# Patient Record
Sex: MALE | Race: WHITE | NOT HISPANIC OR LATINO | Employment: FULL TIME | URBAN - METROPOLITAN AREA
[De-identification: names, ages, dates, MRNs, and addresses within clinical notes are randomized per-mention and may not be internally consistent; named-entity substitution may affect disease eponyms.]

---

## 2017-06-06 ENCOUNTER — TRANSFERRED RECORDS (OUTPATIENT)
Dept: HEALTH INFORMATION MANAGEMENT | Facility: CLINIC | Age: 42
End: 2017-06-06

## 2017-06-07 ENCOUNTER — TRANSFERRED RECORDS (OUTPATIENT)
Dept: HEALTH INFORMATION MANAGEMENT | Facility: CLINIC | Age: 42
End: 2017-06-07

## 2017-06-08 ENCOUNTER — TRANSFERRED RECORDS (OUTPATIENT)
Dept: HEALTH INFORMATION MANAGEMENT | Facility: CLINIC | Age: 42
End: 2017-06-08

## 2017-06-09 ENCOUNTER — TRANSFERRED RECORDS (OUTPATIENT)
Dept: HEALTH INFORMATION MANAGEMENT | Facility: CLINIC | Age: 42
End: 2017-06-09

## 2017-06-12 ENCOUNTER — TRANSFERRED RECORDS (OUTPATIENT)
Dept: HEALTH INFORMATION MANAGEMENT | Facility: CLINIC | Age: 42
End: 2017-06-12

## 2017-07-12 ENCOUNTER — TRANSFERRED RECORDS (OUTPATIENT)
Dept: HEALTH INFORMATION MANAGEMENT | Facility: CLINIC | Age: 42
End: 2017-07-12

## 2017-07-13 ENCOUNTER — TRANSFERRED RECORDS (OUTPATIENT)
Dept: HEALTH INFORMATION MANAGEMENT | Facility: CLINIC | Age: 42
End: 2017-07-13

## 2017-07-17 ENCOUNTER — TRANSFERRED RECORDS (OUTPATIENT)
Dept: HEALTH INFORMATION MANAGEMENT | Facility: CLINIC | Age: 42
End: 2017-07-17

## 2017-07-18 ENCOUNTER — TRANSFERRED RECORDS (OUTPATIENT)
Dept: HEALTH INFORMATION MANAGEMENT | Facility: CLINIC | Age: 42
End: 2017-07-18

## 2017-07-18 ENCOUNTER — PRE VISIT (OUTPATIENT)
Dept: UROLOGY | Facility: CLINIC | Age: 42
End: 2017-07-18

## 2017-08-16 ENCOUNTER — PRE VISIT (OUTPATIENT)
Dept: UROLOGY | Facility: CLINIC | Age: 42
End: 2017-08-16

## 2017-08-16 DIAGNOSIS — N31.9 NEUROGENIC BLADDER: Primary | ICD-10-CM

## 2017-08-16 NOTE — TELEPHONE ENCOUNTER
Patient with a Neurogenic bladder coming in for a consult. Chart reviewed labs are available. Orders placed and message sent to schedulers to schedule a renal US.

## 2017-08-21 ENCOUNTER — OFFICE VISIT (OUTPATIENT)
Dept: UROLOGY | Facility: CLINIC | Age: 42
End: 2017-08-21

## 2017-08-21 VITALS
HEIGHT: 66 IN | DIASTOLIC BLOOD PRESSURE: 74 MMHG | BODY MASS INDEX: 24.11 KG/M2 | WEIGHT: 150 LBS | HEART RATE: 57 BPM | SYSTOLIC BLOOD PRESSURE: 123 MMHG

## 2017-08-21 DIAGNOSIS — S24.109S INJURY OF THORACIC SPINAL CORD, SEQUELA (H): Primary | ICD-10-CM

## 2017-08-21 DIAGNOSIS — N31.9 NEUROGENIC BLADDER: ICD-10-CM

## 2017-08-21 RX ORDER — TAMSULOSIN HYDROCHLORIDE 0.4 MG/1
0.4 CAPSULE ORAL
COMMUNITY
Start: 2017-07-17 | End: 2018-07-16

## 2017-08-21 RX ORDER — SENNOSIDES A AND B 8.6 MG/1
34.4 TABLET, FILM COATED ORAL
COMMUNITY
Start: 2017-07-17 | End: 2019-11-12

## 2017-08-21 RX ORDER — ACETAMINOPHEN 325 MG/1
TABLET ORAL
COMMUNITY
Start: 2017-07-18 | End: 2018-11-12

## 2017-08-21 RX ORDER — BISACODYL 10 MG
10 SUPPOSITORY, RECTAL RECTAL DAILY
COMMUNITY
Start: 2017-07-17 | End: 2019-11-12

## 2017-08-21 RX ORDER — CARVEDILOL 6.25 MG/1
6.25 TABLET ORAL DAILY
COMMUNITY
Start: 2017-07-17

## 2017-08-21 RX ORDER — TRAMADOL HYDROCHLORIDE 50 MG/1
50-100 TABLET ORAL
Status: ON HOLD | COMMUNITY
Start: 2017-07-24 | End: 2019-11-14

## 2017-08-21 RX ORDER — BACLOFEN 10 MG/1
10 TABLET ORAL
COMMUNITY
Start: 2017-07-17 | End: 2018-11-12

## 2017-08-21 RX ORDER — POLYETHYLENE GLYCOL 3350 17 G/17G
17 POWDER, FOR SOLUTION ORAL
Status: ON HOLD | COMMUNITY
Start: 2017-04-06 | End: 2019-11-15

## 2017-08-21 RX ORDER — IBUPROFEN 200 MG
1200 CAPSULE ORAL
COMMUNITY
Start: 2017-07-17 | End: 2023-04-24

## 2017-08-21 RX ORDER — DOCUSATE SODIUM 100 MG/1
200 CAPSULE, LIQUID FILLED ORAL 2 TIMES DAILY
COMMUNITY
Start: 2017-07-17 | End: 2018-07-16

## 2017-08-21 RX ORDER — ALUMINUM HYDROXIDE, MAGNESIUM HYDROXIDE, SIMETHICONE 400; 400; 40 MG/10ML; MG/10ML; MG/10ML
10-20 SUSPENSION ORAL
COMMUNITY
End: 2017-10-09

## 2017-08-21 ASSESSMENT — PAIN SCALES - GENERAL: PAINLEVEL: NO PAIN (0)

## 2017-08-21 NOTE — MR AVS SNAPSHOT
After Visit Summary   8/21/2017    Kinza Teresa    MRN: [id number]           Patient Information     Date Of Birth          1975        Visit Information        Provider Department      8/21/2017 10:30 AM Sulaiman Jacome MD [facility] Urology and Inst for Prostate and Urologic Cancers        Today's Diagnoses     Injury of thoracic spinal cord, sequela (H)    -  1    Neurogenic bladder          Care Instructions       Page 1 of 1  Your exam is on: _______________________ (date) __________________ (time)at: ____________________________________________________ (location)  For informational purposes only. Not to replace the advice of your health care provider.Copyright   2007 [employer]. All rights reserved. [employer] [id number] - REV 12/16.  Urodynamic Testing  What You Need to Know  What is urodynamic testing?  Urodynamic testing is the best way for your doctor to look at the function of your bladder. It is like an EKG, which is used to look at the function of your heart.  The test takes between 60 and 90 minutes. You will spend about 11/2 to 2 hours in your doctor s office. For most patients, the test is not painful.  How should I get ready for this test?   Arrive with a full bladder, if you are able. (Try to drink six 8-ounce glasses of liquid one hour before your exam.)    You may want to wear loose clothing.   Make sure your skin below the waist is clean and dry (no lotions or powders).   If you have any of the following symptoms before the test, please call the clinic right away:Having to urinate more often or feeling a sudden urge to go   Feeling pain or burning when you urinate   Fever or chills   Smelly or cloudy urine      If we gave you a bladder diary, please complete and bring it with you.    What happens during this test?  1. You will empty your bladder into a special toilet. The toilet measures your rate of urine flow.  2. We will then place a very small tube  (catheter) into your bladder. This drains any urine that is left. We will measure the amount of urine.  3. We will place a small tube in your rectum. We ll also put a tiny patch of electrodes on the skin near the anus.  4. A computer will measure the pressure in your bladder and how well your sphincter is working. (The sphincter is the muscle that controls the bladder.)  5. Your bladder will slowly fill with a mixture of saline and contrast medium. We will take images, similar to an Xray. You will tell us when your bladder feels a bit full, quite full and completely full.  6. We will ask you to bear down several times. As you do, we will check for leaking urine.  7. You will again empty your bladder into the special toilet.    What happens after the test?  Your doctor will share the results on the day of the exam, or soon after.  After your test, you may go about your day as normal. You may notice some blood in your urine. You may feel a more urgent need to use the toilet, or you may need to go more often. These effects should improve in the next few days.          Follow-ups after your visit        Your next 10 appointments already scheduled     Sep 27, 2017  9:30 AM CDT   (Arrive by 9:15 AM)   Urodynamics with Regi Richardson PA-C   [facility] Urology and [facility] for Pro[facility] and Urologic C[facility] ([facility])    [address] [place] [address] [id number] [phone]            Oct 09, 2017 12:00 PM CDT   (Arrive by 11:45 AM)   Return Visit with Sulaiman Jacome MD   [facility] Urology and [facility] Urologic C[facility] ([facility])    [address] [place] [address] [id number] [phone]              Who to contact     Please call your clinic at [phone] to:    Ask questions about your health    Make or cancel appointments    Discuss your medicines    Learn about your test results    Speak to your doctor   If you  "have compliments or concerns about an experience at your clinic, or if you wish to file a complaint, please contact [facility] Patient Relations at [phone] or email us at [email or web address]         Additional Information About Your Visit        [employer] Information     [employer] is an electronic gateway that provides easy, online access to your medical records. With [employer], you can request a clinic appointment, read your test results, renew a prescription or communicate with your care team.     To sign up for [employer] visit the website at [email or web address]   You will be asked to enter the access code listed below, as well as some personal information. Please follow the directions to create your username and password.     Your access code is: [email or web address]  Expires: 2017  6:31 AM     Your access code will  in 90 days. If you need help or a new code, please contact your [facility] Clinic or call [phone] for assistance.        Care EveryWhere ID     This is your Care EveryWhere ID. This could be used by other organizations to access your [facility] medical records  [id number]        Your Vitals Were     Pulse Height BMI (Body Mass Index)             57 1.676 m (5' 6\") 24.21 kg/m2          Blood Pressure from Last 3 Encounters:   17 123/74    Weight from Last 3 Encounters:   17 68 kg (150 lb)              Today, you had the following     No orders found for display       Primary Care Provider Office Phone # Fax #    Ray Puente [phone]       [facility] [address] [address]        Equal Access to Services     SUZANNE CASILLAS : Hadii [facility] Sobrice, waaxda luqadaha, qaybta kaalmada hayley hdez. So [facility] [phone].    ATENCIÓN: Si habla español, tiene a villarreal disposición servicios gratuitos de asistencia lingüística. Llame al " [phone].    We comply with applicable federal civil rights laws and [place] laws. We do not discriminate on the basis of race, color, national origin, age, disability sex, sexual orientation or gender identity.            Thank you!     Thank you for choosing [facility] UROLOGY AND [facility] FOR PROST[facility] AND UROLOGIC CANCERS  for your care. Our goal is always to provide you with excellent care. Hearing back from our patients is one way we can continue to improve our services. Please take a few minutes to complete the written survey that you may receive in the mail after your visit with us. Thank you!             Your Updated Medication List - Protect others around you: Learn how to safely use, store and throw away your medicines at www.disposemymeds.org.          This list is accurate as of: 8/21/17 12:09 PM.  Always use your most recent med list.                   Brand Name Dispense Instructions for use Diagnosis    acetaminophen 325 MG tablet    TYLENOL     Take 2 tabs bid        Aluminum-Magnesium-Simethicone 200-200-20 MG/5ML Susp      Take 10-20 mLs by mouth        baclofen 10 MG tablet    LIORESAL     Take 10 mg by mouth        bisacodyl 10 MG Suppository    DULCOLAX     Place 10 mg rectally        calcium carbonate 1250 (500 CA) MG Tabs tablet    OSCAL 500     Take 500 mg by mouth        carvedilol 6.25 MG tablet    COREG     Take 6.25 mg by mouth        docusate sodium 100 MG capsule    COLACE     Take 200 mg by mouth 2 times daily        FIBER (GUAR GUM) PO      Take 1 packet by mouth        omeprazole 20 MG CR capsule    priLOSEC     Take 20 mg by mouth        polyethylene glycol Packet    MIRALAX/GLYCOLAX     Take 17 g by mouth        senna 8.6 MG tablet    SENOKOT     Take 34.4 mg by mouth        tamsulosin 0.4 MG capsule    FLOMAX     Take 0.4 mg by mouth        traMADol 50 MG tablet    ULTRAM     Take  mg by mouth        Wheel Chair K1 Basic Desk Arm Misc      Wheelchair:  Tilt and space  with  leg rests  Length of need: 6 months
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
## 2017-08-21 NOTE — PATIENT INSTRUCTIONS
Page 1 of 1  Your exam is on: _______________________ (date) __________________ (time)at: ____________________________________________________ (location)  For informational purposes only. Not to replace the advice of your health care provider.Copyright   2007 [employer]. All rights reserved. [employer] [id number] - REV 12/16.  Urodynamic Testing  What You Need to Know  What is urodynamic testing?  Urodynamic testing is the best way for your doctor to look at the function of your bladder. It is like an EKG, which is used to look at the function of your heart.  The test takes between 60 and 90 minutes. You will spend about 11/2 to 2 hours in your doctor s office. For most patients, the test is not painful.  How should I get ready for this test?   Arrive with a full bladder, if you are able. (Try to drink six 8-ounce glasses of liquid one hour before your exam.)    You may want to wear loose clothing.   Make sure your skin below the waist is clean and dry (no lotions or powders).   If you have any of the following symptoms before the test, please call the clinic right away:Having to urinate more often or feeling a sudden urge to go   Feeling pain or burning when you urinate   Fever or chills   Smelly or cloudy urine      If we gave you a bladder diary, please complete and bring it with you.    What happens during this test?  1. You will empty your bladder into a special toilet. The toilet measures your rate of urine flow.  2. We will then place a very small tube (catheter) into your bladder. This drains any urine that is left. We will measure the amount of urine.  3. We will place a small tube in your rectum. We ll also put a tiny patch of electrodes on the skin near the anus.  4. A computer will measure the pressure in your bladder and how well your sphincter is working. (The sphincter is the muscle that controls the bladder.)  5. Your bladder will slowly fill with a mixture of saline and contrast medium.  We will take images, similar to an Xray. You will tell us when your bladder feels a bit full, quite full and completely full.  6. We will ask you to bear down several times. As you do, we will check for leaking urine.  7. You will again empty your bladder into the special toilet.    What happens after the test?  Your doctor will share the results on the day of the exam, or soon after.  After your test, you may go about your day as normal. You may notice some blood in your urine. You may feel a more urgent need to use the toilet, or you may need to go more often. These effects should improve in the next few days.

## 2017-08-21 NOTE — NURSING NOTE
"Chief Complaint   Patient presents with     Consult     Neurogenic bladder due to spinal cord injury.       Blood pressure 123/74, pulse 57, height 1.676 m (5' 6\"), weight 68 kg (150 lb). Body mass index is 24.21 kg/(m^2).    There is no problem list on file for this patient.      No Known Allergies    Current Outpatient Prescriptions   Medication Sig Dispense Refill     acetaminophen (TYLENOL) 325 MG tablet Take 2 tabs bid       baclofen (LIORESAL) 10 MG tablet Take 10 mg by mouth       bisacodyl (DULCOLAX) 10 MG Suppository Place 10 mg rectally       calcium carbonate (OSCAL 500) 1250 (500 CA) MG TABS tablet Take 500 mg by mouth       carvedilol (COREG) 6.25 MG tablet Take 6.25 mg by mouth       docusate sodium (COLACE) 100 MG capsule Take 200 mg by mouth 2 times daily       FIBER, GUAR GUM, PO Take 1 packet by mouth       omeprazole (PRILOSEC) 20 MG CR capsule Take 20 mg by mouth       senna (SENOKOT) 8.6 MG tablet Take 34.4 mg by mouth       tamsulosin (FLOMAX) 0.4 MG capsule Take 0.4 mg by mouth       traMADol (ULTRAM) 50 MG tablet Take  mg by mouth       Misc. Devices (WHEEL CHAIR K1 BASIC DESK ARM) MISC Wheelchair:  Tilt and space  with leg rests  Length of need: 6 months       polyethylene glycol (MIRALAX/GLYCOLAX) Packet Take 17 g by mouth       Alum & Mag Hydroxide-Simeth (ALUMINUM-MAGNESIUM-SIMETHICONE) 200-200-20 MG/5ML SUSP Take 10-20 mLs by mouth         Social History   Substance Use Topics     Smoking status: Never Smoker     Smokeless tobacco: Never Used     Alcohol use Not on file       ABDIRIZAK Gomez  8/21/2017  10:53 AM       "

## 2017-09-20 ENCOUNTER — PRE VISIT (OUTPATIENT)
Dept: UROLOGY | Facility: CLINIC | Age: 42
End: 2017-09-20

## 2017-09-27 ENCOUNTER — OFFICE VISIT (OUTPATIENT)
Dept: UROLOGY | Facility: CLINIC | Age: 42
End: 2017-09-27

## 2017-09-27 VITALS
DIASTOLIC BLOOD PRESSURE: 83 MMHG | WEIGHT: 150 LBS | HEIGHT: 66 IN | HEART RATE: 74 BPM | BODY MASS INDEX: 24.11 KG/M2 | SYSTOLIC BLOOD PRESSURE: 132 MMHG

## 2017-09-27 DIAGNOSIS — S24.109S INJURY OF THORACIC SPINAL CORD, SEQUELA (H): ICD-10-CM

## 2017-09-27 DIAGNOSIS — N31.9 NEUROGENIC BLADDER: Primary | ICD-10-CM

## 2017-09-27 LAB
APPEARANCE UR: CLEAR
BILIRUB UR QL: NORMAL
COLOR UR: YELLOW
GLUCOSE URINE: NORMAL MG/DL
HGB UR QL: NORMAL
KETONES UR QL: NORMAL MG/DL
LEUKOCYTE ESTERASE URINE: NORMAL
NITRITE UR QL STRIP: NORMAL
PH UR STRIP: 5 PH (ref 5–7)
PROTEIN ALBUMIN URINE: NORMAL MG/DL
SOURCE: NORMAL
SP GR UR STRIP: 1.02 (ref 1–1.03)
UROBILINOGEN UR QL STRIP: 0.2 EU/DL (ref 0.2–1)

## 2017-09-27 RX ORDER — GABAPENTIN 300 MG/1
CAPSULE ORAL
COMMUNITY
Start: 2017-09-12 | End: 2018-07-16

## 2017-09-27 RX ORDER — ERGOCALCIFEROL 1.25 MG/1
50000 CAPSULE, LIQUID FILLED ORAL
COMMUNITY
Start: 2017-08-21 | End: 2017-10-10

## 2017-09-27 RX ORDER — LIDOCAINE 50 MG/G
OINTMENT TOPICAL
COMMUNITY
Start: 2017-09-12 | End: 2018-07-16

## 2017-09-27 ASSESSMENT — PAIN SCALES - GENERAL: PAINLEVEL: NO PAIN (0)

## 2017-09-27 NOTE — LETTER
9/27/2017       RE: Kinza Teresa  [address] [place] [address]     Dear Colleague,    Thank you for referring your patient, Kinza Teresa, to the [facility] UROLOGY AND INST FOR PROSTATE AND UROLOGIC CANCERS at [facility]. Please see a copy of my visit note below.    PREPROCEDURE DIAGNOSES:    1. Neurogenic bladder secondary to T11 SCI in 3/2017    POSTPROCEDURE DIAGNOSES:  1. Neurogenic bladder secondary to T11 SCI in 3/2017  2. UDS shows:  -Detrusor activity during filling: multiple episodes of DO starting after 200 mL instilled - these occur without incontinence at lower volumes/pressures but WITH incontinence at higher volumes/pressures (first leak noted at 507 mL and Pdet of 30 cm H2O; larger leak occurs at 560 mL and Pdet of 32 cm H2O).  -Otherwise good bladder compliance between episodes of DO  -Normal bladder capacity   -Sensation: absent  -Incontinence: DO with incontinence as described above; no stress leak  -Detrusor sphincter dyssenergia: could not definitively assess as patient did not void (some increased EMG activity at higher volumes, when DO begins)  -Outlet obstruction: could not definitively assess as patient did not void    PROCEDURE:    1. Sterile urethral catheterization for measurement of postvoid residual urine volume.  2. Complex filling cystometrogram with measurement of bladder and rectal pressures.  3. Electromyography of the pelvic floor during urodynamics.  4. Fluoroscopic imaging of the bladder during urodynamics, at least 3 views.    5. Interpretation of urodynamics and flouroscopic imaging.      INDICATIONS FOR PROCEDURE:  Mr. Kinza Teresa is a pleasant 42 year old male with neurogenic bladder secondary to T11 SCI in 3/2017. Baseline video urodynamic assessment is requested today by Dr. Jacome to better characterize Mr. Kinza Teresa's voiding dysfunction.      VOIDING DIARY:  No formal voiding diary completed. Per history obtained  from patient:  Catheterizes per native urethra with a 14F Coude catheter q4-6 hours.  Episodes of incontinence: only with infection.  Incontinence associated with: infection.  Total Volume Intake: 1 bottle of water per day, 240 mL of milk qam and qhs, occasional soda   Total Volume Output: 200-300 mL per catheterization on average; largest cathed volumes range 600-700 mL    DESCRIPTION OF PROCEDURE:  Risks, benefits, and alternatives to urodynamics were discussed with the patient and he wished to proceed.  Urodynamics are planned to better assess the primary etiology for Mr. Teresa's urologic dysfunction.  The patient does not take anticholinergic medications.  After informed consent was obtained, the patient was taken to the procedure room where uroflowmetry was performed. Findings below.     UROFLOWMETRY:  No pre-study uroflow as patient does not void volitionally.   Postvoid residual by catheter: 150 mL.  Pretest urine dipstick was negative for leukocytes and nitrites.    Next a 7F double-lumen urodynamics catheter was inserted into the bladder under sterile technique.  A 7F abdominal manometry catheter was placed in the rectum.  EMG pads were placed on both sides of the anal verge.  The bladder was filled with 200 mL of Cystografin at 25 mL/minute and serial pressures were recorded.  With coughing there was an appropriate rise in vesical and abdominal pressures with no change in detrusor pressure, confirming good study catheter placement.    DURING THE FILLING PHASE:  No sensations reported.  Maximum Capacity: not reported but max volume we were able to instill before recurrent DO with leak was ~ 569 cc    Uninhibited detrusor contractions: several episodes of DO starting after 200 mL - these occur without incontinence at lower volumes/pressures but WITH incontinence at higher volumes/pressures (first leak noted at 507 mL and Pdet of 30 cm H2O; larger leak occurs at 560 mL and Pdet of 32 cm H2O).  Compliance:  otherwise good compliance between UDC's  Continence: urge incontinence as described above; no stress leak at Pabd 64 cm H2O at a volume of 393 mL.  EMG: concordant during filling. Some increased EMG activity at higher volumes, when DO begins.    DURING THE VOIDING PHASE:  No voiding phase as patient does not void volitionally.     FLUOROSCOPIC IMAGING OF THE BLADDER DURING URODYNAMICS:  Fluoroscopy during today's procedure demonstrated a smooth walled bladder without diverticulae or cellules.  No vesicoureteral reflux was observed.  The bladder neck was closed during filling and open during incontinence episodes.  At the completion of today's study, all catheters were removed and the patient was brought back into the consultation room to further discuss today's study results.      ASSESSMENT/PLAN:  Mr. Kinza Teresa is a pleasant 42 year old male with neurogenic bladder secondary to T11 SCI in 3/2017 who demonstrated the following findings today on urodynamic evaluation:    -Detrusor activity during filling: multiple episodes of DO starting after 200 mL instilled - these occur without incontinence at lower volumes/pressures but WITH incontinence at higher volumes/pressures (first leak noted at 507 mL and Pdet of 30 cm H2O; larger leak occurs at 560 mL and Pdet of 32 cm H2O).  -Otherwise good bladder compliance between episodes of DO  -Normal bladder capacity   -Sensation: absent  -Incontinence: DO with incontinence as described above; no stress leak  -Detrusor sphincter dyssenergia: could not definitively assess as patient did not void (some increased EMG activity at higher volumes, when DO begins)  -Outlet obstruction: could not definitively assess as patient did not void    We discussed his study results in detail today. He does exhibit some detrusor overactivity but does not leak until volumes of ~500 cc when detrusor pressures reach 30 cm H2O (no leak at lower volumes/pressures). No vesicoureteral reflux was  observed.  -Discussed initiating an anticholinergic medication but they prefer to follow up with Dr. Jacome as scheduled in 2 weeks prior to making any medication changes.  -Also discussed that Flomax is likely not needed, but they also prefer to continue with this for now until follow up with Dr. Jacome.  -Encouraged to catheterize q4 hours during the day.     The patient will follow up as scheduled with Dr. Jacome to further discuss today's study results and make plans for how best to proceed.      - A single Cipro antibiotic was provided for UTI prophylaxis following completion of today's study per department protocol.  The risk of UTI with VUDS is low at ~2.5-3%.      Thank you for allowing me to participate in the care of Mr. Kinza Teresa and please don't hesitate to contact me with any questions or concerns.      EL JimenezC  Urology Physician Assistant

## 2017-09-27 NOTE — PROGRESS NOTES
PREPROCEDURE DIAGNOSES:    1. Neurogenic bladder secondary to T11 SCI in 3/2017    POSTPROCEDURE DIAGNOSES:  1. Neurogenic bladder secondary to T11 SCI in 3/2017  2. UDS shows:  -Detrusor activity during filling: multiple episodes of DO starting after 200 mL instilled - these occur without incontinence at lower volumes/pressures but WITH incontinence at higher volumes/pressures (first leak noted at 507 mL and Pdet of 30 cm H2O; larger leak occurs at 560 mL and Pdet of 32 cm H2O).  -Otherwise good bladder compliance between episodes of DO  -Normal bladder capacity   -Sensation: absent  -Incontinence: DO with incontinence as described above; no stress leak  -Detrusor sphincter dyssenergia: could not definitively assess as patient did not void (some increased EMG activity at higher volumes, when DO begins)  -Outlet obstruction: could not definitively assess as patient did not void    PROCEDURE:    1. Sterile urethral catheterization for measurement of postvoid residual urine volume.  2. Complex filling cystometrogram with measurement of bladder and rectal pressures.  3. Electromyography of the pelvic floor during urodynamics.  4. Fluoroscopic imaging of the bladder during urodynamics, at least 3 views.    5. Interpretation of urodynamics and flouroscopic imaging.      INDICATIONS FOR PROCEDURE:  Mr. Kinza Teresa is a pleasant 42 year old male with neurogenic bladder secondary to T11 SCI in 3/2017. Baseline video urodynamic assessment is requested today by Dr. Jacome to better characterize Mr. Kinza Teresa's voiding dysfunction.      VOIDING DIARY:  No formal voiding diary completed. Per history obtained from patient:  Catheterizes per native urethra with a 14F Coude catheter q4-6 hours.  Episodes of incontinence: only with infection.  Incontinence associated with: infection.  Total Volume Intake: 1 bottle of water per day, 240 mL of milk qam and qhs, occasional soda   Total Volume Output: 200-300 mL per  catheterization on average; largest cathed volumes range 600-700 mL    DESCRIPTION OF PROCEDURE:  Risks, benefits, and alternatives to urodynamics were discussed with the patient and he wished to proceed.  Urodynamics are planned to better assess the primary etiology for Mr. Teresa's urologic dysfunction.  The patient does not take anticholinergic medications.  After informed consent was obtained, the patient was taken to the procedure room where uroflowmetry was performed. Findings below.     UROFLOWMETRY:  No pre-study uroflow as patient does not void volitionally.   Postvoid residual by catheter: 150 mL.  Pretest urine dipstick was negative for leukocytes and nitrites.    Next a 7F double-lumen urodynamics catheter was inserted into the bladder under sterile technique.  A 7F abdominal manometry catheter was placed in the rectum.  EMG pads were placed on both sides of the anal verge.  The bladder was filled with 200 mL of Cystografin at 25 mL/minute and serial pressures were recorded.  With coughing there was an appropriate rise in vesical and abdominal pressures with no change in detrusor pressure, confirming good study catheter placement.    DURING THE FILLING PHASE:  No sensations reported.  Maximum Capacity: not reported but max volume we were able to instill before recurrent DO with leak was ~ 569 cc    Uninhibited detrusor contractions: several episodes of DO starting after 200 mL - these occur without incontinence at lower volumes/pressures but WITH incontinence at higher volumes/pressures (first leak noted at 507 mL and Pdet of 30 cm H2O; larger leak occurs at 560 mL and Pdet of 32 cm H2O).  Compliance: otherwise good compliance between UDC's  Continence: urge incontinence as described above; no stress leak at Pabd 64 cm H2O at a volume of 393 mL.  EMG: concordant during filling. Some increased EMG activity at higher volumes, when DO begins.    DURING THE VOIDING PHASE:  No voiding phase as patient does  not void volitionally.     FLUOROSCOPIC IMAGING OF THE BLADDER DURING URODYNAMICS:  Fluoroscopy during today's procedure demonstrated a smooth walled bladder without diverticulae or cellules.  No vesicoureteral reflux was observed.  The bladder neck was closed during filling and open during incontinence episodes.  At the completion of today's study, all catheters were removed and the patient was brought back into the consultation room to further discuss today's study results.      ASSESSMENT/PLAN:  Mr. Kinza Teresa is a pleasant 42 year old male with neurogenic bladder secondary to T11 SCI in 3/2017 who demonstrated the following findings today on urodynamic evaluation:    -Detrusor activity during filling: multiple episodes of DO starting after 200 mL instilled - these occur without incontinence at lower volumes/pressures but WITH incontinence at higher volumes/pressures (first leak noted at 507 mL and Pdet of 30 cm H2O; larger leak occurs at 560 mL and Pdet of 32 cm H2O).  -Otherwise good bladder compliance between episodes of DO  -Normal bladder capacity   -Sensation: absent  -Incontinence: DO with incontinence as described above; no stress leak  -Detrusor sphincter dyssenergia: could not definitively assess as patient did not void (some increased EMG activity at higher volumes, when DO begins)  -Outlet obstruction: could not definitively assess as patient did not void    We discussed his study results in detail today. He does exhibit some detrusor overactivity but does not leak until volumes of ~500 cc when detrusor pressures reach 30 cm H2O (no leak at lower volumes/pressures). No vesicoureteral reflux was observed.  -Discussed initiating an anticholinergic medication but they prefer to follow up with Dr. Jacome as scheduled in 2 weeks prior to making any medication changes.  -Also discussed that Flomax is likely not needed, but they also prefer to continue with this for now until follow up with   [facility].  -Encouraged to catheterize q4 hours during the day.     The patient will follow up as scheduled with Dr. Jacome to further discuss today's study results and make plans for how best to proceed.      - A single Cipro antibiotic was provided for UTI prophylaxis following completion of today's study per department protocol.  The risk of UTI with VUDS is low at ~2.5-3%.      Thank you for allowing me to participate in the care of Mr. Kinza Teresa and please don't hesitate to contact me with any questions or concerns.      EL JimenezC  Urology Physician Assistant

## 2017-09-27 NOTE — MR AVS SNAPSHOT
After Visit Summary   2017    Kinza Teresa    MRN: [id number]           Patient Information     Date Of Birth          1975        Visit Information        Provider Department      2017 9:30 AM Regi Richardson PA-C [facility] Urologic Cancers        Today's Diagnoses     Neurogenic bladder    -  1    Injury of thoracic spinal cord, sequela (H)           Follow-ups after your visit        Your next 10 appointments already scheduled     Oct 09, 2017 12:00 PM CDT   (Arrive by 11:45 AM)   Return Visit with Sulaiman Jacome MD   [facility]rologic Cancers ([facility])    [address] [address] [phone]              Who to contact     Please call your clinic at [phone] to:    Ask questions about your health    Make or cancel appointments    Discuss your medicines    Learn about your test results    Speak to your doctor   If you have compliments or concerns about an experience at your clinic, or if you wish to file a complaint, please contact [facility] Patient Relations at [phone] or email us at [email or web address]         Additional Information About Your Visit        MyChart Information     [employer] is an electronic gateway that provides easy, online access to your medical records. With [email or web address], you can request a clinic appointment, read your test results, renew a prescription or communicate with your care team.     To sign up for [employer] visit the website at [email or web address]   You will be asked to enter the access code listed below, as well as some personal information. Please follow the directions to create your username and password.     Your access code is: [email or web address]  Expires: 2017  6:31 AM     Your access code will  in 90 days. If you need help or a new code, please contact  "your [facility] or call [phone] for assistance.        Care EveryWhere ID     This is your Care EveryWhere ID. This could be used by other organizations to access your [facility] medical records  [id number]        Your Vitals Were     Pulse Height BMI (Body Mass Index)             74 1.676 m (5' 6\") 24.21 kg/m2          Blood Pressure from Last 3 Encounters:   09/27/17 132/83   08/21/17 123/74    Weight from Last 3 Encounters:   09/27/17 68 kg (150 lb)   08/21/17 68 kg (150 lb)              We Performed the Following     CYSTOMETROGRAM COMPLEX     EMG ANAL/URETH SPHINCTER, OTHER THAN NEEDLE     HC CONTRAST ISOVUE 370 MG/ML, PER ML     Urinalysis chemical screen POCT     X-Ray Urethrogram retrograde        Primary Care Provider Office Phone # Fax #    Ray Puente [phone]       [facility] [address] [address]        Equal Access to Services     SUZANNE CASILLAS AH: Hadii aad ku hadasho Soomaali, waaxda luqadaha, qaybta kaalmada adeegyada, waxay idiin hayaan sugar jacoboaramiguel massey . So [facility] [phone].    ATENCIÓN: Si habla español, tiene a villarreal disposición servicios gratuitos de asistencia lingüística. Llame al [phone].    We comply with applicable federal civil rights laws and [place] laws. We do not discriminate on the basis of race, color, national origin, age, disability sex, sexual orientation or gender identity.            Thank you!     Thank you for choosing [facility] UROLOGY AND [facility] FOR PROSTATE AND UROLOGIC CANCERS  for your care. Our goal is always to provide you with excellent care. Hearing back from our patients is one way we can continue to improve our services. Please take a few minutes to complete the written survey that you may receive in the mail after your visit with us. Thank you!             Your Updated Medication List - Protect others around you: Learn how to safely use, store and throw away your medicines at " www.disposemymeds.org.          This list is accurate as of: 9/27/17 11:15 AM.  Always use your most recent med list.                   Brand Name Dispense Instructions for use Diagnosis    acetaminophen 325 MG tablet    TYLENOL     Take 2 tabs bid        Aluminum-Magnesium-Simethicone 200-200-20 MG/5ML Susp      Take 10-20 mLs by mouth        baclofen 10 MG tablet    LIORESAL     Take 10 mg by mouth        bisacodyl 10 MG Suppository    DULCOLAX     Place 10 mg rectally        calcium carbonate 1250 (500 CA) MG Tabs tablet    OSCAL 500     Take 500 mg by mouth        carvedilol 6.25 MG tablet    COREG     Take 6.25 mg by mouth        docusate sodium 100 MG capsule    COLACE     Take 200 mg by mouth 2 times daily        FIBER (GUAR GUM) PO      Take 1 packet by mouth        gabapentin 300 MG capsule    NEURONTIN     300 QHS x3 days, then 300mg BID x3 days and then 300mg TID forever        lidocaine 5 % ointment    XYLOCAINE     Apply to areas of irritation three to four times daily as needed        omeprazole 20 MG CR capsule    priLOSEC     Take 20 mg by mouth        polyethylene glycol Packet    MIRALAX/GLYCOLAX     Take 17 g by mouth        senna 8.6 MG tablet    SENOKOT     Take 34.4 mg by mouth        tamsulosin 0.4 MG capsule    FLOMAX     Take 0.4 mg by mouth        traMADol 50 MG tablet    ULTRAM     Take  mg by mouth        vitamin D 71425 UNIT capsule    ERGOCALCIFEROL     Take 50,000 Units by mouth        Wheel Chair K1 Basic Desk Arm Misc      Wheelchair:  Tilt and space  with leg rests  Length of need: 6 months

## 2017-10-03 ENCOUNTER — PRE VISIT (OUTPATIENT)
Dept: UROLOGY | Facility: CLINIC | Age: 42
End: 2017-10-03

## 2017-10-03 NOTE — TELEPHONE ENCOUNTER
Patient with a neurogenic bladder coming in to review UDS. Chart reviewed and all records availale. No need for a call.

## 2017-10-09 ENCOUNTER — OFFICE VISIT (OUTPATIENT)
Dept: UROLOGY | Facility: CLINIC | Age: 42
End: 2017-10-09

## 2017-10-09 VITALS
WEIGHT: 145 LBS | BODY MASS INDEX: 23.3 KG/M2 | HEART RATE: 53 BPM | DIASTOLIC BLOOD PRESSURE: 72 MMHG | HEIGHT: 66 IN | SYSTOLIC BLOOD PRESSURE: 123 MMHG

## 2017-10-09 DIAGNOSIS — N31.9 NEUROGENIC BLADDER: Primary | ICD-10-CM

## 2017-10-09 RX ORDER — OXYBUTYNIN CHLORIDE 5 MG/1
10 TABLET, EXTENDED RELEASE ORAL DAILY
Qty: 30 TABLET | Refills: 3 | Status: SHIPPED | OUTPATIENT
Start: 2017-10-09 | End: 2017-12-05

## 2017-10-09 ASSESSMENT — PAIN SCALES - GENERAL: PAINLEVEL: NO PAIN (0)

## 2017-10-09 NOTE — MR AVS SNAPSHOT
After Visit Summary   10/9/2017    Kinza Teresa    MRN: [id number]           Patient Information     Date Of Birth          1975        Visit Information        Provider Department      10/9/2017 12:00 PM Sulaiman Jacome MD [facility] and [facility]rolog[facility]        Care Instructions    Take medication as directed.    Follow up with Regi Richardson PA-C in 3 months for follow up and possible edex teaching.          Follow-ups after your visit        Your next 10 appointments already scheduled     2018  1:00 PM CST   (Arrive by 12:45 PM)   Return Visit with Regi Richardson PA-C   [facility] and [facility]rolog[facility] ([facility])    [address] [place] [id number] [phone]              Who to contact     Please call your clinic at [phone] to:    Ask questions about your health    Make or cancel appointments    Discuss your medicines    Learn about your test results    Speak to your doctor   If you have compliments or concerns about an experience at your clinic, or if you wish to file a complaint, please contact [facility] Patient Relations at [phone] or email us at [email or web address]         Additional Information About Your Visit        MyChart Information     [employer] is an electronic gateway that provides easy, online access to your medical records. With [employer], you can request a clinic appointment, read your test results, renew a prescription or communicate with your care team.     To sign up for [employer] visit the website at [email or web address]   You will be asked to enter the access code listed below, as well as some personal information. Please follow the directions to create your username and password.     Your access code is: [email or web address]  Expires: 2017  6:31 AM     Your access code will  in 90 days. If  "you need help or a new code, please contact your [facility] or call [phone] for assistance.        Care EveryWhere ID     This is your Care EveryWhere ID. This could be used by other organizations to access your [place] medical records  [id number]        Your Vitals Were     Pulse Height BMI (Body Mass Index)             53 1.676 m (5' 6\") 23.4 kg/m2          Blood Pressure from Last 3 Encounters:   10/09/17 123/72   09/27/17 132/83   08/21/17 123/74    Weight from Last 3 Encounters:   10/09/17 65.8 kg (145 lb)   09/27/17 68 kg (150 lb)   08/21/17 68 kg (150 lb)              Today, you had the following     No orders found for display       Primary Care Provider Office Phone # Fax #    Ray Puente [phone]       [facility] [address] [address]        Equal Access to Services     SUZANNE CASILLAS : Hadii aad ku hadasho Soomaali, waaxda luqadaha, qaybta kaalmada adeegyada, waxay idiin haykyran sugar massey . So [facility] [phone].    ATENCIÓN: Si habla español, tiene a villarreal disposición servicios gratuitos de asistencia lingüística. Llame al [phone].    We comply with applicable federal civil rights laws and [place] laws. We do not discriminate on the basis of race, color, national origin, age, disability, sex, sexual orientation, or gender identity.            Thank you!     Thank you for choosing [facility] AND [facility] PROSTATE AND UROLOGIC CANCERS  for your care. Our goal is always to provide you with excellent care. Hearing back from our patients is one way we can continue to improve our services. Please take a few minutes to complete the written survey that you may receive in the mail after your visit with us. Thank you!             Your Updated Medication List - Protect others around you: Learn how to safely use, store and throw away your medicines at www.disposemymeds.org.          This list is accurate as of: " 10/9/17  2:00 PM.  Always use your most recent med list.                   Brand Name Dispense Instructions for use Diagnosis    acetaminophen 325 MG tablet    TYLENOL     Take 2 tabs bid        baclofen 10 MG tablet    LIORESAL     Take 10 mg by mouth        bisacodyl 10 MG Suppository    DULCOLAX     Place 10 mg rectally        calcium carbonate 1250 (500 CA) MG Tabs tablet    OSCAL 500     Take 500 mg by mouth        carvedilol 6.25 MG tablet    COREG     Take 6.25 mg by mouth        docusate sodium 100 MG capsule    COLACE     Take 200 mg by mouth 2 times daily        FIBER (GUAR GUM) PO      Take 1 packet by mouth        gabapentin 300 MG capsule    NEURONTIN     300 QHS x3 days, then 300mg BID x3 days and then 300mg TID forever        lidocaine 5 % ointment    XYLOCAINE     Apply to areas of irritation three to four times daily as needed        omeprazole 20 MG CR capsule    priLOSEC     Take 20 mg by mouth        polyethylene glycol Packet    MIRALAX/GLYCOLAX     Take 17 g by mouth        senna 8.6 MG tablet    SENOKOT     Take 34.4 mg by mouth        tamsulosin 0.4 MG capsule    FLOMAX     Take 0.4 mg by mouth        traMADol 50 MG tablet    ULTRAM     Take  mg by mouth        vitamin D 49523 UNIT capsule    ERGOCALCIFEROL     Take 50,000 Units by mouth        Wheel Chair K1 Basic Desk Arm Misc      Wheelchair:  Tilt and space  with leg rests  Length of need: 6 months

## 2017-10-09 NOTE — PATIENT INSTRUCTIONS
Take medication as directed.    Follow up with Regi Richardson PA-C in 3 months for follow up and possible edex teaching.
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
## 2017-10-09 NOTE — NURSING NOTE
"Chief Complaint   Patient presents with     RECHECK     Review UDS       Blood pressure 123/72, pulse 53, height 1.676 m (5' 6\"), weight 65.8 kg (145 lb). Body mass index is 23.4 kg/(m^2).    There is no problem list on file for this patient.      No Known Allergies    Current Outpatient Prescriptions   Medication Sig Dispense Refill     gabapentin (NEURONTIN) 300 MG capsule 300 QHS x3 days, then 300mg BID x3 days and then 300mg TID forever       lidocaine (XYLOCAINE) 5 % ointment Apply to areas of irritation three to four times daily as needed       vitamin D (ERGOCALCIFEROL) 07773 UNIT capsule Take 50,000 Units by mouth       acetaminophen (TYLENOL) 325 MG tablet Take 2 tabs bid       baclofen (LIORESAL) 10 MG tablet Take 10 mg by mouth       bisacodyl (DULCOLAX) 10 MG Suppository Place 10 mg rectally       calcium carbonate (OSCAL 500) 1250 (500 CA) MG TABS tablet Take 500 mg by mouth       carvedilol (COREG) 6.25 MG tablet Take 6.25 mg by mouth       docusate sodium (COLACE) 100 MG capsule Take 200 mg by mouth 2 times daily       FIBER, GUAR GUM, PO Take 1 packet by mouth       omeprazole (PRILOSEC) 20 MG CR capsule Take 20 mg by mouth       senna (SENOKOT) 8.6 MG tablet Take 34.4 mg by mouth       tamsulosin (FLOMAX) 0.4 MG capsule Take 0.4 mg by mouth       traMADol (ULTRAM) 50 MG tablet Take  mg by mouth       [facility] Devices (WHEEL CHAIR K1 BASIC DESK ARM) MISC Wheelchair:  Tilt and space  with leg rests  Length of need: 6 months       polyethylene glycol (MIRALAX/GLYCOLAX) Packet Take 17 g by mouth         Social History   Substance Use Topics     Smoking status: Never Smoker     Smokeless tobacco: Never Used     Alcohol use Not on file       ABDIRIZAK Gomez  10/9/2017  12:30 PM       "
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
## 2017-10-11 ENCOUNTER — TELEPHONE (OUTPATIENT)
Dept: UROLOGY | Facility: CLINIC | Age: 42
End: 2017-10-11

## 2017-10-11 DIAGNOSIS — N52.9 ED (ERECTILE DYSFUNCTION): Primary | ICD-10-CM

## 2017-10-11 RX ORDER — SILDENAFIL 100 MG/1
100 TABLET, FILM COATED ORAL DAILY PRN
Qty: 5 TABLET | Refills: 0 | Status: SHIPPED | OUTPATIENT
Start: 2017-10-11 | End: 2018-07-16

## 2017-10-11 NOTE — TELEPHONE ENCOUNTER
[facility] Prior Authorization Team   Phone: [phone]  Fax: [phone]      PA Initiation    Medication: viagra  Insurance Company: [employer] - Phone [phone] Fax [phone]  Pharmacy Filling the Rx: [id number] - [place], [place] - [address] AT [facility] [address]  Filling Pharmacy Phone: [phone]  Filling Pharmacy Fax: [phone]  Start Date: 10/11/2017

## 2017-10-13 NOTE — TELEPHONE ENCOUNTER
[facility] Prior Authorization Team   Phone: [phone]  Fax: [phone]      PRIOR AUTHORIZATION DENIED    Medication: viagra - denied    Denial Date: 10/13/2017    Denial Rational:  ED medications are not covered by INS     Appeal Information:

## 2017-10-16 ENCOUNTER — TELEPHONE (OUTPATIENT)
Dept: UROLOGY | Facility: CLINIC | Age: 42
End: 2017-10-16

## 2017-10-16 DIAGNOSIS — N39.0 URINARY TRACT INFECTION: Primary | ICD-10-CM

## 2017-10-16 DIAGNOSIS — N39.0 URINARY TRACT INFECTION: ICD-10-CM

## 2017-10-16 DIAGNOSIS — R82.90 NONSPECIFIC FINDING ON EXAMINATION OF URINE: Primary | ICD-10-CM

## 2017-10-16 LAB
ALBUMIN UR-MCNC: NEGATIVE MG/DL
APPEARANCE UR: ABNORMAL
BACTERIA #/AREA URNS HPF: ABNORMAL /HPF
BILIRUB UR QL STRIP: NEGATIVE
COLOR UR AUTO: YELLOW
GLUCOSE UR STRIP-MCNC: NEGATIVE MG/DL
HGB UR QL STRIP: ABNORMAL
KETONES UR STRIP-MCNC: NEGATIVE MG/DL
LEUKOCYTE ESTERASE UR QL STRIP: ABNORMAL
NITRATE UR QL: POSITIVE
PH UR STRIP: 5.5 PH (ref 5–7)
RBC #/AREA URNS AUTO: ABNORMAL /HPF
SOURCE: ABNORMAL
SP GR UR STRIP: >1.03 (ref 1–1.03)
UROBILINOGEN UR STRIP-ACNC: 0.2 EU/DL (ref 0.2–1)
WBC #/AREA URNS AUTO: ABNORMAL /HPF

## 2017-10-16 PROCEDURE — 87086 URINE CULTURE/COLONY COUNT: CPT | Performed by: INTERNAL MEDICINE

## 2017-10-16 PROCEDURE — 87186 SC STD MICRODIL/AGAR DIL: CPT | Performed by: INTERNAL MEDICINE

## 2017-10-16 PROCEDURE — 87109 MYCOPLASMA: CPT | Performed by: INTERNAL MEDICINE

## 2017-10-16 PROCEDURE — 81001 URINALYSIS AUTO W/SCOPE: CPT | Performed by: INTERNAL MEDICINE

## 2017-10-16 PROCEDURE — 87088 URINE BACTERIA CULTURE: CPT | Performed by: INTERNAL MEDICINE

## 2017-10-16 RX ORDER — CIPROFLOXACIN 500 MG/1
500 TABLET, FILM COATED ORAL 2 TIMES DAILY
Qty: 10 TABLET | Refills: 0 | Status: SHIPPED | OUTPATIENT
Start: 2017-10-16 | End: 2018-07-16

## 2017-10-16 NOTE — TELEPHONE ENCOUNTER
Patients wife called and patient is having symptoms of uti and wants UAUC done  Ordered labs and will go to [place] [facility] and patient will stop and get this done today. Dasia Guzman, [facility] Staff Nurse

## 2017-10-18 ENCOUNTER — TELEPHONE (OUTPATIENT)
Dept: UROLOGY | Facility: CLINIC | Age: 42
End: 2017-10-18

## 2017-10-18 LAB
BACTERIA SPEC CULT: ABNORMAL
BACTERIA SPEC CULT: ABNORMAL
SPECIMEN SOURCE: ABNORMAL

## 2017-10-23 DIAGNOSIS — N52.9 ED (ERECTILE DYSFUNCTION): Primary | ICD-10-CM

## 2017-10-23 LAB
BACTERIA SPEC CULT: NORMAL
SPECIMEN SOURCE: NORMAL

## 2017-10-23 RX ORDER — TADALAFIL 20 MG/1
20 TABLET ORAL DAILY PRN
Qty: 6 TABLET | Refills: 3 | Status: SHIPPED | OUTPATIENT
Start: 2017-10-23 | End: 2018-07-16

## 2017-12-05 DIAGNOSIS — N31.9 NEUROGENIC BLADDER: ICD-10-CM

## 2017-12-06 RX ORDER — OXYBUTYNIN CHLORIDE 10 MG/1
TABLET, EXTENDED RELEASE ORAL
Qty: 15 TABLET | Refills: 0 | Status: SHIPPED | OUTPATIENT
Start: 2017-12-06 | End: 2018-07-16

## 2017-12-11 DIAGNOSIS — N39.0 URINARY TRACT INFECTION: Primary | ICD-10-CM

## 2017-12-12 DIAGNOSIS — N39.0 URINARY TRACT INFECTION: ICD-10-CM

## 2017-12-12 LAB
ALBUMIN UR-MCNC: ABNORMAL MG/DL
APPEARANCE UR: ABNORMAL
BACTERIA #/AREA URNS HPF: ABNORMAL /HPF
BILIRUB UR QL STRIP: NEGATIVE
COLOR UR AUTO: YELLOW
GLUCOSE UR STRIP-MCNC: NEGATIVE MG/DL
HGB UR QL STRIP: NEGATIVE
KETONES UR STRIP-MCNC: NEGATIVE MG/DL
LEUKOCYTE ESTERASE UR QL STRIP: ABNORMAL
NITRATE UR QL: POSITIVE
NON-SQ EPI CELLS #/AREA URNS LPF: ABNORMAL /LPF
PH UR STRIP: 6 PH (ref 5–7)
RBC #/AREA URNS AUTO: ABNORMAL /HPF
SOURCE: ABNORMAL
SP GR UR STRIP: 1.02 (ref 1–1.03)
UROBILINOGEN UR STRIP-ACNC: 0.2 EU/DL (ref 0.2–1)
WBC #/AREA URNS AUTO: ABNORMAL /HPF

## 2017-12-12 PROCEDURE — 87086 URINE CULTURE/COLONY COUNT: CPT | Performed by: INTERNAL MEDICINE

## 2017-12-12 PROCEDURE — 81001 URINALYSIS AUTO W/SCOPE: CPT | Performed by: INTERNAL MEDICINE

## 2017-12-12 PROCEDURE — 87186 SC STD MICRODIL/AGAR DIL: CPT | Performed by: INTERNAL MEDICINE

## 2017-12-12 PROCEDURE — 87088 URINE BACTERIA CULTURE: CPT | Performed by: INTERNAL MEDICINE

## 2017-12-16 LAB
BACTERIA SPEC CULT: ABNORMAL
BACTERIA SPEC CULT: ABNORMAL
SPECIMEN SOURCE: ABNORMAL

## 2017-12-18 DIAGNOSIS — N32.81 OVERACTIVE BLADDER: Primary | ICD-10-CM

## 2017-12-18 DIAGNOSIS — N39.0 URINARY TRACT INFECTION: ICD-10-CM

## 2017-12-18 DIAGNOSIS — N31.9 NEUROGENIC BLADDER: ICD-10-CM

## 2017-12-18 RX ORDER — OXYBUTYNIN CHLORIDE 10 MG/1
TABLET, EXTENDED RELEASE ORAL
Qty: 15 TABLET | Refills: 0 | OUTPATIENT
Start: 2017-12-18

## 2017-12-18 RX ORDER — SULFAMETHOXAZOLE/TRIMETHOPRIM 800-160 MG
1 TABLET ORAL 2 TIMES DAILY
Qty: 10 TABLET | Refills: 0 | Status: SHIPPED | OUTPATIENT
Start: 2017-12-18 | End: 2018-07-16

## 2017-12-18 RX ORDER — OXYBUTYNIN CHLORIDE 10 MG/1
10 TABLET, EXTENDED RELEASE ORAL DAILY
Qty: 90 TABLET | Refills: 0 | Status: SHIPPED | OUTPATIENT
Start: 2017-12-18 | End: 2018-01-22

## 2018-01-08 ENCOUNTER — PRE VISIT (OUTPATIENT)
Dept: UROLOGY | Facility: CLINIC | Age: 43
End: 2018-01-08

## 2018-01-08 NOTE — TELEPHONE ENCOUNTER
Patient is coming in to see Regi Richardson PA-C for med check, no need for a call.   DISPLAY PLAN FREE TEXT

## 2018-01-22 ENCOUNTER — OFFICE VISIT (OUTPATIENT)
Dept: UROLOGY | Facility: CLINIC | Age: 43
End: 2018-01-22
Payer: COMMERCIAL

## 2018-01-22 VITALS
SYSTOLIC BLOOD PRESSURE: 127 MMHG | BODY MASS INDEX: 22.82 KG/M2 | WEIGHT: 142 LBS | HEIGHT: 66 IN | HEART RATE: 55 BPM | DIASTOLIC BLOOD PRESSURE: 67 MMHG

## 2018-01-22 DIAGNOSIS — N31.9 NEUROGENIC BLADDER: Primary | ICD-10-CM

## 2018-01-22 DIAGNOSIS — N52.9 ERECTILE DYSFUNCTION, UNSPECIFIED ERECTILE DYSFUNCTION TYPE: ICD-10-CM

## 2018-01-22 ASSESSMENT — PAIN SCALES - GENERAL: PAINLEVEL: NO PAIN (0)

## 2018-01-22 NOTE — PATIENT INSTRUCTIONS
UROLOGY CLINIC VISIT PATIENT INSTRUCTIONS    1) Follow up on a Monday morning with one of our urology nurses for Edex injection teaching.     2) Follow up with me and/or Dr. Jacome in 6 months with a renal ultrasound and blood work beforehand.    Call or return to clinic sooner with any issues.     If you have any issues, questions or concerns in the meantime, do not hesitate to contact us at [phone] or via [employer].     It was a pleasure meeting with you today.  Thank you for allowing me and my team the privilege of caring for you today.  YOU are the reason we are here, and I truly hope we provided you with the excellent service you deserve.  Please let us know if there is anything else we can do for you so that we can be sure you are leaving completely satisfied with your care experience.

## 2018-01-22 NOTE — MR AVS SNAPSHOT
After Visit Summary   1/22/2018    Kinza Teresa    MRN: [id number]           Patient Information     Date Of Birth          1975        Visit Information        Provider Department      1/22/2018 1:00 PM Regi Richardson PA-C [facility] Inst for Prostate and Urologic Cancers        Today's Diagnoses     Neurogenic bladder    -  1    Erectile dysfunction, unspecified erectile dysfunction type          Care Instructions    UROLOGY CLINIC VISIT PATIENT INSTRUCTIONS    1) Follow up on a Monday morning with one of our urology nurses for Edex injection teaching.     2) Follow up with me and/or Dr. Jacome in 6 months with a renal ultrasound and blood work beforehand.    Call or return to clinic sooner with any issues.     If you have any issues, questions or concerns in the meantime, do not hesitate to contact us at [phone] or via [employer].     It was a pleasure meeting with you today.  Thank you for allowing me and my team the privilege of caring for you today.  YOU are the reason we are here, and I truly hope we provided you with the excellent service you deserve.  Please let us know if there is anything else we can do for you so that we can be sure you are leaving completely satisfied with your care experience.                Follow-ups after your visit        Your next 10 appointments already scheduled     Jan 29, 2018  9:00 AM CST   (Arrive by 8:45 AM)   Return Visit with  Prostate Cancer Ctr Nurse   [facility] Inst for Prostate and Urologic Cancers ([facility])    [address] [place] [address]  4th [facility] [address] [phone]            Jul 16, 2018  8:45 AM CDT   LAB with  LAB   [facility] ([facility])    [address] [place] [id number] [phone]           Please do not eat 10-12 hours before your appointment if you are coming in fasting for labs on lipids, cholesterol,  or glucose (sugar). This does not apply to pregnant women. Water, hot tea and black coffee (with nothing added) are okay. Do not drink other fluids, diet soda or chew gum.            Jul 16, 2018  9:00 AM CDT   US RENAL COMPLETE with UCUS3   [facility] US ([facility])    [address] [facility]  1st [facility] [id number] [phone]           Please bring a list of your medicines (including vitamins, minerals and over-the-counter drugs). Also, tell your doctor about any allergies you may have. Wear comfortable clothes and leave your valuables at home.  You do not need to do anything special to prepare for your exam.  Please call the Imaging Department at your exam site with any questions.            Jul 16, 2018 10:00 AM CDT   (Arrive by 9:45 AM)   Return Visit with Sulaiman Jacome MD   [facility] for Prostate and Urologic Cancers ([facility])    [address] [place] [address]  4th [facility] [id number] [phone]              Future tests that were ordered for you today     Open Future Orders        Priority Expected Expires Ordered    Basic metabolic panel Routine  1/22/2019 1/22/2018     Renal Complete Routine  1/22/2019 1/22/2018            Who to contact     Please call your clinic at [phone] to:    Ask questions about your health    Make or cancel appointments    Discuss your medicines    Learn about your test results    Speak to your doctor   If you have compliments or concerns about an experience at your clinic, or if you wish to file a complaint, please contact [facility] Patient Relations at [phone] or email us at [email or web address]         Additional Information About Your Visit        Pairin Information     Pairin is an electronic gateway that provides easy, online access to your medical records. With Pairin, you can request a clinic appointment, read  "your test results, renew a prescription or communicate with your care team.     To sign up for [employer] visit the website at [email or web address]   You will be asked to enter the access code listed below, as well as some personal information. Please follow the directions to create your username and password.     Your access code is: [email or web address]  Expires: 2018  6:30 AM     Your access code will  in 90 days. If you need help or a new code, please contact your [facility] or call [phone] for assistance.        Care EveryWhere ID     This is your Care EveryWhere ID. This could be used by other organizations to access your [place] medical records  [id number]        Your Vitals Were     Pulse Height BMI (Body Mass Index)             55 1.676 m (5' 6\") 22.92 kg/m2          Blood Pressure from Last 3 Encounters:   18 127/67   10/09/17 123/72   17 132/83    Weight from Last 3 Encounters:   18 64.4 kg (142 lb)   10/09/17 65.8 kg (145 lb)   17 68 kg (150 lb)               Primary Care Provider Office Phone # Fax #    Ray GROVER Kwame [phone]       [facility] [address] [address]        Equal Access to Services     SUZANNE CASILLAS AH: Hadii forrest larios hadasho Sonikoleali, waaxda luqadaha, qaybta kaalmada ademaileyada, hayley massey . So [facility] [phone].    ATENCIÓN: Si habla español, tiene a villarreal disposición servicios gratuitos de asistencia lingüística. Calebame al [phone].    We comply with applicable federal civil rights laws and [place] laws. We do not discriminate on the basis of race, color, national origin, age, disability, sex, sexual orientation, or gender identity.            Thank you!     Thank you for choosing [facility] UROLOGY AND [facility] FOR PROSTATE AND UROLOGIC CANCERS  for your care. Our goal is always to provide you with excellent care. Hearing back from our patients is one way " we can continue to improve our services. Please take a few minutes to complete the written survey that you may receive in the mail after your visit with us. Thank you!             Your Updated Medication List - Protect others around you: Learn how to safely use, store and throw away your medicines at www.disposemymeds.org.          This list is accurate as of: 1/22/18  1:38 PM.  Always use your most recent med list.                   Brand Name Dispense Instructions for use Diagnosis    acetaminophen 325 MG tablet    TYLENOL     Take 2 tabs bid        baclofen 10 MG tablet    LIORESAL     Take 10 mg by mouth        bisacodyl 10 MG Suppository    DULCOLAX     Place 10 mg rectally        calcium carbonate 1250 (500 CA) MG Tabs tablet    OSCAL 500     Take 500 mg by mouth        carvedilol 6.25 MG tablet    COREG     Take 6.25 mg by mouth        ciprofloxacin 500 MG tablet    CIPRO    10 tablet    Take 1 tablet (500 mg) by mouth 2 times daily    Urinary tract infection       docusate sodium 100 MG capsule    COLACE     Take 200 mg by mouth 2 times daily        FIBER (GUAR GUM) PO      Take 1 packet by mouth        gabapentin 300 MG capsule    NEURONTIN     300 QHS x3 days, then 300mg BID x3 days and then 300mg TID forever        lidocaine 5 % ointment    XYLOCAINE     Apply to areas of irritation three to four times daily as needed        omeprazole 20 MG CR capsule    priLOSEC     Take 20 mg by mouth        oxybutynin 10 MG 24 hr tablet    DITROPAN-XL    15 tablet    TAKE 1 TABLET BY MOUTH DAILY    Neurogenic bladder       polyethylene glycol Packet    MIRALAX/GLYCOLAX     Take 17 g by mouth        senna 8.6 MG tablet    SENOKOT     Take 34.4 mg by mouth        sildenafil 100 MG tablet    VIAGRA    5 tablet    Take 1 tablet (100 mg) by mouth daily as needed    ED (erectile dysfunction)       sulfamethoxazole-trimethoprim 800-160 MG per tablet    BACTRIM DS/SEPTRA DS    10 tablet    Take 1 tablet by mouth 2 times daily     Overactive bladder, Urinary tract infection       tadalafil 20 MG tablet    CIALIS    6 tablet    Take 1 tablet (20 mg) by mouth daily as needed    ED (erectile dysfunction)       tamsulosin 0.4 MG capsule    FLOMAX     Take 0.4 mg by mouth        traMADol 50 MG tablet    ULTRAM     Take  mg by mouth        Wheel Chair K1 Basic Desk Arm Misc      Wheelchair:  Tilt and space  with leg rests  Length of need: 6 months
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
## 2018-01-29 ENCOUNTER — ALLIED HEALTH/NURSE VISIT (OUTPATIENT)
Dept: UROLOGY | Facility: CLINIC | Age: 43
End: 2018-01-29
Payer: COMMERCIAL

## 2018-01-29 DIAGNOSIS — N52.9 ED (ERECTILE DYSFUNCTION): Primary | ICD-10-CM

## 2018-01-29 RX ORDER — SYRINGE-NEEDLE,INSULIN,0.5 ML 27GX1/2"
SYRINGE, EMPTY DISPOSABLE MISCELLANEOUS
Qty: 100 EACH | Status: SHIPPED | OUTPATIENT
Start: 2018-01-29 | End: 2018-07-16

## 2018-01-29 ASSESSMENT — PAIN SCALES - GENERAL: PAINLEVEL: NO PAIN (0)

## 2018-01-29 NOTE — MR AVS SNAPSHOT
After Visit Summary   1/29/2018    Kinza Teresa    MRN: [id number]           Patient Information     Date Of Birth          1975        Visit Information        Provider Department      1/29/2018 9:00 AM Nurse, [facility] Urology and [facility]        Today's Diagnoses     ED (erectile dysfunction)    -  1       Follow-ups after your visit        Your next 10 appointments already scheduled     Jul 16, 2018  8:45 AM CDT   LAB with  LAB   [facility] Lab ([facility])    [address] [place] [id number] [phone]           Please do not eat 10-12 hours before your appointment if you are coming in fasting for labs on lipids, cholesterol, or glucose (sugar). This does not apply to pregnant women. Water, hot tea and black coffee (with nothing added) are okay. Do not drink other fluids, diet soda or chew gum.            Jul 16, 2018  9:00 AM CDT   US RENAL COMPLETE with [id number]   [facility] Imaging Center US ([facility])    [address] [place] [id number] [phone]           Please bring a list of your medicines (including vitamins, minerals and over-the-counter drugs). Also, tell your doctor about any allergies you may have. Wear comfortable clothes and leave your valuables at home.  You do not need to do anything special to prepare for your exam.  Please call the Imaging Department at your exam site with any questions.            Jul 16, 2018 10:00 AM CDT   (Arrive by 9:45 AM)   Return Visit with Sulaiman Jacome MD   [facility] Urology and [facility]rolog[facility] ([facility])    [address] [place] [id number] [phone]              Who to contact     Please call your clinic at [phone] to:    Ask questions about your health    Make or cancel appointments    Discuss  your medicines    Learn about your test results    Speak to your doctor   If you have compliments or concerns about an experience at your clinic, or if you wish to file a complaint, please contact [facility] Patient Relations at [phone] or email us at [email or web address]         Additional Information About Your Visit        [employer] Information     [employer] is an electronic gateway that provides easy, online access to your medical records. With [facility], you can request a clinic appointment, read your test results, renew a prescription or communicate with your care team.     To sign up for [employer] visit the website at [email or web address]   You will be asked to enter the access code listed below, as well as some personal information. Please follow the directions to create your username and password.     Your access code is: [email or web address]  Expires: 2018  6:30 AM     Your access code will  in 90 days. If you need help or a new code, please contact your [facility] Clinic or call [phone] for assistance.        Care EveryWhere ID     This is your Care EveryWhere ID. This could be used by other organizations to access your [place] medical records  [id number]         Blood Pressure from Last 3 Encounters:   18 127/67   10/09/17 123/72   17 132/83    Weight from Last 3 Encounters:   18 64.4 kg (142 lb)   10/09/17 65.8 kg (145 lb)   17 68 kg (150 lb)              We Performed the Following     INJECTION CORPORA CAVERNOSA W PHRM AGNT          Today's Medication Changes          These changes are accurate as of 18  3:09 PM.  If you have any questions, ask your nurse or doctor.               Start taking these medicines.        Dose/Directions    Alcohol Wipes 70 % Pads   Used for:  ED (erectile dysfunction)        Dose:  1 Application   1 Application as needed   Quantity:  1 each   Refills:  1       insulin  "syringe-needle U-100 30G X 1/2\" 1 ML   Commonly known as:  BD insulin syringe ultrafine   Used for:  ED (erectile dysfunction)        Use one syringe PRN.   Quantity:  100 each   Refills:  prn       NEW MED   Used for:  ED (erectile dysfunction)        Dose:  20 mcg   20 mcg by INTRACAVERNOSAL route as needed Alprostadil 50mcg/mL. Inject 0.2mL as needed. Rotate sites. No more than 3 times per week.   Quantity:  1 mL   Refills:  3            Where to get your medicines      These medications were sent to [id number] - [place], [place] - [address] AT [facility] [address]  [facility] [address], [place] [address]     Phone:  [phone]     Alcohol Wipes 70 % Pads    insulin syringe-needle U-100 30G X 1/2\" 1 ML         Some of these will need a paper prescription and others can be bought over the counter.  Ask your nurse if you have questions.     Bring a paper prescription for each of these medications     NEW MED                Primary Care Provider Office Phone # Fax #    Ray GROVER Kwame [phone]       [facility] [address] [facility] [address]        Equal Access to Services     SUZANNE CASILLAS AH: Hadii forrest ku hadasho Soomaali, waaxda luqadaha, qaybta kaalmada adeegyada, waxay pinkyin haykyran sugar chase. So [facility] [phone].    ATENCIÓN: Si habla español, tiene a villarreal disposición servicios gratuitos de asistencia lingüística. Llame al [phone].    We comply with applicable federal civil rights laws and [place] laws. We do not discriminate on the basis of race, color, national origin, age, disability, sex, sexual orientation, or gender identity.            Thank you!     Thank you for choosing [facility]OLOGY AND [facility] FOR PROSTATE AND UROLOGIC CANCERS  for your care. Our goal is always to provide you with excellent care. Hearing back from our patients is one way we can continue to improve our services. Please take a few minutes to " "complete the written survey that you may receive in the mail after your visit with us. Thank you!             Your Updated Medication List - Protect others around you: Learn how to safely use, store and throw away your medicines at www.disposemymeds.org.          This list is accurate as of 1/29/18  3:09 PM.  Always use your most recent med list.                   Brand Name Dispense Instructions for use Diagnosis    acetaminophen 325 MG tablet    TYLENOL     Take 2 tabs bid        Alcohol Wipes 70 % Pads     1 each    1 Application as needed    ED (erectile dysfunction)       baclofen 10 MG tablet    LIORESAL     Take 10 mg by mouth        bisacodyl 10 MG Suppository    DULCOLAX     Place 10 mg rectally        calcium carbonate 1250 (500 CA) MG Tabs tablet    OSCAL 500     Take 500 mg by mouth        carvedilol 6.25 MG tablet    COREG     Take 6.25 mg by mouth        ciprofloxacin 500 MG tablet    CIPRO    10 tablet    Take 1 tablet (500 mg) by mouth 2 times daily    Urinary tract infection       docusate sodium 100 MG capsule    COLACE     Take 200 mg by mouth 2 times daily        FIBER (GUAR GUM) PO      Take 1 packet by mouth        gabapentin 300 MG capsule    NEURONTIN     300 QHS x3 days, then 300mg BID x3 days and then 300mg TID forever        insulin syringe-needle U-100 30G X 1/2\" 1 ML    BD insulin syringe ultrafine    100 each    Use one syringe PRN.    ED (erectile dysfunction)       lidocaine 5 % ointment    XYLOCAINE     Apply to areas of irritation three to four times daily as needed        NEW MED     1 mL    20 mcg by INTRACAVERNOSAL route as needed Alprostadil 50mcg/mL. Inject 0.2mL as needed. Rotate sites. No more than 3 times per week.    ED (erectile dysfunction)       omeprazole 20 MG CR capsule    priLOSEC     Take 20 mg by mouth        oxybutynin 10 MG 24 hr tablet    DITROPAN-XL    15 tablet    TAKE 1 TABLET BY MOUTH DAILY    Neurogenic bladder       polyethylene glycol Packet    " MIRALAX/GLYCOLAX     Take 17 g by mouth        senna 8.6 MG tablet    SENOKOT     Take 34.4 mg by mouth        sildenafil 100 MG tablet    VIAGRA    5 tablet    Take 1 tablet (100 mg) by mouth daily as needed    ED (erectile dysfunction)       sulfamethoxazole-trimethoprim 800-160 MG per tablet    BACTRIM DS/SEPTRA DS    10 tablet    Take 1 tablet by mouth 2 times daily    Overactive bladder, Urinary tract infection       tadalafil 20 MG tablet    CIALIS    6 tablet    Take 1 tablet (20 mg) by mouth daily as needed    ED (erectile dysfunction)       tamsulosin 0.4 MG capsule    FLOMAX     Take 0.4 mg by mouth        traMADol 50 MG tablet    ULTRAM     Take  mg by mouth        Wheel Chair K1 Basic Desk Arm Misc      Wheelchair:  Tilt and space  with leg rests  Length of need: 6 months

## 2018-01-29 NOTE — PROGRESS NOTES
Kinza Teresa comes into clinic today at the request of Regi Richardson PA-C for Edex teaching.    The following medication was given:     MEDICATION: Alprostadil  Dose: 10 mcg  ROUTE: Intracavernosal  Site: Left Penile Shaft  LOT #: [id number]  :  [employer]  EXPIRATION DATE:  02/2020   Amount of waste(mL): 0  Reason for waste: Single use vial    Patient tolerated: well    Patient was given Edex handouts to review prior to injection. All questions answered prior to demonstration. Dose above given per Regi Richardson PA-C. Patient's wife gave own injection and demonstrated proper technique.     After 20 minutes, patient was reassessed. Patient had some slight engorgement of penis, not firm enough for penetration. Regi Richardson PA-C notified.  Rx sent to patient's pharmacy. Patient is to start at 20mcg and increase if needed.     Patient educated on alternating sites for injection, left and right side of shaft. Also discussed not to use more than 3 times per week, at least 24 hours between each injection. Patient verbalized understanding. No further questions.      This service provided today was under the supervising provider of the day Regi Richardson PA-C, who was available if needed.    Priya Bender, RN-BC, BSN

## 2018-02-19 ENCOUNTER — TELEPHONE (OUTPATIENT)
Dept: UROLOGY | Facility: CLINIC | Age: 43
End: 2018-02-19

## 2018-02-19 DIAGNOSIS — R30.0 DYSURIA: ICD-10-CM

## 2018-02-19 DIAGNOSIS — R30.0 DYSURIA: Primary | ICD-10-CM

## 2018-02-19 LAB
ALBUMIN UR-MCNC: ABNORMAL MG/DL
APPEARANCE UR: ABNORMAL
BACTERIA #/AREA URNS HPF: ABNORMAL /HPF
BILIRUB UR QL STRIP: NEGATIVE
COLOR UR AUTO: YELLOW
GLUCOSE UR STRIP-MCNC: NEGATIVE MG/DL
HGB UR QL STRIP: ABNORMAL
KETONES UR STRIP-MCNC: NEGATIVE MG/DL
LEUKOCYTE ESTERASE UR QL STRIP: ABNORMAL
NITRATE UR QL: POSITIVE
PH UR STRIP: 5.5 PH (ref 5–7)
RBC #/AREA URNS AUTO: ABNORMAL /HPF
SOURCE: ABNORMAL
SP GR UR STRIP: >1.03 (ref 1–1.03)
UROBILINOGEN UR STRIP-ACNC: 0.2 EU/DL (ref 0.2–1)
WBC #/AREA URNS AUTO: ABNORMAL /HPF

## 2018-02-19 PROCEDURE — 87088 URINE BACTERIA CULTURE: CPT | Performed by: INTERNAL MEDICINE

## 2018-02-19 PROCEDURE — 81001 URINALYSIS AUTO W/SCOPE: CPT | Performed by: INTERNAL MEDICINE

## 2018-02-19 PROCEDURE — 87086 URINE CULTURE/COLONY COUNT: CPT | Performed by: INTERNAL MEDICINE

## 2018-02-19 PROCEDURE — 87186 SC STD MICRODIL/AGAR DIL: CPT | Performed by: INTERNAL MEDICINE

## 2018-02-19 NOTE — TELEPHONE ENCOUNTER
Patients wife calling stating patient is having increased accidents of urinary incontinence which is usually indicative of a UTI for this patient.  Placed UA/UC orders.    Yadira Jalloh RN  Care Coordinator- Reconstructive Urology

## 2018-02-20 DIAGNOSIS — R30.0 DYSURIA: Primary | ICD-10-CM

## 2018-02-20 RX ORDER — SULFAMETHOXAZOLE/TRIMETHOPRIM 800-160 MG
1 TABLET ORAL 2 TIMES DAILY
Qty: 14 TABLET | Refills: 0 | Status: SHIPPED | OUTPATIENT
Start: 2018-02-20 | End: 2018-02-27

## 2018-02-21 LAB
BACTERIA SPEC CULT: ABNORMAL
SPECIMEN SOURCE: ABNORMAL

## 2018-03-15 DIAGNOSIS — N32.81 OVERACTIVE BLADDER: ICD-10-CM

## 2018-03-19 RX ORDER — OXYBUTYNIN CHLORIDE 10 MG/1
10 TABLET, EXTENDED RELEASE ORAL DAILY
Qty: 90 TABLET | Refills: 2 | Status: SHIPPED | OUTPATIENT
Start: 2018-03-19 | End: 2018-12-06

## 2018-03-19 NOTE — TELEPHONE ENCOUNTER
Last Clinic Visit: 1/22/2018  [facility] Urology and [facility] for Prostate and Urologic Cancers

## 2018-05-08 ENCOUNTER — TELEPHONE (OUTPATIENT)
Dept: UROLOGY | Facility: CLINIC | Age: 43
End: 2018-05-08

## 2018-05-08 DIAGNOSIS — N39.0 URINARY TRACT INFECTION: Primary | ICD-10-CM

## 2018-05-08 DIAGNOSIS — N39.0 URINARY TRACT INFECTION: ICD-10-CM

## 2018-05-08 LAB
ALBUMIN UR-MCNC: ABNORMAL MG/DL
APPEARANCE UR: ABNORMAL
BACTERIA #/AREA URNS HPF: ABNORMAL /HPF
BILIRUB UR QL STRIP: NEGATIVE
COLOR UR AUTO: YELLOW
GLUCOSE UR STRIP-MCNC: NEGATIVE MG/DL
HGB UR QL STRIP: ABNORMAL
KETONES UR STRIP-MCNC: NEGATIVE MG/DL
LEUKOCYTE ESTERASE UR QL STRIP: ABNORMAL
NITRATE UR QL: POSITIVE
NON-SQ EPI CELLS #/AREA URNS LPF: ABNORMAL /LPF
PH UR STRIP: 6.5 PH (ref 5–7)
RBC #/AREA URNS AUTO: ABNORMAL /HPF
SOURCE: ABNORMAL
SP GR UR STRIP: 1.02 (ref 1–1.03)
UROBILINOGEN UR STRIP-ACNC: 0.2 EU/DL (ref 0.2–1)
WBC #/AREA URNS AUTO: >100 /HPF
WBC CLUMPS #/AREA URNS HPF: PRESENT /HPF

## 2018-05-08 PROCEDURE — 87086 URINE CULTURE/COLONY COUNT: CPT | Performed by: INTERNAL MEDICINE

## 2018-05-08 PROCEDURE — 87186 SC STD MICRODIL/AGAR DIL: CPT | Performed by: INTERNAL MEDICINE

## 2018-05-08 PROCEDURE — 81001 URINALYSIS AUTO W/SCOPE: CPT | Performed by: INTERNAL MEDICINE

## 2018-05-08 PROCEDURE — 87088 URINE BACTERIA CULTURE: CPT | Performed by: INTERNAL MEDICINE

## 2018-05-08 NOTE — TELEPHONE ENCOUNTER
Called wife and spoke to her about his symptoms  Incontinence increased cloudy urine and it also has odor.  Patient will go to  clinic close to work.  Patient self caths at home. Dasia Guzman LPN Staff Nurse

## 2018-05-08 NOTE — TELEPHONE ENCOUNTER
[facility] Call Center    Phone Message    May a detailed message be left on voicemail: no    Reason for Call: Other: Pt wife calling pt may have a UTI, asking for orders for urine test. Pt wife Keshia asking for a call back.      Action Taken: Message routed to:  [facility]  Urology

## 2018-05-09 DIAGNOSIS — N39.0 URINARY TRACT INFECTION: Primary | ICD-10-CM

## 2018-05-09 RX ORDER — SULFAMETHOXAZOLE/TRIMETHOPRIM 800-160 MG
1 TABLET ORAL 2 TIMES DAILY
Qty: 14 TABLET | Refills: 0 | Status: SHIPPED | OUTPATIENT
Start: 2018-05-09 | End: 2018-07-16

## 2018-05-12 LAB
BACTERIA SPEC CULT: ABNORMAL
SPECIMEN SOURCE: ABNORMAL

## 2018-05-14 ENCOUNTER — TELEPHONE (OUTPATIENT)
Dept: UROLOGY | Facility: CLINIC | Age: 43
End: 2018-05-14

## 2018-05-14 NOTE — TELEPHONE ENCOUNTER
----- Message from Yadira Jalloh RN sent at 5/11/2018  3:47 PM CDT -----  Chong Saxena,    UC still pending as of 3:45pm today.  We will check on it on Monday    Yadira Perez  ----- Message -----     From: Dasia Guzman LPN     Sent: 5/10/2018   4:49 PM       To: Yadira Jalloh RN    I was waiting for uc result never came I have pre treated wanted to make sure the right drug. Chris saxena  ----- Message -----     From: Regi Richardson PA-C     Sent: 5/9/2018   7:11 AM       To: YUMI Hutson,    That's fine. We can start him on Bactrim DS BID x 7 days while awaiting final culture results. Could you send Rx and notify patient?    Thank you!  Regi Richardson PA-C  ----- Message -----     From: Dasia Guzman LPN     Sent: 5/8/2018   1:15 PM       To: Regi Richardson PA-C    Having symptoms of uti incontinence the worst  His ua posted nitrite positive  Asking for antibiotics now not in 48 hours. Dasia Guzman LPN Staff Nurse

## 2018-05-14 NOTE — TELEPHONE ENCOUNTER
Patient called and wife told the antibiotic is fighting the infection  Patient is doing better. Dasia Guzman LPN Staff Nurse

## 2018-06-19 ENCOUNTER — TELEPHONE (OUTPATIENT)
Dept: UROLOGY | Facility: CLINIC | Age: 43
End: 2018-06-19

## 2018-06-19 NOTE — TELEPHONE ENCOUNTER
Patients wife called and told them I will ask dr isaacs and message was sent to rn care coordinator. Dasia Guzman LPN Staff Nurse

## 2018-06-19 NOTE — TELEPHONE ENCOUNTER
[facility] Call Center    Phone Message    May a detailed message be left on voicemail: yes    Reason for Call: Other: Pt's wife is calling. They are wondering if they can increase dosage of tadalafil (CIALIS) 20 MG tablet. Please give them a call back.      Action Taken: Message routed to:  [facility] Urology

## 2018-07-13 ENCOUNTER — PRE VISIT (OUTPATIENT)
Dept: UROLOGY | Facility: CLINIC | Age: 43
End: 2018-07-13

## 2018-07-13 NOTE — TELEPHONE ENCOUNTER
Reason for visit: Neurogenic bladder follow up      Relevant information: secondary to spinal cord injury    Records/imaging/labs: Labs available, imaging scheduled    Pt called: No need for a call    Rooming: regular

## 2018-07-16 ENCOUNTER — RADIANT APPOINTMENT (OUTPATIENT)
Dept: ULTRASOUND IMAGING | Facility: CLINIC | Age: 43
End: 2018-07-16
Attending: PHYSICIAN ASSISTANT
Payer: COMMERCIAL

## 2018-07-16 ENCOUNTER — OFFICE VISIT (OUTPATIENT)
Dept: UROLOGY | Facility: CLINIC | Age: 43
End: 2018-07-16
Payer: COMMERCIAL

## 2018-07-16 VITALS
DIASTOLIC BLOOD PRESSURE: 70 MMHG | HEART RATE: 49 BPM | HEIGHT: 66 IN | SYSTOLIC BLOOD PRESSURE: 130 MMHG | BODY MASS INDEX: 23.3 KG/M2 | WEIGHT: 145 LBS

## 2018-07-16 DIAGNOSIS — N31.9 NEUROGENIC BLADDER: ICD-10-CM

## 2018-07-16 DIAGNOSIS — N52.1 ERECTILE DYSFUNCTION DUE TO DISEASES CLASSIFIED ELSEWHERE: Primary | ICD-10-CM

## 2018-07-16 LAB
ANION GAP SERPL CALCULATED.3IONS-SCNC: 6 MMOL/L (ref 3–14)
BUN SERPL-MCNC: 13 MG/DL (ref 7–30)
CALCIUM SERPL-MCNC: 8.9 MG/DL (ref 8.5–10.1)
CHLORIDE SERPL-SCNC: 107 MMOL/L (ref 94–109)
CO2 SERPL-SCNC: 28 MMOL/L (ref 20–32)
CREAT SERPL-MCNC: 0.62 MG/DL (ref 0.66–1.25)
GFR SERPL CREATININE-BSD FRML MDRD: >90 ML/MIN/1.7M2
GLUCOSE SERPL-MCNC: 93 MG/DL (ref 70–99)
POTASSIUM SERPL-SCNC: 4.1 MMOL/L (ref 3.4–5.3)
SODIUM SERPL-SCNC: 140 MMOL/L (ref 133–144)

## 2018-07-16 RX ORDER — PREGABALIN 50 MG/1
50 CAPSULE ORAL DAILY
COMMUNITY
Start: 2018-05-01

## 2018-07-16 RX ORDER — ALPROSTADIL 20 UG/ML
INJECTION, POWDER, LYOPHILIZED, FOR SOLUTION INTRACAVERNOUS
Refills: 3 | COMMUNITY
Start: 2018-07-02 | End: 2018-11-12

## 2018-07-16 ASSESSMENT — PAIN SCALES - GENERAL: PAINLEVEL: SEVERE PAIN (7)
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
## 2018-07-16 NOTE — MR AVS SNAPSHOT
After Visit Summary   7/16/2018    Kinza Teresa    MRN: [id number]           Patient Information     Date Of Birth          1975        Visit Information        Provider Department      7/16/2018 10:00 AM Sulaiman Jacome MD [facility]        Today's Diagnoses     Erectile dysfunction due to diseases classified elsewhere    -  1      Care Instructions    Use medication as directed.    Return in one year to see Regi Richardson PA-C with a renal US and a BMP.    It was a pleasure meeting with you today.  Thank you for allowing me and my team the privilege of caring for you today.  YOU are the reason we are here, and I truly hope we provided you with the excellent service you deserve.  Please let us know if there is anything else we can do for you so that we can be sure you are leaving completely satisfied with your care experience.                  Follow-ups after your visit        Future tests that were ordered for you today     Open Future Orders        Priority Expected Expires Ordered    Basic metabolic panel Routine 7/16/2019 7/16/2019 7/16/2018    Renal US Routine 7/16/2019 7/16/2019 7/16/2018            Who to contact     Please call your clinic at [phone] to:    Ask questions about your health    Make or cancel appointments    Discuss your medicines    Learn about your test results    Speak to your doctor            Additional Information About Your Visit        MyChart Information     [employer] is an electronic gateway that provides easy, online access to your medical records. With [employer], you can request a clinic appointment, read your test results, renew a prescription or communicate with your care team.     To sign up for [employer] visit the website at [email or web address]   You will be asked to enter the access code listed below, as well as some personal information. Please follow the directions to create your username and  "password.     Your access code is: [email or web address]  Expires: 2018  6:31 AM     Your access code will  in 90 days. If you need help or a new code, please contact your [facility] or call [phone] for assistance.        Care EveryWhere ID     This is your Care EveryWhere ID. This could be used by other organizations to access your [facility] medical records  [id number]        Your Vitals Were     Pulse Height BMI (Body Mass Index)             49 1.676 m (5' 6\") 23.4 kg/m2          Blood Pressure from Last 3 Encounters:   18 130/70   18 127/67   10/09/17 123/72    Weight from Last 3 Encounters:   18 65.8 kg (145 lb)   18 64.4 kg (142 lb)   10/09/17 65.8 kg (145 lb)                 Today's Medication Changes          These changes are accurate as of 18 11:17 AM.  If you have any questions, ask your nurse or doctor.               These medicines have changed or have updated prescriptions.        Dose/Directions    * EDEX 20 MCG kit   This may have changed:  Another medication with the same name was added. Make sure you understand how and when to take each.   Generic drug:  alprostadil   Changed by:  Sulaiman Jacome MD        INJECT 20MCG BY INTRACAVERNOSAL ROUTE PRN. NO MORE THAN 3 TIMES PER WEEK   Refills:  3       * alprostadil 40 MCG kit   Commonly known as:  EDEX   This may have changed:  You were already taking a medication with the same name, and this prescription was added. Make sure you understand how and when to take each.   Used for:  Erectile dysfunction due to diseases classified elsewhere   Changed by:  Sulaiman Jacome MD        Dose:  40 mcg   40 mcg by Intracavitary route as needed for erectile dysfunction use no more than 3 times per week   Quantity:  6 kit   Refills:  3       oxybutynin 10 MG 24 hr tablet   Commonly known as:  DITROPAN-XL   This may have changed:  Another medication with the same name was removed. " "Continue taking this medication, and follow the directions you see here.   Used for:  Overactive bladder   Changed by:  Sulaiman Jacome MD        Dose:  10 mg   Take 1 tablet (10 mg) by mouth daily   Quantity:  90 tablet   Refills:  2       * Notice:  This list has 2 medication(s) that are the same as other medications prescribed for you. Read the directions carefully, and ask your doctor or other care provider to review them with you.      Stop taking these medicines if you haven't already. Please contact your care team if you have questions.     ciprofloxacin 500 MG tablet   Commonly known as:  CIPRO   Stopped by:  Sulaiman Jacome MD           docusate sodium 100 MG capsule   Commonly known as:  COLACE   Stopped by:  Sulaiman Jacome MD           gabapentin 300 MG capsule   Commonly known as:  NEURONTIN   Stopped by:  Sulaiman Jacome MD           insulin syringe-needle U-100 30G X 1/2\" 1 ML   Commonly known as:  BD insulin syringe ultrafine   Stopped by:  Sulaiman Jacome MD           lidocaine 5 % ointment   Commonly known as:  XYLOCAINE   Stopped by:  Sulaiman Jacome MD           sildenafil 100 MG tablet   Commonly known as:  VIAGRA   Stopped by:  Sulaiman Jacome MD           sulfamethoxazole-trimethoprim 800-160 MG per tablet   Commonly known as:  BACTRIM DS/SEPTRA DS   Stopped by:  Sulaiman Jacome MD           tadalafil 20 MG tablet   Commonly known as:  CIALIS   Stopped by:  Sulaiman Jacome MD           tamsulosin 0.4 MG capsule   Commonly known as:  FLOMAX   Stopped by:  Sulaiman Jacome MD                Where to get your medicines      Some of these will need a paper prescription and others can be bought over the counter.  Ask your nurse if you have questions.     Bring a paper prescription for each of these medications     alprostadil 40 MCG kit                Primary Care Provider Office Phone # Fax #    Ray Puente [phone] " [phone]       [facility] [place] [address] [facility] [address]        Equal Access to Services     SUZANNE CASILLAS : Hadii aad ku hadgerber Chappell, waroberthda luqadaha, qaybta kaalmada ketty, hayley pinkyin hayaaeliot ricksmaile mc bryson chase. So [facility] [phone].    ATENCIÓN: Si habla español, tiene a villarreal disposición servicios gratuitos de asistencia lingüística. Calebame al [phone].    We comply with applicable federal civil rights laws and [place] laws. We do not discriminate on the basis of race, color, national origin, age, disability, sex, sexual orientation, or gender identity.            Thank you!     Thank you for choosing [facility] UROLOGY AND [facility] FOR PROSTATE AND UROLOGIC CANCERS  for your care. Our goal is always to provide you with excellent care. Hearing back from our patients is one way we can continue to improve our services. Please take a few minutes to complete the written survey that you may receive in the mail after your visit with us. Thank you!             Your Updated Medication List - Protect others around you: Learn how to safely use, store and throw away your medicines at www.disposemymeds.org.          This list is accurate as of 7/16/18 11:17 AM.  Always use your most recent med list.                   Brand Name Dispense Instructions for use Diagnosis    acetaminophen 325 MG tablet    TYLENOL     Take 2 tabs bid        Alcohol Wipes 70 % Pads     1 each    1 Application as needed    ED (erectile dysfunction)       baclofen 10 MG tablet    LIORESAL     Take 10 mg by mouth        bisacodyl 10 MG Suppository    DULCOLAX     Place 10 mg rectally        calcium carbonate 1250 (500 Ca) MG Tabs tablet    OSCAL 500     Take 500 mg by mouth        carvedilol 6.25 MG tablet    COREG     Take 6.25 mg by mouth        * EDEX 20 MCG kit   Generic drug:  alprostadil      INJECT 20MCG BY INTRACAVERNOSAL ROUTE PRN. NO MORE THAN 3 TIMES PER WEEK        * alprostadil 40 MCG kit    EDEX    6 kit    40  mcg by Intracavitary route as needed for erectile dysfunction use no more than 3 times per week    Erectile dysfunction due to diseases classified elsewhere       FIBER (GUAR GUM) PO      Take 1 packet by mouth        NEW MED     1 mL    20 mcg by INTRACAVERNOSAL route as needed Alprostadil 50mcg/mL. Inject 0.2mL as needed. Rotate sites. No more than 3 times per week.    ED (erectile dysfunction)       omeprazole 20 MG CR capsule    priLOSEC     Take 20 mg by mouth        oxybutynin 10 MG 24 hr tablet    DITROPAN-XL    90 tablet    Take 1 tablet (10 mg) by mouth daily    Overactive bladder       polyethylene glycol Packet    MIRALAX/GLYCOLAX     Take 17 g by mouth        pregabalin 50 MG capsule    LYRICA     Take 50 mg by mouth        senna 8.6 MG tablet    SENOKOT     Take 34.4 mg by mouth        traMADol 50 MG tablet    ULTRAM     Take  mg by mouth        Wheel Chair K1 Basic Desk Arm Misc      Wheelchair:  Tilt and space  with leg rests  Length of need: 6 months        * Notice:  This list has 2 medication(s) that are the same as other medications prescribed for you. Read the directions carefully, and ask your doctor or other care provider to review them with you.

## 2018-07-16 NOTE — PATIENT INSTRUCTIONS
Use medication as directed.    Return in one year to see Regi Richardson PA-C with a renal US and a BMP.    It was a pleasure meeting with you today.  Thank you for allowing me and my team the privilege of caring for you today.  YOU are the reason we are here, and I truly hope we provided you with the excellent service you deserve.  Please let us know if there is anything else we can do for you so that we can be sure you are leaving completely satisfied with your care experience.

## 2018-07-31 ENCOUNTER — TELEPHONE (OUTPATIENT)
Dept: UROLOGY | Facility: CLINIC | Age: 43
End: 2018-07-31

## 2018-07-31 DIAGNOSIS — Z01.89 ENCOUNTER FOR URINE TEST: ICD-10-CM

## 2018-07-31 DIAGNOSIS — R82.90 NONSPECIFIC FINDING ON EXAMINATION OF URINE: Primary | ICD-10-CM

## 2018-07-31 DIAGNOSIS — Z01.89 ENCOUNTER FOR URINE TEST: Primary | ICD-10-CM

## 2018-07-31 LAB
ALBUMIN UR-MCNC: NEGATIVE MG/DL
APPEARANCE UR: ABNORMAL
BACTERIA #/AREA URNS HPF: ABNORMAL /HPF
BILIRUB UR QL STRIP: NEGATIVE
COLOR UR AUTO: YELLOW
GLUCOSE UR STRIP-MCNC: NEGATIVE MG/DL
HGB UR QL STRIP: ABNORMAL
KETONES UR STRIP-MCNC: NEGATIVE MG/DL
LEUKOCYTE ESTERASE UR QL STRIP: ABNORMAL
NITRATE UR QL: POSITIVE
PH UR STRIP: 6 PH (ref 5–7)
RBC #/AREA URNS AUTO: ABNORMAL /HPF
SOURCE: ABNORMAL
SP GR UR STRIP: <=1.005 (ref 1–1.03)
UROBILINOGEN UR STRIP-ACNC: 0.2 EU/DL (ref 0.2–1)
WBC #/AREA URNS AUTO: ABNORMAL /HPF

## 2018-07-31 PROCEDURE — 87088 URINE BACTERIA CULTURE: CPT | Performed by: INTERNAL MEDICINE

## 2018-07-31 PROCEDURE — 87086 URINE CULTURE/COLONY COUNT: CPT | Performed by: INTERNAL MEDICINE

## 2018-07-31 PROCEDURE — 87186 SC STD MICRODIL/AGAR DIL: CPT | Performed by: INTERNAL MEDICINE

## 2018-07-31 PROCEDURE — 81001 URINALYSIS AUTO W/SCOPE: CPT | Performed by: INTERNAL MEDICINE
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
## 2018-07-31 NOTE — TELEPHONE ENCOUNTER
Order placed and patient notified.  He will present to local clinic and present catheterized sample.

## 2018-08-02 LAB
BACTERIA SPEC CULT: ABNORMAL
SPECIMEN SOURCE: ABNORMAL

## 2018-08-03 ENCOUNTER — TELEPHONE (OUTPATIENT)
Dept: UROLOGY | Facility: CLINIC | Age: 43
End: 2018-08-03

## 2018-08-03 DIAGNOSIS — N39.0 URINARY TRACT INFECTION: Primary | ICD-10-CM

## 2018-08-03 RX ORDER — SULFAMETHOXAZOLE/TRIMETHOPRIM 800-160 MG
1 TABLET ORAL 2 TIMES DAILY
Qty: 10 TABLET | Refills: 0 | Status: SHIPPED | OUTPATIENT
Start: 2018-08-03 | End: 2018-11-12

## 2018-08-03 NOTE — TELEPHONE ENCOUNTER
[facility] Call Center    Phone Message    May a detailed message be left on voicemail: yes    Reason for Call: Requesting Results   Name/type of test: UA with Microscopic reflex to Culture & Urine Culture Aerobic Bacterial     Date of test: 7.31.2018  Was test done at a location other than [facility] (Please fill in the location if not [facility])?: No      Action Taken: Message routed to:  [facility] URO

## 2018-08-03 NOTE — TELEPHONE ENCOUNTER
Called wife and told her the uauc was positive and ordered bactrim to his pharmacy of choice. Dasia Guzman LPN Staff Nurse  w

## 2018-09-21 DIAGNOSIS — R32 URINARY INCONTINENCE: ICD-10-CM

## 2018-09-21 LAB
ALBUMIN UR-MCNC: 30 MG/DL
APPEARANCE UR: ABNORMAL
BACTERIA #/AREA URNS HPF: ABNORMAL /HPF
BILIRUB UR QL STRIP: NEGATIVE
COLOR UR AUTO: YELLOW
GLUCOSE UR STRIP-MCNC: NEGATIVE MG/DL
HGB UR QL STRIP: ABNORMAL
KETONES UR STRIP-MCNC: NEGATIVE MG/DL
LEUKOCYTE ESTERASE UR QL STRIP: ABNORMAL
NITRATE UR QL: POSITIVE
NON-SQ EPI CELLS #/AREA URNS LPF: ABNORMAL /LPF
PH UR STRIP: 7 PH (ref 5–7)
RBC #/AREA URNS AUTO: ABNORMAL /HPF
SOURCE: ABNORMAL
SP GR UR STRIP: 1.01 (ref 1–1.03)
UROBILINOGEN UR STRIP-ACNC: 0.2 EU/DL (ref 0.2–1)
WBC #/AREA URNS AUTO: >100 /HPF

## 2018-09-21 PROCEDURE — 81001 URINALYSIS AUTO W/SCOPE: CPT | Performed by: INTERNAL MEDICINE

## 2018-09-21 PROCEDURE — 87086 URINE CULTURE/COLONY COUNT: CPT | Performed by: INTERNAL MEDICINE

## 2018-09-21 PROCEDURE — 87186 SC STD MICRODIL/AGAR DIL: CPT | Performed by: INTERNAL MEDICINE

## 2018-09-21 PROCEDURE — 87088 URINE BACTERIA CULTURE: CPT | Performed by: INTERNAL MEDICINE

## 2018-09-24 DIAGNOSIS — N39.0 URINARY TRACT INFECTION: Primary | ICD-10-CM

## 2018-09-24 LAB
BACTERIA SPEC CULT: ABNORMAL
SPECIMEN SOURCE: ABNORMAL

## 2018-09-24 RX ORDER — SULFAMETHOXAZOLE/TRIMETHOPRIM 800-160 MG
1 TABLET ORAL 2 TIMES DAILY
Qty: 14 TABLET | Refills: 0 | Status: SHIPPED | OUTPATIENT
Start: 2018-09-24 | End: 2019-03-19

## 2018-10-08 ENCOUNTER — TELEPHONE (OUTPATIENT)
Dept: UROLOGY | Facility: CLINIC | Age: 43
End: 2018-10-08

## 2018-10-08 DIAGNOSIS — N39.0 URINARY TRACT INFECTION: ICD-10-CM

## 2018-10-08 DIAGNOSIS — N39.0 URINARY TRACT INFECTION: Primary | ICD-10-CM

## 2018-10-08 DIAGNOSIS — N52.1 ERECTILE DYSFUNCTION DUE TO DISEASES CLASSIFIED ELSEWHERE: ICD-10-CM

## 2018-10-08 LAB
ALBUMIN UR-MCNC: 30 MG/DL
APPEARANCE UR: ABNORMAL
BACTERIA #/AREA URNS HPF: ABNORMAL /HPF
BILIRUB UR QL STRIP: NEGATIVE
COLOR UR AUTO: YELLOW
GLUCOSE UR STRIP-MCNC: NEGATIVE MG/DL
HGB UR QL STRIP: ABNORMAL
KETONES UR STRIP-MCNC: NEGATIVE MG/DL
LEUKOCYTE ESTERASE UR QL STRIP: ABNORMAL
NITRATE UR QL: POSITIVE
NON-SQ EPI CELLS #/AREA URNS LPF: ABNORMAL /LPF
PH UR STRIP: 7.5 PH (ref 5–7)
RBC #/AREA URNS AUTO: ABNORMAL /HPF
SOURCE: ABNORMAL
SP GR UR STRIP: 1.02 (ref 1–1.03)
UROBILINOGEN UR STRIP-ACNC: 0.2 EU/DL (ref 0.2–1)
WBC #/AREA URNS AUTO: >100 /HPF

## 2018-10-08 PROCEDURE — 87088 URINE BACTERIA CULTURE: CPT | Performed by: INTERNAL MEDICINE

## 2018-10-08 PROCEDURE — 87186 SC STD MICRODIL/AGAR DIL: CPT | Performed by: INTERNAL MEDICINE

## 2018-10-08 PROCEDURE — 87086 URINE CULTURE/COLONY COUNT: CPT | Performed by: INTERNAL MEDICINE

## 2018-10-08 PROCEDURE — 81001 URINALYSIS AUTO W/SCOPE: CPT | Performed by: INTERNAL MEDICINE

## 2018-10-08 NOTE — TELEPHONE ENCOUNTER
[facility] Call [facility]    Phone Message    May a detailed message be left on voicemail: yes    Reason for Call: Other: Pts wife calling. She states pt just finished a 10 day round of antibiotics and his UTI came back immediately. Please call back to discuss.     Action Taken: Message routed to:  [facility] UC URO AND PROSTATE

## 2018-10-08 NOTE — TELEPHONE ENCOUNTER
Patient called and spoke with wife regarding his upcoming appointments  In dr lux note stated needs to be seen for cysto and urodynamics  Our schedule for urodynamics is out till January appt made for then.  Cysto made for this month with dr meza  Patient will get his UAUC done again and has 3 positive uc since July appointment Dasia Guzman LPN Staff Nurse

## 2018-10-10 LAB
BACTERIA SPEC CULT: ABNORMAL
SPECIMEN SOURCE: ABNORMAL

## 2018-10-12 ENCOUNTER — TELEPHONE (OUTPATIENT)
Dept: UROLOGY | Facility: CLINIC | Age: 43
End: 2018-10-12

## 2018-10-12 DIAGNOSIS — N39.0 URINARY TRACT INFECTION: Primary | ICD-10-CM

## 2018-10-12 RX ORDER — CIPROFLOXACIN 500 MG/1
500 TABLET, FILM COATED ORAL 2 TIMES DAILY
Qty: 10 TABLET | Refills: 0 | Status: SHIPPED | OUTPATIENT
Start: 2018-10-12 | End: 2019-03-19

## 2018-10-12 NOTE — TELEPHONE ENCOUNTER
[facility] Call Center    Phone Message    May a detailed message be left on voicemail: yes    Reason for Call: Other: Pt waiting to hear results from urine analysis that was dropped off, please call     Action Taken: Message routed to:  [facility] Urology Clinic

## 2018-10-12 NOTE — TELEPHONE ENCOUNTER
Just finished abx - day after still having symptoms  Incontinence and generalized tiredness, weakness.  No fever, chills, nausea or vomiting.  C/O right back pain.    UC from 10.8.18 (+). Cipro 500mg BID x 5 days per protocol given.  RX sent to patient's pharmacy.    Patient's spouse, Keshia, informed of above.  Verbalized understanding. No further questions.     Priya Bender, RN, BSN  Urology Patient Care Supervisor

## 2018-10-18 ENCOUNTER — PRE VISIT (OUTPATIENT)
Dept: UROLOGY | Facility: CLINIC | Age: 43
End: 2018-10-18

## 2018-10-22 ENCOUNTER — OFFICE VISIT (OUTPATIENT)
Dept: UROLOGY | Facility: CLINIC | Age: 43
End: 2018-10-22
Payer: COMMERCIAL

## 2018-10-22 ENCOUNTER — RADIANT APPOINTMENT (OUTPATIENT)
Dept: ULTRASOUND IMAGING | Facility: CLINIC | Age: 43
End: 2018-10-22
Attending: UROLOGY
Payer: COMMERCIAL

## 2018-10-22 VITALS
DIASTOLIC BLOOD PRESSURE: 79 MMHG | WEIGHT: 145 LBS | HEART RATE: 76 BPM | SYSTOLIC BLOOD PRESSURE: 137 MMHG | HEIGHT: 66 IN | BODY MASS INDEX: 23.3 KG/M2

## 2018-10-22 DIAGNOSIS — N31.9 NEUROGENIC BLADDER: Primary | ICD-10-CM

## 2018-10-22 DIAGNOSIS — N52.1 ERECTILE DYSFUNCTION DUE TO DISEASES CLASSIFIED ELSEWHERE: ICD-10-CM

## 2018-10-22 ASSESSMENT — PAIN SCALES - GENERAL
PAINLEVEL: NO PAIN (0)
PAINLEVEL: NO PAIN (0)
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
## 2018-10-22 NOTE — NURSING NOTE
"Chief Complaint   Patient presents with     Cystoscopy     NGB, rUTIs       Blood pressure 137/79, pulse 76, height 1.676 m (5' 6\"), weight 65.8 kg (145 lb). Body mass index is 23.4 kg/(m^2).    There is no problem list on file for this patient.      No Known Allergies    Current Outpatient Prescriptions   Medication Sig Dispense Refill     acetaminophen (TYLENOL) 325 MG tablet Take 2 tabs bid       Alcohol Swabs (ALCOHOL WIPES) 70 % PADS 1 Application as needed 1 each 1     alprostadil (EDEX) 40 MCG kit 40 mcg by Intracavitary route as needed for erectile dysfunction use no more than 3 times per week 6 kit 3     baclofen (LIORESAL) 10 MG tablet Take 10 mg by mouth       bisacodyl (DULCOLAX) 10 MG Suppository Place 10 mg rectally       calcium carbonate (OSCAL 500) 1250 (500 CA) MG TABS tablet Take 500 mg by mouth       carvedilol (COREG) 6.25 MG tablet Take 6.25 mg by mouth       EDEX 20 MCG kit INJECT 20MCG BY INTRACAVERNOSAL ROUTE PRN. NO MORE THAN 3 TIMES PER WEEK  3     FIBER, GUAR GUM, PO Take 1 packet by mouth       [facility]. Devices (WHEEL CHAIR K1 BASIC DESK ARM) MISC Wheelchair:  Tilt and space  with leg rests  Length of need: 6 months       NEW MED 20 mcg by INTRACAVERNOSAL route as needed Alprostadil 50mcg/mL. Inject 0.2mL as needed. Rotate sites. No more than 3 times per week. 1 mL 3     omeprazole (PRILOSEC) 20 MG CR capsule Take 20 mg by mouth       oxybutynin (DITROPAN-XL) 10 MG 24 hr tablet Take 1 tablet (10 mg) by mouth daily 90 tablet 2     polyethylene glycol (MIRALAX/GLYCOLAX) Packet Take 17 g by mouth       pregabalin (LYRICA) 50 MG capsule Take 50 mg by mouth       senna (SENOKOT) 8.6 MG tablet Take 34.4 mg by mouth       sulfamethoxazole-trimethoprim (BACTRIM DS/SEPTRA DS) 800-160 MG per tablet Take 1 tablet by mouth 2 times daily 10 tablet 0     traMADol (ULTRAM) 50 MG tablet Take  mg by mouth         Social History   Substance Use Topics     Smoking status: Never Smoker     Smokeless " tobacco: Never Used     Alcohol use Not on file     Invasive Procedure Safety Checklist:    Procedure: cystoscopy    Action: Complete sections and checkboxes as appropriate.    Pre-procedure:  1. Patient ID Verified with 2 identifiers (Nery and  or MRN) : YES    2. Procedure and site verified with patient/designee (when able) : YES    3. Accurate consent documentation in medical record : YES    4. H&P (or appropriate assessment) documented in medical record : NO  H&P must be up to 30 days prior to procedure an updated within 24 hours of                 Procedure as applicable.     5. Relevant diagnostic and radiology test results appropriately labeled and displayed as applicable : NO    6. Blood products, implants, devices, and/or special equipment available for the procedure as applicable : NO    7. Procedure site(s) marked with provider initials [Exclusions: none] : NO    8. Marking not required. Reason : Yes  Procedure does not require site marking    Time Out:     Time-Out performed immediately prior to starting procedure, including verbal and active participation of all team members addressing: YES    1. Correct patient identity.  2. Confirmed that the correct side and site are marked.  3. An accurate procedure to be done.  4. Agreement on the procedure to be done.  5. Correct patient position.  6. Relevant images and results are properly labeled and appropriately displayed.  7. The need to administer antibiotics or fluids for irrigation purposes during the procedure as applicable.  8. Safety precautions based on patient history or medication use.    During Procedure: Verification of correct person, site, and procedure occurs any time the responsibility for care of the patient is transferred to another member of the care team.    Ivette Parikh LPN  10/22/2018  1:30 PM    ** No Urojet was used for this patient as he has no sensation below the waist. **

## 2018-10-22 NOTE — MR AVS SNAPSHOT
"              After Visit Summary   10/22/2018    Kinza Teresa    MRN: [id number]           Patient Information     Date Of Birth          1975        Visit Information        Provider Department      10/22/2018 2:00 PM Elder Trujillo MD [facility]        Today's Diagnoses     Neurogenic bladder    -  1      Care Instructions      AFTER YOUR CYSTOSCOPY        You have just completed a cystoscopy, or \"cysto\", which allowed your physician to learn more about your bladder (or to remove a stent placed after surgery). We suggest that you continue to avoid caffeine, fruit juice, and alcohol for the next 24 hours, however, you are encouraged to return to your normal activities.         A few things that are considered normal after your cystoscopy:     * Small amount of bleeding (or spotting) that clears within the next 24 hours     * Slight burning sensation with urination     * Sensation to of needing to avoid more frequently     * The feeling of \"air\" in your urine     * Mild discomfort that is relieved with Tylenol        Please contact our office promptly if you:     * Develop a fever above 101 degrees     * Are unable to urinate     * Develop bright red blood that does not stop     * Severe pain or swelling         Please contact our office with any concerns or questions @[facility].          Follow-ups after your visit        Follow-up notes from your care team     Return for as scheduled for urodynamics.      Your next 10 appointments already scheduled     Nov 12, 2018 10:00 AM CST   New Sleep Patient with Jagruti Bangura MD   [facility] ([facility])    [address] [place] [address]   [phone]            Jan 09, 2019  1:00 PM CST   (Arrive by 12:45 PM)   Urodynamics with OVIDIO Trevino [facility]rologic " Cancers ([facility])    [address] [place] [address]  4th [facility] [id number] [phone]            Jul 15, 2019  9:00 AM CDT   US RENAL COMPLETE with UCUS3   [facility] US ([facility])    [address] [place] [id number] [phone]           How do I prepare for my exam? (Food and drink instructions) No Food and Drink Restrictions.  How do I prepare for my exam? (Other instructions) You do not need to do anything special to prepare for your exam.  What should I wear: Wear comfortable clothes.  How long does the exam take: Most ultrasounds take 30 to 60 minutes.  What should I bring: Bring a list of your medicines, including vitamins, minerals and over-the-counter drugs. It is safest to leave personal items at home.  Do I need a :  No  is needed.  What do I need to tell my doctor: Tell your doctor about any allergies you may have.  What should I do after the exam: No restrictions, You may resume normal activities.  What is this test: An ultrasound uses sound waves to make pictures of the body. Sound waves do not cause pain. The only discomfort may be the pressure of the wand against your skin or full bladder.  Who should I call with questions: If you have any questions, please call the Imaging Department where you will have your exam. Directions, parking instructions, and other information is available on our website, [email or web address].            Jul 15, 2019 10:00 AM CDT   LAB with  LAB   [facility] Lab ([facility])    [address] [place] [id number] [phone]           Please do not eat 10-12 hours before your appointment if you are coming in fasting for labs on lipids, cholesterol, or glucose (sugar). This does not apply to pregnant women. Water, hot tea and black coffee (with nothing added) are okay. Do not drink other fluids, diet soda or  "chew gum.            Jul 15, 2019 10:30 AM CDT   (Arrive by 10:15 AM)   Return Visit with Regi Richardson PA-C   [facility] Urology and [facility] for Prostate and Urologic Cancers ([facility])    [address] [facility]  4th [facility] [address] [phone]              Who to contact     Please call your clinic at [phone] to:    Ask questions about your health    Make or cancel appointments    Discuss your medicines    Learn about your test results    Speak to your doctor            Additional Information About Your Visit        Care EveryWhere ID     This is your Care EveryWhere ID. This could be used by other organizations to access your [facility] medical records  [id number]        Your Vitals Were     Pulse Height BMI (Body Mass Index)             76 1.676 m (5' 6\") 23.4 kg/m2          Blood Pressure from Last 3 Encounters:   10/22/18 137/79   07/16/18 130/70   01/22/18 127/67    Weight from Last 3 Encounters:   10/22/18 65.8 kg (145 lb)   07/16/18 65.8 kg (145 lb)   01/22/18 64.4 kg (142 lb)              We Performed the Following     CYSTOURETHROSCOPY        Primary Care Provider Office Phone # Fax #    Ray Puente [phone]       [facility] [address] [place] [address]        Equal Access to Services     SUZANNE CASILLAS AH: Hadii aad ku hadasho Soomaali, waaxda luqadaha, qaybta kaalmada ramboegyada, hayley chase. So [facility] [phone].    ATENCIÓN: Si habla español, tiene a villarreal disposición servicios gratzoranos de asistencia lingüística. Quinton al [phone].    We comply with applicable federal civil rights laws and [place] laws. We do not discriminate on the basis of race, color, national origin, age, disability, sex, sexual orientation, or gender identity.            Thank you!     Thank you for choosing [facility] UROLOGY AND [facility] FOR PROSTATE AND UROLOGIC CANCERS  for your care. Our goal is always to " provide you with excellent care. Hearing back from our patients is one way we can continue to improve our services. Please take a few minutes to complete the written survey that you may receive in the mail after your visit with us. Thank you!             Your Updated Medication List - Protect others around you: Learn how to safely use, store and throw away your medicines at www.disposemymeds.org.          This list is accurate as of 10/22/18  2:15 PM.  Always use your most recent med list.                   Brand Name Dispense Instructions for use Diagnosis    acetaminophen 325 MG tablet    TYLENOL     Take 2 tabs bid        Alcohol Wipes 70 % Pads     1 each    1 Application as needed    ED (erectile dysfunction)       baclofen 10 MG tablet    LIORESAL     Take 10 mg by mouth        bisacodyl 10 MG Suppository    DULCOLAX     Place 10 mg rectally        calcium carbonate 500 mg (elemental) 1250 (500 Ca) MG Tabs tablet    OSCAL 500     Take 500 mg by mouth        carvedilol 6.25 MG tablet    COREG     Take 6.25 mg by mouth        * EDEX 20 MCG kit   Generic drug:  alprostadil      INJECT 20MCG BY INTRACAVERNOSAL ROUTE PRN. NO MORE THAN 3 TIMES PER WEEK        * alprostadil 40 MCG kit    EDEX    6 kit    40 mcg by Intracavitary route as needed for erectile dysfunction use no more than 3 times per week    Erectile dysfunction due to diseases classified elsewhere       FIBER (GUAR GUM) PO      Take 1 packet by mouth        NEW MED     1 mL    20 mcg by INTRACAVERNOSAL route as needed Alprostadil 50mcg/mL. Inject 0.2mL as needed. Rotate sites. No more than 3 times per week.    ED (erectile dysfunction)       omeprazole 20 MG CR capsule    priLOSEC     Take 20 mg by mouth        oxybutynin 10 MG 24 hr tablet    DITROPAN-XL    90 tablet    Take 1 tablet (10 mg) by mouth daily    Overactive bladder       polyethylene glycol Packet    MIRALAX/GLYCOLAX     Take 17 g by mouth        pregabalin 50 MG capsule    LYRICA     Take  50 mg by mouth        senna 8.6 MG tablet    SENOKOT     Take 34.4 mg by mouth        sulfamethoxazole-trimethoprim 800-160 MG per tablet    BACTRIM DS/SEPTRA DS    10 tablet    Take 1 tablet by mouth 2 times daily    Urinary tract infection       traMADol 50 MG tablet    ULTRAM     Take  mg by mouth        Wheel Chair K1 Basic Desk Arm Misc      Wheelchair:  Tilt and space  with leg rests  Length of need: 6 months        * Notice:  This list has 2 medication(s) that are the same as other medications prescribed for you. Read the directions carefully, and ask your doctor or other care provider to review them with you.

## 2018-10-25 ENCOUNTER — HOSPITAL ENCOUNTER (EMERGENCY)
Facility: CLINIC | Age: 43
Discharge: HOME | End: 2018-10-25
Attending: EMERGENCY MEDICINE | Admitting: EMERGENCY MEDICINE
Payer: COMMERCIAL

## 2018-10-25 ENCOUNTER — APPOINTMENT (OUTPATIENT)
Dept: CT IMAGING | Facility: CLINIC | Age: 43
End: 2018-10-25
Attending: EMERGENCY MEDICINE
Payer: COMMERCIAL

## 2018-10-25 ENCOUNTER — APPOINTMENT (OUTPATIENT)
Dept: ULTRASOUND IMAGING | Facility: CLINIC | Age: 43
End: 2018-10-25
Attending: EMERGENCY MEDICINE
Payer: COMMERCIAL

## 2018-10-25 ENCOUNTER — APPOINTMENT (OUTPATIENT)
Dept: GENERAL RADIOLOGY | Facility: CLINIC | Age: 43
End: 2018-10-25
Attending: EMERGENCY MEDICINE
Payer: COMMERCIAL

## 2018-10-25 VITALS
BODY MASS INDEX: 23.4 KG/M2 | WEIGHT: 145 LBS | RESPIRATION RATE: 11 BRPM | TEMPERATURE: 98.3 F | OXYGEN SATURATION: 98 % | DIASTOLIC BLOOD PRESSURE: 59 MMHG | SYSTOLIC BLOOD PRESSURE: 110 MMHG | HEART RATE: 63 BPM

## 2018-10-25 DIAGNOSIS — R10.84 ABDOMINAL PAIN, GENERALIZED: ICD-10-CM

## 2018-10-25 DIAGNOSIS — G89.4 CHRONIC PAIN SYNDROME: ICD-10-CM

## 2018-10-25 DIAGNOSIS — K59.00 CONSTIPATION, UNSPECIFIED CONSTIPATION TYPE: ICD-10-CM

## 2018-10-25 DIAGNOSIS — R10.9 FLANK PAIN: ICD-10-CM
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
## 2018-10-25 PROBLEM — I60.9 SAH (SUBARACHNOID HEMORRHAGE) (H): Status: ACTIVE | Noted: 2017-03-06

## 2018-10-25 PROBLEM — S06.9X9S: Status: ACTIVE | Noted: 2017-03-21

## 2018-10-25 PROBLEM — S06.9XAA TRAUMATIC BRAIN INJURY (H): Status: ACTIVE | Noted: 2017-06-06

## 2018-10-25 PROBLEM — S22.089A: Status: ACTIVE | Noted: 2017-06-06

## 2018-10-25 PROBLEM — S42.302D CLOSED FRACTURE OF SHAFT OF LEFT HUMERUS WITH ROUTINE HEALING: Status: ACTIVE | Noted: 2017-03-27

## 2018-10-25 PROBLEM — Z98.1 S/P SPINAL FUSION: Status: ACTIVE | Noted: 2017-04-25

## 2018-10-25 PROBLEM — S24.109A: Status: ACTIVE | Noted: 2017-06-06

## 2018-10-25 PROBLEM — G82.20 PARAPLEGIA (H): Status: ACTIVE | Noted: 2017-05-08

## 2018-10-25 PROBLEM — S22.089D: Status: ACTIVE | Noted: 2017-03-06

## 2018-10-25 PROBLEM — Z87.828: Status: ACTIVE | Noted: 2017-06-06

## 2018-10-25 PROBLEM — S02.609B: Status: ACTIVE | Noted: 2017-03-06

## 2018-10-25 PROBLEM — M79.2 NEUROPATHIC PAIN: Status: ACTIVE | Noted: 2017-09-12

## 2018-10-25 LAB
ALBUMIN SERPL-MCNC: 4.2 G/DL (ref 3.4–5)
ALBUMIN UR-MCNC: NEGATIVE MG/DL
ALP SERPL-CCNC: 93 U/L (ref 40–150)
ALT SERPL W P-5'-P-CCNC: 19 U/L (ref 0–70)
ANION GAP SERPL CALCULATED.3IONS-SCNC: 7 MMOL/L (ref 3–14)
APPEARANCE UR: CLEAR
APTT PPP: 32 SEC (ref 22–37)
AST SERPL W P-5'-P-CCNC: 15 U/L (ref 0–45)
BASOPHILS # BLD AUTO: 0 10E9/L (ref 0–0.2)
BASOPHILS NFR BLD AUTO: 0.1 %
BILIRUB SERPL-MCNC: 0.3 MG/DL (ref 0.2–1.3)
BILIRUB UR QL STRIP: NEGATIVE
BUN SERPL-MCNC: 19 MG/DL (ref 7–30)
CALCIUM SERPL-MCNC: 9.2 MG/DL (ref 8.5–10.1)
CHLORIDE SERPL-SCNC: 104 MMOL/L (ref 94–109)
CK SERPL-CCNC: 178 U/L (ref 30–300)
CO2 SERPL-SCNC: 29 MMOL/L (ref 20–32)
COLOR UR AUTO: NORMAL
CREAT SERPL-MCNC: 0.79 MG/DL (ref 0.66–1.25)
DIFFERENTIAL METHOD BLD: NORMAL
EOSINOPHIL # BLD AUTO: 0 10E9/L (ref 0–0.7)
EOSINOPHIL NFR BLD AUTO: 0.3 %
ERYTHROCYTE [DISTWIDTH] IN BLOOD BY AUTOMATED COUNT: 12.6 % (ref 10–15)
GFR SERPL CREATININE-BSD FRML MDRD: >90 ML/MIN/1.7M2
GLUCOSE SERPL-MCNC: 100 MG/DL (ref 70–99)
GLUCOSE UR STRIP-MCNC: NEGATIVE MG/DL
HCT VFR BLD AUTO: 44.8 % (ref 40–53)
HGB BLD-MCNC: 15.1 G/DL (ref 13.3–17.7)
HGB UR QL STRIP: NEGATIVE
IMM GRANULOCYTES # BLD: 0 10E9/L (ref 0–0.4)
IMM GRANULOCYTES NFR BLD: 0.3 %
INR PPP: 0.93 (ref 0.86–1.14)
KETONES UR STRIP-MCNC: NEGATIVE MG/DL
LACTATE BLD-SCNC: 1 MMOL/L (ref 0.7–2)
LEUKOCYTE ESTERASE UR QL STRIP: NEGATIVE
LIPASE SERPL-CCNC: 89 U/L (ref 73–393)
LYMPHOCYTES # BLD AUTO: 2.7 10E9/L (ref 0.8–5.3)
LYMPHOCYTES NFR BLD AUTO: 35.6 %
MAGNESIUM SERPL-MCNC: 2.1 MG/DL (ref 1.6–2.3)
MCH RBC QN AUTO: 31.2 PG (ref 26.5–33)
MCHC RBC AUTO-ENTMCNC: 33.7 G/DL (ref 31.5–36.5)
MCV RBC AUTO: 93 FL (ref 78–100)
MONOCYTES # BLD AUTO: 0.5 10E9/L (ref 0–1.3)
MONOCYTES NFR BLD AUTO: 6.6 %
NEUTROPHILS # BLD AUTO: 4.3 10E9/L (ref 1.6–8.3)
NEUTROPHILS NFR BLD AUTO: 57.1 %
NITRATE UR QL: NEGATIVE
NRBC # BLD AUTO: 0 10*3/UL
NRBC BLD AUTO-RTO: 0 /100
PH UR STRIP: 6 PH (ref 5–7)
PLATELET # BLD AUTO: 185 10E9/L (ref 150–450)
POTASSIUM SERPL-SCNC: 3.8 MMOL/L (ref 3.4–5.3)
PROT SERPL-MCNC: 8 G/DL (ref 6.8–8.8)
RBC # BLD AUTO: 4.84 10E12/L (ref 4.4–5.9)
SODIUM SERPL-SCNC: 140 MMOL/L (ref 133–144)
SOURCE: NORMAL
SP GR UR STRIP: 1.01 (ref 1–1.03)
UROBILINOGEN UR STRIP-MCNC: NORMAL MG/DL (ref 0–2)
WBC # BLD AUTO: 7.5 10E9/L (ref 4–11)

## 2018-10-25 PROCEDURE — 25000125 ZZHC RX 250: Performed by: EMERGENCY MEDICINE

## 2018-10-25 PROCEDURE — 83735 ASSAY OF MAGNESIUM: CPT | Performed by: EMERGENCY MEDICINE

## 2018-10-25 PROCEDURE — 76705 ECHO EXAM OF ABDOMEN: CPT

## 2018-10-25 PROCEDURE — 96374 THER/PROPH/DIAG INJ IV PUSH: CPT | Performed by: EMERGENCY MEDICINE

## 2018-10-25 PROCEDURE — 99284 EMERGENCY DEPT VISIT MOD MDM: CPT | Mod: Z6 | Performed by: EMERGENCY MEDICINE

## 2018-10-25 PROCEDURE — 99285 EMERGENCY DEPT VISIT HI MDM: CPT | Mod: 25 | Performed by: EMERGENCY MEDICINE

## 2018-10-25 PROCEDURE — 85610 PROTHROMBIN TIME: CPT | Performed by: EMERGENCY MEDICINE

## 2018-10-25 PROCEDURE — 85730 THROMBOPLASTIN TIME PARTIAL: CPT | Performed by: EMERGENCY MEDICINE

## 2018-10-25 PROCEDURE — 87040 BLOOD CULTURE FOR BACTERIA: CPT | Performed by: EMERGENCY MEDICINE

## 2018-10-25 PROCEDURE — 96376 TX/PRO/DX INJ SAME DRUG ADON: CPT | Performed by: EMERGENCY MEDICINE

## 2018-10-25 PROCEDURE — 80053 COMPREHEN METABOLIC PANEL: CPT | Performed by: EMERGENCY MEDICINE

## 2018-10-25 PROCEDURE — 25000128 H RX IP 250 OP 636: Performed by: EMERGENCY MEDICINE

## 2018-10-25 PROCEDURE — 96375 TX/PRO/DX INJ NEW DRUG ADDON: CPT | Performed by: EMERGENCY MEDICINE

## 2018-10-25 PROCEDURE — 71045 X-RAY EXAM CHEST 1 VIEW: CPT

## 2018-10-25 PROCEDURE — 74177 CT ABD & PELVIS W/CONTRAST: CPT

## 2018-10-25 PROCEDURE — 85025 COMPLETE CBC W/AUTO DIFF WBC: CPT | Performed by: EMERGENCY MEDICINE

## 2018-10-25 PROCEDURE — 25000132 ZZH RX MED GY IP 250 OP 250 PS 637: Performed by: EMERGENCY MEDICINE

## 2018-10-25 PROCEDURE — 81003 URINALYSIS AUTO W/O SCOPE: CPT | Performed by: EMERGENCY MEDICINE

## 2018-10-25 PROCEDURE — 83690 ASSAY OF LIPASE: CPT | Performed by: EMERGENCY MEDICINE

## 2018-10-25 PROCEDURE — 83605 ASSAY OF LACTIC ACID: CPT | Performed by: EMERGENCY MEDICINE

## 2018-10-25 PROCEDURE — 82550 ASSAY OF CK (CPK): CPT | Performed by: EMERGENCY MEDICINE

## 2018-10-25 PROCEDURE — 96361 HYDRATE IV INFUSION ADD-ON: CPT | Performed by: EMERGENCY MEDICINE

## 2018-10-25 RX ORDER — MAGNESIUM CARB/ALUMINUM HYDROX 105-160MG
30 TABLET,CHEWABLE ORAL ONCE
Status: COMPLETED | OUTPATIENT
Start: 2018-10-25 | End: 2018-10-25

## 2018-10-25 RX ORDER — DIAZEPAM 5 MG
5 TABLET ORAL EVERY 6 HOURS PRN
Qty: 10 TABLET | Refills: 0 | Status: SHIPPED | OUTPATIENT
Start: 2018-10-25 | End: 2018-11-12

## 2018-10-25 RX ORDER — ONDANSETRON 2 MG/ML
4 INJECTION INTRAMUSCULAR; INTRAVENOUS EVERY 30 MIN PRN
Status: DISCONTINUED | OUTPATIENT
Start: 2018-10-25 | End: 2018-10-25 | Stop reason: HOSPADM

## 2018-10-25 RX ORDER — IOPAMIDOL 755 MG/ML
89 INJECTION, SOLUTION INTRAVASCULAR ONCE
Status: COMPLETED | OUTPATIENT
Start: 2018-10-25 | End: 2018-10-25

## 2018-10-25 RX ORDER — KETOROLAC TROMETHAMINE 15 MG/ML
10 INJECTION, SOLUTION INTRAMUSCULAR; INTRAVENOUS ONCE
Status: COMPLETED | OUTPATIENT
Start: 2018-10-25 | End: 2018-10-25

## 2018-10-25 RX ORDER — SODIUM CHLORIDE 9 MG/ML
1000 INJECTION, SOLUTION INTRAVENOUS CONTINUOUS
Status: DISCONTINUED | OUTPATIENT
Start: 2018-10-25 | End: 2018-10-25 | Stop reason: HOSPADM

## 2018-10-25 RX ORDER — HYDROMORPHONE HYDROCHLORIDE 1 MG/ML
1 INJECTION, SOLUTION INTRAMUSCULAR; INTRAVENOUS; SUBCUTANEOUS ONCE
Status: COMPLETED | OUTPATIENT
Start: 2018-10-25 | End: 2018-10-25

## 2018-10-25 RX ADMIN — MAGNESIUM CITRATE 30 ML: 1.75 LIQUID ORAL at 06:22

## 2018-10-25 RX ADMIN — SODIUM CHLORIDE 1000 ML: 9 INJECTION, SOLUTION INTRAVENOUS at 03:32

## 2018-10-25 RX ADMIN — Medication 1 MG: at 06:13

## 2018-10-25 RX ADMIN — KETOROLAC TROMETHAMINE 10 MG: 15 INJECTION, SOLUTION INTRAMUSCULAR; INTRAVENOUS at 06:17

## 2018-10-25 RX ADMIN — Medication 1 MG: at 03:32

## 2018-10-25 RX ADMIN — IOPAMIDOL 89 ML: 755 INJECTION, SOLUTION INTRAVENOUS at 05:03

## 2018-10-25 RX ADMIN — SODIUM CHLORIDE, PRESERVATIVE FREE 76 ML: 5 INJECTION INTRAVENOUS at 05:03

## 2018-10-25 ASSESSMENT — ENCOUNTER SYMPTOMS
ABDOMINAL PAIN: 1
NAUSEA: 1
FEVER: 0
SHORTNESS OF BREATH: 0

## 2018-10-25 NOTE — DISCHARGE INSTRUCTIONS
Abdominal Pain    Abdominal pain is pain in the stomach or belly area. Everyone has this pain from time to time. In many cases it goes away on its own. But abdominal pain can sometimes be due to a serious problem, such as appendicitis. So it s important to know when to seek help.  Causes of abdominal pain  There are many possible causes of abdominal pain. Common causes in adults include:    Constipation, diarrhea, or gas    Stomach acid flowing back up into the esophagus (acid reflux or heartburn)    Severe acid reflux, called GERD (gastroesophageal reflux disease)    A sore in the lining of the stomach or small intestine (peptic ulcer)    Inflammation of the gallbladder, liver, or pancreas    Gallstones or kidney stones    Appendicitis     Intestinal blockage     An internal organ pushing through a muscle or other tissue (hernia)    Urinary tract infections    In women, menstrual cramps, fibroids, or endometriosis    Inflammation or infection of the intestines  Diagnosing the cause of abdominal pain  Your healthcare provider will do a physical exam help find the cause of your pain. If needed, tests will be ordered. Belly pain has many possible causes. So it can be hard to find the reason for your pain. Giving details about your pain can help. Tell your provider where and when you feel the pain, and what makes it better or worse. Also let your provider know if you have other symptoms such as:    Fever    Tiredness    Upset stomach (nausea)    Vomiting    Changes in bathroom habits  Treating abdominal pain  Some causes of pain need emergency medical treatment right away. These include appendicitis or a bowel blockage. Other problems can be treated with rest, fluids, or medicines. Your healthcare provider can give you specific instructions for treatment or self-care based on what is causing your pain.  If you have vomiting or diarrhea, sip water or other clear fluids. When you are ready to eat solid foods again,  start with small amounts of easy-to-digest, low-fat foods. These include apple sauce, toast, or crackers.   When to seek medical care  Call 911 or go to the hospital right away if you:    Can t pass stool and are vomiting    Are vomiting blood or have bloody diarrhea or black, tarry diarrhea    Have chest, neck, or shoulder pain    Feel like you might pass out    Have pain in your shoulder blades with nausea    Have sudden, severe belly pain    Have new, severe pain unlike any you have felt before    Have a belly that is rigid, hard, and tender to touch  Call your healthcare provider if you have:    Pain for more than 5 days    Bloating for more than 2 days    Diarrhea for more than 5 days    A fever of 100.4 F (38 C) or higher, or as directed by your healthcare provider    Pain that gets worse    Weight loss for no reason    Continued lack of appetite    Blood in your stool  How to prevent abdominal pain  Here are some tips to help prevent abdominal pain:    Eat smaller amounts of food at one time.    Avoid greasy, fried, or other high-fat foods.    Avoid foods that give you gas.    Exercise regularly.    Drink plenty of fluids.  To help prevent GERD symptoms:    Quit smoking.    Reduce alcohol and certain foods that increase stomach acid.    Avoid aspirin and over-the-counter pain and fever medicines (NSAIDS or nonsteroidal anti-inflammatory drugs), if possible    Lose extra weight.    Finish eating at least 2 hours before you go to bed or lie down.    Raise the head of your bed.  Date Last Reviewed: 7/1/2016 2000-2017 [employer]. [address] [place] [address], [place] [address]. All rights reserved. This information is not intended as a substitute for professional medical care. Always follow your healthcare professional's instructions.          Chronic Pain  Pain serves an important role. It lets you know something is wrong that needs your attention. When the body heals, pain normally goes away.  When  pain lasts longer than 6 months, it is called  chronic  pain. This is pain that is present even after the body has healed. Chronic pain can cause mood problems and get in the way of your relationships and your daily life.  A number of conditions can cause chronic pain. Some of the more common include:    Previous surgery    An old injury    Infection    Diseases such as diabetes    Nerve damage    Back injury    Arthritis    Migraine or other headaches    Fibromyalgia    Cancer  Depression and stress can make chronic pain symptoms worse. In some cases, a cause for the pain can't be found.   Treatment  Treatment can greatly reduce pain. In many cases, pain can become less severe, occur less often, and interfere less with your daily life. Chronic pain is often treated with a combination of medicines, therapies, and lifestyle changes. You will work closely with your healthcare provider to find a treatment plan that works best for you.    Ask your healthcare provider for a referral to a pain management specialty center. These can provide the most recent and proven pain management strategies, along with emotional support and comprehensive services.    Several different types of medicines may be prescribed for chronic pain. Work with your healthcare provider to develop a medicine plan that helps manage your pain.    Physical therapy can help reduce certain types of chronic pain.    Occupational therapy teaches you how to do routine tasks of daily living in ways that lessen your discomfort.    Counseling can help you cope better with stress and pain.    Other therapies such as meditation, yoga, biofeedback, massage, and acupuncture can also help manage chronic pain.    Changing certain habits can help reduce chronic pain. They include:  ? Eating healthy  ? Developing an exercise routine  ? Getting enough sleep   ? Stopping smoking and limiting alcohol use  ? Losing excess weight  Follow-up care  Follow up with  your healthcare provider, or as advised. Let your healthcare provider know if your current treatment plan is working or if changes are needed.  Resources  For more information, contact:    American Headache and Migraine Association, juice.memberAkustica.[employer]umific or 179-354-8512    American Chronic Pain Association, theacpa.org or 063-802-4949  Date Last Reviewed: 8/1/2017 2000-2017 [employer]. [address] [place] [address]. All rights reserved. This information is not intended as a substitute for professional medical care. Always follow your healthcare professional's instructions.          Treating Constipation    Constipation is a common and often uncomfortable problem. Constipation means you have bowel movements fewer than 3 times per week, or strain to pass hard, dry stool. It can last a short time. Or it can be a problem that never seems to go away. The good news is that it can often be treated and controlled.  Eat more fiber  One of the best ways to help treat constipation is to increase your fiber intake. You can do this either through diet or by using fiber supplements. Fiber (in whole grains, fruits, and vegetables) adds bulk and absorbs water to soften the stool. This helps the stool pass through the colon more easily. When you increase your fiber intake, do it slowly to avoid side effects such as bloating. Also increase the amount of water that you drink. Eating more of the following foods can add fiber to your diet.    High-fiber cereals    Whole grains, bran, and brown rice    Vegetables such as carrots, broccoli, and greens    Fresh fruits (especially apples, pears, and dried fruits like raisins and apricots)    Nuts and legumes (especially beans such as lentils, kidney beans, and lima beans)  Get physically active  Exercise helps improve the working of your colon which helps ease constipation. Try to get some physical activity every day. If you haven t been active for a while,  talk to your healthcare provider before starting again.  Laxatives  Your healthcare provider may suggest an over-the-counter product to help ease your constipation. He or she may suggest the use of bulk-forming agents or laxatives. The use of laxatives, if used as directed, is common and safe. Follow directions carefully when using them. See your healthcare provider for new-onset constipation, or long-term constipation, to rule out other causes such as medicines or thyroid disease.  Date Last Reviewed: 7/1/2016 2000-2017 [employer]. [address] [place] [address], [place] [address]. All rights reserved. This information is not intended as a substitute for professional medical care. Always follow your healthcare professional's instructions.      Is very important that you follow-up with your primary care doctor in 2 days to make sure that you are getting better and to see if any other test of treatments will be needed.  If fevers if worse pain if nausea vomiting or if any other concerns develop please return to the emergency department as soon as possible.  Please realize abdominal pain is very difficult to diagnose to make sure you return if you get worse for reevaluation.
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
## 2018-10-25 NOTE — ED AVS SNAPSHOT
[facility], [place], Emergency Department    [address] [place] [address]    Phone:  [phone]                                       Kinza Teresa   MRN: [id number]    Department:  [facility], Emergency Department   Date of Visit:  10/25/2018           After Visit Summary Signature Page     I have received my discharge instructions, and my questions have been answered. I have discussed any challenges I see with this plan with the nurse or doctor.    ..........................................................................................................................................  Patient/Patient Representative Signature      ..........................................................................................................................................  Patient Representative Print Name and Relationship to Patient    ..................................................               ................................................  Date                                   Time    ..........................................................................................................................................  Reviewed by Signature/Title    ...................................................              ..............................................  Date                                               Time          22EPIC Rev 08/18
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
## 2018-10-25 NOTE — ED AVS SNAPSHOT
[facility], Emergency Department    [address] [address]    Phone:  [phone]                                       Kinza Teresa   MRN: [id number]    Department:  [facility], Emergency Department   Date of Visit:  10/25/2018           Patient Information     Date Of Birth          1975        Your diagnoses for this visit were:     Flank pain     Abdominal pain, generalized     Chronic pain syndrome     Constipation, unspecified constipation type        You were seen by Shankar Hernandez MD.      Follow-up Information     Follow up with Ray Puente MD In 2 days.    Specialty:  Family Practice    Why:  To see if any other tests or treatments will be needed    Contact information:    [facility]  [address]  [place] [address]  [phone]          Discharge Instructions         Abdominal Pain    Abdominal pain is pain in the stomach or belly area. Everyone has this pain from time to time. In many cases it goes away on its own. But abdominal pain can sometimes be due to a serious problem, such as appendicitis. So it s important to know when to seek help.  Causes of abdominal pain  There are many possible causes of abdominal pain. Common causes in adults include:    Constipation, diarrhea, or gas    Stomach acid flowing back up into the esophagus (acid reflux or heartburn)    Severe acid reflux, called GERD (gastroesophageal reflux disease)    A sore in the lining of the stomach or small intestine (peptic ulcer)    Inflammation of the gallbladder, liver, or pancreas    Gallstones or kidney stones    Appendicitis     Intestinal blockage     An internal organ pushing through a muscle or other tissue (hernia)    Urinary tract infections    In women, menstrual cramps, fibroids, or endometriosis    Inflammation or infection of the intestines  Diagnosing the cause of abdominal pain  Your healthcare provider will do a physical exam help find the cause of your  pain. If needed, tests will be ordered. Belly pain has many possible causes. So it can be hard to find the reason for your pain. Giving details about your pain can help. Tell your provider where and when you feel the pain, and what makes it better or worse. Also let your provider know if you have other symptoms such as:    Fever    Tiredness    Upset stomach (nausea)    Vomiting    Changes in bathroom habits  Treating abdominal pain  Some causes of pain need emergency medical treatment right away. These include appendicitis or a bowel blockage. Other problems can be treated with rest, fluids, or medicines. Your healthcare provider can give you specific instructions for treatment or self-care based on what is causing your pain.  If you have vomiting or diarrhea, sip water or other clear fluids. When you are ready to eat solid foods again, start with small amounts of easy-to-digest, low-fat foods. These include apple sauce, toast, or crackers.   When to seek medical care  Call 911 or go to the hospital right away if you:    Can t pass stool and are vomiting    Are vomiting blood or have bloody diarrhea or black, tarry diarrhea    Have chest, neck, or shoulder pain    Feel like you might pass out    Have pain in your shoulder blades with nausea    Have sudden, severe belly pain    Have new, severe pain unlike any you have felt before    Have a belly that is rigid, hard, and tender to touch  Call your healthcare provider if you have:    Pain for more than 5 days    Bloating for more than 2 days    Diarrhea for more than 5 days    A fever of 100.4 F (38 C) or higher, or as directed by your healthcare provider    Pain that gets worse    Weight loss for no reason    Continued lack of appetite    Blood in your stool  How to prevent abdominal pain  Here are some tips to help prevent abdominal pain:    Eat smaller amounts of food at one time.    Avoid greasy, fried, or other high-fat foods.    Avoid foods that give you  gas.    Exercise regularly.    Drink plenty of fluids.  To help prevent GERD symptoms:    Quit smoking.    Reduce alcohol and certain foods that increase stomach acid.    Avoid aspirin and over-the-counter pain and fever medicines (NSAIDS or nonsteroidal anti-inflammatory drugs), if possible    Lose extra weight.    Finish eating at least 2 hours before you go to bed or lie down.    Raise the head of your bed.  Date Last Reviewed: 7/1/2016 2000-2017 [employer]. [address] [place] [address], [place] [id number]. All rights reserved. This information is not intended as a substitute for professional medical care. Always follow your healthcare professional's instructions.          Chronic Pain  Pain serves an important role. It lets you know something is wrong that needs your attention. When the body heals, pain normally goes away.  When pain lasts longer than 6 months, it is called  chronic  pain. This is pain that is present even after the body has healed. Chronic pain can cause mood problems and get in the way of your relationships and your daily life.  A number of conditions can cause chronic pain. Some of the more common include:    Previous surgery    An old injury    Infection    Diseases such as diabetes    Nerve damage    Back injury    Arthritis    Migraine or other headaches    Fibromyalgia    Cancer  Depression and stress can make chronic pain symptoms worse. In some cases, a cause for the pain can't be found.   Treatment  Treatment can greatly reduce pain. In many cases, pain can become less severe, occur less often, and interfere less with your daily life. Chronic pain is often treated with a combination of medicines, therapies, and lifestyle changes. You will work closely with your healthcare provider to find a treatment plan that works best for you.    Ask your healthcare provider for a referral to a pain management specialty center. These can provide the most recent and proven pain management  strategies, along with emotional support and comprehensive services.    Several different types of medicines may be prescribed for chronic pain. Work with your healthcare provider to develop a medicine plan that helps manage your pain.    Physical therapy can help reduce certain types of chronic pain.    Occupational therapy teaches you how to do routine tasks of daily living in ways that lessen your discomfort.    Counseling can help you cope better with stress and pain.    Other therapies such as meditation, yoga, biofeedback, massage, and acupuncture can also help manage chronic pain.    Changing certain habits can help reduce chronic pain. They include:  ? Eating healthy  ? Developing an exercise routine  ? Getting enough sleep   ? Stopping smoking and limiting alcohol use  ? Losing excess weight  Follow-up care  Follow up with your healthcare provider, or as advised. Let your healthcare provider know if your current treatment plan is working or if changes are needed.  Resources  For more information, contact:    American Headache and Migraine Associationjuice.memberClick [email or web address].[employer] or 508-308-2255    American Chronic Pain Association, theacpa.org or 103-291-8772  Date Last Reviewed: 8/1/2017 2000-2017 [employer]. [address] [place] [address]. All rights reserved. This information is not intended as a substitute for professional medical care. Always follow your healthcare professional's instructions.          Treating Constipation    Constipation is a common and often uncomfortable problem. Constipation means you have bowel movements fewer than 3 times per week, or strain to pass hard, dry stool. It can last a short time. Or it can be a problem that never seems to go away. The good news is that it can often be treated and controlled.  Eat more fiber  One of the best ways to help treat constipation is to increase your fiber intake. You can do this either through diet or by using fiber  supplements. Fiber (in whole grains, fruits, and vegetables) adds bulk and absorbs water to soften the stool. This helps the stool pass through the colon more easily. When you increase your fiber intake, do it slowly to avoid side effects such as bloating. Also increase the amount of water that you drink. Eating more of the following foods can add fiber to your diet.    High-fiber cereals    Whole grains, bran, and brown rice    Vegetables such as carrots, broccoli, and greens    Fresh fruits (especially apples, pears, and dried fruits like raisins and apricots)    Nuts and legumes (especially beans such as lentils, kidney beans, and lima beans)  Get physically active  Exercise helps improve the working of your colon which helps ease constipation. Try to get some physical activity every day. If you haven t been active for a while, talk to your healthcare provider before starting again.  Laxatives  Your healthcare provider may suggest an over-the-counter product to help ease your constipation. He or she may suggest the use of bulk-forming agents or laxatives. The use of laxatives, if used as directed, is common and safe. Follow directions carefully when using them. See your healthcare provider for new-onset constipation, or long-term constipation, to rule out other causes such as medicines or thyroid disease.  Date Last Reviewed: 7/1/2016 2000-2017 [employer]. [address] [place] [address], [place] [address]. All rights reserved. This information is not intended as a substitute for professional medical care. Always follow your healthcare professional's instructions.      Is very important that you follow-up with your primary care doctor in 2 days to make sure that you are getting better and to see if any other test of treatments will be needed.  If fevers if worse pain if nausea vomiting or if any other concerns develop please return to the emergency department as soon as possible.  Please realize  abdominal pain is very difficult to diagnose to make sure you return if you get worse for reevaluation.    Your next 10 appointments already scheduled     Nov 12, 2018 10:00 AM CST   New Sleep Patient with Jagruti Bangura MD   [facility] ([facility])    [address] [place] [address]   [phone]            Jan 09, 2019  1:00 PM CST   (Arrive by 12:45 PM)   Urodynamics with Regi Richardson PA-C   [facility] ([facility] [place])    [address]  4th Floor  [facility] [address]   [phone]            Jul 15, 2019  9:00 AM CDT   US RENAL COMPLETE with [email or web address]   [facility] ([facility])    [address]  1st Floor  [facility] [address]   [phone]           How do I prepare for my exam? (Food and drink instructions) No Food and Drink Restrictions.  How do I prepare for my exam? (Other instructions) You do not need to do anything special to prepare for your exam.  What should I wear: Wear comfortable clothes.  How long does the exam take: Most ultrasounds take 30 to 60 minutes.  What should I bring: Bring a list of your medicines, including vitamins, minerals and over-the-counter drugs. It is safest to leave personal items at home.  Do I need a :  No  is needed.  What do I need to tell my doctor: Tell your doctor about any allergies you may have.  What should I do after the exam: No restrictions, You may resume normal activities.  What is this test: An ultrasound uses sound waves to make pictures of the body. Sound waves do not cause pain. The only discomfort may be the pressure of the wand against your skin or full bladder.  Who should I call with questions: If you have any questions, please call the Imaging Department where you will have your exam. Directions, parking  instructions, and other information is available on our website, [email or web address].            Jul 15, 2019 10:00 AM CDT   LAB with  LAB   [facility] ([facility])    [address] [place] [address]  [address] [facility] [address] [phone]           Please do not eat 10-12 hours before your appointment if you are coming in fasting for labs on lipids, cholesterol, or glucose (sugar). This does not apply to pregnant women. Water, hot tea and black coffee (with nothing added) are okay. Do not drink other fluids, diet soda or chew gum.            Jul 15, 2019 10:30 AM CDT   (Arrive by 10:15 AM)   Return Visit with Regi Richardson PA-C   [facility] ([facility])    [address] [place] [address]  [address] [facility] [id number] [phone]              24 Hour Appointment Hotline       To make an appointment at any [facility] clinic, call [phone] ([phone]). If you don't have a family doctor or clinic, we will help you find one. [facility] clinics are conveniently located to serve the needs of you and your family.             Review of your medicines      START taking        Dose / Directions Last dose taken    diazepam 5 MG tablet   Commonly known as:  VALIUM   Dose:  5 mg   Quantity:  10 tablet        Take 1 tablet (5 mg) by mouth every 6 hours as needed for anxiety or sleep   Refills:  0          Our records show that you are taking the medicines listed below. If these are incorrect, please call your family doctor or clinic.        Dose / Directions Last dose taken    acetaminophen 325 MG tablet   Commonly known as:  TYLENOL        Take 2 tabs bid   Refills:  0        Alcohol Wipes 70 % Pads   Dose:  1 Application   Quantity:  1 each        1 Application as needed   Refills:  1        baclofen 10 MG tablet   Commonly known as:  LIORESAL   Dose:  10 mg        Take 10 mg by mouth   Refills:  0         bisacodyl 10 MG Suppository   Commonly known as:  DULCOLAX   Dose:  10 mg        Place 10 mg rectally   Refills:  0        calcium carbonate 500 mg (elemental) 1250 (500 Ca) MG Tabs tablet   Commonly known as:  OSCAL 500   Dose:  500 mg        Take 500 mg by mouth   Refills:  0        carvedilol 6.25 MG tablet   Commonly known as:  COREG   Dose:  6.25 mg        Take 6.25 mg by mouth   Refills:  0        * EDEX 20 MCG kit   Generic drug:  alprostadil        INJECT 20MCG BY INTRACAVERNOSAL ROUTE PRN. NO MORE THAN 3 TIMES PER WEEK   Refills:  3        * alprostadil 40 MCG kit   Commonly known as:  EDEX   Dose:  40 mcg   Quantity:  6 kit        40 mcg by Intracavitary route as needed for erectile dysfunction use no more than 3 times per week   Refills:  3        FIBER (GUAR GUM) PO   Dose:  1 packet        Take 1 packet by mouth   Refills:  0        NEW MED   Dose:  20 mcg   Quantity:  1 mL        20 mcg by INTRACAVERNOSAL route as needed Alprostadil 50mcg/mL. Inject 0.2mL as needed. Rotate sites. No more than 3 times per week.   Refills:  3        omeprazole 20 MG CR capsule   Commonly known as:  priLOSEC   Dose:  20 mg        Take 20 mg by mouth   Refills:  0        oxybutynin 10 MG 24 hr tablet   Commonly known as:  DITROPAN-XL   Dose:  10 mg   Quantity:  90 tablet        Take 1 tablet (10 mg) by mouth daily   Refills:  2        polyethylene glycol Packet   Commonly known as:  MIRALAX/GLYCOLAX   Dose:  17 g        Take 17 g by mouth   Refills:  0        pregabalin 50 MG capsule   Commonly known as:  LYRICA   Dose:  50 mg        Take 50 mg by mouth   Refills:  0        senna 8.6 MG tablet   Commonly known as:  SENOKOT   Dose:  34.4 mg        Take 34.4 mg by mouth   Refills:  0        sulfamethoxazole-trimethoprim 800-160 MG per tablet   Commonly known as:  BACTRIM DS/SEPTRA DS   Dose:  1 tablet   Quantity:  10 tablet        Take 1 tablet by mouth 2 times daily   Refills:  0        traMADol 50 MG tablet   Commonly  known as:  ULTRAM   Dose:   mg        Take  mg by mouth   Refills:  0        Wheel Chair K1 Basic Desk Arm Misc        Wheelchair:  Tilt and space  with leg rests  Length of need: 6 months   Refills:  0        * Notice:  This list has 2 medication(s) that are the same as other medications prescribed for you. Read the directions carefully, and ask your doctor or other care provider to review them with you.            Prescriptions were sent or printed at these locations (1 Prescription)                   Other Prescriptions                Printed at Department/Unit printer (1 of 1)         diazepam (VALIUM) 5 MG tablet                Procedures and tests performed during your visit     Abdomen US, limited (RUQ only)    Blood culture    CBC with platelets differential    CK total    CT Abdomen Pelvis w Contrast    Comprehensive metabolic panel    INR    Lactic acid whole blood    Lipase    Magnesium    Partial thromboplastin time    UA reflex to Microscopic and Culture    XR Chest Port 1 View      Orders Needing Specimen Collection     None      Pending Results     Date and Time Order Name Status Description    10/25/2018 0256 CT Abdomen Pelvis w Contrast Preliminary     10/25/2018 0256 Blood culture Preliminary             Pending Culture Results     Date and Time Order Name Status Description    10/25/2018 0256 Blood culture Preliminary             Pending Results Instructions     If you had any lab results that were not finalized at the time of your Discharge, you can call the ED Lab Result RN at [phone]. You will be contacted by this team for any positive Lab results or changes in treatment. The nurses are available 7 days a week from 10A to 6:30P.  You can leave a message 24 hours per day and they will return your call.        Thank you for choosing [facility]       Thank you for choosing [facility] for your care. Our goal is always to provide you with excellent care. Hearing back from our patients  is one way we can continue to improve our services. Please take a few minutes to complete the written survey that you may receive in the mail after you visit with us. Thank you!        Care EveryWhere ID     This is your Care EveryWhere ID. This could be used by other organizations to access your [facility] medical records  [id number]        Equal Access to Services     SUZANNE CASILLAS : Carissa Chappell, bernardino billingsley, hayley lopez. So [facility] [phone].    ATENCIÓN: Si habla español, tiene a villarreal disposición servicios gratuitos de asistencia lingüística. Llame al [phone].    We comply with applicable federal civil rights laws and [place] laws. We do not discriminate on the basis of race, color, national origin, age, disability, sex, sexual orientation, or gender identity.            After Visit Summary       This is your record. Keep this with you and show to your community pharmacist(s) and doctor(s) at your next visit.

## 2018-10-31 LAB
BACTERIA SPEC CULT: NO GROWTH
Lab: NORMAL
SPECIMEN SOURCE: NORMAL

## 2018-11-09 ENCOUNTER — PRE VISIT (OUTPATIENT)
Dept: SLEEP MEDICINE | Facility: CLINIC | Age: 43
End: 2018-11-09

## 2018-11-09 NOTE — TELEPHONE ENCOUNTER
"  1.  Reason for the visit:  Consult possible sleep apnea  2.  Referring provider and clinic name:  Dr. Vinny Godinez  3.  Previous Sleep Doctor or Pulmonlogist (clinic name)?  None noted  4.  Records, Procedures, Imaging, and Labs (see below)  No records to obtain        All NOTES from previous office visits that pertain to why they are being seen in the Sleep Center    Previous Sleep Studies, Chest CT, Echos and reports that pertain to why they are seeing Sleep Center    All Sleep records that have been done in the last 2 years that pertain to why they are seeing Sleep Center            Are they being seen for continuation of care for Cpap/Bipap/Avap/Trilogy/Dental Device?     If yes to above Who and Where was Device issued/currently getting supplies from?     Are you currently on \"Supplemental Oxygen\" during the day or night?                                                                                                                                                         Please remind pt to bring Cpap machine and ask to arrive 15 minutes early to appointment due traffic and congestion                                                 5. Pt Sleep Center Packet received Message left asking pt to arrive 30 minutes early to appointment if no packet received.        Yes: \"please make sure that you bring this to your appointment completed, either the doctor will not see you until this completed or you may be asked to reschedule your appointment.\"     No: mail or email to the pt and explain, \"please make sure that you bring this to your appointment completed, either the doctor will not see you until this completed or you may be asked to reschedule your appointment.\"     ~If pt coming early to fill packet out, ask that they come 30 minutes prior to their appointment~     6. Has the pt's medication list been updated and preferred pharmacy added?     7. Has the allergy list been reviewed?    \"Thank you for choosing " "[facility] and we look forward to seeing you at your upcoming appointment\"     "

## 2018-11-12 ENCOUNTER — OFFICE VISIT (OUTPATIENT)
Dept: SLEEP MEDICINE | Facility: CLINIC | Age: 43
End: 2018-11-12
Payer: COMMERCIAL

## 2018-11-12 VITALS
SYSTOLIC BLOOD PRESSURE: 118 MMHG | HEART RATE: 52 BPM | HEIGHT: 66 IN | DIASTOLIC BLOOD PRESSURE: 75 MMHG | BODY MASS INDEX: 24.11 KG/M2 | RESPIRATION RATE: 16 BRPM | WEIGHT: 150 LBS | OXYGEN SATURATION: 100 %

## 2018-11-12 DIAGNOSIS — R06.83 SNORING: Primary | ICD-10-CM

## 2018-11-12 PROCEDURE — 99205 OFFICE O/P NEW HI 60 MIN: CPT | Performed by: INTERNAL MEDICINE

## 2018-11-12 NOTE — PATIENT INSTRUCTIONS
Your BMI is Body mass index is 24.21 kg/(m^2).  Weight management is a personal decision.  If you are interested in exploring weight loss strategies, the following discussion covers the approaches that may be successful. Body mass index (BMI) is one way to tell whether you are at a healthy weight, overweight, or obese. It measures your weight in relation to your height.  A BMI of 18.5 to 24.9 is in the healthy range. A person with a BMI of 25 to 29.9 is considered overweight, and someone with a BMI of 30 or greater is considered obese. More than two-thirds of American adults are considered overweight or obese.  Being overweight or obese increases the risk for further weight gain. Excess weight may lead to heart disease and diabetes.  Creating and following plans for healthy eating and physical activity may help you improve your health.  Weight control is part of healthy lifestyle and includes exercise, emotional health, and healthy eating habits. Careful eating habits lifelong are the mainstay of weight control. Though there are significant health benefits from weight loss, long-term weight loss with diet alone may be very difficult to achieve- studies show long-term success with dietary management in less than 10% of people. Attaining a healthy weight may be especially difficult to achieve in those with severe obesity. In some cases, medications, devices and surgical management might be considered.  What can you do?  If you are overweight or obese and are interested in methods for weight loss, you should discuss this with your provider.     Consider reducing daily calorie intake by 500 calories.     Keep a food journal.     Avoiding skipping meals, consider cutting portions instead.    Diet combined with exercise helps maintain muscle while optimizing fat loss. Strength training is particularly important for building and maintaining muscle mass. Exercise helps reduce stress, increase energy, and improves fitness.  "Increasing exercise without diet control, however, may not burn enough calories to loose weight.       Start walking three days a week 10-20 minutes at a time    Work towards walking thirty minutes five days a week     Eventually, increase the speed of your walking for 1-2 minutes at time    In addition, we recommend that you review healthy lifestyles and methods for weight loss available through the National Institutes of Health patient information sites:  http://win.niddk.nih.gov/publications/index.htm    And look into health and wellness programs that may be available through your health insurance provider, employer, local community center, or shilo club.    MY TREATMENT INFORMATION FOR SLEEP APNEA-  Kinza Teresa    [facility] :      MY CONTACT NUMBER:     Am I having a sleep study at a sleep center?  Make sure you have an appointment for the study before you leave!    Am I having a home sleep study?  Watch this video:  https://www.Schoolnet.com/watch?v=CteI_GhyP9g&list=[email or web address]  Please verify your insurance coverage with your insurance carrier    Frequently asked questions:  1. What is Obstructive Sleep Apnea (JENNIFER)? JENNIFER is the most common type of sleep apnea. Apnea means, \"without breath.\"  Apnea is most often caused by narrowing or collapse of the upper airway as muscles relax during sleep.   Almost everyone has occasional apneas. Most people with sleep apnea have had brief interruptions at night frequently for many years.  The severity of sleep apnea is related to how frequent and severe the events are.   2. What are the consequences of JENNIFER? Symptoms include: feeling sleepy during the day, snoring loudly, gasping or stopping of breathing, trouble sleeping, and occasionally morning headaches or heartburn at night.  Sleepiness can be serious and even increase the risk of falling asleep while driving. Other health consequences may include " development of high blood pressure and other cardiovascular disease in persons who are susceptible. Untreated JENNIFER  can contribute to heart disease, stroke and diabetes.   3. What are the treatment options? In most situations, sleep apnea is a lifelong disease that must be managed with daily therapy. Medications are not effective for sleep apnea and surgery is generally not considered until other therapies have been tried. Your treatment is your choice . Continuous Positive Airway (CPAP) works right away and is the therapy that is effective in nearly everyone. An oral device to hold your jaw forward is usually the next most reliable option. Other options include postioning devices (to keep you off your back), weight loss, and surgery including a tongue pacing device. There is more detail about some of these options below.    Important tips for using CPAP and similar devices   Know your equipment:  CPAP is continuous positive airway pressure that prevents obstructive sleep apnea by keeping the throat from collapsing while you are sleeping. In most cases, the device is  smart  and can slowly self-adjusts if your throat collapses and keeps a record every day of how well you are treated-this information is available to you and your care team.  BPAP is bilevel positive airway pressure that keeps your throat open and also assists each breath with a pressure boost to maintain adequate breathing.  Special kinds of BPAP are used in patients who have inadequate breathing from lung or heart disease. In most cases, the device is  smart  and can slowly self-adjusts to assist breathing. Like CPAP, the device keeps a record of how well you are treated.  Your mask is your connection to the device. You get to choose what feels most comfortable and the staff will help to make sure if fits. Here: are some examples of the different masks that are available:       Key points to remember on your journey with sleep apnea:  1. Sleep study.   PAP devices often need to be adjusted during a sleep study to show that they are effective and adjusted right.  2. Good tips to remember: Try wearing just the mask during a quiet time during the day so your body adapts to wearing it. A humidifier is recommended for comfort in most cases to prevent drying of your nose and throat. Allergy medication from your provider may help you if you are having nasal congestion.  3. Getting settled-in. It takes more than one night for most of us to get used to wearing a mask. Try wearing just the mask during a quiet time during the day so your body adapts to wearing it. A humidifier is recommended for comfort in most cases. Our team will work with you carefully on the first day and will be in contact within 4 days and again at 2 and 4 weeks for advice and remote device adjustments. Your therapy is evaluated by the device each day.   4. Use it every night. The more you are able to sleep naturally for 7-8 hours, the more likely you will have good sleep and to prevent health risks or symptoms from sleep apnea. Even if you use it 4 hours it helps. Occasionally all of us are unable to use a medical therapy, in sleep apnea, it is not dangerous to miss one night.   5. Communicate. Call our skilled team on the number provided on the first day if your visit for problems that make it difficult to wear the device. Over 2 out of 3 patients can learn to wear the device long-term with help from our team. Remember to call our team or your sleep providers if you are unable to wear the device as we may have other solutions for those who cannot adapt to mask CPAP therapy. It is recommended that you sleep your sleep provider within the first 3 months and yearly after that if you are not having problems.   Take care of your equipment. Make sure you clean your mask and tubing using directions every day and that your filter and mask are replaced as recommended or if they are not working.     BESIDES  CPAP, WHAT OTHER THERAPIES ARE THERE?    Positioning Device  Positioning devices are generally used when sleep apnea is mild and only occurs on your back.This example shows a pillow that straps around the waist. It may be appropriate for those whose sleep study shows milder sleep apnea that occurs primarily when lying flat on one's back. Preliminary studies have shown benefit but effectiveness at home may need to be verified by a home sleep test. These devices are generally not covered by medical insurance.  Examples of devices that maintain sleeping on the back to prevent snoring and mild sleep apnea.    Belt type body positioner  Http://[employer]/    Electronic reminder  Http://nightshifttherapy.com/  Http://[email or web address]/    Oral Appliance  What is oral appliance therapy?  An oral appliance device fits on your teeth at night like a retainer used after having braces. The device is made by a specialized dentist and requires several visits over 1-2 months before a manufactured device is made to fit your teeth and is adjusted to prevent your sleep apnea. Once an oral device is working properly, snoring should be improved. A home sleep test may be recommended at that time if to determine whether the sleep apnea is adequately treated.       Some things to remember:  -Oral devices are often, but not always, covered by your medical insurance. Be sure to check with your insurance provider.   -If you are referred for oral therapy, you will be given a list of specialized dentists to consider or you may choose to visit the Web site of the American Academy of Dental Sleep Medicine  -Oral devices are less likely to work if you have severe sleep apnea or are extremely overweight.     More detailed information  An oral appliance is a small acrylic device that fits over the upper and lower teeth  (similar to a retainer or a mouth guard). This device slightly moves jaw forward, which moves the base of the tongue forward,  opens the airway, improves breathing for effective treat snoring and obstructive sleep apnea in perhaps 7 out of 10 people .  The best working devices are custom-made by a dental device  after a mold is made of the teeth 1, 2, 3.  When is an oral appliance indicated?  Oral appliance therapy is recommended as a first-line treatment for patients with primary snoring, mild sleep apnea, and for patients with moderate sleep apnea who prefer appliance therapy to use of CPAP4, 5. Severity of sleep apnea is determined by sleep testing and is based on the number of respiratory events per hour of sleep.   How successful is oral appliance therapy?  The success rate of oral appliance therapy in patients with mild sleep apnea is 75-80% while in patients with moderate sleep apnea it is 50-70%. The chance of success in patients with severe sleep apnea is 40-50%. The research also shows that oral appliances have a beneficial effect on the cardiovascular health of JENNIFER patients at the same magnitude as CPAP therapy7.  Oral appliances should be a second-line treatment in cases of severe sleep apnea, but if not completely successful then a combination therapy utilizing CPAP plus oral appliance therapy may be effective. Oral appliances tend to be effective in a broad range of patients although studies show that the patients who have the highest success are females, younger patients, those with milder disease, and less severe obesity. 3, 6.   Finding a dentist that practices dental sleep medicine  Specific training is available through the American Academy of Dental Sleep Medicine for dentists interested in working in the field of sleep. To find a dentist who is educated in the field of sleep and the use of oral appliances, near you, visit the Web site of the American Academy of Dental Sleep Medicine.    References  1. Kathy et al. Objectively measured vs self-reported compliance during oral appliance therapy for  sleep-disordered breathing. Chest 2013; 144(5): 5535-2548.  2. Manisha, et al. Objective measurement of compliance during oral appliance therapy for sleep-disordered breathing. Thorax 2013; 68(1): 91-96.  3. Mario et al. Mandibular advancement devices in 620 men and women with JENNIFER and snoring: tolerability and predictors of treatment success. Chest 2004; 125: 5581-4216.  4. Ronn et al. Oral appliances for snoring and JENNIFER: a review. Sleep 2006; 29: 244-262.  5. Yesi et al. Oral appliance treatment for JENNIFER: an update. J Clin Sleep Med 2014; 10(2): 215-227.  6. Raghav et al. Predictors of OSAH treatment outcome. J Dent Res 2007; 86: 5339-2233.      Weight Loss:    Weight loss is a long-term strategy that may improve sleep apnea in some patients.    Weight management is a personal decision and the decision should be based on your interest and the potential benefits.  If you are interested in exploring weight loss strategies, the following discussion covers the impact on weight loss on sleep apnea and the approaches that may be successful.    Being overweight does not necessarily mean you will have health consequences.  Those who have BMI over 35 or over 27 with existing medical conditions carries greater risk.   Weight loss decreases severity of sleep apnea in most people with obesity. For those with mild obesity who have developed snoring with weight gain, even 15-30 pound weight loss can improve and occasionally eliminate sleep apnea.  Structured and life-long dietary and health habits are necessary to lose weight and keep healthier weight levels.     Though there may be significant health benefits from weight loss, long-term weight loss is very difficult to achieve- studies show success with dietary management in less than 10% of people. In addition, substantial weight loss may require years of dietary control and may be difficult if patients have severe obesity. In these cases, surgical  management may be considered.  Finally, older individuals who have tolerated obesity without health complications may be less likely to benefit from weight loss strategies.        Your BMI is Body mass index is 24.21 kg/(m^2).  Weight management is a personal decision.  If you are interested in exploring weight loss strategies, the following discussion covers the approaches that may be successful. Body mass index (BMI) is one way to tell whether you are at a healthy weight, overweight, or obese. It measures your weight in relation to your height.  A BMI of 18.5 to 24.9 is in the healthy range. A person with a BMI of 25 to 29.9 is considered overweight, and someone with a BMI of 30 or greater is considered obese. More than two-thirds of American adults are considered overweight or obese.  Being overweight or obese increases the risk for further weight gain. Excess weight may lead to heart disease and diabetes.  Creating and following plans for healthy eating and physical activity may help you improve your health.  Weight control is part of healthy lifestyle and includes exercise, emotional health, and healthy eating habits. Careful eating habits lifelong are the mainstay of weight control. Though there are significant health benefits from weight loss, long-term weight loss with diet alone may be very difficult to achieve- studies show long-term success with dietary management in less than 10% of people. Attaining a healthy weight may be especially difficult to achieve in those with severe obesity. In some cases, medications, devices and surgical management might be considered.  What can you do?  If you are overweight or obese and are interested in methods for weight loss, you should discuss this with your provider.     Consider reducing daily calorie intake by 500 calories.     Keep a food journal.     Avoiding skipping meals, consider cutting portions instead.    Diet combined with exercise helps maintain muscle  while optimizing fat loss. Strength training is particularly important for building and maintaining muscle mass. Exercise helps reduce stress, increase energy, and improves fitness. Increasing exercise without diet control, however, may not burn enough calories to loose weight.       Start walking three days a week 10-20 minutes at a time    Work towards walking thirty minutes five days a week     Eventually, increase the speed of your walking for 1-2 minutes at time    In addition, we recommend that you review healthy lifestyles and methods for weight loss available through the National Institutes of Health patient information sites:  http://win.niddk.nih.gov/publications/index.htm    And look into health and wellness programs that may be available through your health insurance provider, employer, local community center, or shilo club.      Surgery:    Surgery for obstructive sleep apnea is considered generally only when other therapies fail to work. Surgery may be discussed with you if you are having a difficult time tolerating CPAP and or when there is an abnormal structure that requires surgical correction.  Nose and throat surgeries often enlarge the airway to prevent collapse.  Most of these surgeries create pain for 1-2 weeks and up to half of the most common surgeries are not effective throughout life.  You should carefully discuss the benefits and drawbacks to surgery with your sleep provider and surgeon to determine if it is the best solution for you.   More information  Surgery for JENNIFER is directed at areas that are responsible for narrowing or complete obstruction of the airway during sleep.  There are a wide range of procedures available to enlarge and/or stabilize the airway to prevent blockage of breathing in the three major areas where it can occur: the palate, tongue, and nasal regions.  Successful surgical treatment depends on the accurate identification of the factors responsible for obstructive  sleep apnea in each person.  A personalized approach is required because there is no single treatment that works well for everyone.  Because of anatomic variation, consultation with an examination by a sleep surgeon is a critical first step in determining what surgical options are best for each patient.  In some cases, examination during sedation may be recommended in order to guide the selection of procedures.  Patients will be counseled about risks and benefits as well as the typical recovery course after surgery. Surgery is typically not a cure for a person s JENNIFER.  However, surgery will often significantly improve one s JENNIFER severity (termed  success rate ).  Even in the absence of a cure, surgery will decrease the cardiovascular risk associated with OSA7; improve overall quality of life8 (sleepiness, functionality, sleep quality, etc).      Palate Procedures:  Patients with JENNIFER often have narrowing of their airway in the region of their tonsils and uvula.  The goals of palate procedures are to widen the airway in this region as well as to help the tissues resist collapse.  Modern palate procedure techniques focus on tissue conservation and soft tissue rearrangement, rather than tissue removal.  Often the uvula is preserved in this procedure. Residual sleep apnea is common in patient after pharyngoplasty with an average reduction in sleep apnea events of 33%2.      Tongue Procedures:  ExamWhile patients are awake, the muscles that surround the throat are active and keep this region open for breathing. These muscles relax during sleep, allowing the tongue and other structures to collapse and block breathing.  There are several different tongue procedures available.  Selection of a tongue base procedure depends on characteristics seen on physical exam.  Generally, procedures are aimed at removing bulky tissues in this area or preventing the back of the tongue from falling back during sleep.  Success rates for tongue  surgery range from 50-62%3.    Hypoglossal Nerve Stimulation:  Hypoglossal nerve stimulation has recently received approval from the [place] Food and Drug Administration for the treatment of obstructive sleep apnea.  This is based on research showing that the system was safe and effective in treating sleep apnea6.  Results showed that the median AHI score decreased 68%, from 29.3 to 9.0. This therapy uses an implant system that senses breathing patterns and delivers mild stimulation to airway muscles, which keeps the airway open during sleep.  The system consists of three fully implanted components: a small generator (similar in size to a pacemaker), a breathing sensor, and a stimulation lead.  Using a small handheld remote, a patient turns the therapy on before bed and off upon awakening.    Candidates for this device must be greater than 22 years of age, have moderate to severe JENNIFER (AHI between 20-65), BMI less than 32, have tried CPAP/oral appliance without success, and have appropriate upper airway anatomy (determined by a sleep endoscopy performed by Dr. Shepard).    Hypoglossal Nerve Stimulation Pathway:    The sleep surgeon s office will work with the patient through the insurance prior-authorization process (including communications and appeals).    Nasal Procedures:  Nasal obstruction can interfere with nasal breathing during the day and night.  Studies have shown that relief of nasal obstruction can improve the ability of some patients to tolerate positive airway pressure therapy for obstructive sleep apnea1.  Treatment options include medications such as nasal saline, topical corticosteroid and antihistamine sprays, and oral medications such as antihistamines or decongestants. Non-surgical treatments can include external nasal dilators for selected patients. If these are not successful by themselves, surgery can improve the nasal airway either alone or in combination with these other  options.      Combination Procedures:  Combination of surgical procedures and other treatments may be recommended, particularly if patients have more than one area of narrowing or persistent positional disease.  The success rate of combination surgery ranges from 66-80%2,3.    References  1. Calos SALAZAR. The Role of the Nose in Snoring and Obstructive Sleep Apnoea: An Update.  Eur Arch Otorhinolaryngol. 2011; 268: 1365-73.  2.  Bart SM; Brody JA; Azul JR; Pallanch JF; Eitan MB; Audrey SG; Ashley SHAH. Surgical modifications of the upper airway for obstructive sleep apnea in adults: a systematic review and meta-analysis. SLEEP 2010;33(10):4111-5324. Jere MEJIA. Hypopharyngeal surgery in obstructive sleep apnea: an evidence-based medicine review.  Arch Otolaryngol Head Neck Surg. 2006 Feb;132(2):206-13.  3. Lyle YH1, Kathryn Y, [place]esto GLORIA. The efficacy of anatomically based multilevel surgery for obstructive sleep apnea. Otolaryngol Head Neck Surg. 2003 Oct;129(4):327-35.  4. Jere MEJIA, Goldberg A. Hypopharyngeal Surgery in Obstructive Sleep Apnea: An Evidence-Based Medicine Review. Arch Otolaryngol Head Neck Surg. 2006 Feb;132(2):206-13.  5. Venu TREJO et al. Upper-Airway Stimulation for Obstructive Sleep Apnea.  N Engl J Med. 2014 Jan 9;370(2):139-49.  6. Radha Y et al. Increased Incidence of Cardiovascular Disease in Middle-aged Men with Obstructive Sleep Apnea. Am J Respir Crit Care Med; 2002 166: 159-165  7. Leyla EM et al. Studying Life Effects and Effectiveness of Palatopharyngoplasty (SLEEP) study: Subjective Outcomes of Isolated Uvulopalatopharyngoplasty. Otolaryngol Head Neck Surg. 2011; 144: 623-631.    Please do not drive if drowsy or sleepy;  pull over if drowsy.  Please make sure a copy of the pain specialist's report is faxed to our sleep clinic [phone]

## 2018-11-12 NOTE — MR AVS SNAPSHOT
After Visit Summary   11/12/2018    Kinza Teresa    MRN: [id number]           Patient Information     Date Of Birth          1975        Visit Information        Provider Department      11/12/2018 10:00 AM Jagruti Bangura MD [facility]        Today's Diagnoses     Snoring    -  1      Care Instructions      Your BMI is Body mass index is 24.21 kg/(m^2).  Weight management is a personal decision.  If you are interested in exploring weight loss strategies, the following discussion covers the approaches that may be successful. Body mass index (BMI) is one way to tell whether you are at a healthy weight, overweight, or obese. It measures your weight in relation to your height.  A BMI of 18.5 to 24.9 is in the healthy range. A person with a BMI of 25 to 29.9 is considered overweight, and someone with a BMI of 30 or greater is considered obese. More than two-thirds of American adults are considered overweight or obese.  Being overweight or obese increases the risk for further weight gain. Excess weight may lead to heart disease and diabetes.  Creating and following plans for healthy eating and physical activity may help you improve your health.  Weight control is part of healthy lifestyle and includes exercise, emotional health, and healthy eating habits. Careful eating habits lifelong are the mainstay of weight control. Though there are significant health benefits from weight loss, long-term weight loss with diet alone may be very difficult to achieve- studies show long-term success with dietary management in less than 10% of people. Attaining a healthy weight may be especially difficult to achieve in those with severe obesity. In some cases, medications, devices and surgical management might be considered.  What can you do?  If you are overweight or obese and are interested in methods for weight loss, you should discuss this with your provider.  "    Consider reducing daily calorie intake by 500 calories.     Keep a food journal.     Avoiding skipping meals, consider cutting portions instead.    Diet combined with exercise helps maintain muscle while optimizing fat loss. Strength training is particularly important for building and maintaining muscle mass. Exercise helps reduce stress, increase energy, and improves fitness. Increasing exercise without diet control, however, may not burn enough calories to loose weight.       Start walking three days a week 10-20 minutes at a time    Work towards walking thirty minutes five days a week     Eventually, increase the speed of your walking for 1-2 minutes at time    In addition, we recommend that you review healthy lifestyles and methods for weight loss available through the National Institutes of Health patient information sites:  http://win.niddk.nih.gov/publications/index.htm    And look into health and wellness programs that may be available through your health insurance provider, employer, local community center, or shilo club.    MY TREATMENT INFORMATION FOR SLEEP APNEA-  Kinza Teresa    [facility]  SLEEP CENTER :      MY CONTACT NUMBER:     Am I having a sleep study at a sleep center?  Make sure you have an appointment for the study before you leave!    Am I having a home sleep study?  Watch this video:  https://www.Bit9.com/watch?v=CteI_GhyP9g&list=[email or web address]  Please verify your insurance coverage with your insurance carrier    Frequently asked questions:  1. What is Obstructive Sleep Apnea (JENNIFER)? JENNIFER is the most common type of sleep apnea. Apnea means, \"without breath.\"  Apnea is most often caused by narrowing or collapse of the upper airway as muscles relax during sleep.   Almost everyone has occasional apneas. Most people with sleep apnea have had brief interruptions at night frequently for many years.  The severity of sleep apnea is " related to how frequent and severe the events are.   2. What are the consequences of JENNIFER? Symptoms include: feeling sleepy during the day, snoring loudly, gasping or stopping of breathing, trouble sleeping, and occasionally morning headaches or heartburn at night.  Sleepiness can be serious and even increase the risk of falling asleep while driving. Other health consequences may include development of high blood pressure and other cardiovascular disease in persons who are susceptible. Untreated JENNIFER  can contribute to heart disease, stroke and diabetes.   3. What are the treatment options? In most situations, sleep apnea is a lifelong disease that must be managed with daily therapy. Medications are not effective for sleep apnea and surgery is generally not considered until other therapies have been tried. Your treatment is your choice . Continuous Positive Airway (CPAP) works right away and is the therapy that is effective in nearly everyone. An oral device to hold your jaw forward is usually the next most reliable option. Other options include postioning devices (to keep you off your back), weight loss, and surgery including a tongue pacing device. There is more detail about some of these options below.    Important tips for using CPAP and similar devices   Know your equipment:  CPAP is continuous positive airway pressure that prevents obstructive sleep apnea by keeping the throat from collapsing while you are sleeping. In most cases, the device is  smart  and can slowly self-adjusts if your throat collapses and keeps a record every day of how well you are treated-this information is available to you and your care team.  BPAP is bilevel positive airway pressure that keeps your throat open and also assists each breath with a pressure boost to maintain adequate breathing.  Special kinds of BPAP are used in patients who have inadequate breathing from lung or heart disease. In most cases, the device is  smart  and can  slowly self-adjusts to assist breathing. Like CPAP, the device keeps a record of how well you are treated.  Your mask is your connection to the device. You get to choose what feels most comfortable and the staff will help to make sure if fits. Here: are some examples of the different masks that are available:       Key points to remember on your journey with sleep apnea:  1. Sleep study.  PAP devices often need to be adjusted during a sleep study to show that they are effective and adjusted right.  2. Good tips to remember: Try wearing just the mask during a quiet time during the day so your body adapts to wearing it. A humidifier is recommended for comfort in most cases to prevent drying of your nose and throat. Allergy medication from your provider may help you if you are having nasal congestion.  3. Getting settled-in. It takes more than one night for most of us to get used to wearing a mask. Try wearing just the mask during a quiet time during the day so your body adapts to wearing it. A humidifier is recommended for comfort in most cases. Our team will work with you carefully on the first day and will be in contact within 4 days and again at 2 and 4 weeks for advice and remote device adjustments. Your therapy is evaluated by the device each day.   4. Use it every night. The more you are able to sleep naturally for 7-8 hours, the more likely you will have good sleep and to prevent health risks or symptoms from sleep apnea. Even if you use it 4 hours it helps. Occasionally all of us are unable to use a medical therapy, in sleep apnea, it is not dangerous to miss one night.   5. Communicate. Call our skilled team on the number provided on the first day if your visit for problems that make it difficult to wear the device. Over 2 out of 3 patients can learn to wear the device long-term with help from our team. Remember to call our team or your sleep providers if you are unable to wear the device as we may have other  solutions for those who cannot adapt to mask CPAP therapy. It is recommended that you sleep your sleep provider within the first 3 months and yearly after that if you are not having problems.   Take care of your equipment. Make sure you clean your mask and tubing using directions every day and that your filter and mask are replaced as recommended or if they are not working.     BESIDES CPAP, WHAT OTHER THERAPIES ARE THERE?    Positioning Device  Positioning devices are generally used when sleep apnea is mild and only occurs on your back.This example shows a pillow that straps around the waist. It may be appropriate for those whose sleep study shows milder sleep apnea that occurs primarily when lying flat on one's back. Preliminary studies have shown benefit but effectiveness at home may need to be verified by a home sleep test. These devices are generally not covered by medical insurance.  Examples of devices that maintain sleeping on the back to prevent snoring and mild sleep apnea.    Belt type body positioner  Http://YesPlz!/    Electronic reminder  Http://nightshifttherapy.com/  Http://www."[employer].GoChongo.au/    Oral Appliance  What is oral appliance therapy?  An oral appliance device fits on your teeth at night like a retainer used after having braces. The device is made by a specialized dentist and requires several visits over 1-2 months before a manufactured device is made to fit your teeth and is adjusted to prevent your sleep apnea. Once an oral device is working properly, snoring should be improved. A home sleep test may be recommended at that time if to determine whether the sleep apnea is adequately treated.       Some things to remember:  -Oral devices are often, but not always, covered by your medical insurance. Be sure to check with your insurance provider.   -If you are referred for oral therapy, you will be given a list of specialized dentists to consider or you may choose to visit the Web site of  the American Academy of Dental Sleep Medicine  -Oral devices are less likely to work if you have severe sleep apnea or are extremely overweight.     More detailed information  An oral appliance is a small acrylic device that fits over the upper and lower teeth  (similar to a retainer or a mouth guard). This device slightly moves jaw forward, which moves the base of the tongue forward, opens the airway, improves breathing for effective treat snoring and obstructive sleep apnea in perhaps 7 out of 10 people .  The best working devices are custom-made by a dental device  after a mold is made of the teeth 1, 2, 3.  When is an oral appliance indicated?  Oral appliance therapy is recommended as a first-line treatment for patients with primary snoring, mild sleep apnea, and for patients with moderate sleep apnea who prefer appliance therapy to use of CPAP4, 5. Severity of sleep apnea is determined by sleep testing and is based on the number of respiratory events per hour of sleep.   How successful is oral appliance therapy?  The success rate of oral appliance therapy in patients with mild sleep apnea is 75-80% while in patients with moderate sleep apnea it is 50-70%. The chance of success in patients with severe sleep apnea is 40-50%. The research also shows that oral appliances have a beneficial effect on the cardiovascular health of JENNIFER patients at the same magnitude as CPAP therapy7.  Oral appliances should be a second-line treatment in cases of severe sleep apnea, but if not completely successful then a combination therapy utilizing CPAP plus oral appliance therapy may be effective. Oral appliances tend to be effective in a broad range of patients although studies show that the patients who have the highest success are females, younger patients, those with milder disease, and less severe obesity. 3, 6.   Finding a dentist that practices dental sleep medicine  Specific training is available through the  American Academy of Dental Sleep Medicine for dentists interested in working in the field of sleep. To find a dentist who is educated in the field of sleep and the use of oral appliances, near you, visit the Web site of the American Academy of Dental Sleep Medicine.    References  1. Kathy et al. Objectively measured vs self-reported compliance during oral appliance therapy for sleep-disordered breathing. Chest 2013; 144(5): 6300-3631.  2. Manisha et al. Objective measurement of compliance during oral appliance therapy for sleep-disordered breathing. Thorax 2013; 68(1): 91-96.  3. Mario et al. Mandibular advancement devices in 620 men and women with JENNIFER and snoring: tolerability and predictors of treatment success. Chest 2004; 125: 5567-9823.  4. Ronn et al. Oral appliances for snoring and JENNIFER: a review. Sleep 2006; 29: 244-262.  5. Yesi et al. Oral appliance treatment for JENNIFER: an update. J Clin Sleep Med 2014; 10(2): 215-227.  6. Raghav et al. Predictors of OSAH treatment outcome. J Dent Res 2007; 86: 6179-8572.      Weight Loss:    Weight loss is a long-term strategy that may improve sleep apnea in some patients.    Weight management is a personal decision and the decision should be based on your interest and the potential benefits.  If you are interested in exploring weight loss strategies, the following discussion covers the impact on weight loss on sleep apnea and the approaches that may be successful.    Being overweight does not necessarily mean you will have health consequences.  Those who have BMI over 35 or over 27 with existing medical conditions carries greater risk.   Weight loss decreases severity of sleep apnea in most people with obesity. For those with mild obesity who have developed snoring with weight gain, even 15-30 pound weight loss can improve and occasionally eliminate sleep apnea.  Structured and life-long dietary and health habits are necessary to lose weight  and keep healthier weight levels.     Though there may be significant health benefits from weight loss, long-term weight loss is very difficult to achieve- studies show success with dietary management in less than 10% of people. In addition, substantial weight loss may require years of dietary control and may be difficult if patients have severe obesity. In these cases, surgical management may be considered.  Finally, older individuals who have tolerated obesity without health complications may be less likely to benefit from weight loss strategies.        Your BMI is Body mass index is 24.21 kg/(m^2).  Weight management is a personal decision.  If you are interested in exploring weight loss strategies, the following discussion covers the approaches that may be successful. Body mass index (BMI) is one way to tell whether you are at a healthy weight, overweight, or obese. It measures your weight in relation to your height.  A BMI of 18.5 to 24.9 is in the healthy range. A person with a BMI of 25 to 29.9 is considered overweight, and someone with a BMI of 30 or greater is considered obese. More than two-thirds of American adults are considered overweight or obese.  Being overweight or obese increases the risk for further weight gain. Excess weight may lead to heart disease and diabetes.  Creating and following plans for healthy eating and physical activity may help you improve your health.  Weight control is part of healthy lifestyle and includes exercise, emotional health, and healthy eating habits. Careful eating habits lifelong are the mainstay of weight control. Though there are significant health benefits from weight loss, long-term weight loss with diet alone may be very difficult to achieve- studies show long-term success with dietary management in less than 10% of people. Attaining a healthy weight may be especially difficult to achieve in those with severe obesity. In some cases, medications, devices and  surgical management might be considered.  What can you do?  If you are overweight or obese and are interested in methods for weight loss, you should discuss this with your provider.     Consider reducing daily calorie intake by 500 calories.     Keep a food journal.     Avoiding skipping meals, consider cutting portions instead.    Diet combined with exercise helps maintain muscle while optimizing fat loss. Strength training is particularly important for building and maintaining muscle mass. Exercise helps reduce stress, increase energy, and improves fitness. Increasing exercise without diet control, however, may not burn enough calories to loose weight.       Start walking three days a week 10-20 minutes at a time    Work towards walking thirty minutes five days a week     Eventually, increase the speed of your walking for 1-2 minutes at time    In addition, we recommend that you review healthy lifestyles and methods for weight loss available through the National Institutes of Health patient information sites:  http://win.niddk.nih.gov/publications/index.htm    And look into health and wellness programs that may be available through your health insurance provider, employer, local community center, or shilo club.      Surgery:    Surgery for obstructive sleep apnea is considered generally only when other therapies fail to work. Surgery may be discussed with you if you are having a difficult time tolerating CPAP and or when there is an abnormal structure that requires surgical correction.  Nose and throat surgeries often enlarge the airway to prevent collapse.  Most of these surgeries create pain for 1-2 weeks and up to half of the most common surgeries are not effective throughout life.  You should carefully discuss the benefits and drawbacks to surgery with your sleep provider and surgeon to determine if it is the best solution for you.   More information  Surgery for JENNIFER is directed at areas that are responsible  for narrowing or complete obstruction of the airway during sleep.  There are a wide range of procedures available to enlarge and/or stabilize the airway to prevent blockage of breathing in the three major areas where it can occur: the palate, tongue, and nasal regions.  Successful surgical treatment depends on the accurate identification of the factors responsible for obstructive sleep apnea in each person.  A personalized approach is required because there is no single treatment that works well for everyone.  Because of anatomic variation, consultation with an examination by a sleep surgeon is a critical first step in determining what surgical options are best for each patient.  In some cases, examination during sedation may be recommended in order to guide the selection of procedures.  Patients will be counseled about risks and benefits as well as the typical recovery course after surgery. Surgery is typically not a cure for a person s JENNIFER.  However, surgery will often significantly improve one s JENNIFER severity (termed  success rate ).  Even in the absence of a cure, surgery will decrease the cardiovascular risk associated with OSA7; improve overall quality of life8 (sleepiness, functionality, sleep quality, etc).      Palate Procedures:  Patients with JENNIFER often have narrowing of their airway in the region of their tonsils and uvula.  The goals of palate procedures are to widen the airway in this region as well as to help the tissues resist collapse.  Modern palate procedure techniques focus on tissue conservation and soft tissue rearrangement, rather than tissue removal.  Often the uvula is preserved in this procedure. Residual sleep apnea is common in patient after pharyngoplasty with an average reduction in sleep apnea events of 33%2.      Tongue Procedures:  ExamWhile patients are awake, the muscles that surround the throat are active and keep this region open for breathing. These muscles relax during sleep,  allowing the tongue and other structures to collapse and block breathing.  There are several different tongue procedures available.  Selection of a tongue base procedure depends on characteristics seen on physical exam.  Generally, procedures are aimed at removing bulky tissues in this area or preventing the back of the tongue from falling back during sleep.  Success rates for tongue surgery range from 50-62%3.    Hypoglossal Nerve Stimulation:  Hypoglossal nerve stimulation has recently received approval from the [place] Food and Drug Administration for the treatment of obstructive sleep apnea.  This is based on research showing that the system was safe and effective in treating sleep apnea6.  Results showed that the median AHI score decreased 68%, from 29.3 to 9.0. This therapy uses an implant system that senses breathing patterns and delivers mild stimulation to airway muscles, which keeps the airway open during sleep.  The system consists of three fully implanted components: a small generator (similar in size to a pacemaker), a breathing sensor, and a stimulation lead.  Using a small handheld remote, a patient turns the therapy on before bed and off upon awakening.    Candidates for this device must be greater than 22 years of age, have moderate to severe JENNIFER (AHI between 20-65), BMI less than 32, have tried CPAP/oral appliance without success, and have appropriate upper airway anatomy (determined by a sleep endoscopy performed by Dr. Shepard).    Hypoglossal Nerve Stimulation Pathway:    The sleep surgeon s office will work with the patient through the insurance prior-authorization process (including communications and appeals).    Nasal Procedures:  Nasal obstruction can interfere with nasal breathing during the day and night.  Studies have shown that relief of nasal obstruction can improve the ability of some patients to tolerate positive airway pressure therapy for obstructive sleep apnea1.  Treatment  options include medications such as nasal saline, topical corticosteroid and antihistamine sprays, and oral medications such as antihistamines or decongestants. Non-surgical treatments can include external nasal dilators for selected patients. If these are not successful by themselves, surgery can improve the nasal airway either alone or in combination with these other options.      Combination Procedures:  Combination of surgical procedures and other treatments may be recommended, particularly if patients have more than one area of narrowing or persistent positional disease.  The success rate of combination surgery ranges from 66-80%2,3.    References  1. aClos SALAZAR. The Role of the Nose in Snoring and Obstructive Sleep Apnoea: An Update.  Eur Arch Otorhinolaryngol. 2011; 268: 1365-73.  2.  Bart SM; Brody JA; Azul JR; Pallanch JF; Eitan MB; Audrey SG; Ashley SHAH. Surgical modifications of the upper airway for obstructive sleep apnea in adults: a systematic review and meta-analysis. SLEEP 2010;33(10):2303-6208. Jere MEJIA. Hypopharyngeal surgery in obstructive sleep apnea: an evidence-based medicine review.  Arch Otolaryngol Head Neck Surg. 2006 Feb;132(2):206-13.  3. Lyle YH1, Kathryn Y, Modesto GLORIA. The efficacy of anatomically based multilevel surgery for obstructive sleep apnea. Otolaryngol Head Neck Surg. 2003 Oct;129(4):327-35.  4. Jere MEJIA, Goldberg A. Hypopharyngeal Surgery in Obstructive Sleep Apnea: An Evidence-Based Medicine Review. Arch Otolaryngol Head Neck Surg. 2006 Feb;132(2):206-13.  5. Venu TREJO et al. Upper-Airway Stimulation for Obstructive Sleep Apnea.  N Engl J Med. 2014 Jan 9;370(2):139-49.  6. Radha Y et al. Increased Incidence of Cardiovascular Disease in Middle-aged Men with Obstructive Sleep Apnea. Am J Respir Crit Care Med; 2002 166: 159-165  7. Leyla ROPER et al. Studying Life Effects and Effectiveness of Palatopharyngoplasty (SLEEP) study: Subjective Outcomes of Isolated  Uvulopalatopharyngoplasty. Otolaryngol Head Neck Surg. 2011; 144: 623-631.    Please do not drive if drowsy or sleepy;  pull over if drowsy.  Please make sure a copy of the pain specialist's report is faxed to our sleep clinic [phone]                        Follow-ups after your visit        Follow-up notes from your care team     Return in 4 weeks (on 12/10/2018).      Your next 10 appointments already scheduled     Dec 12, 2018 11:00 AM CST   HST  with SLEEP STUDY  7   [facility] ([facility])    [address] [place] [address]   [phone]            Dec 13, 2018  8:30 AM CST   HST Drop Off with SLEEP STUDY  7   [facility] ([facility])    [address] [place] [id number]   [phone]            Jan 09, 2019  1:00 PM CST   (Arrive by 12:45 PM)   Urodynamics with Regi Richardson PA-C   [facility] ([facility] [place])    [address]  4th Floor  [facility] [address] [phone]            Jul 16, 2019  9:00 AM CDT   US RENAL COMPLETE with UCUS3   [facility] ([facility])    [address] [place] [address]  [address] [facility] [address] [phone]           How do I prepare for my exam? (Food and drink instructions) No Food and Drink Restrictions.  How do I prepare for my exam? (Other instructions) You do not need to do anything special to prepare for your exam.  What should I wear: Wear comfortable clothes.  How long does the exam take: Most ultrasounds take 30 to 60 minutes.  What should I bring: Bring a list of your medicines, including vitamins, minerals and over-the-counter drugs. It is safest to leave personal items at home.  Do I need a :  No  is needed.  What do I need to tell my doctor: Tell your  doctor about any allergies you may have.  What should I do after the exam: No restrictions, You may resume normal activities.  What is this test: An ultrasound uses sound waves to make pictures of the body. Sound waves do not cause pain. The only discomfort may be the pressure of the wand against your skin or full bladder.  Who should I call with questions: If you have any questions, please call the Imaging Department where you will have your exam. Directions, parking instructions, and other information is available on our website, [email or web address].            Jul 16, 2019 10:00 AM CDT   LAB with  LAB   [facility] ([facility])    [address] [facility]  [address]st [facility] [address] [phone]           Please do not eat 10-12 hours before your appointment if you are coming in fasting for labs on lipids, cholesterol, or glucose (sugar). This does not apply to pregnant women. Water, hot tea and black coffee (with nothing added) are okay. Do not drink other fluids, diet soda or chew gum.            Jul 16, 2019 10:30 AM CDT   (Arrive by 10:15 AM)   Return Visit with Regi Richardson PA-C   [facility] ([facility])    [address] [place] [address] [phone]              Future tests that were ordered for you today     Open Future Orders        Priority Expected Expires Ordered    HST-Home Sleep Apnea Test Routine  5/14/2019 11/12/2018            Who to contact     If you have questions or need follow up information about today's clinic visit or your schedule please contact [facility] directly at [phone].  Normal or non-critical lab and imaging results will be communicated to you by MyChart, letter or phone within 4 business days after the clinic has received the results. If you do not hear from us within 7 days, please contact the clinic  "through [employer] or phone. If you have a critical or abnormal lab result, we will notify you by phone as soon as possible.  Submit refill requests through [employer] or call your pharmacy and they will forward the refill request to us. Please allow 3 business days for your refill to be completed.          Additional Information About Your Visit        Care EveryWhere ID     This is your Care EveryWhere ID. This could be used by other organizations to access your [place] medical records  [id number]        Your Vitals Were     Pulse Respirations Height Pulse Oximetry BMI (Body Mass Index)       52 16 1.676 m (5' 6\") 100% 24.21 kg/m2        Blood Pressure from Last 3 Encounters:   11/12/18 118/75   10/25/18 110/59   10/22/18 137/79    Weight from Last 3 Encounters:   11/12/18 68 kg (150 lb)   10/25/18 65.8 kg (145 lb)   10/22/18 65.8 kg (145 lb)               Primary Care Provider Office Phone # Fax #    Vinny Godinez [phone]       [facility] [address]  [facility] [address]        Equal Access to Services     SUZANNE CASILLAS AH: Hadii aad ku hadasho Soomaali, waaxda luqadaha, qaybta kaalmada adeegyada, waxay pinkyin hayaan sugar massey ah. So [facility] [phone].    ATENCIÓN: Si habla español, tiene a villarreal disposición servicios gratuitos de asistencia lingüística. Quinton al [phone].    We comply with applicable federal civil rights laws and [place] laws. We do not discriminate on the basis of race, color, national origin, age, disability, sex, sexual orientation, or gender identity.            Thank you!     Thank you for choosing [facility]  for your care. Our goal is always to provide you with excellent care. Hearing back from our patients is one way we can continue to improve our services. Please take a few minutes to complete the written survey that you may receive in the mail after your visit with us. Thank you!             Your Updated " Medication List - Protect others around you: Learn how to safely use, store and throw away your medicines at www.disposemymeds.org.          This list is accurate as of 11/12/18 10:44 AM.  Always use your most recent med list.                   Brand Name Dispense Instructions for use Diagnosis    alprostadil 40 MCG kit    EDEX    6 kit    40 mcg by Intracavitary route as needed for erectile dysfunction use no more than 3 times per week    Erectile dysfunction due to diseases classified elsewhere       bisacodyl 10 MG Suppository    DULCOLAX     Place 10 mg rectally        calcium carbonate 500 mg (elemental) 1250 (500 Ca) MG Tabs tablet    OSCAL 500     Take 600 mg by mouth With vitamin D        carvedilol 6.25 MG tablet    COREG     Take 6.25 mg by mouth 2 times daily (with meals)        METAMUCIL PO      Takes one capsule  every other day        oxybutynin 10 MG 24 hr tablet    DITROPAN-XL    90 tablet    Take 1 tablet (10 mg) by mouth daily    Overactive bladder       polyethylene glycol Packet    MIRALAX/GLYCOLAX     Take 17 g by mouth        pregabalin 50 MG capsule    LYRICA     Take 50 mg by mouth Per patient the dosing is 100 mg in AM, 100 MG at noon and 50 at HS        senna 8.6 MG tablet    SENOKOT     Take 34.4 mg by mouth        traMADol 50 MG tablet    ULTRAM     Take  mg by mouth        Wheel Chair K1 Basic Desk Arm Misc      Wheelchair:  Tilt and space  with leg rests  Length of need: 6 months

## 2018-11-12 NOTE — NURSING NOTE
"    Chief Complaint   Patient presents with     Consult     Poss JENNIFER       Initial /75  Pulse 52  Resp 16  Ht 1.676 m (5' 6\")  Wt 68 kg (150 lb)  SpO2 100%  BMI 24.21 kg/m2 Estimated body mass index is 24.21 kg/(m^2) as calculated from the following:    Height as of this encounter: 1.676 m (5' 6\").    Weight as of this encounter: 68 kg (150 lb).    Medication Reconciliation: complete    Neck circumference: 14.5 inches / 37 centimeters.    DME: [facility]       "
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
## 2018-11-14 NOTE — PROGRESS NOTES
DICTATED THE SLEEP CLINIC VISIT NOTE     Jagruti Bangura MD   of Medicine,  Division of Pulmonary/Sleep Medicine  [facility].

## 2018-12-06 DIAGNOSIS — N32.81 OVERACTIVE BLADDER: ICD-10-CM

## 2018-12-10 RX ORDER — OXYBUTYNIN CHLORIDE 10 MG/1
10 TABLET, EXTENDED RELEASE ORAL DAILY
Qty: 90 TABLET | Refills: 2 | Status: SHIPPED | OUTPATIENT
Start: 2018-12-10 | End: 2019-01-09

## 2018-12-12 ENCOUNTER — DOCUMENTATION ONLY (OUTPATIENT)
Dept: SLEEP MEDICINE | Facility: CLINIC | Age: 43
End: 2018-12-12

## 2018-12-12 ENCOUNTER — OFFICE VISIT (OUTPATIENT)
Dept: SLEEP MEDICINE | Facility: CLINIC | Age: 43
End: 2018-12-12
Attending: INTERNAL MEDICINE
Payer: COMMERCIAL

## 2018-12-12 DIAGNOSIS — R06.83 SNORING: ICD-10-CM

## 2018-12-12 PROCEDURE — G0399 HOME SLEEP TEST/TYPE 3 PORTA: HCPCS | Performed by: INTERNAL MEDICINE

## 2018-12-12 NOTE — PROGRESS NOTES
Pt is completing a home sleep test. Pt wife arrived and was instructed on how to put on the Noxturnal T3 device and associated equipment before going to bed and given the opportunity to practice putting it on before leaving the sleep center. Pt wife was reminded to bring the home sleep test kit back to the center tomorrow, at agreed upon time for download and reporting. Pt was unable to come in for instructions due to patient mobility issues.    Hunter Melgar  Sleep Clinic Specialist - [place]

## 2018-12-13 ENCOUNTER — DOCUMENTATION ONLY (OUTPATIENT)
Dept: SLEEP MEDICINE | Facility: CLINIC | Age: 43
End: 2018-12-13
Payer: COMMERCIAL

## 2018-12-13 ENCOUNTER — TELEPHONE (OUTPATIENT)
Dept: UROLOGY | Facility: CLINIC | Age: 43
End: 2018-12-13

## 2018-12-13 DIAGNOSIS — N39.0 URINARY TRACT INFECTION: Primary | ICD-10-CM

## 2018-12-13 NOTE — NURSING NOTE
Pt returned HST device. It was downloaded and forwarded data to the clinical specialist for scoring.    Hunter Melgar  Sleep Clinic Specialist - [place]

## 2018-12-13 NOTE — TELEPHONE ENCOUNTER
[facility] Call Center    Phone Message    May a detailed message be left on voicemail: yes    Reason for Call: Other: Pts wife calling again asking for UA orders to be placed for pt.     Action Taken: Message routed to:  [facility] ([facility]): MANDIE URO AND PROSTATE

## 2018-12-13 NOTE — TELEPHONE ENCOUNTER
Spoke to patient wife, patient is having fever/chills, malodorous urine and urinary incontinence.  Placed UA/UC orders.  Patient will leave urine specimen today or tomorrow.    Yadira Jalloh RN, BSN  Care Coordinator- Reconstructive Urology

## 2018-12-13 NOTE — TELEPHONE ENCOUNTER
[facility] Call Center    Phone Message    May a detailed message be left on voicemail: yes    Reason for Call: Other: Patient's wife calling in, again, requesting UA orders. I told her call back policy is 24 hours so she may not hear back today. Thank you.     Action Taken: Message routed to:  [facility] ([facility]): Urology

## 2018-12-13 NOTE — Clinical Note
HST downloaded ready for scoring.[facility] - [place]

## 2018-12-13 NOTE — TELEPHONE ENCOUNTER
[facility] Call Center    Phone Message    May a detailed message be left on voicemail: yes    Reason for Call: Symptoms or Concerns     If patient has red-flag symptoms, warm transfer to triage line    Current symptom or concern: UTI    Symptoms have been present for:  A couple day(s)    Has patient previously been seen for this? No      Action Taken: Message routed to:  [facility] urology

## 2018-12-13 NOTE — NURSING NOTE
This HST performed using a Noxturnal T3 device which recorded snore sound, movement activity, body position, nasal pressure, oronasal thermal airflow, pulse, oximetry, and both chest and abdominal respiratory effort.  HST data was confined to the time patient states he was in bed.    Patient was scored using the 4% hypopnea rule.  Patient respiratory events showed an AHI of 6.0 with rare mild snoring.  Patient Sp02 below 89% was 6.0  minutes.   Pt will follow up with his sleep provider to determine appropriate therapy.

## 2018-12-14 DIAGNOSIS — N39.0 URINARY TRACT INFECTION: ICD-10-CM

## 2018-12-14 LAB
ALBUMIN UR-MCNC: ABNORMAL MG/DL
APPEARANCE UR: ABNORMAL
BACTERIA #/AREA URNS HPF: ABNORMAL /HPF
BILIRUB UR QL STRIP: NEGATIVE
COLOR UR AUTO: YELLOW
GLUCOSE UR STRIP-MCNC: NEGATIVE MG/DL
HGB UR QL STRIP: ABNORMAL
KETONES UR STRIP-MCNC: NEGATIVE MG/DL
LEUKOCYTE ESTERASE UR QL STRIP: ABNORMAL
NITRATE UR QL: POSITIVE
PH UR STRIP: 6 PH (ref 5–7)
RBC #/AREA URNS AUTO: ABNORMAL /HPF
SOURCE: ABNORMAL
SP GR UR STRIP: 1.01 (ref 1–1.03)
UROBILINOGEN UR STRIP-ACNC: 0.2 EU/DL (ref 0.2–1)
WBC #/AREA URNS AUTO: ABNORMAL /HPF

## 2018-12-14 PROCEDURE — 87086 URINE CULTURE/COLONY COUNT: CPT | Performed by: INTERNAL MEDICINE

## 2018-12-14 PROCEDURE — 87186 SC STD MICRODIL/AGAR DIL: CPT | Performed by: INTERNAL MEDICINE

## 2018-12-14 PROCEDURE — 87088 URINE BACTERIA CULTURE: CPT | Performed by: INTERNAL MEDICINE

## 2018-12-14 PROCEDURE — 81001 URINALYSIS AUTO W/SCOPE: CPT | Performed by: INTERNAL MEDICINE

## 2018-12-16 LAB
BACTERIA SPEC CULT: ABNORMAL
SPECIMEN SOURCE: ABNORMAL

## 2018-12-17 DIAGNOSIS — N39.0 URINARY TRACT INFECTION: Primary | ICD-10-CM

## 2018-12-17 RX ORDER — CIPROFLOXACIN 500 MG/1
500 TABLET, FILM COATED ORAL 2 TIMES DAILY
Qty: 10 TABLET | Refills: 0 | Status: SHIPPED | OUTPATIENT
Start: 2018-12-17 | End: 2019-03-19

## 2018-12-18 NOTE — PROCEDURES
"HOME SLEEP STUDY INTERPRETATION    Patient: Kinza Teresa  MRN: [id number]  YOB: 1975  Study Date:2018  Referring Provider: Vinny Godinez  Ordering Provider:Jagruti Bangura MD     Indications for Home Study: Kinza Teresa is a 43 year old male with reports of snoring and observed apneas during sleep.  Home sleep study was obtained to evaluate for possible sleep apnea.    Estimated body mass index is 24.21 kg/m  as calculated from the following:    Height as of 18: 1.676 m (5' 6\").    Weight as of 18: 68 kg (150 lb).  [place] Sleepiness Scale:   STOP-BAN/8    Data: A full night home sleep study was performed recording the standard physiologic parameters including body position, movement, sound, nasal pressure, thermal oral airflow, chest and abdominal movements with respiratory inductance plethysmography, and oxygen saturation by pulse oximetry. Pulse rate was estimated by oximetry recording. This study was considered adequate based on > 4 hours of quality oximetry and respiratory recording. As specified by the AASM Manual for the Scoring of Sleep and Associated events, version 2.3, Rule VIII.D 1B, 4% oxygen desaturation scoring for hypopneas is used as a standard of care on all home sleep apnea testing.    Analysis Time: 467.5 minutes    Respiration:   Sleep Associated Hypoxemia: sustained hypoxemia was present. Baseline oxygen saturation was 95.4 %.  Time with saturation less than or equal to 88% was 6.0 minutes. The lowest oxygen saturation was 72 %.   Snoring: Snoring was present(mild).  Respiratory events: The home study revealed a presence of 16 obstructive apneas and 4 mixed and central apneas. There were 27 hypopneas resulting in a combined apnea/hypopnea index [AHI] of 6.0 events per hour.  AHI was 6.0 per hour supine, - per hour prone, - per hour on left side, and - per hour on right side.   Pattern: Excluding events noted above, " respiratory rate and pattern was Normal.    Position: Percent of time spent: supine -100 %, prone - 0%, on left - 0%, on right - 0%.    Heart Rate: By pulse oximetry normal rate was noted.     Assessment:     Mild obstructive sleep apnea(patient slept in supine position during the entire study).Home sleep apnea testing may lack sensitivity to identify mild   disease.    Sleep associated hypoxemia was present.    Recommendations:    Suggest obtaining supervised polysomnography to reevaluate the overall severity of the sleep related breathing disorder.    If the patient is not interested in pursuing the polysomnography, suggest the option of empiric treatment using auto titrating CPAP device with pressure settings 5-15 cm water with  clinical follow-up through sleep therapy management program.  Suggest obtaining an overnight oximetry with the auto CPAP to check for resolution of the hypoxemia.    Suggest optimizing sleep hygiene and avoiding sleep deprivation.      Diagnosis Code(s): Obstructive Sleep Apnea G47.33, Hypoxemia G47.36, Snoring R06.83    Jagruti Bangura MD, December 18, 2018   Diplomate, American Board of Internal Medicine, Sleep Medicine

## 2019-01-02 ENCOUNTER — PRE VISIT (OUTPATIENT)
Dept: UROLOGY | Facility: CLINIC | Age: 44
End: 2019-01-02

## 2019-01-02 DIAGNOSIS — N39.0 URINARY TRACT INFECTION: Primary | ICD-10-CM

## 2019-01-02 NOTE — PROGRESS NOTES
Spoke with patients wife, Anjali.  Would like to repeat UA/UC prior to Urodynamics Study next week.  Records available in EPIC.

## 2019-01-04 DIAGNOSIS — N39.0 URINARY TRACT INFECTION: ICD-10-CM

## 2019-01-04 LAB
ALBUMIN UR-MCNC: NEGATIVE MG/DL
APPEARANCE UR: CLEAR
BACTERIA #/AREA URNS HPF: ABNORMAL /HPF
BILIRUB UR QL STRIP: NEGATIVE
COLOR UR AUTO: YELLOW
GLUCOSE UR STRIP-MCNC: NEGATIVE MG/DL
HGB UR QL STRIP: NEGATIVE
KETONES UR STRIP-MCNC: NEGATIVE MG/DL
LEUKOCYTE ESTERASE UR QL STRIP: ABNORMAL
MUCOUS THREADS #/AREA URNS LPF: PRESENT /LPF
NITRATE UR QL: NEGATIVE
NON-SQ EPI CELLS #/AREA URNS LPF: ABNORMAL /LPF
PH UR STRIP: 6.5 PH (ref 5–7)
RBC #/AREA URNS AUTO: ABNORMAL /HPF
SOURCE: ABNORMAL
SP GR UR STRIP: 1.02 (ref 1–1.03)
UROBILINOGEN UR STRIP-ACNC: 0.2 EU/DL (ref 0.2–1)
WBC #/AREA URNS AUTO: ABNORMAL /HPF

## 2019-01-04 PROCEDURE — 87086 URINE CULTURE/COLONY COUNT: CPT | Performed by: INTERNAL MEDICINE

## 2019-01-04 PROCEDURE — 81001 URINALYSIS AUTO W/SCOPE: CPT | Performed by: INTERNAL MEDICINE

## 2019-01-05 LAB
BACTERIA SPEC CULT: NO GROWTH
SPECIMEN SOURCE: NORMAL

## 2019-01-09 ENCOUNTER — OFFICE VISIT (OUTPATIENT)
Dept: UROLOGY | Facility: CLINIC | Age: 44
End: 2019-01-09
Payer: COMMERCIAL

## 2019-01-09 ENCOUNTER — ANCILLARY PROCEDURE (OUTPATIENT)
Dept: RADIOLOGY | Facility: AMBULATORY SURGERY CENTER | Age: 44
End: 2019-01-09
Payer: COMMERCIAL

## 2019-01-09 VITALS
DIASTOLIC BLOOD PRESSURE: 66 MMHG | BODY MASS INDEX: 24.11 KG/M2 | HEIGHT: 66 IN | HEART RATE: 63 BPM | SYSTOLIC BLOOD PRESSURE: 111 MMHG | WEIGHT: 150 LBS

## 2019-01-09 DIAGNOSIS — N32.81 OVERACTIVE BLADDER: ICD-10-CM

## 2019-01-09 DIAGNOSIS — N31.9 NEUROGENIC BLADDER: Primary | ICD-10-CM

## 2019-01-09 LAB
APPEARANCE UR: CLEAR
BILIRUB UR QL: NORMAL
COLOR UR: YELLOW
GLUCOSE URINE: NORMAL MG/DL
HGB UR QL: NORMAL
KETONES UR QL: NORMAL MG/DL
LEUKOCYTE ESTERASE URINE: NORMAL
NITRITE UR QL STRIP: NORMAL
PH UR STRIP: 5.5 PH (ref 5–7)
PROTEIN ALBUMIN URINE: NORMAL MG/DL
SOURCE: NORMAL
SP GR UR STRIP: 1.02 (ref 1–1.03)
UROBILINOGEN UR QL STRIP: 0.2 EU/DL (ref 0.2–1)

## 2019-01-09 RX ORDER — OXYBUTYNIN CHLORIDE 10 MG/1
20 TABLET, EXTENDED RELEASE ORAL DAILY
Qty: 180 TABLET | Refills: 3 | Status: SHIPPED | OUTPATIENT
Start: 2019-01-09 | End: 2019-03-19

## 2019-01-09 ASSESSMENT — MIFFLIN-ST. JEOR: SCORE: 1508.15

## 2019-01-09 ASSESSMENT — PAIN SCALES - GENERAL: PAINLEVEL: SEVERE PAIN (7)

## 2019-01-09 NOTE — LETTER
1/9/2019       RE: Kinza Teresa  [address] [id number]     Dear Colleague,    Thank you for referring your patient, Kinza Teresa, to the [facility] UROLOGY AND INST FOR PROSTATE AND UROLOGIC CANCERS at [facility]. Please see a copy of my visit note below.    Chief Complaint   Patient presents with     Urodynamics Study     ABDIRIZAK Whitehead    PREPROCEDURE DIAGNOSES:    1. Neurogenic bladder secondary to T11 spinal cord injury in March 2017  2. Recurrent UTI's  3. Urinary retention, currently managed with CIC     POSTPROCEDURE DIAGNOSES:  -Normal bladder capacity (650 mL) with absent filling sensations.  -Several episodes of DO with small volume incontinence starting at bladder volumes > 400 mL (improved from last study when he started having spasms at 200 mL). Max Pdet reaches 30 cm H2O at the peak of these contractions with pressures quickly returning to baseline after he leaks. Cycle repeats x 3  -Otherwise normal bladder compliance between uninhibited detrusor contractions.  -No stress urinary incontinence.  -No voiding phase as patient does not void volitionally.  -No evidence to suggest presence of DSD.  -Fluoroscopy reveals a smooth-walled bladder without diverticuli or VUR. Bladder neck is closed during filling.     PROCEDURE:    1. Sterile urethral catheterization for measurement of residual urine volume.  2. Complex filling cystometrogram with measurement of bladder and rectal pressures.  3. Electromyography of the pelvic floor during urodynamics.  4. Fluoroscopic imaging of the bladder during urodynamics, at least 3 views.    5. Interpretation of urodynamics and flouroscopic imaging.      INDICATIONS FOR PROCEDURE:  Mr. Kinza Teresa is a pleasant 43 year old male with neurogenic bladder secondary to T11 SCI in 3/2017 with resulting urinary retention, currently managed with CIC. He is experiencing recurrent UTI's. Prior UDS in 9/2017 showed DO  so he was started on oxybutynin XL 10 mg daily at this time. Given his ongoing UTI's, repeat video urodynamic assessment is requested today by Dr. Trujillo to better characterize Mr. Kinza Teresa's voiding dysfunction.      VOIDING DIARY:  Patient did not complete. Per chart review:  Catheterization History:  The patient catheterizes per native urethra with a 14F Coude catheter q 6-8 hours. Voiding approximately 250-300 mL with each cath. Catheterization is performed by  self. The patient uses a new catheter each time. He does not irrigate the bladder. Reports good hygiene.     Incontinence History:  He does not leak between voids/caths unless he has a UTI. He does not experience urgency of urination. He does not experience stress urinary incontinence with activites.      If patient has an AUS or Sling then:  Leak management: Diapers (how many/day? 1- but remains dry).    DESCRIPTION OF PROCEDURE:  Risks, benefits, and alternatives to urodynamics were discussed with the patient and he wished to proceed.  Urodynamics are planned to better assess the primary etiology for Mr. Teresa's urologic dysfunction.  The patient last took anticholinergic medication 17 hours ago.  After informed consent was obtained, the patient was taken to the procedure room where uroflowmetry was performed. Findings below.     PRE-STUDY UROFLOWMETRY:  Not performed as patient does not void volitionally.   Residual by catheter: 80 mL.  Pretest urine dipstick was negative for leukocytes and nitrites.    Next a 7F double-lumen urodynamics catheter was inserted into the bladder under sterile technique via urethra.  A 7F abdominal manometry catheter was placed in the rectum.  EMG pads were placed on both sides of the anal verge.  The bladder was filled with 200 mL of Cystografin at 40 mL/minute and serial pressures were recorded.  With coughing there was an appropriate rise in vesical and abdominal pressures with no change in detrusor pressure,  confirming good study catheter placement.    DURING THE FILLING PHASE:  No filling sensations reported.  Maximum Capacity: filling terminated at 650 mL per clinician discretion.    Uninhibited detrusor contractions: several episodes of DO with small volume incontinence starting at bladder volumes > 400 mL. Max Pdet reaches 30 cm H2O at the peak of these contractions with pressures quickly returning to baseline after he leaks. Cycle repeats x 3  Compliance: otherwise normal bladder compliance between UDC's.  Continence: DOI as described above. No ARTURO.  EMG: mostly concordant during filling.    DURING THE VOIDING PHASE:  No voiding phase as patient does not void volitionally.     FLUOROSCOPIC IMAGING OF THE BLADDER DURING URODYNAMICS:   imaging demonstrated spinal and pelvic hardware. Fluoroscopy during today's procedure demonstrated a smooth walled bladder without diverticulae or cellules.  No vesicoureteral reflux was observed.  The bladder neck was closed during filling.  At the completion of today's study, all catheters were removed, the patient was straight catheterized for residual contrast volume, and was brought back into the consultation room to further discuss today's study results.      ASSESSMENT/PLAN:  Mr. Kinza Teresa is a pleasant 43 year old male with neurogenic bladder secondary to T11 SCI who demonstrated the following findings today on urodynamic evaluation:    -Normal bladder capacity (650 mL) with absent filling sensations.  -Several episodes of DO with small volume incontinence starting at bladder volumes > 400 mL (improved from last study when he started having spasms at 200 mL). Max Pdet reaches 30 cm H2O at the peak of these contractions with pressures quickly returning to baseline after he leaks. Cycle repeats x 3  -Otherwise normal bladder compliance between uninhibited detrusor contractions.  -No stress urinary incontinence.  -No voiding phase as patient does not void  volitionally.  -No evidence to suggest presence of DSD.  -Fluoroscopy reveals a smooth-walled bladder without diverticuli or VUR. Bladder neck is closed during filling.     We discussed his study results in detail today. Continues to have episodes of DO with incontinence despite oxybutynin XL 10 mg once daily. We discussed increasing the dose to lessen risk for incontinence and Fritz. Patient and wife in agreement. We also discussed implementing daily bladder irrigations to further reduce risk for UTI's. They also agree to this.  -Increase oxybutynin XL to 20 mg once daily. Rx updated.  -Start daily bladder irrigations. Will order supplies today.     The patient will also follow up as scheduled with Dr. Jacome to further discuss today's study results and make plans for how best to proceed and to discuss IPP.    - A single Cipro antibiotic was provided for UTI prophylaxis following completion of today's study per department protocol.  The risk of UTI with VUDS is low at ~2.5-3%.      Thank you for allowing me to participate in the care of Mr. Kinza Teresa and please don't hesitate to contact me with any questions or concerns.      Regi Richardson PA-C  Urology Physician Assistant    Invasive Procedure Safety Checklist:    Procedure: Urodynamics Study    Action: Complete sections and checkboxes as appropriate.    Pre-procedure:  1. Patient ID Verified with 2 identifiers (Nery and  or MRN) : YES    2. Procedure and site verified with patient/designee (when able) : YES    3. Accurate consent documentation in medical record : YES    4. H&P (or appropriate assessment) documented in medical record : NO  H&P must be up to 30 days prior to procedure an updated within 24 hours of                 Procedure as applicable.     5. Relevant diagnostic and radiology test results appropriately labeled and displayed as applicable : NO    6. Blood products, implants, devices, and/or special equipment available for the procedure  as applicable : NO    7. Procedure site(s) marked with provider initials [Exclusions: ] : NO    8. Marking not required. Reason : Yes  Procedure does not require site marking    Time Out:     Time-Out performed immediately prior to starting procedure, including verbal and active participation of all team members addressing: YES    1. Correct patient identity.  2. Confirmed that the correct side and site are marked.  3. An accurate procedure to be done.  4. Agreement on the procedure to be done.  5. Correct patient position.  6. Relevant images and results are properly labeled and appropriately displayed.  7. The need to administer antibiotics or fluids for irrigation purposes during the procedure as applicable.  8. Safety precautions based on patient history or medication use.    During Procedure: Verification of correct person, site, and procedure occurs any time the responsibility for care of the patient is transferred to another member of the care team.      The following medication was given:     MEDICATION: Ciprofloxacin  ROUTE: PO  SITE: Medication was given orally   DOSE: 500 mg  LOT #: [id number]  :  [employer]  EXPIRATION DATE:  08/2020  NDC#: [id number]    ABDIRIZAK Whitehead       Again, thank you for allowing me to participate in the care of your patient.      Sincerely,    Regi Richardson PA-C

## 2019-01-09 NOTE — PATIENT INSTRUCTIONS
UROLOGY CLINIC VISIT PATIENT INSTRUCTIONS    1) Increase your dose of oxybutynin XL to 20 mg once daily. Double up on the supply you have until you run out, and then a new prescription was sent to your pharmacy.    2) Start irrigating (flushing) your bladder once daily to eliminate mucous/sediment which can cause UTI's.   Directions:  Instill 2 syringes (120 mL) of normal saline or sterile water, then withdraw one (60 mL), then instill one (60 mL), and withdraw one (60 mL)... and continue this process until there is no longer mucous being irrigated out.      We will set up an order for irrigation supplies for you.    3) Follow up with Dr. Jacome as scheduled.     If you have any issues, questions or concerns in the meantime, do not hesitate to contact us at [phone] or via [employer].     It was a pleasure meeting with you today.  Thank you for allowing me and my team the privilege of caring for you today.  YOU are the reason we are here, and I truly hope we provided you with the excellent service you deserve.  Please let us know if there is anything else we can do for you so that we can be sure you are leaving completely satisfied with your care experience.

## 2019-01-09 NOTE — PROGRESS NOTES
PREPROCEDURE DIAGNOSES:    1. Neurogenic bladder secondary to T11 spinal cord injury in March 2017  2. Recurrent UTI's  3. Urinary retention, currently managed with CIC     POSTPROCEDURE DIAGNOSES:  -Normal bladder capacity (650 mL) with absent filling sensations.  -Several episodes of DO with small volume incontinence starting at bladder volumes > 400 mL (improved from last study when he started having spasms at 200 mL). Max Pdet reaches 30 cm H2O at the peak of these contractions with pressures quickly returning to baseline after he leaks. Cycle repeats x 3  -Otherwise normal bladder compliance between uninhibited detrusor contractions.  -No stress urinary incontinence.  -No voiding phase as patient does not void volitionally.  -No evidence to suggest presence of DSD.  -Fluoroscopy reveals a smooth-walled bladder without diverticuli or VUR. Bladder neck is closed during filling.     PROCEDURE:    1. Sterile urethral catheterization for measurement of residual urine volume.  2. Complex filling cystometrogram with measurement of bladder and rectal pressures.  3. Electromyography of the pelvic floor during urodynamics.  4. Fluoroscopic imaging of the bladder during urodynamics, at least 3 views.    5. Interpretation of urodynamics and flouroscopic imaging.      INDICATIONS FOR PROCEDURE:  Mr. Kinza Teresa is a pleasant 43 year old male with neurogenic bladder secondary to T11 SCI in 3/2017 with resulting urinary retention, currently managed with CIC. He is experiencing recurrent UTI's. Prior UDS in 9/2017 showed DO so he was started on oxybutynin XL 10 mg daily at this time. Given his ongoing UTI's, repeat video urodynamic assessment is requested today by Dr. Trujillo to better characterize Mr. Kinza Teresa's voiding dysfunction.      VOIDING DIARY:  Patient did not complete. Per chart review:  Catheterization History:  The patient catheterizes per native urethra with a 14F Coude catheter q 6-8 hours. Voiding  approximately 250-300 mL with each cath. Catheterization is performed by  self. The patient uses a new catheter each time. He does not irrigate the bladder. Reports good hygiene.     Incontinence History:  He does not leak between voids/caths unless he has a UTI. He does not experience urgency of urination. He does not experience stress urinary incontinence with activites.      If patient has an AUS or Sling then:  Leak management: Diapers (how many/day? 1- but remains dry).    DESCRIPTION OF PROCEDURE:  Risks, benefits, and alternatives to urodynamics were discussed with the patient and he wished to proceed.  Urodynamics are planned to better assess the primary etiology for Mr. Teresa's urologic dysfunction.  The patient last took anticholinergic medication 17 hours ago.  After informed consent was obtained, the patient was taken to the procedure room where uroflowmetry was performed. Findings below.     PRE-STUDY UROFLOWMETRY:  Not performed as patient does not void volitionally.   Residual by catheter: 80 mL.  Pretest urine dipstick was negative for leukocytes and nitrites.    Next a 7F double-lumen urodynamics catheter was inserted into the bladder under sterile technique via urethra.  A 7F abdominal manometry catheter was placed in the rectum.  EMG pads were placed on both sides of the anal verge.  The bladder was filled with 200 mL of Cystografin at 40 mL/minute and serial pressures were recorded.  With coughing there was an appropriate rise in vesical and abdominal pressures with no change in detrusor pressure, confirming good study catheter placement.    DURING THE FILLING PHASE:  No filling sensations reported.  Maximum Capacity: filling terminated at 650 mL per clinician discretion.    Uninhibited detrusor contractions: several episodes of DO with small volume incontinence starting at bladder volumes > 400 mL. Max Pdet reaches 30 cm H2O at the peak of these contractions with pressures quickly returning  to baseline after he leaks. Cycle repeats x 3  Compliance: otherwise normal bladder compliance between UDC's.  Continence: DOI as described above. No ARTURO.  EMG: mostly concordant during filling.    DURING THE VOIDING PHASE:  No voiding phase as patient does not void volitionally.     FLUOROSCOPIC IMAGING OF THE BLADDER DURING URODYNAMICS:   imaging demonstrated spinal and pelvic hardware. Fluoroscopy during today's procedure demonstrated a smooth walled bladder without diverticulae or cellules.  No vesicoureteral reflux was observed.  The bladder neck was closed during filling.  At the completion of today's study, all catheters were removed, the patient was straight catheterized for residual contrast volume, and was brought back into the consultation room to further discuss today's study results.      ASSESSMENT/PLAN:  Mr. Kinza Teresa is a pleasant 43 year old male with neurogenic bladder secondary to T11 SCI who demonstrated the following findings today on urodynamic evaluation:    -Normal bladder capacity (650 mL) with absent filling sensations.  -Several episodes of DO with small volume incontinence starting at bladder volumes > 400 mL (improved from last study when he started having spasms at 200 mL). Max Pdet reaches 30 cm H2O at the peak of these contractions with pressures quickly returning to baseline after he leaks. Cycle repeats x 3  -Otherwise normal bladder compliance between uninhibited detrusor contractions.  -No stress urinary incontinence.  -No voiding phase as patient does not void volitionally.  -No evidence to suggest presence of DSD.  -Fluoroscopy reveals a smooth-walled bladder without diverticuli or VUR. Bladder neck is closed during filling.     We discussed his study results in detail today. Continues to have episodes of DO with incontinence despite oxybutynin XL 10 mg once daily. We discussed increasing the dose to lessen risk for incontinence and Fritz. Patient and wife in agreement.  We also discussed implementing daily bladder irrigations to further reduce risk for UTI's. They also agree to this.  -Increase oxybutynin XL to 20 mg once daily. Rx updated.  -Start daily bladder irrigations. Will order supplies today.     The patient will also follow up as scheduled with Dr. Jacome to further discuss today's study results and make plans for how best to proceed and to discuss IPP.    - A single Cipro antibiotic was provided for UTI prophylaxis following completion of today's study per department protocol.  The risk of UTI with VUDS is low at ~2.5-3%.      Thank you for allowing me to participate in the care of Mr. Kinza Teresa and please don't hesitate to contact me with any questions or concerns.      EL JimenezC  Urology Physician Assistant

## 2019-01-09 NOTE — PROGRESS NOTES
Invasive Procedure Safety Checklist:    Procedure: Urodynamics Study    Action: Complete sections and checkboxes as appropriate.    Pre-procedure:  1. Patient ID Verified with 2 identifiers (Nery and  or MRN) : YES    2. Procedure and site verified with patient/designee (when able) : YES    3. Accurate consent documentation in medical record : YES    4. H&P (or appropriate assessment) documented in medical record : NO  H&P must be up to 30 days prior to procedure an updated within 24 hours of                 Procedure as applicable.     5. Relevant diagnostic and radiology test results appropriately labeled and displayed as applicable : NO    6. Blood products, implants, devices, and/or special equipment available for the procedure as applicable : NO    7. Procedure site(s) marked with provider initials [Exclusions: ] : NO    8. Marking not required. Reason : Yes  Procedure does not require site marking    Time Out:     Time-Out performed immediately prior to starting procedure, including verbal and active participation of all team members addressing: YES    1. Correct patient identity.  2. Confirmed that the correct side and site are marked.  3. An accurate procedure to be done.  4. Agreement on the procedure to be done.  5. Correct patient position.  6. Relevant images and results are properly labeled and appropriately displayed.  7. The need to administer antibiotics or fluids for irrigation purposes during the procedure as applicable.  8. Safety precautions based on patient history or medication use.    During Procedure: Verification of correct person, site, and procedure occurs any time the responsibility for care of the patient is transferred to another member of the care team.      The following medication was given:     MEDICATION: Ciprofloxacin  ROUTE: PO  SITE: Medication was given orally   DOSE: 500 mg  LOT #: [id number]  :  [employer]  EXPIRATION DATE:  2020  NDC#:  [id number]    Ladan Zarco ROCKY

## 2019-01-16 ENCOUNTER — TELEPHONE (OUTPATIENT)
Dept: UROLOGY | Facility: CLINIC | Age: 44
End: 2019-01-16

## 2019-01-16 ENCOUNTER — MEDICAL CORRESPONDENCE (OUTPATIENT)
Dept: HEALTH INFORMATION MANAGEMENT | Facility: CLINIC | Age: 44
End: 2019-01-16

## 2019-01-16 NOTE — TELEPHONE ENCOUNTER
[facility] Call Center    Phone Message    May a detailed message be left on voicemail: yes    Reason for Call: Other: Pt's wife, Keshia, called in and would like to talk to Regi or her nurse. She says they are running into several problems trying to get irrigation supplies. Please give Keshia a call back. Thank you.    Action Taken: Message routed to:  [facility] Urology

## 2019-01-17 NOTE — TELEPHONE ENCOUNTER
Orders for irrigation supplies was faxed to [facility] @ [phone]/[facility] @ [phone].      Chiqui Jeffers MA

## 2019-01-23 ENCOUNTER — TELEPHONE (OUTPATIENT)
Dept: UROLOGY | Facility: CLINIC | Age: 44
End: 2019-01-23

## 2019-01-23 DIAGNOSIS — N31.9 NEUROGENIC BLADDER: Primary | ICD-10-CM

## 2019-01-23 RX ORDER — OXYBUTYNIN CHLORIDE 15 MG/1
15 TABLET, EXTENDED RELEASE ORAL DAILY
Qty: 90 TABLET | Refills: 1 | Status: SHIPPED | OUTPATIENT
Start: 2019-01-23 | End: 2019-03-19

## 2019-01-23 RX ORDER — OXYBUTYNIN CHLORIDE 5 MG/1
TABLET, EXTENDED RELEASE ORAL
Qty: 30 TABLET | Refills: 3 | Status: SHIPPED | OUTPATIENT
Start: 2019-01-23 | End: 2019-07-29

## 2019-01-23 NOTE — TELEPHONE ENCOUNTER
[facility] Call Center    Phone Message    May a detailed message be left on voicemail: no    Reason for Call: Medication Question or concern regarding medication   Prescription Clarification  Name of Medication: oxybutynin ER (DITROPAN-XL) 5 MG 24 hr tablet  Prescribing Provider: Regi Richardson   Pharmacy: [facility]   What on the order needs clarification? Dosing and directions don't match on the script they just received          Action Taken: Message routed to:  [facility] UROLOGY ADULT [facility]

## 2019-01-23 NOTE — TELEPHONE ENCOUNTER
Patient wife (Keshia) to let her know that Regi Richardson PA-C would like for the patient to take Oxybutynin 15 mg daily. Patient wife states that she wants me to write a new prescription because she do not think she will be able to cut the other pills in half.  Oxybutynin 15 mg daily was sent to [facility] in [place] on [address] and [address].        Chiqui Jeffers MA

## 2019-01-23 NOTE — TELEPHONE ENCOUNTER
Patient wife (Keshia) states that the patient has had some side effects from the increase of his Oxybutynin 10 mg. She states since the increase-the patient is experiencing some dry mouth and sleepiness. The patient wants to know if he can go back to his normal dose or be prescribed another medication.    Message forward to Regi Richardson PA-C and her RNCC Yadira Jalloh. Please advise      Thanks, Chiqui Jeffers MA

## 2019-01-23 NOTE — TELEPHONE ENCOUNTER
Oxybutynin 15 mg prescription was sent to [facility] . Dosage and sig was verified with the pharmacist.      Chiqui Jeffers MA

## 2019-01-23 NOTE — TELEPHONE ENCOUNTER
[facility] Call Center    Phone Message    May a detailed message be left on voicemail: yes    Reason for Call: Other: Pt's wife called in to speak to a nurse about her 's medication for oxybutynin ER (DITROPAN-XL) 10 MG 24 hr tablet. Please give Keshia a call back. Thank you.     Action Taken: Message routed to:  [facility] Urology

## 2019-01-31 ENCOUNTER — TELEPHONE (OUTPATIENT)
Dept: UROLOGY | Facility: CLINIC | Age: 44
End: 2019-01-31

## 2019-01-31 NOTE — TELEPHONE ENCOUNTER
[facility] Call Center    Phone Message    May a detailed message be left on voicemail: yes    Reason for Call: Other: Pura with [facility] called in to get verification on what type of syringes they should send out for the pt. Please give Pura a call back at [phone]     Action Taken: Message routed to:  [facility] Urology

## 2019-02-06 ENCOUNTER — THERAPY VISIT (OUTPATIENT)
Dept: PHYSICAL THERAPY | Facility: CLINIC | Age: 44
End: 2019-02-06
Payer: COMMERCIAL

## 2019-02-06 ENCOUNTER — PRE VISIT (OUTPATIENT)
Dept: UROLOGY | Facility: CLINIC | Age: 44
End: 2019-02-06

## 2019-02-06 DIAGNOSIS — G89.29 CHRONIC RIGHT-SIDED LOW BACK PAIN WITHOUT SCIATICA: ICD-10-CM

## 2019-02-06 DIAGNOSIS — M54.50 CHRONIC RIGHT-SIDED LOW BACK PAIN WITHOUT SCIATICA: ICD-10-CM

## 2019-02-06 PROCEDURE — 97162 PT EVAL MOD COMPLEX 30 MIN: CPT | Mod: GP | Performed by: PHYSICAL THERAPIST

## 2019-02-06 PROCEDURE — 97110 THERAPEUTIC EXERCISES: CPT | Mod: GP | Performed by: PHYSICAL THERAPIST

## 2019-02-06 PROCEDURE — 97530 THERAPEUTIC ACTIVITIES: CPT | Mod: GP | Performed by: PHYSICAL THERAPIST
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
## 2019-02-06 NOTE — TELEPHONE ENCOUNTER
Reason for visit: Discuss IPP placement      Relevant information: Neurogenic bladder due to spinal cord injury    Records/imaging/labs/orders: all records available    Pt called: no need for a call    At Rooming: regular

## 2019-02-11 ENCOUNTER — OFFICE VISIT (OUTPATIENT)
Dept: UROLOGY | Facility: CLINIC | Age: 44
End: 2019-02-11
Payer: COMMERCIAL

## 2019-02-11 VITALS
DIASTOLIC BLOOD PRESSURE: 86 MMHG | BODY MASS INDEX: 24.11 KG/M2 | HEART RATE: 52 BPM | WEIGHT: 150 LBS | SYSTOLIC BLOOD PRESSURE: 130 MMHG | HEIGHT: 66 IN

## 2019-02-11 DIAGNOSIS — N31.9 NEUROGENIC BLADDER: Primary | ICD-10-CM

## 2019-02-11 DIAGNOSIS — N52.8 OTHER MALE ERECTILE DYSFUNCTION: ICD-10-CM

## 2019-02-11 ASSESSMENT — MIFFLIN-ST. JEOR: SCORE: 1508.15

## 2019-02-11 ASSESSMENT — PAIN SCALES - GENERAL: PAINLEVEL: NO PAIN (0)

## 2019-02-11 NOTE — PATIENT INSTRUCTIONS
Schedule an appointment with Dr. Tong to discuss IPP.    Return in three months with Regi Richardson PA-C to discuss UTIs    It was a pleasure meeting with you today.  Thank you for allowing me and my team the privilege of caring for you today.  YOU are the reason we are here, and I truly hope we provided you with the excellent service you deserve.  Please let us know if there is anything else we can do for you so that we can be sure you are leaving completely satisfied with your care experience.
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
## 2019-02-13 ENCOUNTER — THERAPY VISIT (OUTPATIENT)
Dept: PHYSICAL THERAPY | Facility: CLINIC | Age: 44
End: 2019-02-13
Payer: COMMERCIAL

## 2019-02-13 DIAGNOSIS — G89.29 CHRONIC RIGHT-SIDED LOW BACK PAIN WITHOUT SCIATICA: ICD-10-CM

## 2019-02-13 DIAGNOSIS — M54.50 CHRONIC RIGHT-SIDED LOW BACK PAIN WITHOUT SCIATICA: ICD-10-CM

## 2019-02-13 PROCEDURE — 97110 THERAPEUTIC EXERCISES: CPT | Mod: GP | Performed by: PHYSICAL THERAPIST

## 2019-02-14 ENCOUNTER — PRE VISIT (OUTPATIENT)
Dept: UROLOGY | Facility: CLINIC | Age: 44
End: 2019-02-14

## 2019-02-14 NOTE — TELEPHONE ENCOUNTER
Chief Complaint   Patient presents with     Previsit     Follow up with Dr. Tong to further discuss IPP

## 2019-02-27 ENCOUNTER — HOSPITAL ENCOUNTER (OUTPATIENT)
Facility: CLINIC | Age: 44
End: 2019-02-27
Attending: UROLOGY | Admitting: UROLOGY
Payer: COMMERCIAL

## 2019-02-27 ENCOUNTER — ALLIED HEALTH/NURSE VISIT (OUTPATIENT)
Dept: UROLOGY | Facility: CLINIC | Age: 44
End: 2019-02-27
Payer: COMMERCIAL

## 2019-02-27 ENCOUNTER — OFFICE VISIT (OUTPATIENT)
Dept: UROLOGY | Facility: CLINIC | Age: 44
End: 2019-02-27
Payer: COMMERCIAL

## 2019-02-27 ENCOUNTER — TELEPHONE (OUTPATIENT)
Dept: SLEEP MEDICINE | Facility: CLINIC | Age: 44
End: 2019-02-27

## 2019-02-27 VITALS
SYSTOLIC BLOOD PRESSURE: 127 MMHG | BODY MASS INDEX: 24.11 KG/M2 | WEIGHT: 150 LBS | DIASTOLIC BLOOD PRESSURE: 69 MMHG | HEIGHT: 66 IN | HEART RATE: 60 BPM

## 2019-02-27 DIAGNOSIS — S24.109S INJURY OF THORACIC SPINAL CORD, SEQUELA (H): ICD-10-CM

## 2019-02-27 DIAGNOSIS — N52.9 ERECTILE DYSFUNCTION OF ORGANIC ORIGIN: ICD-10-CM

## 2019-02-27 DIAGNOSIS — N52.1 ERECTILE DYSFUNCTION DUE TO DISEASES CLASSIFIED ELSEWHERE: Primary | ICD-10-CM

## 2019-02-27 RX ORDER — CIPROFLOXACIN 500 MG/1
500 TABLET, FILM COATED ORAL 2 TIMES DAILY
Qty: 20 TABLET | Refills: 0 | Status: SHIPPED | OUTPATIENT
Start: 2019-03-29 | End: 2019-03-19

## 2019-02-27 ASSESSMENT — PAIN SCALES - GENERAL: PAINLEVEL: NO PAIN (0)

## 2019-02-27 ASSESSMENT — MIFFLIN-ST. JEOR: SCORE: 1508.15

## 2019-02-27 NOTE — PROGRESS NOTES
Pre Op Teaching Flowsheet       Pre and Post op Patient Education  Relevant Diagnosis:  Erectile dysfunction due to diseases classified elsewhere   Teaching Topic:  Pre and post op teaching for Insertion of Inflatable Penile Prosthesis  Person Involved in teaching:  Kinza Teresa      Motivation Level:  Asks Questions: Yes  Eager to Learn:  Yes  Cooperative: Yes  Receptive (willing/able to accept information):  Yes  Patient demonstrates understanding of the following:  Date and time of surgery:  4/1/19 at 0815  Location of surgery: [address] [place] [address]  History and Physical and any other testing necessary prior to surgery: Yes  Required time line for completion of History and Physical and any pre-op testing: Yes    NPO Guidelines: NPO per Anesthesia Guidelines    Patient demonstrates understanding of the following:  Patient understands the need for a responsible adult to drive them home and someone to stay with them for the first 24 hours post-operatively: YES Wife  Pre-op bowel prep: No, not needed  Pre-op showering/scrub information with Hibiclens Soap: Yes  Medications to take the day of surgery:  Per PCP  Blood thinner medications discussed and when to stop (if applicable):  Yes  Diabetes medication management (if applicable):  N/A  Discussed pain control after surgery: pain scale, pain medications and pain management techniques  Infection Prevention: Patient demonstrates understanding of the following:  Patient instructed on hand hygiene:  Yes  Surgical procedure site care taught: Yes  Signs and symptoms of infection taught:  Yes  Wound care will be taught at the time of discharge.  Central venous catheter care will be taught at the time of discharge (if applicable).    Post-op follow-up:  Discussed how to contact the hospital, nurse, and clinic scheduling staff if necessary.    Instructional materials used/given/mailed:  [facility] Surgery Booklet, post op teaching sheet, Map, Soap, and arrival/location  information.    Surgical instructions given to patient in clinic: Yes.    Instructional Materials given:  Before your surgery packet , Medications to avoid before surgery , Showering or Bathing instructions before surgery  and What to expect after surgery    Post-op appointment/testing scheduled per MD orders: Yes    Total time with patient: 10 minutes    Yadira Jalloh RN, BSN  Urology Care Coordinator

## 2019-02-27 NOTE — TELEPHONE ENCOUNTER
Reason for call: Per spouse: has some concerns regarding what to do next, they want him to have another sleep study and to also get a cpap machine    Phone number to reach patient:  Home number on file [phone] (home)    Best Time:  Anytime    Can we leave a detailed message on this number?  YES
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
## 2019-02-27 NOTE — LETTER
RE: Kinza Teresa  [address] [id number]     Dear Colleague,    Thank you for referring your patient, Kinza Teresa, to the [facility] UROLOGY AND INST FOR PROSTATE AND UROLOGIC CANCERS at [facility]. Please see a copy of my visit note below.      Name: Kinza Teresa    MRN: [id number]   YOB: 1975                 Chief Complaint:   ED          History of Present Illness:   Mr. Kinza Teresa is a 43 year old male seen in consultation from Dr. Jacome.  Spine fracture, pelvic fracture, jar, arm fracture, tbi  Tried pde5, vacuum and injections.  Injections, there is a partial erection, but not full erection.  They used the maximum dose of Edex - 40mcg.    No diabetes.  Had pelvic fracture.         Past Medical History:     Past Medical History:   Diagnosis Date     Constipation      Spinal cord injury at T7-T12 level with spinal cord lesion (H) 03/2017            Past Surgical History:   Pelvic fracture plating  Spine surgery         Social History:     Social History     Tobacco Use     Smoking status: Never Smoker     Smokeless tobacco: Never Used   Substance Use Topics     Alcohol use: No          Family History:     Family History   Problem Relation Age of Onset     Hypertension Mother      Hypertension Father           Allergies:   No Known Allergies         Medications:     Current Outpatient Medications   Medication Sig     alprostadil (EDEX) 40 MCG kit 40 mcg by Intracavitary route as needed for erectile dysfunction use no more than 3 times per week     bisacodyl (DULCOLAX) 10 MG Suppository Place 10 mg rectally     calcium carbonate (OSCAL 500) 1250 (500 CA) MG TABS tablet Take 600 mg by mouth With vitamin D     carvedilol (COREG) 6.25 MG tablet Take 6.25 mg by mouth 2 times daily (with meals)      medical cannabis (Patient's own supply.  Not a prescription) See Admin Instructions (This is NOT a prescription, and does not certify that  "the patient has a qualifying medical condition for medical cannabis.  The purpose of this order is  to document that the patient reports taking medical cannabis.)     [facility] [place]. Devices (WHEEL CHAIR K1 BASIC DESK ARM) MISC Wheelchair:  Tilt and space  with leg rests  Length of need: 6 months     oxybutynin ER (DITROPAN XL) 15 MG 24 hr tablet Take 1 tablet (15 mg) by mouth daily     oxybutynin ER (DITROPAN-XL) 10 MG 24 hr tablet Take 2 tablets (20 mg) by mouth daily     oxybutynin ER (DITROPAN-XL) 5 MG 24 hr tablet Take one (15 mg) tablet daily     polyethylene glycol (MIRALAX/GLYCOLAX) Packet Take 17 g by mouth     pregabalin (LYRICA) 50 MG capsule Take 50 mg by mouth Per patient the dosing is 100 mg in AM, 100 MG at noon and 50 at HS     Psyllium (METAMUCIL PO) Takes one capsule  every other day     senna (SENOKOT) 8.6 MG tablet Take 34.4 mg by mouth     traMADol (ULTRAM) 50 MG tablet Take  mg by mouth     No current facility-administered medications for this visit.           Physical Exam:   B/P: 127/69, T: Data Unavailable, P: 60, R: Data Unavailable  Estimated body mass index is 24.21 kg/m  as calculated from the following:    Height as of this encounter: 1.676 m (5' 6\").    Weight as of this encounter: 68 kg (150 lb).  General: age-appropriate appearing male in NAD. thin body habitus. wheelchair  HEENT: Head AT/NC, EOMI, CN Grossly intact  Resp: no respiratory distress, lung sounds clear.  CV: heart rate regular, S1, S2.  Lymph: No cervical, supraclavicular or axillary lymphadenopathy  Back: bony spine is non-tender, flanks are nontender  Abdomen: no obesity , soft, non-distended, non-tender. No organomegaly. Surgical scars include: lower right abdominal incision.   : testicles normal without atrophy or masses and penis normal without urethral discharge;   LE: no edema.   Neuro: grossly intact  Motor: excellent strength throughout  Skin: clear of rashes or ecchymoses.             Assessment and Plan:   43 " year old male with neurogenic bladder.     Prefers coloplast. Discussed CIC prior to OR.   Then ectopic reservoir on left side due to incisions on the right and hardware on pubic symphysis and denervated right rectus.  Will do Cipro x 10 days starting 3 days prior to OR. (and 7 days after).  Will have him do CIC immediately prior to procedure to empty bladder.  He wants to go home on day of surgery.    Risks, benefits, alternatives discussed of IPP type. Include risks of infection, mechanical malfunction, distal erosion.    Alejandro Tong MD  February 27, 2019

## 2019-02-27 NOTE — PROGRESS NOTES
Name: Kinza Teresa    MRN: [id number]   YOB: 1975                 Chief Complaint:   ED          History of Present Illness:   Mr. Kinza Teresa is a 43 year old male seen in consultation from Dr. Jacome.  Spine fracture, pelvic fracture, jar, arm fracture, tbi  Tried pde5, vacuum and injections.  Injections, there is a partial erection, but not full erection.  They used the maximum dose of Edex - 40mcg.    No diabetes.  Had pelvic fracture.         Past Medical History:     Past Medical History:   Diagnosis Date     Constipation      Spinal cord injury at T7-T12 level with spinal cord lesion (H) 03/2017            Past Surgical History:   Pelvic fracture plating  Spine surgery           Social History:     Social History     Tobacco Use     Smoking status: Never Smoker     Smokeless tobacco: Never Used   Substance Use Topics     Alcohol use: No            Family History:     Family History   Problem Relation Age of Onset     Hypertension Mother      Hypertension Father               Allergies:   No Known Allergies         Medications:     Current Outpatient Medications   Medication Sig     alprostadil (EDEX) 40 MCG kit 40 mcg by Intracavitary route as needed for erectile dysfunction use no more than 3 times per week     bisacodyl (DULCOLAX) 10 MG Suppository Place 10 mg rectally     calcium carbonate (OSCAL 500) 1250 (500 CA) MG TABS tablet Take 600 mg by mouth With vitamin D     carvedilol (COREG) 6.25 MG tablet Take 6.25 mg by mouth 2 times daily (with meals)      medical cannabis (Patient's own supply.  Not a prescription) See Admin Instructions (This is NOT a prescription, and does not certify that the patient has a qualifying medical condition for medical cannabis.  The purpose of this order is  to document that the patient reports taking medical cannabis.)     [facility]. Devices (WHEEL CHAIR K1 BASIC DESK ARM) MISC Wheelchair:  Tilt and space  with leg rests  Length of need: 6 months      "oxybutynin ER (DITROPAN XL) 15 MG 24 hr tablet Take 1 tablet (15 mg) by mouth daily     oxybutynin ER (DITROPAN-XL) 10 MG 24 hr tablet Take 2 tablets (20 mg) by mouth daily     oxybutynin ER (DITROPAN-XL) 5 MG 24 hr tablet Take one (15 mg) tablet daily     polyethylene glycol (MIRALAX/GLYCOLAX) Packet Take 17 g by mouth     pregabalin (LYRICA) 50 MG capsule Take 50 mg by mouth Per patient the dosing is 100 mg in AM, 100 MG at noon and 50 at HS     Psyllium (METAMUCIL PO) Takes one capsule  every other day     senna (SENOKOT) 8.6 MG tablet Take 34.4 mg by mouth     traMADol (ULTRAM) 50 MG tablet Take  mg by mouth     No current facility-administered medications for this visit.              Review of Systems:    ROS: 14 point ROS neg other than the symptoms noted above in the HPI and PMH.          Physical Exam:   B/P: 127/69, T: Data Unavailable, P: 60, R: Data Unavailable  Estimated body mass index is 24.21 kg/m  as calculated from the following:    Height as of this encounter: 1.676 m (5' 6\").    Weight as of this encounter: 68 kg (150 lb).  General: age-appropriate appearing male in NAD. thin body habitus. wheelchair  HEENT: Head AT/NC, EOMI, CN Grossly intact  Resp: no respiratory distress, lung sounds clear.  CV: heart rate regular, S1, S2.  Lymph: No cervical, supraclavicular or axillary lymphadenopathy  Back: bony spine is non-tender, flanks are nontender  Abdomen: no obesity , soft, non-distended, non-tender. No organomegaly. Surgical scars include: lower right abdominal incision.   : testicles normal without atrophy or masses and penis normal without urethral discharge;   LE: no edema.   Neuro: grossly intact  Motor: excellent strength throughout  Skin: clear of rashes or ecchymoses.             Assessment and Plan:   43 year old male with neurogenic bladder secondary to spinal cord injury  Also has erectile dysfunction secondary to spinal cord injury.     Prefers coloplast. Discussed CIC prior to " OR.   Then ectopic reservoir on left side due to incisions on the right and hardware on pubic symphysis and denervated right rectus.  Will do Cipro x 10 days starting 3 days prior to OR. (and 7 days after).  Will have him do CIC immediately prior to procedure to empty bladder.  He wants to go home on day of surgery.    Risks, benefits, alternatives discussed of IPP type. Include risks of infection, mechanical malfunction, distal erosion.    Alejandro Tong MD  February 27, 2019

## 2019-02-28 NOTE — TELEPHONE ENCOUNTER
Spoke with the spouse and informed her that per the results note the patient could do either but that it might be best to come in for a face to face to discuss all options. Both the patient and his spouse were okay with this plan and a follow up was made.    Skylar Aguilar [facility] ~[place]

## 2019-03-18 ENCOUNTER — TELEPHONE (OUTPATIENT)
Dept: UROLOGY | Facility: CLINIC | Age: 44
End: 2019-03-18

## 2019-03-18 NOTE — TELEPHONE ENCOUNTER
Contacted Keshia who stated that patient is scheduled to get a epidural steroid injection 4 days prior to his uro surgery. Paged Dr. Tong, who stated that it should be at least 2 weeks apart as the injection will compromise immune system. Keshia notified and will reschedule the injection.  Ivette Parikh LPN

## 2019-03-18 NOTE — TELEPHONE ENCOUNTER
Health Call Center    Phone Message    May a detailed message be left on voicemail: yes    Reason for Call: Other: Keshia calling to speak with a nurse about pt's surgery on April 1st and the Epideral Steroid Injection he will be given. Keshia just wants to make sure that won't cause any problems/issues. Please give Keshia a call back to discuss.      Action Taken: Message routed to:  [facility] ([facility] Urology

## 2019-03-19 ENCOUNTER — OFFICE VISIT (OUTPATIENT)
Dept: SLEEP MEDICINE | Facility: CLINIC | Age: 44
End: 2019-03-19
Payer: COMMERCIAL

## 2019-03-19 VITALS
SYSTOLIC BLOOD PRESSURE: 113 MMHG | DIASTOLIC BLOOD PRESSURE: 66 MMHG | HEART RATE: 65 BPM | OXYGEN SATURATION: 99 % | HEIGHT: 66 IN | BODY MASS INDEX: 24.21 KG/M2 | RESPIRATION RATE: 18 BRPM

## 2019-03-19 DIAGNOSIS — G47.33 OSA (OBSTRUCTIVE SLEEP APNEA): Primary | ICD-10-CM

## 2019-03-19 PROCEDURE — 99214 OFFICE O/P EST MOD 30 MIN: CPT | Performed by: INTERNAL MEDICINE

## 2019-03-19 NOTE — PROGRESS NOTES
SLEEP CLINIC FOLLOW UP VISIT    Date of visit:3/19/19    Purpose of visit:review results of HST obtained on 12/12/18    HPI:Mr. Kinza Teresa is a 43-year-old male, who presents to Sleep Clinic today, accompanied by his wife, Keshia, to review results of HST obtained on 12/12/18.  His wife reports that he snores almost on a nightly basis and also reports observed apneas during sleep    HST RESULTS:    Analysis Time: 467.5 minutes      Respiration:   Sleep Associated Hypoxemia: sustained hypoxemia was present. Baseline oxygen saturation was 95.4 %.  Time with saturation less than or equal to 88% was 6.0 minutes. The lowest oxygen saturation was 72 %.   Snoring: Snoring was present(mild).  Respiratory events: The home study revealed a presence of 16 obstructive apneas and 4 mixed and central apneas. There were 27 hypopneas resulting in a combined apnea/hypopnea index [AHI] of 6.0 events per hour.  AHI was 6.0 per hour supine, - per hour prone, - per hour on left side, and - per hour on right side.   Pattern: Excluding events noted above, respiratory rate and pattern was Normal.     Position: Percent of time spent: supine -100 %, prone - 0%, on left - 0%, on right - 0%.     Heart Rate: By pulse oximetry normal rate was noted.      The test results were discussed with the patient in detail.    Current meds:  Current Outpatient Medications   Medication Sig Dispense Refill     bisacodyl (DULCOLAX) 10 MG Suppository Place 10 mg rectally       calcium carbonate (OSCAL 500) 1250 (500 CA) MG TABS tablet Take 600 mg by mouth With vitamin D       carvedilol (COREG) 6.25 MG tablet Take 6.25 mg by mouth 2 times daily (with meals)        medical cannabis (Patient's own supply.  Not a prescription) See Admin Instructions (This is NOT a prescription, and does not certify that the patient has a qualifying medical condition for medical cannabis.  The purpose of this order is  to document that the patient reports taking medical  "cannabis.)       Misc. Devices (WHEEL CHAIR K1 BASIC DESK ARM) MISC Wheelchair:  Tilt and space  with leg rests  Length of need: 6 months       oxybutynin ER (DITROPAN-XL) 5 MG 24 hr tablet Take one (15 mg) tablet daily 30 tablet 3     polyethylene glycol (MIRALAX/GLYCOLAX) Packet Take 17 g by mouth       pregabalin (LYRICA) 50 MG capsule Take 50 mg by mouth Per patient the dosing is 100 mg in AM, 100 MG at noon and 50 at HS       Psyllium (METAMUCIL PO) Takes one capsule  every other day       senna (SENOKOT) 8.6 MG tablet Take 34.4 mg by mouth       traMADol (ULTRAM) 50 MG tablet Take  mg by mouth       Past medical history, Past surgical history, Allergies, Social history, Family history: reviewed, per EMR    Exam:  /66   Pulse 65   Resp 18   Ht 1.676 m (5' 6\")   SpO2 99%   BMI 24.21 kg/m    General appearance: seated in his wheel chair,  in no apparent distress  Pt is dressed casually, cooperative with good eye contact.   Speech is spontaneous with regular rate and volume.   Mood: euthymic; affect congruent with full range and intensity.   Sensorium: awake, alert and oriented to person, place, time, and situation.      Assessment/plan   Mild obstructive sleep apnea(patient slept in supine position during the entire study). Mild Sleep associated hypoxemia was present.    We discussed the option of obtaining supervised polysomnography to re-evaluate the overall severity of the sleep related breathing disorder .vs.  option of empiric treatment using auto titrating CPAP device with pressure settings 5-15 cm water.  He was agreeable with the auto CPAP. Prescription was provided for auto CPAP.  He was recommended to use the device regularly during sleep and was instructed to get back to us if in case he encounters any interface problems.  He will be followed through sleep therapy management program.   Recommended obtaining an overnight oximetry with the auto CPAP to check for resolution of the " "hypoxemia.  Plan to obtain  the oximetry at least 2 weeks after he starts the CPAP treatment and both  patient and his wife Keshia would like the results of the overnight oximetry to be communicated with Keshia and it is okay to leave a voice message detail with the test results.    He will f/u at sleep clinic in 6-8 weeks after  starting the CPAP treatment     Patient was counseled on the importance of driving while alert, avoiding driving if drowsy or sleepy and to pull over if drowsy.      The above note was dictated using voice recognition software. Although reviewed after completion, some word and grammatical error may remain . Please contact the author for any clarifications.    \"I spent a total of 25 minutes face to face with Kinza Teresa during today's office visit. Over 50% of this time was spent counseling the patient and  coordinating care regarding sleep apnea, polysomnography, auto CPAP treatment.\"       Jagruti Bangura MD   of Medicine,  Division of Pulmonary/Sleep Medicine  [facility].      "

## 2019-03-20 ENCOUNTER — RECORDS - HEALTHEAST (OUTPATIENT)
Dept: LAB | Facility: CLINIC | Age: 44
End: 2019-03-20

## 2019-03-20 ENCOUNTER — TRANSFERRED RECORDS (OUTPATIENT)
Dept: HEALTH INFORMATION MANAGEMENT | Facility: CLINIC | Age: 44
End: 2019-03-20

## 2019-03-20 ENCOUNTER — TELEPHONE (OUTPATIENT)
Dept: UROLOGY | Facility: CLINIC | Age: 44
End: 2019-03-20

## 2019-03-20 LAB
ALBUMIN SERPL-MCNC: 4.1 G/DL (ref 3.5–5)
ALP SERPL-CCNC: 77 U/L (ref 45–120)
ALT SERPL W P-5'-P-CCNC: 12 U/L (ref 0–45)
ANION GAP SERPL CALCULATED.3IONS-SCNC: 8 MMOL/L (ref 5–18)
AST SERPL W P-5'-P-CCNC: 16 U/L (ref 0–40)
BILIRUB SERPL-MCNC: 0.3 MG/DL (ref 0–1)
BUN SERPL-MCNC: 14 MG/DL (ref 8–22)
CALCIUM SERPL-MCNC: 9.5 MG/DL (ref 8.5–10.5)
CHLORIDE BLD-SCNC: 106 MMOL/L (ref 98–107)
CO2 SERPL-SCNC: 28 MMOL/L (ref 22–31)
CREAT SERPL-MCNC: 0.74 MG/DL (ref 0.7–1.3)
GFR SERPL CREATININE-BSD FRML MDRD: >60 ML/MIN/1.73M2
GLUCOSE BLD-MCNC: 96 MG/DL (ref 70–125)
POTASSIUM BLD-SCNC: 4.5 MMOL/L (ref 3.5–5)
PROT SERPL-MCNC: 6.8 G/DL (ref 6–8)
SODIUM SERPL-SCNC: 142 MMOL/L (ref 136–145)

## 2019-03-20 NOTE — TELEPHONE ENCOUNTER
[facility] Call Center    Phone Message    May a detailed message be left on voicemail: yes    Reason for Call: Other: Keshia calling to clarify if and what type of antibiotic her  should be on prior to the surgery. Please call her back to discuss.     Action Taken: Message routed to:  [facility] ([facility] gadiel uro

## 2019-03-20 NOTE — TELEPHONE ENCOUNTER
Chong Tong,        Patient had pre op on 3/19/2019. He left his UA/UC. His wife was notified for the patient to take Cipro x 10 days starting 3 days prior to OR. (and 7 days after). She wants to know what if the UC is positive and it grows an infection that Cipro does not treat -should the patient still take the Cipro or another antibiotic.please advise    Thanks, Chiqui Jeffesr MA

## 2019-03-22 LAB — BACTERIA SPEC CULT: ABNORMAL

## 2019-03-22 NOTE — TELEPHONE ENCOUNTER
Chong Leblanc,      Patient is having surgery with Dr. Alejandro Tong on 4/1/2019. I have tried several times calling Keshia but unable to reach to discuss the plan if the patient UC is positive. Patient had labs done at an outside clinic. Can you please look out for the results.       Thanks, Chiqui Jeffers MA

## 2019-03-22 NOTE — TELEPHONE ENCOUNTER
Message left for Keshia to give me a call to discuss the patient's antibiotic plan.        Chiqui Jeffers MA

## 2019-03-22 NOTE — TELEPHONE ENCOUNTER
He performs CIC at baseline.   I recommended a preop and postop antibiotic to clear bacteria given prosthetic surgery.   So our plan was Cipro x 3 days prior to surgery, and Cipro x 7 days after surgery.   You're saying someone checked a urine culture? - good.   Are there results? I can't find them.   If it's positive, then they should really start taking Cipro a week prior to surgery.   If the urine culture is positive but to something resistant to Cipro, we should choose another antibiotic instead starting one week prior to surgery.   If it's negative, the 3 days of Cipro preop would be my plan.   -Jaspreet

## 2019-03-27 ENCOUNTER — TELEPHONE (OUTPATIENT)
Dept: UROLOGY | Facility: CLINIC | Age: 44
End: 2019-03-27

## 2019-03-27 ENCOUNTER — TELEPHONE (OUTPATIENT)
Dept: SLEEP MEDICINE | Facility: CLINIC | Age: 44
End: 2019-03-27

## 2019-03-27 NOTE — TELEPHONE ENCOUNTER
I called pt today at 1:04 pm but he will have to check with his wife and will call me back to schedule the appointment.

## 2019-03-27 NOTE — TELEPHONE ENCOUNTER
Surgery with Dr Tong on 4/1/19 @ [facility] OR was canceled due to insurance will not cover. Patient is aware per financial counselor Jacquelyn FITZPATRICK

## 2019-04-01 ENCOUNTER — TRANSFERRED RECORDS (OUTPATIENT)
Dept: SURGERY | Facility: CLINIC | Age: 44
End: 2019-04-01

## 2019-04-04 ENCOUNTER — DOCUMENTATION ONLY (OUTPATIENT)
Dept: SLEEP MEDICINE | Facility: CLINIC | Age: 44
End: 2019-04-04

## 2019-04-04 NOTE — PROGRESS NOTES
Patient was offered choice of vendor and chose [facility].  Patient Kinaz Teresa was set up at [facility] on April 4, 2019. Patient received a Resmed AirSense 10 Auto. Pressures were set at 5-15 cm H2O.   Patient s ramp is 5 cm H2O for Auto and FLEX/EPR is 2.  Patient received a Resmed Mask name: [id number]  Pillow mask size Small, heated tubing and heated humidifier.  Patient is enrolled in the STM Program and does NOT NEED TO MEET COMLIANCE  Kim Hayward

## 2019-04-08 ENCOUNTER — DOCUMENTATION ONLY (OUTPATIENT)
Dept: SLEEP MEDICINE | Facility: CLINIC | Age: 44
End: 2019-04-08

## 2019-04-08 NOTE — PROGRESS NOTES
3 DAY STM VISIT    Diagnostic AHI:  6.0 HST    Patient contacted for 3 day STM visit  Message left for patient to return call     Device type: Auto-CPAP  PAP settings from order::  CPAP min 5 cm  H20       CPAP max 15 cm  H20    recorded cm  H20  Mask type:    Nasal Pillows     Device settings from machine      Min CPAP 5.0            Max CPAP 15.0             Assessment: Nightly usage over four hours.  Action plan: Pt to have f/u 14 day [facility] visit.  Patient has a follow up visit scheduled:   not required by insurance.

## 2019-04-12 ENCOUNTER — TELEPHONE (OUTPATIENT)
Dept: SLEEP MEDICINE | Facility: CLINIC | Age: 44
End: 2019-04-12

## 2019-04-12 NOTE — TELEPHONE ENCOUNTER
Pt called and message left asking pt to call back to schedule his VENANCIO  as well as 7 week follow up for cpap compliance.    ABDIRIZAK Valerio

## 2019-04-17 ENCOUNTER — OFFICE VISIT (OUTPATIENT)
Dept: SLEEP MEDICINE | Facility: CLINIC | Age: 44
End: 2019-04-17
Payer: COMMERCIAL

## 2019-04-17 DIAGNOSIS — G47.33 OSA (OBSTRUCTIVE SLEEP APNEA): ICD-10-CM

## 2019-04-17 NOTE — PROGRESS NOTES
Patient instructed on VENANCIO use. Patient demonstrated and verbalized knowledge of use. Insurance was verified by financial securing. Device programmed. Device will be returned tomorrow before noon.    Hunter Melgar  Sleep Clinic Specialist - [place]

## 2019-04-18 ENCOUNTER — DOCUMENTATION ONLY (OUTPATIENT)
Dept: SLEEP MEDICINE | Facility: CLINIC | Age: 44
End: 2019-04-18
Payer: COMMERCIAL

## 2019-04-18 ENCOUNTER — DOCUMENTATION ONLY (OUTPATIENT)
Dept: SLEEP MEDICINE | Facility: CLINIC | Age: 44
End: 2019-04-18

## 2019-04-18 NOTE — Clinical Note
VENANCIO has been returned, downloaded and ready for review. Report has been placed in providers folder.H[facility]garWhite [facility] Specialist - [place]

## 2019-04-18 NOTE — PROGRESS NOTES
Overnight oximetry returned to clinic today 4/18/2019. The encounter was routed to the provider for review. A copy of these results have also been scanned.    Recording date: 4/17/2019 at 22:37:42    Duration: 07:21:12    Hunter Melgar  Sleep Clinic Specialist - [place]

## 2019-04-22 ENCOUNTER — DOCUMENTATION ONLY (OUTPATIENT)
Dept: SLEEP MEDICINE | Facility: CLINIC | Age: 44
End: 2019-04-22

## 2019-04-22 NOTE — PROGRESS NOTES
14  DAY STM VISIT    Diagnostic AHI:   6.0 HST    Subjective measures:   Pt feels things are working fine, however his struggling to find a good mask fit.  He is having some soreness. He is feeling benefit from therapy.      Assessment: Pt meeting objective benchmarks.  Patient meeting subjective benchmarks.       Action plan: pt to have 30 day STM visit.   Pt will try using a barrier between mask and face for comfort     Device type: Auto-CPAP    PAP settings: CPAP min 5.0 cm  H20       CPAP max 15.0 cm  H20           95th% pressure 12.3 cm  H20     Mask type:  Nasal Pillows    Objective measures: 14 day rolling measures      Compliance  85 %      Leak  11.82 lpm  last  upload      AHI 2.62   last  upload      Average number of minutes 384      Objective measure goal  Compliance   Goal >70%  Leak   Goal < 24 lpm  AHI  Goal < 5  Usage  Goal >240

## 2019-05-06 ENCOUNTER — DOCUMENTATION ONLY (OUTPATIENT)
Dept: SLEEP MEDICINE | Facility: CLINIC | Age: 44
End: 2019-05-06

## 2019-05-06 NOTE — PROGRESS NOTES
30 DAY STM VISIT    Diagnostic AHI:   6.0 HST    Subjective measures:   Pt states things are going well and has no issues or complaints.  Pt is benefiting from therapy.      Assessment: Pt meeting objective benchmarks.  Patient meeting subjective benchmarks.   Action plan: pt to have 6 month STM visit  Patient has scheduled a follow up visit with Dr. Banugra on 6/18/2019.   Device type: Auto-CPAP  PAP settings: CPAP min 5.0 cm  H20     CPAP max 15.0 cm  H20        95th% pressure 11.6 cm  H20   Mask type:  Nasal Pillows  Objective measures: 14 day rolling measures      Compliance  92 %      Leak  21.2 lpm  last  upload      AHI 3.57   last  upload      Average number of minutes 392      Objective measure goal  Compliance   Goal >70%  Leak   Goal < 24 lpm  AHI  Goal < 5  Usage  Goal >240

## 2019-05-17 NOTE — PROCEDURES
Overnight oximetry results    Overnight oximetry was done on 4/17/2019 with  Auto CPAP settings at 515 cms of water. (valid recording time: 7 hours and 21 minutes)  The oximetry was essentially normal. Baseline oxygen saturation was 96.1 %, lowest oxygen saturation was 87%(the reported value of 76% appears to be artifactual). Time  with oxygen saturations below 88% was 1.0 minutes.   Based on the oximetry,  the sleep associated hypoxemia which was noted during home sleep study(12/2018) resolved with CPAP treatment.   Recommend patient to continue using the CPAP regularly during sleep and get regular equipment replacement.   F/U on yearly basis or sooner  if any concerns.    Jagruti Bangura MD    of Medicine,   Division of Pulmonary/Sleep Medicine   [facility]

## 2019-06-04 ENCOUNTER — PRE VISIT (OUTPATIENT)
Dept: UROLOGY | Facility: CLINIC | Age: 44
End: 2019-06-04

## 2019-06-04 NOTE — TELEPHONE ENCOUNTER
Chief Complaint : 3 month check to discuss UTI management  Records/Orders: na    Pt Contacted: no    At Rooming: normal

## 2019-06-11 ENCOUNTER — OFFICE VISIT (OUTPATIENT)
Dept: UROLOGY | Facility: CLINIC | Age: 44
End: 2019-06-11
Payer: COMMERCIAL

## 2019-06-11 VITALS
BODY MASS INDEX: 22.5 KG/M2 | HEIGHT: 66 IN | SYSTOLIC BLOOD PRESSURE: 122 MMHG | WEIGHT: 140 LBS | HEART RATE: 51 BPM | DIASTOLIC BLOOD PRESSURE: 72 MMHG

## 2019-06-11 DIAGNOSIS — N31.9 NEUROGENIC BLADDER: Primary | ICD-10-CM

## 2019-06-11 DIAGNOSIS — R33.9 URINARY RETENTION: ICD-10-CM

## 2019-06-11 ASSESSMENT — PAIN SCALES - GENERAL: PAINLEVEL: NO PAIN (0)

## 2019-06-11 ASSESSMENT — PATIENT HEALTH QUESTIONNAIRE - PHQ9
10. IF YOU CHECKED OFF ANY PROBLEMS, HOW DIFFICULT HAVE THESE PROBLEMS MADE IT FOR YOU TO DO YOUR WORK, TAKE CARE OF THINGS AT HOME, OR GET ALONG WITH OTHER PEOPLE: NOT DIFFICULT AT ALL
SUM OF ALL RESPONSES TO PHQ QUESTIONS 1-9: 3
SUM OF ALL RESPONSES TO PHQ QUESTIONS 1-9: 3

## 2019-06-11 ASSESSMENT — MIFFLIN-ST. JEOR: SCORE: 1457.79

## 2019-06-11 NOTE — PATIENT INSTRUCTIONS
UROLOGY CLINIC VISIT PATIENT INSTRUCTIONS    1) Cancel the appointment with Regi Richardson PA-C on July 16 (not needed).    2) Reschedule the kidney ultrasound and labs on July 16 to sometime in October (we will contact you with results).    3) We will plan to see you back in the office in October 2020 with another yearly kidney ultrasound and labs.    If you have any issues, questions or concerns in the meantime, do not hesitate to contact us at [phone] or via [employer].     It was a pleasure meeting with you today.  Thank you for allowing me and my team the privilege of caring for you today.  YOU are the reason we are here, and I truly hope we provided you with the excellent service you deserve.  Please let us know if there is anything else we can do for you so that we can be sure you are leaving completely satisfied with your care experience.

## 2019-06-11 NOTE — NURSING NOTE
Chief Complaint   Patient presents with     RECHECK     3 month check to discuss UTI management       Navya Holley MA
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
## 2019-06-12 ASSESSMENT — PATIENT HEALTH QUESTIONNAIRE - PHQ9: SUM OF ALL RESPONSES TO PHQ QUESTIONS 1-9: 3

## 2019-06-18 ENCOUNTER — TELEPHONE (OUTPATIENT)
Dept: UROLOGY | Facility: CLINIC | Age: 44
End: 2019-06-18

## 2019-06-18 ENCOUNTER — OFFICE VISIT (OUTPATIENT)
Dept: SLEEP MEDICINE | Facility: CLINIC | Age: 44
End: 2019-06-18
Payer: COMMERCIAL

## 2019-06-18 VITALS
BODY MASS INDEX: 22.5 KG/M2 | OXYGEN SATURATION: 98 % | HEIGHT: 66 IN | DIASTOLIC BLOOD PRESSURE: 68 MMHG | WEIGHT: 140 LBS | HEART RATE: 46 BPM | SYSTOLIC BLOOD PRESSURE: 106 MMHG | RESPIRATION RATE: 16 BRPM

## 2019-06-18 DIAGNOSIS — N39.0 URINARY TRACT INFECTION WITHOUT HEMATURIA, SITE UNSPECIFIED: Primary | ICD-10-CM

## 2019-06-18 DIAGNOSIS — N39.0 URINARY TRACT INFECTION WITHOUT HEMATURIA, SITE UNSPECIFIED: ICD-10-CM

## 2019-06-18 DIAGNOSIS — G47.33 OSA ON CPAP: Primary | ICD-10-CM

## 2019-06-18 LAB
ALBUMIN UR-MCNC: NEGATIVE MG/DL
APPEARANCE UR: ABNORMAL
BACTERIA #/AREA URNS HPF: ABNORMAL /HPF
BILIRUB UR QL STRIP: NEGATIVE
COLOR UR AUTO: ABNORMAL
GLUCOSE UR STRIP-MCNC: NEGATIVE MG/DL
HGB UR QL STRIP: NEGATIVE
KETONES UR STRIP-MCNC: NEGATIVE MG/DL
LEUKOCYTE ESTERASE UR QL STRIP: ABNORMAL
NITRATE UR QL: NEGATIVE
PH UR STRIP: 6 PH (ref 5–7)
RBC #/AREA URNS AUTO: 2 /HPF (ref 0–2)
SOURCE: ABNORMAL
SP GR UR STRIP: 1.01 (ref 1–1.03)
UROBILINOGEN UR STRIP-MCNC: NORMAL MG/DL (ref 0–2)
WBC #/AREA URNS AUTO: 140 /HPF (ref 0–5)

## 2019-06-18 PROCEDURE — 81001 URINALYSIS AUTO W/SCOPE: CPT | Performed by: UROLOGY

## 2019-06-18 PROCEDURE — 99214 OFFICE O/P EST MOD 30 MIN: CPT | Performed by: INTERNAL MEDICINE

## 2019-06-18 PROCEDURE — 87088 URINE BACTERIA CULTURE: CPT | Performed by: UROLOGY

## 2019-06-18 PROCEDURE — 87086 URINE CULTURE/COLONY COUNT: CPT | Performed by: UROLOGY

## 2019-06-18 PROCEDURE — 87186 SC STD MICRODIL/AGAR DIL: CPT | Performed by: UROLOGY

## 2019-06-18 ASSESSMENT — MIFFLIN-ST. JEOR: SCORE: 1457.79
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
## 2019-06-18 NOTE — PROGRESS NOTES
SLEEP CLINIC FOLLOW UP VISIT     Date of visit:6/18/19     Purpose of visit: Follow-up of obstructive sleep apnea/review CPAP compliance    HPI:Mr. Kinza Teresa is a 44-year-old male, who presents to Sleep Clinic today, accompanied by his wife, Keshia,  for follow-up of JENNIFER and to review the CPAP compliance.     HST obtained in December 2018 showed Combined apnea/hypopnea index [AHI] of 6.0 events per hour and sleep associated hypoxemia. Baseline oxygen saturation was 95.4 %.  Time with saturation less than or equal to 88% was 6.0 minutes. The lowest oxygen saturation was 72 %.    He is being treated with auto titrating CPAP device with pressure settings between 5-15 cmH2O.  He reports using the CPAP regularly during sleep. His bedtime is variable between 10 PM to 12 midnight and he wakes up between 4 to 4:30 AM for bowel prep.  Sometimes he falls back asleep after the bowel prep for a couple hours and does not usually use CPAP during that time frame. He has been using  nasal pillow mask and has gotten used to it with time.  With the CPAP use there have not been any reports of snoring.  He denies awakenings due to gasping for air or choking with the CPAP. He denies air hunger and feels comfortable with the current pressure settings.  He reports improvement in sleep quality, waking up comparatively more refreshed and  improvement in his energy levels with the CPAP treatment.  He denies drowsiness while driving.    Downloadable compliance data for the past 1 month was reviewed.  He used the device for 29 out of 30 days with an average use of 5 hours and 29 minutes on the days used.95th percentile on leak was 18.6 L/min. Residual AHI was 3.2 per hour. Median pressure settings: 7.8; 95th percentile : 12.3 and max pressure: 13.4 cms of water    Overnight oximetry obtained  on 4/17/2019 with  Auto CPAP settings at 5- 15 cms of water. (valid recording time: 7 hours and 21 minutes)  The oximetry was essentially normal.  "Baseline oxygen saturation was 96.1 %, lowest oxygen saturation was 87%(the reported value of 76% appears to be artifactual). Time  with oxygen saturations below 88% was 1.0 minutes.     Current meds:  Current Outpatient Medications   Medication Sig Dispense Refill     bisacodyl (DULCOLAX) 10 MG Suppository Place 10 mg rectally       calcium carbonate (OSCAL 500) 1250 (500 CA) MG TABS tablet Take 600 mg by mouth With vitamin D       carvedilol (COREG) 6.25 MG tablet Take 6.25 mg by mouth 2 times daily (with meals)        CRANBERRY EXTRACT PO Takes 1000 mg       medical cannabis (Patient's own supply.  Not a prescription) See Admin Instructions (This is NOT a prescription, and does not certify that the patient has a qualifying medical condition for medical cannabis.  The purpose of this order is  to document that the patient reports taking medical cannabis.)       Misc. Devices (WHEEL CHAIR K1 BASIC DESK ARM) MISC Wheelchair:  Tilt and space  with leg rests  Length of need: 6 months       omeprazole 20 MG tablet Take 20 mg by mouth       oxybutynin ER (DITROPAN-XL) 5 MG 24 hr tablet Take one (15 mg) tablet daily 30 tablet 3     polyethylene glycol (MIRALAX/GLYCOLAX) Packet Take 17 g by mouth       pregabalin (LYRICA) 50 MG capsule Take 50 mg by mouth Per patient the dosing is 100 mg in AM, 100 MG at noon and 50 at HS       Psyllium (METAMUCIL PO) Takes one capsule  every other day       senna (SENOKOT) 8.6 MG tablet Take 34.4 mg by mouth       traMADol (ULTRAM) 50 MG tablet Take  mg by mouth            Past medical history, Past surgical history, Allergies, Social history, Family history: reviewed, per EMR     Exam:  /66   Pulse 65   Resp 18   Ht 1.676 m (5' 6\")   SpO2 99%   BMI 24.21 kg/m    General appearance: seated in his wheel chair,  in no apparent distress  Pt is dressed casually, cooperative with good eye contact.   Speech is spontaneous with regular rate and volume.   Mood: euthymic; affect " "congruent with full range and intensity.   Sensorium: awake, alert and oriented to person, place, time, and situation.        Assessment/plan:   Mild obstructive sleep apnea: Patient reports good compliance with CPAP device and positive benefits with the device use.  Based on the most recent overnight oximetry, the sleep associated hypoxemia which was noted during home sleep study(12/2018) resolved with CPAP treatment.  Based on the compliance download JENNIFER is well controlled with the CPAP at the current pressure settings.  Since he reports good tolerance to the current pressure settings no changes were made with the pressure settings today.   Recommend patient to continue using the CPAP regularly during sleep, and advised him to use the device during the entire sleep duration to derive maximum benefit and get regular equipment replacement.    Recommended increasing the total sleep duration by advancing his bedtime between 8:30-9 PM.  We discussed optimizing sleep hygiene measures, minimizing exposure to bright light a couple hours before the desired bedtime and avoiding mind stimulating activities before bed.     Encouraged following healthy diet.    Patient was strongly advised to avoid driving, operating any heavy machinery or other hazardous situations while drowsy or sleepy.  Patient was counseled on the importance of driving while alert, to pull over if drowsy, or nap before getting into the vehicle if sleepy.     He will f/u at sleep clinic in 1 year or sooner if any concerns.     The above note was dictated using voice recognition software. Although reviewed after completion, some word and grammatical error may remain. Please contact the author for any clarifications.     \"I spent a total of 25 minutes face to face with Kinza Teresa during today's office visit. Over 50% of this time was spent counseling the patient and  coordinating care regarding sleep apnea, polysomnography, auto CPAP treatment.\" "       Jagruti Bangura MD   of Medicine,  Division of Pulmonary/Sleep Medicine  [facility].

## 2019-06-18 NOTE — PATIENT INSTRUCTIONS
Your BMI is Body mass index is 22.6 kg/m .  Weight management is a personal decision.  If you are interested in exploring weight loss strategies, the following discussion covers the approaches that may be successful. Body mass index (BMI) is one way to tell whether you are at a healthy weight, overweight, or obese. It measures your weight in relation to your height.  A BMI of 18.5 to 24.9 is in the healthy range. A person with a BMI of 25 to 29.9 is considered overweight, and someone with a BMI of 30 or greater is considered obese. More than two-thirds of American adults are considered overweight or obese.  Being overweight or obese increases the risk for further weight gain. Excess weight may lead to heart disease and diabetes.  Creating and following plans for healthy eating and physical activity may help you improve your health.  Weight control is part of healthy lifestyle and includes exercise, emotional health, and healthy eating habits. Careful eating habits lifelong are the mainstay of weight control. Though there are significant health benefits from weight loss, long-term weight loss with diet alone may be very difficult to achieve- studies show long-term success with dietary management in less than 10% of people. Attaining a healthy weight may be especially difficult to achieve in those with severe obesity. In some cases, medications, devices and surgical management might be considered.  What can you do?  If you are overweight or obese and are interested in methods for weight loss, you should discuss this with your provider.     Consider reducing daily calorie intake by 500 calories.     Keep a food journal.     Avoiding skipping meals, consider cutting portions instead.    Diet combined with exercise helps maintain muscle while optimizing fat loss. Strength training is particularly important for building and maintaining muscle mass. Exercise helps reduce stress, increase energy, and improves fitness.  Increasing exercise without diet control, however, may not burn enough calories to loose weight.       Start walking three days a week 10-20 minutes at a time    Work towards walking thirty minutes five days a week     Eventually, increase the speed of your walking for 1-2 minutes at time    In addition, we recommend that you review healthy lifestyles and methods for weight loss available through the National Institutes of Health patient information sites:  http://win.niddk.nih.gov/publications/index.htm    And look into health and wellness programs that may be available through your health insurance provider, employer, local community center, or shilo club.

## 2019-06-19 LAB
BACTERIA SPEC CULT: ABNORMAL
Lab: ABNORMAL
SPECIMEN SOURCE: ABNORMAL

## 2019-06-20 RX ORDER — CIPROFLOXACIN 500 MG/1
500 TABLET, FILM COATED ORAL 2 TIMES DAILY
Qty: 10 TABLET | Refills: 0 | Status: SHIPPED | OUTPATIENT
Start: 2019-06-20 | End: 2019-11-12

## 2019-06-20 NOTE — TELEPHONE ENCOUNTER
Keshia notified that the patient UC is positive. His treament plan is Cipro 500 mg take one tablet BID for 5 days. Keshia agreed with the plan for the patient. Rx sent to [facility] in [place].        Chiqui Jeffers MA

## 2019-07-29 DIAGNOSIS — N31.9 NEUROGENIC BLADDER: ICD-10-CM

## 2019-07-29 RX ORDER — OXYBUTYNIN CHLORIDE 15 MG/1
15 TABLET, EXTENDED RELEASE ORAL DAILY
Qty: 90 TABLET | Refills: 3 | Status: SHIPPED | OUTPATIENT
Start: 2019-07-29 | End: 2020-08-05

## 2019-07-29 NOTE — TELEPHONE ENCOUNTER
Last Clinic Visit: 6/11/2019  [facility]

## 2019-08-07 NOTE — TELEPHONE ENCOUNTER
[facility] Call Center    Phone Message    May a detailed message be left on voicemail: yes    Reason for Call: Other: pt's wife calling to get a time and date set up for her  surgery now that the insurance is fixed. Please call pt's wife back to discuss.     Action Taken: Message routed to:  [facility] urology

## 2019-08-07 NOTE — TELEPHONE ENCOUNTER
Spoke to wife, explained that Dr. Tong is currently on PAIGE and we will call patient to schedule surgery once we know when Dr. Tong will return.  Patient doesn't want the surgery until December.  Patients wife states understanding.    Yadira Jalloh RN, BSN  Care Coordinator- Reconstructive Urology

## 2019-08-13 ENCOUNTER — TELEPHONE (OUTPATIENT)
Dept: UROLOGY | Facility: CLINIC | Age: 44
End: 2019-08-13

## 2019-08-13 DIAGNOSIS — N39.0 URINARY TRACT INFECTION WITHOUT HEMATURIA, SITE UNSPECIFIED: Primary | ICD-10-CM

## 2019-08-13 NOTE — TELEPHONE ENCOUNTER
[facility] Call Center    Phone Message    May a detailed message be left on voicemail: yes    Reason for Call: Other: pt's wife calling because her  is showing symptoms of a UTI. She would like a order placed for a Urine cultrue. Please call pt's wife to discuss.     Action Taken: Message routed to:  [facility] urology

## 2019-08-14 DIAGNOSIS — N39.0 URINARY TRACT INFECTION WITHOUT HEMATURIA, SITE UNSPECIFIED: ICD-10-CM

## 2019-08-14 LAB
ALBUMIN UR-MCNC: NEGATIVE MG/DL
APPEARANCE UR: ABNORMAL
BACTERIA #/AREA URNS HPF: ABNORMAL /HPF
BILIRUB UR QL STRIP: NEGATIVE
COLOR UR AUTO: YELLOW
GLUCOSE UR STRIP-MCNC: NEGATIVE MG/DL
HGB UR QL STRIP: ABNORMAL
KETONES UR STRIP-MCNC: NEGATIVE MG/DL
LEUKOCYTE ESTERASE UR QL STRIP: ABNORMAL
NITRATE UR QL: POSITIVE
PH UR STRIP: 7 PH (ref 5–7)
RBC #/AREA URNS AUTO: ABNORMAL /HPF
SOURCE: ABNORMAL
SP GR UR STRIP: 1.01 (ref 1–1.03)
UROBILINOGEN UR STRIP-ACNC: 0.2 EU/DL (ref 0.2–1)
WBC #/AREA URNS AUTO: >100 /HPF

## 2019-08-14 PROCEDURE — 87086 URINE CULTURE/COLONY COUNT: CPT | Performed by: UROLOGY

## 2019-08-14 PROCEDURE — 87088 URINE BACTERIA CULTURE: CPT | Performed by: INTERNAL MEDICINE

## 2019-08-14 PROCEDURE — 81001 URINALYSIS AUTO W/SCOPE: CPT | Performed by: UROLOGY

## 2019-08-14 PROCEDURE — 87186 SC STD MICRODIL/AGAR DIL: CPT | Performed by: INTERNAL MEDICINE

## 2019-08-14 NOTE — TELEPHONE ENCOUNTER
Keshia-patient's wife states that the patient is having increased back pain and frequent urinary accidents in between. Keshia was notified that I placed UA/UC orders in the patient's chart. She states that the patient would stop by a [facility] to leave a urine specimen.      Chiqui Jeffers MA

## 2019-08-16 NOTE — TELEPHONE ENCOUNTER
Message left on Keshia(patient's wife) voicemail stating that the patient's UC is still pending. She can call [phone] and ask for the on call Urologist resident to be paged over the weekend to discuss the treatment plan for the patient.      Chiqui Jeffers mA

## 2019-08-17 LAB
BACTERIA SPEC CULT: ABNORMAL
BACTERIA SPEC CULT: ABNORMAL
SPECIMEN SOURCE: ABNORMAL

## 2019-08-18 ENCOUNTER — TELEPHONE (OUTPATIENT)
Dept: SURGERY | Facility: CLINIC | Age: 44
End: 2019-08-18

## 2019-08-18 DIAGNOSIS — N39.0 URINARY TRACT INFECTION WITHOUT HEMATURIA, SITE UNSPECIFIED: Primary | ICD-10-CM

## 2019-08-18 RX ORDER — SULFAMETHOXAZOLE/TRIMETHOPRIM 800-160 MG
1 TABLET ORAL 2 TIMES DAILY
Qty: 14 TABLET | Refills: 0 | Status: SHIPPED | OUTPATIENT
Start: 2019-08-18 | End: 2019-11-12

## 2019-08-18 NOTE — TELEPHONE ENCOUNTER
Telephone Encounter    Date: 9:18 AM; 8/18/2019  Patient Name: Kinza Teresa  MRN: [id number]    Patient's wife called for UC results. He is still having symptoms. Both bacteria are sensitive to Bactrim.     Plan:  - Bactrim BID for 7 days  - Call if no improvement    --    George Acosta MD  Urology Resident

## 2019-08-20 ENCOUNTER — DOCUMENTATION ONLY (OUTPATIENT)
Dept: LAB | Facility: CLINIC | Age: 44
End: 2019-08-20

## 2019-08-20 DIAGNOSIS — S24.109S INJURY OF THORACIC SPINAL CORD, SEQUELA (H): Primary | ICD-10-CM

## 2019-09-25 DIAGNOSIS — S24.109S INJURY OF THORACIC SPINAL CORD, SEQUELA (H): ICD-10-CM

## 2019-09-25 PROCEDURE — 36415 COLL VENOUS BLD VENIPUNCTURE: CPT | Performed by: PHYSICAL MEDICINE & REHABILITATION

## 2019-09-25 PROCEDURE — 82306 VITAMIN D 25 HYDROXY: CPT | Performed by: PHYSICAL MEDICINE & REHABILITATION

## 2019-09-26 LAB — DEPRECATED CALCIDIOL+CALCIFEROL SERPL-MC: 27 UG/L (ref 20–75)

## 2019-09-27 ENCOUNTER — TELEPHONE (OUTPATIENT)
Dept: UROLOGY | Facility: CLINIC | Age: 44
End: 2019-09-27

## 2019-09-27 NOTE — TELEPHONE ENCOUNTER
Called to schedule surgery with Dr Tong. Left voice mail for patient to call back at [phone].

## 2019-09-30 ENCOUNTER — TELEPHONE (OUTPATIENT)
Dept: UROLOGY | Facility: CLINIC | Age: 44
End: 2019-09-30

## 2019-09-30 ENCOUNTER — PREP FOR PROCEDURE (OUTPATIENT)
Dept: UROLOGY | Facility: CLINIC | Age: 44
End: 2019-09-30

## 2019-09-30 DIAGNOSIS — N52.1 ERECTILE DYSFUNCTION DUE TO DISEASES CLASSIFIED ELSEWHERE: Primary | ICD-10-CM

## 2019-09-30 NOTE — TELEPHONE ENCOUNTER
Patient is scheduled for surgery with Dr. Tong      Spoke or left message with: Keshia    Date of Surgery: 12/23/19    Location: [facility] OR    Informed patient they will need an adult  yes    Pre-op with surgeon (if applicable): n/a    H&P: Scheduled with pcp    Additional imaging/appointments: n/a    Surgery packet: mailed 9/30/19     Additional comments: n/a

## 2019-10-02 ENCOUNTER — DOCUMENTATION ONLY (OUTPATIENT)
Dept: SLEEP MEDICINE | Facility: CLINIC | Age: 44
End: 2019-10-02

## 2019-10-02 NOTE — PROGRESS NOTES
6 month STM    STM Recheck Visit     Diagnostic AHI:   6.0  HST    Data only recheck     Assessment: Pt meeting objective benchmarks.     Action plan:   pt to follow up per provider request       Device type: Auto-CPAP  PAP settings: CPAP min 5.0 cm  H20     CPAP max 15.0 cm  H20        95th% pressure 13.4 cm  H20   Objective measures: 14 day rolling measures      Compliance  100 %      Leak  18.48 lpm  last  upload      AHI 2.89   last  upload      Average number of minutes 392      Objective measure goal  Compliance   Goal >70%  Leak   Goal < 24 lpm  AHI  Goal < 5  Usage  Goal >240

## 2019-10-09 ENCOUNTER — TELEPHONE (OUTPATIENT)
Dept: UROLOGY | Facility: CLINIC | Age: 44
End: 2019-10-09

## 2019-10-09 NOTE — TELEPHONE ENCOUNTER
Called to reschedule surgery with Dr Tong from 12/23/19 to 11/14/19 @ [facility]. Patient confirm update.

## 2019-10-14 ENCOUNTER — TELEPHONE (OUTPATIENT)
Dept: UROLOGY | Facility: CLINIC | Age: 44
End: 2019-10-14

## 2019-10-14 DIAGNOSIS — N39.0 URINARY TRACT INFECTION: ICD-10-CM

## 2019-10-14 DIAGNOSIS — R32 URINARY INCONTINENCE, UNSPECIFIED TYPE: ICD-10-CM

## 2019-10-14 DIAGNOSIS — N39.0 URINARY TRACT INFECTION WITHOUT HEMATURIA, SITE UNSPECIFIED: ICD-10-CM

## 2019-10-14 DIAGNOSIS — N31.9 NEUROGENIC BLADDER: Primary | ICD-10-CM

## 2019-10-16 DIAGNOSIS — N39.0 URINARY TRACT INFECTION, SITE NOT SPECIFIED: ICD-10-CM

## 2019-10-16 DIAGNOSIS — N39.0 URINARY TRACT INFECTION: ICD-10-CM

## 2019-10-16 DIAGNOSIS — R32 UNSPECIFIED URINARY INCONTINENCE: ICD-10-CM

## 2019-10-16 DIAGNOSIS — N31.9 NEUROGENIC DYSFUNCTION OF THE URINARY BLADDER: Primary | ICD-10-CM

## 2019-10-16 DIAGNOSIS — N31.9 NEUROGENIC BLADDER: ICD-10-CM

## 2019-10-16 DIAGNOSIS — R32 URINARY INCONTINENCE, UNSPECIFIED TYPE: ICD-10-CM

## 2019-10-16 LAB
ALBUMIN UR-MCNC: NEGATIVE MG/DL
APPEARANCE UR: CLEAR
BACTERIA #/AREA URNS HPF: ABNORMAL /HPF
BILIRUB UR QL STRIP: NEGATIVE
COLOR UR AUTO: YELLOW
GLUCOSE UR STRIP-MCNC: NEGATIVE MG/DL
HGB UR QL STRIP: ABNORMAL
KETONES UR STRIP-MCNC: NEGATIVE MG/DL
LEUKOCYTE ESTERASE UR QL STRIP: ABNORMAL
NITRATE UR QL: POSITIVE
PH UR STRIP: 5 PH (ref 5–7)
RBC #/AREA URNS AUTO: ABNORMAL /HPF
SOURCE: ABNORMAL
SP GR UR STRIP: 1.01 (ref 1–1.03)
UROBILINOGEN UR STRIP-ACNC: 0.2 EU/DL (ref 0.2–1)
WBC #/AREA URNS AUTO: >100 /HPF

## 2019-10-16 PROCEDURE — 87086 URINE CULTURE/COLONY COUNT: CPT | Performed by: UROLOGY

## 2019-10-16 PROCEDURE — 87088 URINE BACTERIA CULTURE: CPT | Performed by: FAMILY MEDICINE

## 2019-10-16 PROCEDURE — 87186 SC STD MICRODIL/AGAR DIL: CPT | Performed by: INTERNAL MEDICINE

## 2019-10-16 PROCEDURE — 81001 URINALYSIS AUTO W/SCOPE: CPT | Performed by: UROLOGY

## 2019-10-18 ENCOUNTER — TELEPHONE (OUTPATIENT)
Dept: UROLOGY | Facility: CLINIC | Age: 44
End: 2019-10-18

## 2019-10-18 LAB
BACTERIA SPEC CULT: ABNORMAL
BACTERIA SPEC CULT: ABNORMAL
SPECIMEN SOURCE: ABNORMAL

## 2019-10-18 NOTE — TELEPHONE ENCOUNTER
Ordered fosfomycin 3G doses to be taken today skip tomorrow Sunday skip Monday and last dose Tuesday.  Patient called several times to be notified of infection and that I sent it to the pharmacy in his chart  But had to leave a message each time . Dasia Guzman LPN Staff Nurse

## 2019-11-01 ENCOUNTER — RECORDS - HEALTHEAST (OUTPATIENT)
Dept: LAB | Facility: CLINIC | Age: 44
End: 2019-11-01

## 2019-11-02 LAB — BACTERIA SPEC CULT: NO GROWTH

## 2019-11-05 PROBLEM — G89.29 CHRONIC RIGHT-SIDED LOW BACK PAIN WITHOUT SCIATICA: Status: RESOLVED | Noted: 2019-02-06 | Resolved: 2019-11-05

## 2019-11-05 PROBLEM — M54.50 CHRONIC RIGHT-SIDED LOW BACK PAIN WITHOUT SCIATICA: Status: RESOLVED | Noted: 2019-02-06 | Resolved: 2019-11-05

## 2019-11-05 NOTE — PROGRESS NOTES
Discharge Note    Progress reporting period is from initial eval to Feb 13, 2019.     Kinza failed to return for next follow up visit and current status is unknown.  Please see information below for last relevant information on current status.  Patient seen for Rxs Used: 2 visits.  SUBJECTIVE  Subjective changes noted by patient:  Subjective: Patient completes HEP approximately 4-5 x/day with 10-15 reps. Pain level has been around 5/10 lately. He reports spending around 9-10 hours seated in his W/C at a time throughout the day and still has the most pain relief from laying on his back. Pt did not have a chance to try his TENs unit at home. Pain improves after performing HEP short term.   .  Current pain level is Current Pain level: 5/10.     Previous pain level was  Initial Pain level: 5/10.   Changes in function:  Yes (See Goal flowsheet attached for changes in current functional level)  Adverse reaction to treatment or activity: None    OBJECTIVE  Changes noted in objective findings: Objective: ROM approximately 45 degrees rotation bilaterally with pain when rotatin to the R. Thor Ext approx 10 deg with no pain.      ASSESSMENT/PLAN  Diagnosis: Rt Thoracic/LBP   DIAGP:  The encounter diagnosis was Chronic right-sided low back pain without sciatica.  Updated problem list and treatment plan:   Pain - HEP  Decreased ROM/flexibility - HEP  Decreased function - HEP  Decreased strength - HEP  Impaired posture - HEP  STG/LTGs have been met or progress has been made towards goals:  Yes, please see goal flowsheet for most current information  Assessment of Progress: current status is unknown.  Last current status:     Self Management Plans:  HEP  I have re-evaluated this patient and find that the nature, scope, duration and intensity of the therapy is appropriate for the medical condition of the patient.  Kinza continues to require the following intervention to meet STG and LTG's:  HEP.    Recommendations:  Discharge with  current home program.  Patient to follow up with MD as needed.    Please refer to the daily flowsheet for treatment today, total treatment time and time spent performing 1:1 timed codes.

## 2019-11-07 ENCOUNTER — TELEPHONE (OUTPATIENT)
Dept: UROLOGY | Facility: CLINIC | Age: 44
End: 2019-11-07

## 2019-11-07 NOTE — TELEPHONE ENCOUNTER
Spoke to patients wife Keshia and answered all of her questions.    Yadira Jalloh, RN, BSN  Care Coordinator- Reconstructive Urology

## 2019-11-07 NOTE — TELEPHONE ENCOUNTER
[facility] Call Center    Phone Message    May a detailed message be left on voicemail: yes    Reason for Call: Other: pts wife leonel is calling wondering about time of surgery, as well as pt had a urine test, he might have a urine infection, please call leonel back, thanks     Action Taken: Message routed to:  [facility] urology

## 2019-11-08 ENCOUNTER — DOCUMENTATION ONLY (OUTPATIENT)
Dept: UROLOGY | Facility: CLINIC | Age: 44
End: 2019-11-08

## 2019-11-08 DIAGNOSIS — Z01.818 PREOP GENERAL PHYSICAL EXAM: Primary | ICD-10-CM

## 2019-11-08 DIAGNOSIS — Z01.818 PREOP GENERAL PHYSICAL EXAM: ICD-10-CM

## 2019-11-08 DIAGNOSIS — N31.9 NEUROGENIC BLADDER: ICD-10-CM

## 2019-11-08 DIAGNOSIS — Z29.89 NEED FOR PROPHYLAXIS AGAINST URINARY TRACT INFECTION: Primary | ICD-10-CM

## 2019-11-08 LAB
ANION GAP SERPL CALCULATED.3IONS-SCNC: 7 MMOL/L (ref 3–14)
BUN SERPL-MCNC: 12 MG/DL (ref 7–30)
CALCIUM SERPL-MCNC: 9.8 MG/DL (ref 8.5–10.1)
CHLORIDE SERPL-SCNC: 105 MMOL/L (ref 94–109)
CO2 SERPL-SCNC: 28 MMOL/L (ref 20–32)
CREAT SERPL-MCNC: 0.68 MG/DL (ref 0.66–1.25)
GFR SERPL CREATININE-BSD FRML MDRD: >90 ML/MIN/{1.73_M2}
GLUCOSE SERPL-MCNC: 99 MG/DL (ref 70–99)
POTASSIUM SERPL-SCNC: 4.3 MMOL/L (ref 3.4–5.3)
SODIUM SERPL-SCNC: 140 MMOL/L (ref 133–144)

## 2019-11-08 PROCEDURE — 80048 BASIC METABOLIC PNL TOTAL CA: CPT | Performed by: PHYSICIAN ASSISTANT

## 2019-11-08 PROCEDURE — 87186 SC STD MICRODIL/AGAR DIL: CPT | Performed by: UROLOGY

## 2019-11-08 PROCEDURE — 87088 URINE BACTERIA CULTURE: CPT | Performed by: UROLOGY

## 2019-11-08 PROCEDURE — 36415 COLL VENOUS BLD VENIPUNCTURE: CPT | Performed by: PHYSICIAN ASSISTANT

## 2019-11-08 PROCEDURE — 87086 URINE CULTURE/COLONY COUNT: CPT | Performed by: UROLOGY

## 2019-11-08 RX ORDER — NITROFURANTOIN 25; 75 MG/1; MG/1
100 CAPSULE ORAL 2 TIMES DAILY
Qty: 12 CAPSULE | Refills: 0 | Status: ON HOLD | OUTPATIENT
Start: 2019-11-08 | End: 2019-11-14

## 2019-11-11 LAB
BACTERIA SPEC CULT: ABNORMAL
BACTERIA SPEC CULT: ABNORMAL
SPECIMEN SOURCE: ABNORMAL

## 2019-11-12 DIAGNOSIS — N39.0 URINARY TRACT INFECTION: ICD-10-CM

## 2019-11-12 RX ORDER — BISACODYL 5 MG/1
10 TABLET, DELAYED RELEASE ORAL EVERY OTHER DAY
COMMUNITY

## 2019-11-12 RX ORDER — NITROFURANTOIN MACROCRYSTAL 100 MG
100 CAPSULE ORAL 2 TIMES DAILY
COMMUNITY
End: 2019-11-12

## 2019-11-13 ENCOUNTER — INFUSION THERAPY VISIT (OUTPATIENT)
Dept: INFUSION THERAPY | Facility: CLINIC | Age: 44
End: 2019-11-13
Attending: UROLOGY
Payer: COMMERCIAL

## 2019-11-13 VITALS
SYSTOLIC BLOOD PRESSURE: 114 MMHG | OXYGEN SATURATION: 99 % | HEART RATE: 56 BPM | RESPIRATION RATE: 16 BRPM | DIASTOLIC BLOOD PRESSURE: 72 MMHG | TEMPERATURE: 98.1 F

## 2019-11-13 DIAGNOSIS — T83.511S URINARY TRACT INFECTION ASSOCIATED WITH CATHETERIZATION OF URINARY TRACT, UNSPECIFIED INDWELLING URINARY CATHETER TYPE, SEQUELA: Primary | ICD-10-CM

## 2019-11-13 DIAGNOSIS — N39.0 URINARY TRACT INFECTION ASSOCIATED WITH CATHETERIZATION OF URINARY TRACT, UNSPECIFIED INDWELLING URINARY CATHETER TYPE, SEQUELA: Primary | ICD-10-CM

## 2019-11-13 PROCEDURE — 96374 THER/PROPH/DIAG INJ IV PUSH: CPT

## 2019-11-13 PROCEDURE — 25000128 H RX IP 250 OP 636: Mod: ZF | Performed by: UROLOGY

## 2019-11-13 PROCEDURE — 25000125 ZZHC RX 250: Mod: ZF | Performed by: UROLOGY

## 2019-11-13 RX ADMIN — WATER 1 G: 1 INJECTION INTRAMUSCULAR; INTRAVENOUS; SUBCUTANEOUS at 07:37

## 2019-11-13 NOTE — PATIENT INSTRUCTIONS
Patient Education     Ertapenem injection  Brand Name: Invanz  What is this medicine?  ERTAPENEM (er ta PEN em) is a carbapenem antibiotic. It is used to treat certain kinds of bacterial infections. It will not work for colds, flu, or other viral infections.  How should I use this medicine?  This medicine is infused into a vein or injected deep into a muscle. It is usually given by a health care professional in a hospital or clinic setting.  If you get this medicine at home, you will be taught how to prepare and give this medicine. Use exactly as directed. Take your medicine at regular intervals. Do not take your medicine more often than directed.  It is important that you put your used needles and syringes in a special sharps container. Do not put them in a trash can. If you do not have a sharps container, call your pharmacist or healthcare provider to get one.  Talk to your pediatrician regarding the use of this medicine in children. While this drug may be prescribed for children as young as 3 months old for selected conditions, precautions do apply.  What side effects may I notice from receiving this medicine?  Side effects that you should report to your doctor or health care professional as soon as possible:    allergic reactions like skin rash, itching or hives, swelling of the face, lips, or tongue    anxiety, confusion, dizzy    chest pain    difficulty breathing, wheezing    edema, swelling    fever    irregular heart rate, blood pressure    pain or difficulty passing urine    seizures    unusually weak or tired    white or red patches in mouth  Side effects that usually do not require medical attention (report to your doctor or health care professional if they continue or are bothersome):    constipation or diarrhea    difficulty sleeping    headache    nausea, vomiting    pain, swelling or irritation where injected    stomach upset    vaginal itch, irritation  What may interact with this  medicine?    birth control pills    probenecid    What if I miss a dose?  If you miss a dose, take it as soon as you can. If it is almost time for your next dose, take only that dose. Do not take double or extra doses.  Where should I keep my medicine?  Keep out of the reach of children.  You will be instructed on how to store this medicine. Throw away any unused medicine after the expiration date on the label.  What should I tell my health care provider before I take this medicine?  They need to know if you have any of these conditions:    brain tumor, lesion    kidney disease    seizure disorder    an unusual or allergic reaction to ertapenem, other antibiotics, amide local anesthetics like lidocaine, or other medicines, foods, dyes, or preservatives    pregnant or trying to get pregnant    breast-feeding  What should I watch for while using this medicine?  Tell your doctor or health care professional if your symptoms do not improve or if you get new symptoms. Your doctor will monitor your condition and blood work as needed.  Do not treat diarrhea with over the counter products. Contact your doctor if you have diarrhea that lasts more than 2 days or if it is severe and watery.  NOTE:This sheet is a summary. It may not cover all possible information. If you have questions about this medicine, talk to your doctor, pharmacist, or health care provider. Copyright  2019 [employer]         Dear Kinza Teresa    Thank you for choosing [facility] ([facility]) for your infusion.  The following information is a summary of our appointment as well as important reminders.      We look forward in seeing you on your next appointment here at [facility] ([facility]).  Please don t hesitate to call us at [phone] to reschedule any of your appointments or to speak with one of the [facility] registered nurses.  It was a pleasure taking care of you  today.    Sincerely,    [facility]  [facility]  [address] [place]  [address]  Phone:  [phone]

## 2019-11-13 NOTE — PROGRESS NOTES
Nursing Note  Kinza Teresa [facility] for: IV antibx  During today's [facility] appointment, orders from Alex Tong were completed.  Frequency: once    Progress note:  Patient identification verified by name and date of birth.  Assessment completed.  Vitals recorded in Doc Flowsheets.  Patient was provided with education regarding infusion and possible side effects.  Patient verbalized understanding.     present during visit today: Not Applicable.    Treatment Conditions: non-applicable.    Premedications: were not ordered.    Drug Waste Record: No    Infusion length and rate:  infusion given over approximately 5 minutes    Labs: were not ordered for this appointment.    Vascular access: peripheral IV placed today.    Post Infusion Assessment:  Patient tolerated infusion without incident.     Discharge Plan:   Follow up plan of care with: primary medical doctor.  Discharge instructions were reviewed with patient.  Patient/representative verbalized understanding of discharge instructions and all questions answered.  [facility] discharged [facility] in stable condition.    Pura Flores RN       Administrations This Visit     ertapenem (INVanz) 1 g in 10 mL SWFI for IVP     Admin Date  11/13/2019 Action  Given Dose  1 g Route  Intravenous Administered By  Pura Flores RN                /72   Pulse 56   Temp 98.1  F (36.7  C) (Oral)   Resp 16   SpO2 99%

## 2019-11-14 ENCOUNTER — HOSPITAL ENCOUNTER (OUTPATIENT)
Facility: CLINIC | Age: 44
Discharge: HOME | End: 2019-11-16
Attending: UROLOGY | Admitting: UROLOGY
Payer: COMMERCIAL

## 2019-11-14 ENCOUNTER — ANESTHESIA EVENT (OUTPATIENT)
Dept: SURGERY | Facility: CLINIC | Age: 44
End: 2019-11-14
Payer: COMMERCIAL

## 2019-11-14 ENCOUNTER — ANESTHESIA (OUTPATIENT)
Dept: SURGERY | Facility: CLINIC | Age: 44
End: 2019-11-14
Payer: COMMERCIAL

## 2019-11-14 DIAGNOSIS — G89.18 POSTOPERATIVE PAIN: ICD-10-CM

## 2019-11-14 DIAGNOSIS — N52.1 ERECTILE DYSFUNCTION DUE TO DISEASES CLASSIFIED ELSEWHERE: ICD-10-CM

## 2019-11-14 DIAGNOSIS — G89.18 ACUTE POST-OPERATIVE PAIN: Primary | ICD-10-CM

## 2019-11-14 PROBLEM — N52.9 ERECTILE DYSFUNCTION: Status: ACTIVE | Noted: 2019-11-14

## 2019-11-14 LAB — GLUCOSE BLDC GLUCOMTR-MCNC: 111 MG/DL (ref 70–99)

## 2019-11-14 PROCEDURE — 71000016 ZZH RECOVERY PHASE 1 LEVEL 3 FIRST HR: Performed by: UROLOGY

## 2019-11-14 PROCEDURE — 25000128 H RX IP 250 OP 636: Performed by: UROLOGY

## 2019-11-14 PROCEDURE — 25000128 H RX IP 250 OP 636: Performed by: ANESTHESIOLOGY

## 2019-11-14 PROCEDURE — 27210794 ZZH OR GENERAL SUPPLY STERILE: Performed by: UROLOGY

## 2019-11-14 PROCEDURE — 25000132 ZZH RX MED GY IP 250 OP 250 PS 637: Performed by: STUDENT IN AN ORGANIZED HEALTH CARE EDUCATION/TRAINING PROGRAM

## 2019-11-14 PROCEDURE — 25000132 ZZH RX MED GY IP 250 OP 250 PS 637: Performed by: UROLOGY

## 2019-11-14 PROCEDURE — 82962 GLUCOSE BLOOD TEST: CPT

## 2019-11-14 PROCEDURE — C1813 PROSTHESIS, PENILE, INFLATAB: HCPCS | Performed by: UROLOGY

## 2019-11-14 PROCEDURE — 25000132 ZZH RX MED GY IP 250 OP 250 PS 637: Performed by: ANESTHESIOLOGY

## 2019-11-14 PROCEDURE — 36000059 ZZH SURGERY LEVEL 3 EA 15 ADDTL MIN UMMC: Performed by: UROLOGY

## 2019-11-14 PROCEDURE — 25000125 ZZHC RX 250: Performed by: NURSE ANESTHETIST, CERTIFIED REGISTERED

## 2019-11-14 PROCEDURE — 71000017 ZZH RECOVERY PHASE 1 LEVEL 3 EA ADDTL HR: Performed by: UROLOGY

## 2019-11-14 PROCEDURE — 37000009 ZZH ANESTHESIA TECHNICAL FEE, EACH ADDTL 15 MIN: Performed by: UROLOGY

## 2019-11-14 PROCEDURE — C1815 PROS, URINARY SPH, IMP: HCPCS | Performed by: UROLOGY

## 2019-11-14 PROCEDURE — 25000132 ZZH RX MED GY IP 250 OP 250 PS 637

## 2019-11-14 PROCEDURE — 40000170 ZZH STATISTIC PRE-PROCEDURE ASSESSMENT II: Performed by: UROLOGY

## 2019-11-14 PROCEDURE — 37000008 ZZH ANESTHESIA TECHNICAL FEE, 1ST 30 MIN: Performed by: UROLOGY

## 2019-11-14 PROCEDURE — 71000027 ZZH RECOVERY PHASE 2 EACH 15 MINS: Performed by: UROLOGY

## 2019-11-14 PROCEDURE — 25800030 ZZH RX IP 258 OP 636: Performed by: STUDENT IN AN ORGANIZED HEALTH CARE EDUCATION/TRAINING PROGRAM

## 2019-11-14 PROCEDURE — 36000057 ZZH SURGERY LEVEL 3 1ST 30 MIN - UMMC: Performed by: UROLOGY

## 2019-11-14 PROCEDURE — 25000128 H RX IP 250 OP 636: Performed by: NURSE ANESTHETIST, CERTIFIED REGISTERED

## 2019-11-14 PROCEDURE — 25800030 ZZH RX IP 258 OP 636: Performed by: ANESTHESIOLOGY

## 2019-11-14 PROCEDURE — 25000566 ZZH SEVOFLURANE, EA 15 MIN: Performed by: UROLOGY

## 2019-11-14 PROCEDURE — 25000125 ZZHC RX 250: Performed by: UROLOGY

## 2019-11-14 PROCEDURE — 25800030 ZZH RX IP 258 OP 636: Performed by: NURSE ANESTHETIST, CERTIFIED REGISTERED

## 2019-11-14 PROCEDURE — 25000128 H RX IP 250 OP 636: Performed by: STUDENT IN AN ORGANIZED HEALTH CARE EDUCATION/TRAINING PROGRAM

## 2019-11-14 DEVICE — IMP PROSTHESIS PENILE TITAN STD ASSEMBLY KIT 91-9480SC: Type: IMPLANTABLE DEVICE | Site: PENIS | Status: FUNCTIONAL

## 2019-11-14 DEVICE — IMPLANTABLE DEVICE: Type: IMPLANTABLE DEVICE | Site: SCROTUM | Status: FUNCTIONAL

## 2019-11-14 RX ORDER — NALOXONE HYDROCHLORIDE 0.4 MG/ML
.1-.4 INJECTION, SOLUTION INTRAMUSCULAR; INTRAVENOUS; SUBCUTANEOUS
Status: DISCONTINUED | OUTPATIENT
Start: 2019-11-14 | End: 2019-11-16 | Stop reason: HOSPADM

## 2019-11-14 RX ORDER — FENTANYL CITRATE 50 UG/ML
INJECTION, SOLUTION INTRAMUSCULAR; INTRAVENOUS PRN
Status: DISCONTINUED | OUTPATIENT
Start: 2019-11-14 | End: 2019-11-14

## 2019-11-14 RX ORDER — PROPOFOL 10 MG/ML
INJECTION, EMULSION INTRAVENOUS PRN
Status: DISCONTINUED | OUTPATIENT
Start: 2019-11-14 | End: 2019-11-14

## 2019-11-14 RX ORDER — ONDANSETRON 2 MG/ML
INJECTION INTRAMUSCULAR; INTRAVENOUS PRN
Status: DISCONTINUED | OUTPATIENT
Start: 2019-11-14 | End: 2019-11-14

## 2019-11-14 RX ORDER — ONDANSETRON 2 MG/ML
4 INJECTION INTRAMUSCULAR; INTRAVENOUS EVERY 6 HOURS PRN
Status: DISCONTINUED | OUTPATIENT
Start: 2019-11-14 | End: 2019-11-16 | Stop reason: HOSPADM

## 2019-11-14 RX ORDER — ONDANSETRON 4 MG/1
4 TABLET, ORALLY DISINTEGRATING ORAL EVERY 30 MIN PRN
Status: DISCONTINUED | OUTPATIENT
Start: 2019-11-14 | End: 2019-11-14 | Stop reason: HOSPADM

## 2019-11-14 RX ORDER — FENTANYL CITRATE 50 UG/ML
25-50 INJECTION, SOLUTION INTRAMUSCULAR; INTRAVENOUS EVERY 5 MIN PRN
Status: DISCONTINUED | OUTPATIENT
Start: 2019-11-14 | End: 2019-11-14 | Stop reason: HOSPADM

## 2019-11-14 RX ORDER — SODIUM CHLORIDE 9 MG/ML
INJECTION, SOLUTION INTRAVENOUS CONTINUOUS
Status: DISCONTINUED | OUTPATIENT
Start: 2019-11-14 | End: 2019-11-15

## 2019-11-14 RX ORDER — HYDROMORPHONE HYDROCHLORIDE 1 MG/ML
.3-.5 INJECTION, SOLUTION INTRAMUSCULAR; INTRAVENOUS; SUBCUTANEOUS
Status: DISCONTINUED | OUTPATIENT
Start: 2019-11-14 | End: 2019-11-15

## 2019-11-14 RX ORDER — VANCOMYCIN HYDROCHLORIDE 1 G/200ML
1000 INJECTION, SOLUTION INTRAVENOUS SEE ADMIN INSTRUCTIONS
Status: DISCONTINUED | OUTPATIENT
Start: 2019-11-14 | End: 2019-11-14 | Stop reason: HOSPADM

## 2019-11-14 RX ORDER — ACETAMINOPHEN 325 MG/1
975 TABLET ORAL ONCE
Status: COMPLETED | OUTPATIENT
Start: 2019-11-14 | End: 2019-11-14

## 2019-11-14 RX ORDER — FENTANYL CITRATE 50 UG/ML
25-50 INJECTION, SOLUTION INTRAMUSCULAR; INTRAVENOUS
Status: DISCONTINUED | OUTPATIENT
Start: 2019-11-14 | End: 2019-11-14 | Stop reason: HOSPADM

## 2019-11-14 RX ORDER — CARVEDILOL 6.25 MG/1
6.25 TABLET ORAL 2 TIMES DAILY WITH MEALS
Status: DISCONTINUED | OUTPATIENT
Start: 2019-11-15 | End: 2019-11-16 | Stop reason: HOSPADM

## 2019-11-14 RX ORDER — VANCOMYCIN HYDROCHLORIDE 1 G/200ML
1000 INJECTION, SOLUTION INTRAVENOUS
Status: COMPLETED | OUTPATIENT
Start: 2019-11-14 | End: 2019-11-14

## 2019-11-14 RX ORDER — SODIUM CHLORIDE, SODIUM LACTATE, POTASSIUM CHLORIDE, CALCIUM CHLORIDE 600; 310; 30; 20 MG/100ML; MG/100ML; MG/100ML; MG/100ML
INJECTION, SOLUTION INTRAVENOUS CONTINUOUS
Status: DISCONTINUED | OUTPATIENT
Start: 2019-11-14 | End: 2019-11-14 | Stop reason: HOSPADM

## 2019-11-14 RX ORDER — PROCHLORPERAZINE MALEATE 10 MG
10 TABLET ORAL EVERY 6 HOURS PRN
Status: DISCONTINUED | OUTPATIENT
Start: 2019-11-14 | End: 2019-11-16 | Stop reason: HOSPADM

## 2019-11-14 RX ORDER — ONDANSETRON 2 MG/ML
4 INJECTION INTRAMUSCULAR; INTRAVENOUS EVERY 30 MIN PRN
Status: DISCONTINUED | OUTPATIENT
Start: 2019-11-14 | End: 2019-11-14 | Stop reason: HOSPADM

## 2019-11-14 RX ORDER — NALOXONE HYDROCHLORIDE 0.4 MG/ML
.1-.4 INJECTION, SOLUTION INTRAMUSCULAR; INTRAVENOUS; SUBCUTANEOUS
Status: DISCONTINUED | OUTPATIENT
Start: 2019-11-14 | End: 2019-11-14 | Stop reason: HOSPADM

## 2019-11-14 RX ORDER — MEPERIDINE HYDROCHLORIDE 25 MG/ML
12.5 INJECTION INTRAMUSCULAR; INTRAVENOUS; SUBCUTANEOUS
Status: DISCONTINUED | OUTPATIENT
Start: 2019-11-14 | End: 2019-11-14 | Stop reason: HOSPADM

## 2019-11-14 RX ORDER — BISACODYL 10 MG
10 SUPPOSITORY, RECTAL RECTAL DAILY PRN
Status: DISCONTINUED | OUTPATIENT
Start: 2019-11-14 | End: 2019-11-14

## 2019-11-14 RX ORDER — LIDOCAINE 40 MG/G
CREAM TOPICAL
Status: DISCONTINUED | OUTPATIENT
Start: 2019-11-14 | End: 2019-11-14 | Stop reason: HOSPADM

## 2019-11-14 RX ORDER — PIPERACILLIN SODIUM, TAZOBACTAM SODIUM 4; .5 G/20ML; G/20ML
4.5 INJECTION, POWDER, LYOPHILIZED, FOR SOLUTION INTRAVENOUS
Status: COMPLETED | OUTPATIENT
Start: 2019-11-14 | End: 2019-11-14

## 2019-11-14 RX ORDER — LIDOCAINE 40 MG/G
CREAM TOPICAL
Status: DISCONTINUED | OUTPATIENT
Start: 2019-11-14 | End: 2019-11-16 | Stop reason: HOSPADM

## 2019-11-14 RX ORDER — OXYCODONE HYDROCHLORIDE 5 MG/1
5-10 TABLET ORAL EVERY 6 HOURS PRN
Qty: 15 TABLET | Refills: 0 | Status: SHIPPED | OUTPATIENT
Start: 2019-11-14 | End: 2019-11-15

## 2019-11-14 RX ORDER — GLYCOPYRROLATE 0.2 MG/ML
INJECTION, SOLUTION INTRAMUSCULAR; INTRAVENOUS PRN
Status: DISCONTINUED | OUTPATIENT
Start: 2019-11-14 | End: 2019-11-14

## 2019-11-14 RX ORDER — PREGABALIN 50 MG/1
50 CAPSULE ORAL AT BEDTIME
Status: DISCONTINUED | OUTPATIENT
Start: 2019-11-14 | End: 2019-11-16 | Stop reason: HOSPADM

## 2019-11-14 RX ORDER — OXYCODONE HYDROCHLORIDE 5 MG/1
5 TABLET ORAL ONCE
Status: COMPLETED | OUTPATIENT
Start: 2019-11-14 | End: 2019-11-14

## 2019-11-14 RX ORDER — OXYCODONE HYDROCHLORIDE 5 MG/1
5 TABLET ORAL EVERY 6 HOURS PRN
Qty: 8 TABLET | Refills: 0 | Status: SHIPPED | OUTPATIENT
Start: 2019-11-14 | End: 2019-11-14

## 2019-11-14 RX ORDER — ACETAMINOPHEN 325 MG/1
650 TABLET ORAL EVERY 6 HOURS PRN
Status: DISCONTINUED | OUTPATIENT
Start: 2019-11-14 | End: 2019-11-16 | Stop reason: HOSPADM

## 2019-11-14 RX ORDER — LIDOCAINE HYDROCHLORIDE 20 MG/ML
INJECTION, SOLUTION INFILTRATION; PERINEURAL PRN
Status: DISCONTINUED | OUTPATIENT
Start: 2019-11-14 | End: 2019-11-14

## 2019-11-14 RX ORDER — OXYCODONE HYDROCHLORIDE 5 MG/1
5-10 TABLET ORAL
Status: DISCONTINUED | OUTPATIENT
Start: 2019-11-14 | End: 2019-11-16 | Stop reason: HOSPADM

## 2019-11-14 RX ORDER — HYDROMORPHONE HYDROCHLORIDE 1 MG/ML
.3-.5 INJECTION, SOLUTION INTRAMUSCULAR; INTRAVENOUS; SUBCUTANEOUS EVERY 10 MIN PRN
Status: DISCONTINUED | OUTPATIENT
Start: 2019-11-14 | End: 2019-11-14 | Stop reason: HOSPADM

## 2019-11-14 RX ORDER — ONDANSETRON 4 MG/1
4 TABLET, ORALLY DISINTEGRATING ORAL EVERY 6 HOURS PRN
Status: DISCONTINUED | OUTPATIENT
Start: 2019-11-14 | End: 2019-11-16 | Stop reason: HOSPADM

## 2019-11-14 RX ORDER — BISACODYL 5 MG
10 TABLET, DELAYED RELEASE (ENTERIC COATED) ORAL DAILY PRN
Status: DISCONTINUED | OUTPATIENT
Start: 2019-11-14 | End: 2019-11-16 | Stop reason: HOSPADM

## 2019-11-14 RX ORDER — ERTAPENEM 1 G/1
1 INJECTION, POWDER, LYOPHILIZED, FOR SOLUTION INTRAMUSCULAR; INTRAVENOUS EVERY 24 HOURS
Status: DISCONTINUED | OUTPATIENT
Start: 2019-11-14 | End: 2019-11-16 | Stop reason: HOSPADM

## 2019-11-14 RX ADMIN — LIDOCAINE HYDROCHLORIDE 100 MG: 20 INJECTION, SOLUTION INFILTRATION; PERINEURAL at 07:49

## 2019-11-14 RX ADMIN — ACETAMINOPHEN 975 MG: 325 TABLET, FILM COATED ORAL at 13:17

## 2019-11-14 RX ADMIN — FENTANYL CITRATE 50 MCG: 50 INJECTION, SOLUTION INTRAMUSCULAR; INTRAVENOUS at 07:49

## 2019-11-14 RX ADMIN — HYDROMORPHONE HYDROCHLORIDE 0.2 MG: 1 INJECTION, SOLUTION INTRAMUSCULAR; INTRAVENOUS; SUBCUTANEOUS at 11:09

## 2019-11-14 RX ADMIN — FENTANYL CITRATE 25 MCG: 50 INJECTION, SOLUTION INTRAMUSCULAR; INTRAVENOUS at 10:22

## 2019-11-14 RX ADMIN — ROCURONIUM BROMIDE 50 MG: 10 INJECTION INTRAVENOUS at 07:49

## 2019-11-14 RX ADMIN — Medication: at 12:29

## 2019-11-14 RX ADMIN — MIDAZOLAM 2 MG: 1 INJECTION INTRAMUSCULAR; INTRAVENOUS at 07:37

## 2019-11-14 RX ADMIN — SODIUM CHLORIDE: 9 INJECTION, SOLUTION INTRAVENOUS at 22:06

## 2019-11-14 RX ADMIN — OXYCODONE HYDROCHLORIDE 10 MG: 5 TABLET ORAL at 17:14

## 2019-11-14 RX ADMIN — PREGABALIN 50 MG: 50 CAPSULE ORAL at 22:06

## 2019-11-14 RX ADMIN — VANCOMYCIN HYDROCHLORIDE 1000 MG: 1 INJECTION, SOLUTION INTRAVENOUS at 07:10

## 2019-11-14 RX ADMIN — PROPOFOL 90 MG: 10 INJECTION, EMULSION INTRAVENOUS at 07:49

## 2019-11-14 RX ADMIN — BISACODYL 10 MG: 5 TABLET, COATED ORAL at 22:06

## 2019-11-14 RX ADMIN — OXYBUTYNIN CHLORIDE 15 MG: 10 TABLET, EXTENDED RELEASE ORAL at 22:06

## 2019-11-14 RX ADMIN — FENTANYL CITRATE 25 MCG: 50 INJECTION, SOLUTION INTRAMUSCULAR; INTRAVENOUS at 10:27

## 2019-11-14 RX ADMIN — SUGAMMADEX 150 MG: 100 INJECTION, SOLUTION INTRAVENOUS at 09:58

## 2019-11-14 RX ADMIN — SODIUM CHLORIDE, POTASSIUM CHLORIDE, SODIUM LACTATE AND CALCIUM CHLORIDE: 600; 310; 30; 20 INJECTION, SOLUTION INTRAVENOUS at 07:37

## 2019-11-14 RX ADMIN — GLYCOPYRROLATE 0.2 MG: 0.2 INJECTION, SOLUTION INTRAMUSCULAR; INTRAVENOUS at 07:47

## 2019-11-14 RX ADMIN — ERTAPENEM SODIUM 1 G: 1 INJECTION, POWDER, LYOPHILIZED, FOR SOLUTION INTRAMUSCULAR; INTRAVENOUS at 22:06

## 2019-11-14 RX ADMIN — HYDROMORPHONE HYDROCHLORIDE 0.3 MG: 1 INJECTION, SOLUTION INTRAMUSCULAR; INTRAVENOUS; SUBCUTANEOUS at 10:53

## 2019-11-14 RX ADMIN — OXYCODONE HYDROCHLORIDE 5 MG: 5 TABLET ORAL at 13:18

## 2019-11-14 RX ADMIN — ONDANSETRON 4 MG: 2 INJECTION INTRAMUSCULAR; INTRAVENOUS at 09:45

## 2019-11-14 RX ADMIN — PHENYLEPHRINE HYDROCHLORIDE 100 MCG: 10 INJECTION INTRAVENOUS at 08:11

## 2019-11-14 RX ADMIN — PIPERACILLIN AND TAZOBACTAM 4.5 G: 4; .5 INJECTION, POWDER, LYOPHILIZED, FOR SOLUTION INTRAVENOUS; PARENTERAL at 07:54

## 2019-11-14 RX ADMIN — FENTANYL CITRATE 25 MCG: 50 INJECTION, SOLUTION INTRAMUSCULAR; INTRAVENOUS at 10:34

## 2019-11-14 RX ADMIN — OXYCODONE HYDROCHLORIDE 10 MG: 5 TABLET ORAL at 20:13

## 2019-11-14 RX ADMIN — HYDROMORPHONE HYDROCHLORIDE 0.3 MG: 1 INJECTION, SOLUTION INTRAMUSCULAR; INTRAVENOUS; SUBCUTANEOUS at 11:27

## 2019-11-14 RX ADMIN — FENTANYL CITRATE 50 MCG: 50 INJECTION, SOLUTION INTRAMUSCULAR; INTRAVENOUS at 08:57

## 2019-11-14 ASSESSMENT — MIFFLIN-ST. JEOR: SCORE: 1503.15

## 2019-11-14 ASSESSMENT — PAIN DESCRIPTION - DESCRIPTORS: DESCRIPTORS: SORE;STABBING

## 2019-11-14 NOTE — ANESTHESIA CARE TRANSFER NOTE
Patient: Kinza Teresa    Procedure(s):  Insertion of Inflatable Penile Prosthesis    Diagnosis: Erectile dysfunction due to diseases classified elsewhere [N52.1]  Diagnosis Additional Information: No value filed.    Anesthesia Type:   General     Note:    Patient transferred to:PACU  Comments: Anesthesia Care Transfer Note    Patient: Kinza Teresa    Transferred to: PACU with supplemental O2    Patient vital signs: stable    Airway: none    Monitors on, VSS, breathing spontaneously, report to MARLINE Selby CRNA   11/14/2019 10:11 AMHandoff Report: Identifed the Patient, Identified the Reponsible Provider, Reviewed the pertinent medical history, Discussed the surgical course, Reviewed Intra-OP anesthesia mangement and issues during anesthesia, Set expectations for post-procedure period and Allowed opportunity for questions and acknowledgement of understanding      Vitals: (Last set prior to Anesthesia Care Transfer)    CRNA VITALS  11/14/2019 0938 - 11/14/2019 1011      11/14/2019             Pulse:  92    SpO2:  100 %    Resp Rate (observed): 11                Electronically Signed By: JOSE Benavidez CRNA  November 14, 2019  10:11 AM

## 2019-11-14 NOTE — ANESTHESIA POSTPROCEDURE EVALUATION
Anesthesia POST Procedure Evaluation    Patient: Kinza Teresa   MRN:     [id number] Gender:   male   Age:    44 year old :      1975        Preoperative Diagnosis: Erectile dysfunction due to diseases classified elsewhere [N52.1]   Procedure(s):  Insertion of Inflatable Penile Prosthesis   Postop Comments: No value filed.       Anesthesia Type:  Not documented  General    Reportable Event: NO     PAIN: Uncomplicated   Sign Out status: Comfortable, Well controlled pain     PONV: No PONV   Sign Out status:  No Nausea or Vomiting     Neuro/Psych: Uneventful perioperative course   Sign Out Status: Preoperative baseline; Age appropriate mentation     Airway/Resp.: Uneventful perioperative course   Sign Out Status: Non labored breathing, age appropriate RR; Resp. Status within EXPECTED Parameters     CV: Uneventful perioperative course   Sign Out status: Appropriate BP and perfusion indices; Appropriate HR/Rhythm     Disposition:   Sign Out in:  PACU  Disposition:  Floor  Recovery Course: Uneventful  Follow-Up: Not required           Last Anesthesia Record Vitals:  CRNA VITALS  2019 0938 - 2019 1038      2019             Pulse:  92    SpO2:  100 %    Resp Rate (observed):  (!) 1          Last PACU Vitals:  Vitals Value Taken Time   /75 2019 10:30 AM   Temp 36.4  C (97.5  F) 2019 10:11 AM   Pulse 80 2019 10:30 AM   Resp 14 2019 10:30 AM   SpO2 97 % 2019 10:40 AM   Temp src     NIBP     Pulse     SpO2     Resp     Temp     Ht Rate     Temp 2     Vitals shown include unvalidated device data.      Electronically Signed By: Luanne Cao MD, 2019, 10:42 AM

## 2019-11-14 NOTE — OR NURSING
Pt remains very painful after taking 5 mg oxycodone- message sent to Dr. Tong-    pt, Kinza Teresa, in phase 2, gave 5 mg oxycodone 35 min ago, pt still very painful, unable to move. Wife is wondering if he could take 5-10 instead of 5, or other suggestions?  Thanks, María [phone]- Dr. Tong called back and gave order to give pt 5 mg more of oxycodone and he will adjust script.    Will give additional 5 mg of oxycodone and monitor results.

## 2019-11-14 NOTE — OR NURSING
Pt unable to transfer to  d/t pain and wife and patient state he will not be able to go home if he cannot transfer.  Dr. Alycia hernández and will come to assess pt and speak with pt and wife.

## 2019-11-14 NOTE — BRIEF OP NOTE
[facility], [place]    Brief Operative Note    Pre-operative diagnosis: Erectile dysfunction due to diseases classified elsewhere [N52.1]  Post-operative diagnosis Same as pre-operative diagnosis    Procedure: Procedure(s):  Insertion of Inflatable Penile Prosthesis  Surgeon: Surgeon(s) and Role:     * Alejandro Tong MD - Primary     * Melvin Tian MD - Resident - Assisting     * Deana Baltazar MD - Fellow - Assisting  Anesthesia: General   Estimated blood loss: Minimal  Drains: None  Specimens: * No specimens in log *  Findings:   None.  Complications: None.  Implants:   Implant Name Type Inv. Item Serial No.  Lot No. LRB No. Used   IMP PROSTHESIS PENILE TITAN STD ASSEMBLY KIT [id number] Penile Implant IMP PROSTHESIS PENILE TITAN STD ASSEMBLY KIT [id number]  COLOPLAST [id number] N/A 1   SCROTAL ZERO DEGREE ANGLE CYLINDER SET W/ PUMP Penile Implant   COLOPLAST [id number] N/A 1   CL RESERVOIR Penile Implant   COLOPLAST [id number] Left 1

## 2019-11-14 NOTE — ANESTHESIA PREPROCEDURE EVALUATION
Anesthesia Pre-Procedure Evaluation    Patient: Kinza Teresa   MRN:     [id number] Gender:   male   Age:    44 year old :      1975        Preoperative Diagnosis: Erectile dysfunction due to diseases classified elsewhere [N52.1]   Procedure(s):  Insertion of Inflatable Penile Prosthesis     Past Medical History:   Diagnosis Date     Constipation      Sleep apnea      Spinal cord injury at T7-T12 level with spinal cord lesion (H) 2017      History reviewed. No pertinent surgical history.       Anesthesia Evaluation     .             ROS/MED HX    ENT/Pulmonary:     (+)sleep apnea, , . .    Neurologic:     (+)Spinal cord injury year sustained: T7     Cardiovascular:         METS/Exercise Tolerance:     Hematologic:         Musculoskeletal:         GI/Hepatic:         Renal/Genitourinary:         Endo:         Psychiatric:         Infectious Disease:         Malignancy:         Other:                     JZG FV AN PHYSICAL EXAM    LABS:  CBC:   Lab Results   Component Value Date    WBC 7.5 10/25/2018    HGB 15.1 10/25/2018    HCT 44.8 10/25/2018     10/25/2018     BMP:   Lab Results   Component Value Date     2019     10/25/2018    POTASSIUM 4.3 2019    POTASSIUM 3.8 10/25/2018    CHLORIDE 105 2019    CHLORIDE 104 10/25/2018    CO2 28 2019    CO2 29 10/25/2018    BUN 12 2019    BUN 19 10/25/2018    CR 0.68 2019    CR 0.79 10/25/2018    GLC 99 2019     (H) 10/25/2018     COAGS:   Lab Results   Component Value Date    PTT 32 10/25/2018    INR 0.93 10/25/2018     POC:   Lab Results   Component Value Date     (H) 2019     OTHER:   Lab Results   Component Value Date    LACT 1.0 10/25/2018    LAYLA 9.8 2019    MAG 2.1 10/25/2018    ALBUMIN 4.2 10/25/2018    PROTTOTAL 8.0 10/25/2018    ALT 19 10/25/2018    AST 15 10/25/2018    ALKPHOS 93 10/25/2018    BILITOTAL 0.3 10/25/2018    LIPASE 89 10/25/2018        Preop Vitals    BP  "Readings from Last 3 Encounters:   11/14/19 131/85   11/13/19 114/72   06/18/19 106/68    Pulse Readings from Last 3 Encounters:   11/14/19 51   11/13/19 56   06/18/19 (!) 46      Resp Readings from Last 3 Encounters:   11/14/19 15   11/13/19 16   06/18/19 16    SpO2 Readings from Last 3 Encounters:   11/14/19 100%   11/13/19 99%   06/18/19 98%      Temp Readings from Last 1 Encounters:   11/14/19 36.6  C (97.8  F) (Oral)    Ht Readings from Last 1 Encounters:   11/14/19 1.676 m (5' 6\")      Wt Readings from Last 1 Encounters:   11/14/19 68 kg (150 lb)    Estimated body mass index is 24.21 kg/m  as calculated from the following:    Height as of this encounter: 1.676 m (5' 6\").    Weight as of this encounter: 68 kg (150 lb).     LDA:        Assessment:   ASA SCORE: 3    H&P: History and physical reviewed and following examination; no interval change.         Plan:   Anes. Type:  General   Pre-Medication: None   Induction:  IV (Standard)   Airway: ETT; Oral   Access/Monitoring: PIV; 2nd PIV   Maintenance: Balanced     Postop Plan:   Postop Pain: Opioids  Postop Sedation/Airway: Not planned  Disposition: Inpatient/Admit     PONV Management:   Adult Risk Factors:, Postop Opioids   Prevention: Ondansetron, Dexamethasone     CONSENT: Direct conversation   Plan and risks discussed with: Patient   Blood Products: Consented (ALL Blood Products)                   Luanne Cao MD  "
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
## 2019-11-15 PROCEDURE — 25000128 H RX IP 250 OP 636: Performed by: STUDENT IN AN ORGANIZED HEALTH CARE EDUCATION/TRAINING PROGRAM

## 2019-11-15 PROCEDURE — 25000132 ZZH RX MED GY IP 250 OP 250 PS 637: Performed by: STUDENT IN AN ORGANIZED HEALTH CARE EDUCATION/TRAINING PROGRAM

## 2019-11-15 PROCEDURE — 25000132 ZZH RX MED GY IP 250 OP 250 PS 637: Performed by: PHYSICIAN ASSISTANT

## 2019-11-15 RX ORDER — METHOCARBAMOL 500 MG/1
500 TABLET, FILM COATED ORAL 4 TIMES DAILY PRN
Qty: 28 TABLET | Refills: 0 | Status: ON HOLD | OUTPATIENT
Start: 2019-11-15 | End: 2020-09-22

## 2019-11-15 RX ORDER — IBUPROFEN 600 MG/1
600 TABLET, FILM COATED ORAL 3 TIMES DAILY
Status: DISCONTINUED | OUTPATIENT
Start: 2019-11-15 | End: 2019-11-15

## 2019-11-15 RX ORDER — ACETAMINOPHEN 325 MG/1
975 TABLET ORAL 3 TIMES DAILY
Status: DISCONTINUED | OUTPATIENT
Start: 2019-11-15 | End: 2019-11-16 | Stop reason: HOSPADM

## 2019-11-15 RX ORDER — METHOCARBAMOL 500 MG/1
500 TABLET, FILM COATED ORAL 4 TIMES DAILY
Status: DISCONTINUED | OUTPATIENT
Start: 2019-11-15 | End: 2019-11-16 | Stop reason: HOSPADM

## 2019-11-15 RX ORDER — OXYCODONE HYDROCHLORIDE 10 MG/1
5-10 TABLET ORAL EVERY 4 HOURS PRN
Qty: 18 TABLET | Refills: 0 | Status: SHIPPED | OUTPATIENT
Start: 2019-11-15 | End: 2019-11-20

## 2019-11-15 RX ORDER — POLYETHYLENE GLYCOL 3350 17 G/17G
17 POWDER, FOR SOLUTION ORAL DAILY
Qty: 30 PACKET | Refills: 0 | Status: SHIPPED | OUTPATIENT
Start: 2019-11-15 | End: 2023-04-24

## 2019-11-15 RX ORDER — LIDOCAINE 4 G/G
1-2 PATCH TOPICAL
Status: DISCONTINUED | OUTPATIENT
Start: 2019-11-15 | End: 2019-11-16 | Stop reason: HOSPADM

## 2019-11-15 RX ORDER — IBUPROFEN 600 MG/1
600 TABLET, FILM COATED ORAL 3 TIMES DAILY
Status: DISCONTINUED | OUTPATIENT
Start: 2019-11-15 | End: 2019-11-16 | Stop reason: HOSPADM

## 2019-11-15 RX ORDER — POLYETHYLENE GLYCOL 3350 17 G/17G
17 POWDER, FOR SOLUTION ORAL 2 TIMES DAILY PRN
Status: DISCONTINUED | OUTPATIENT
Start: 2019-11-15 | End: 2019-11-16 | Stop reason: HOSPADM

## 2019-11-15 RX ORDER — ACETAMINOPHEN 325 MG/1
650 TABLET ORAL EVERY 6 HOURS PRN
Qty: 100 TABLET | Refills: 0 | Status: ON HOLD | OUTPATIENT
Start: 2019-11-15 | End: 2020-09-22

## 2019-11-15 RX ORDER — ACETAMINOPHEN 325 MG/1
650 TABLET ORAL EVERY 4 HOURS PRN
Qty: 100 TABLET | Refills: 0 | Status: ON HOLD | OUTPATIENT
Start: 2019-11-15 | End: 2020-09-22

## 2019-11-15 RX ORDER — IBUPROFEN 600 MG/1
600 TABLET, FILM COATED ORAL EVERY 6 HOURS PRN
Qty: 30 TABLET | Refills: 0 | Status: SHIPPED | OUTPATIENT
Start: 2019-11-15 | End: 2023-05-28

## 2019-11-15 RX ORDER — LIDOCAINE 4 G/G
1-2 PATCH TOPICAL EVERY 24 HOURS
Qty: 20 PATCH | Refills: 0 | Status: SHIPPED | OUTPATIENT
Start: 2019-11-15 | End: 2021-03-25

## 2019-11-15 RX ADMIN — ACETAMINOPHEN 975 MG: 325 TABLET, FILM COATED ORAL at 22:03

## 2019-11-15 RX ADMIN — OXYCODONE HYDROCHLORIDE 5 MG: 5 TABLET ORAL at 07:36

## 2019-11-15 RX ADMIN — OXYCODONE HYDROCHLORIDE 10 MG: 5 TABLET ORAL at 15:45

## 2019-11-15 RX ADMIN — CARVEDILOL 6.25 MG: 6.25 TABLET, FILM COATED ORAL at 07:36

## 2019-11-15 RX ADMIN — OMEPRAZOLE 20 MG: 20 CAPSULE, DELAYED RELEASE ORAL at 07:37

## 2019-11-15 RX ADMIN — IBUPROFEN 600 MG: 600 TABLET, FILM COATED ORAL at 20:59

## 2019-11-15 RX ADMIN — OXYCODONE HYDROCHLORIDE 10 MG: 5 TABLET ORAL at 12:26

## 2019-11-15 RX ADMIN — ACETAMINOPHEN 650 MG: 325 TABLET, FILM COATED ORAL at 02:06

## 2019-11-15 RX ADMIN — OMEPRAZOLE 20 MG: 20 CAPSULE, DELAYED RELEASE ORAL at 15:45

## 2019-11-15 RX ADMIN — METHOCARBAMOL 500 MG: 500 TABLET, FILM COATED ORAL at 15:45

## 2019-11-15 RX ADMIN — ACETAMINOPHEN 650 MG: 325 TABLET, FILM COATED ORAL at 07:36

## 2019-11-15 RX ADMIN — IBUPROFEN 600 MG: 600 TABLET, FILM COATED ORAL at 13:47

## 2019-11-15 RX ADMIN — OXYCODONE HYDROCHLORIDE 5 MG: 5 TABLET ORAL at 02:07

## 2019-11-15 RX ADMIN — CARVEDILOL 6.25 MG: 6.25 TABLET, FILM COATED ORAL at 19:05

## 2019-11-15 RX ADMIN — PREGABALIN 50 MG: 50 CAPSULE ORAL at 22:03

## 2019-11-15 RX ADMIN — METHOCARBAMOL 500 MG: 500 TABLET, FILM COATED ORAL at 20:59

## 2019-11-15 RX ADMIN — OXYCODONE HYDROCHLORIDE 10 MG: 5 TABLET ORAL at 22:03

## 2019-11-15 RX ADMIN — METHOCARBAMOL 500 MG: 500 TABLET, FILM COATED ORAL at 11:09

## 2019-11-15 RX ADMIN — OXYCODONE HYDROCHLORIDE 5 MG: 5 TABLET ORAL at 08:57

## 2019-11-15 RX ADMIN — ERTAPENEM SODIUM 1 G: 1 INJECTION, POWDER, LYOPHILIZED, FOR SOLUTION INTRAMUSCULAR; INTRAVENOUS at 22:03

## 2019-11-15 RX ADMIN — LIDOCAINE 2 PATCH: 560 PATCH PERCUTANEOUS; TOPICAL; TRANSDERMAL at 11:09

## 2019-11-15 RX ADMIN — OXYCODONE HYDROCHLORIDE 10 MG: 5 TABLET ORAL at 19:05

## 2019-11-15 RX ADMIN — OXYBUTYNIN CHLORIDE 15 MG: 10 TABLET, EXTENDED RELEASE ORAL at 22:03

## 2019-11-15 RX ADMIN — ACETAMINOPHEN 975 MG: 325 TABLET, FILM COATED ORAL at 13:47

## 2019-11-15 NOTE — PROGRESS NOTES
"Urology  Progress Note  11/15/2019    - Had uncontrolled pain in the PACU after surgery, now pain is very well controlled  - Performing self cath without issue  - Has not yet transferred to the chair    Exam  /63 (BP Location: Left arm)   Pulse 63   Temp 98.3  F (36.8  C) (Oral)   Resp 16   Ht 1.676 m (5' 6\")   Wt 68 kg (150 lb)   SpO2 97%   BMI 24.21 kg/m    No acute distress  Unlabored breathing  Abdomen soft, nontender, nondistended. Incision over left ectopic reservoir c/d/i.   penis with moderate ecchymosis, glans is pink and well perfused, incision at penoscrotal junction c/d/i.    I/O's (last 24/since midnight):  /NR    AM labs pending    Assessment/Plan  44 year old y/o male POD#1 s/p primary IPP placement for ED.     Neuro: Tylenol, oxy prn for pain control. PTA lyrica  CV: HDS, home metoprolol  Pulm: incentive spirometry while awake  FEN/GI: Regular diet, discontinue MIVF  Endo: No issues  : Performs self-catheterization at baseline. PTA oxybutynin. Mummy wrap taken down.  Heme/ID: Discharge on 1 week course of Bactrim.  Activity: Ad ingrid.  PPx: SCDs  Dispo: Home today.    Seen and examined with the chief resident. Will discuss with Dr. Tong.    Regi Holden MD  PGY-2 Urology  Pager [phone]       Contacting the Urology Team     Please use the following job codes to reach the Urology Team. Note that you must use an in house phone and that job codes cannot receive text pages.     On weekdays, dial [phone] (or star-star-star [phone] on the new [employer] telephones) then [phone] to reach the Adult Urology resident or PA on call    On weekdays, dial [phone] (or star-star-star [phone] on the new [employer] telephones) then [phone] to reach the Pediatric Urology resident    On weeknights and weekends, dial [phone] (or star-star-star [phone] on the new [employer] telephones) then [phone] to reach the Urology resident on call (for both Adult and Pediatrics)              "

## 2019-11-15 NOTE — PLAN OF CARE
"Observation Goals  Pain controlled with oral medication. Yes     Shift: 3088-8900  VS: /63 (BP Location: Left arm)   Pulse 63   Temp 98.3  F (36.8  C) (Oral)   Resp 16   Ht 1.676 m (5' 6\")   Wt 68 kg (150 lb)   SpO2 97%   BMI 24.21 kg/m    Pain: pain controlled w/ prn oxycodone & tylenol.  Neuro: A&O x4.  Cardiac: AVSS.  Respiratory: Satting above 95% on RA. Wears home CPAP at night.  GI/Diet/Appetite: Tolerating regular diet. Denies nausea.  : Self-caths (done x1 this shift for 250mL).  LDA's: L PIV infusing NS @100.  Skin: Bandages w/ minimal amount of dried drainage. L abdominal incision CDI, left GENARO.  Activity: Paraplegic, wheelchair bound, able to use arms.   Tests/Procedures: N/A  Pertinent Labs/Lab Collection: N/A     Plan: Will continue w/ POC.      "

## 2019-11-15 NOTE — OR NURSING
SBAR report given to Jesenia HORAN 6D.  Waiting for room 10 to be cleaned and then pt can go up.

## 2019-11-15 NOTE — PLAN OF CARE
"VS: /69 (BP Location: Left arm)   Pulse 63   Temp 98.7  F (37.1  C) (Oral)   Resp 16   Ht 1.676 m (5' 6\")   Wt 68 kg (150 lb)   SpO2 98%   BMI 24.21 kg/m      Pain: pain controlled w/ prn oxycodone, Lidocaine patch, and scheduled tylenol   Neuro: A&O x4.  Cardiac: AVSS.  Respiratory: Satting above 95% on RA.   GI/Diet/Appetite: Tolerating regular diet. Denies nausea.  : Self-caths   LDA's: L PIV infusing NS @100.  Skin: Bandages w/ minimal amount of dried drainage. L abdominal incision CDI, left GENARO.  Activity: Paraplegic, wheelchair bound, able to use arms.        Was told by wife, that she gave patient a Bisacodyl suppository. Unaware until after the administration  "

## 2019-11-15 NOTE — DISCHARGE SUMMARY
[facility] U[facility]Dis[facility] Summary    Patient: Kinza Teresa    MRN: [id number]   : 1975         Date of Admission:  2019   Date of Discharge::  11/15/2019  Admitting Physician:  Alejandro Tong MD  Discharge Physician:  Margie Ambrosio PA-C             Admission Diagnoses:   Erectile dysfunction due to diseases classified elsewhere [N52.1]    Past Medical History:   Diagnosis Date     Constipation      Sleep apnea      Spinal cord injury at T7-T12 level with spinal cord lesion (H) 2017             Discharge Diagnosis:   Erectile dysfunction due to diseases classified elsewhere [N52.1]    Past Medical History:   Diagnosis Date     Constipation      Sleep apnea      Spinal cord injury at T7-T12 level with spinal cord lesion (H) 2017            Procedures:   Procedure(s): 19 -   PROCEDURE:    1. Insertion of multi-component, inflatable penile prosthesis, including placement of pump, cylinders, and reservoir   Dr. Alejandro Tong, Urology            Medications Prior to Admission:     Medications Prior to Admission   Medication Sig Dispense Refill Last Dose     bisacodyl (DULCOLAX) 5 MG EC tablet Take 5 mg by mouth daily as needed for constipation 5-10 mg daily prn   Past Week at Unknown time     calcium carbonate (OSCAL 500) 1250 (500 CA) MG TABS tablet Take 1,200 mg by mouth With vitamin D to equal 1000 units/day   2019 at 0400     carvedilol (COREG) 6.25 MG tablet Take 6.25 mg by mouth 2 times daily (with meals)    2019 at 0400     CRANBERRY EXTRACT PO Takes 1000 mg   2019 at 0400     medical cannabis (Patient's own supply.  Not a prescription) See Admin Instructions (This is NOT a prescription, and does not certify that the patient has a qualifying medical condition for medical cannabis.  The purpose of this order is  to document that the patient reports taking medical cannabis.)   2019 at Unknown time     omeprazole 20 MG tablet Take 20 mg by mouth 2  times daily    11/14/2019 at 0400     oxybutynin ER (DITROPAN XL) 15 MG 24 hr tablet Take 1 tablet (15 mg) by mouth daily (Patient taking differently: Take 15 mg by mouth At Bedtime ) 90 tablet 3 11/13/2019 at 2100     pregabalin (LYRICA) 50 MG capsule Take 50 mg by mouth Per patient the dosing is 100 mg in AM,  and 50mg  at HS   11/14/2019 at 0400     Psyllium (METAMUCIL PO) Take by mouth At Bedtime    11/13/2019 at 2100     [facility]. Devices (WHEEL CHAIR K1 BASIC DESK ARM) MISC Wheelchair:  Tilt and space  with leg rests  Length of need: 6 months   Unknown at Unknown time     polyethylene glycol (MIRALAX/GLYCOLAX) Packet Take 17 g by mouth   More than a month at Unknown time     [DISCONTINUED] nitroFURantoin macrocrystal-monohydrate (MACROBID) 100 MG capsule Take 1 capsule (100 mg) by mouth 2 times daily for 6 days 12 capsule 0 Past Week at Unknown time     [DISCONTINUED] traMADol (ULTRAM) 50 MG tablet Take  mg by mouth   11/14/2019 at 0400             Discharge Medications:     Current Discharge Medication List      START taking these medications    Details   acetaminophen (TYLENOL) 325 MG tablet Take 2 tablets (650 mg) by mouth every 6 hours as needed for pain  Qty: 100 tablet, Refills: 0    Associated Diagnoses: Acute post-operative pain      oxyCODONE (ROXICODONE) 5 MG tablet Take 1-2 tablets (5-10 mg) by mouth every 6 hours as needed for pain  Qty: 15 tablet, Refills: 0    Associated Diagnoses: Erectile dysfunction due to diseases classified elsewhere         CONTINUE these medications which have NOT CHANGED    Details   bisacodyl (DULCOLAX) 5 MG EC tablet Take 5 mg by mouth daily as needed for constipation 5-10 mg daily prn      calcium carbonate (OSCAL 500) 1250 (500 CA) MG TABS tablet Take 1,200 mg by mouth With vitamin D to equal 1000 units/day      carvedilol (COREG) 6.25 MG tablet Take 6.25 mg by mouth 2 times daily (with meals)       CRANBERRY EXTRACT PO Takes 1000 mg      medical cannabis (Patient's  own supply.  Not a prescription) See Admin Instructions (This is NOT a prescription, and does not certify that the patient has a qualifying medical condition for medical cannabis.  The purpose of this order is  to document that the patient reports taking medical cannabis.)      omeprazole 20 MG tablet Take 20 mg by mouth 2 times daily       oxybutynin ER (DITROPAN XL) 15 MG 24 hr tablet Take 1 tablet (15 mg) by mouth daily  Qty: 90 tablet, Refills: 3    Associated Diagnoses: Neurogenic bladder      pregabalin (LYRICA) 50 MG capsule Take 50 mg by mouth Per patient the dosing is 100 mg in AM,  and 50mg  at HS      Psyllium (METAMUCIL PO) Take by mouth At Bedtime       Mis. Devices (WHEEL CHAIR K1 BASIC DESK ARM) MISC Wheelchair:  Tilt and space  with leg rests  Length of need: 6 months      polyethylene glycol (MIRALAX/GLYCOLAX) Packet Take 17 g by mouth         STOP taking these medications       nitroFURantoin macrocrystal-monohydrate (MACROBID) 100 MG capsule Comments:   Reason for Stopping:         traMADol (ULTRAM) 50 MG tablet Comments:   Reason for Stopping:                     Consultations:   Consultation during this admission received:   None          Brief History of Illness:   Reason for admission requiring a surgical or invasive procedure:   Erectile dysfunction due to diseases classified elsewhere [N52.1]   The patient underwent the following procedure(s):   See above   There were no immediate complications during this procedure.    Please refer to the full operative summary for details.           Hospital Course:   The patient's hospital course was remarkable for significant postop pain in his LLQ reservoir site requiring admission to observation. He also required a dose of IV ertapenem given known bacteriuria with ESBL colonization.  Kinza Teresa otherwise recovered as anticipated and experienced no post-operative complications.      Following surgery he resumed straight catheterization per his home  routine. As he worked to get his pain under control, he and his wife practiced transfers to assure they would be able to resume home activities of daily living.  Prior to discharge on POD#1 he was tolerating the discharge diet, had pain controlled with PO medications to go home with, and was requiring no IV medications or fluids. He had received a preop dose of ertapenem at 7am on 11/13 then another dose on POD#0 (11/14 at 10pm).  He had also received a dose of perioperative gentamicin, zosyn and vancomycin.   On POD#1 he was discharged to home with appropriate contact information, follow-up and instructions as seen below in the discharge paperwork.         Discharge Labs:     No results found for: PSA  No lab results found in last 7 days.  Recent Labs   Lab 11/08/19  1219      POTASSIUM 4.3   CHLORIDE 105   CO2 28   BUN 12   CR 0.68   GLC 99   LAYLA 9.8     No lab results found in last 7 days.    Invalid input(s): URINEBLOOD  Results for orders placed or performed in visit on 11/08/19   Urine Culture Aerobic Bacterial   Result Value Ref Range    Specimen Description Midstream Urine     Culture Micro >100,000 colonies/mL  Klebsiella pneumoniae ESBL   (A)     Culture Micro       ESBL (extended beta lactamase) producing organisms require contact precautions.       Susceptibility    Klebsiella pneumoniae esbl - THANH     AMPICILLIN* >=32 Resistant ug/mL      * Intrinsically Resistant     CEFAZOLIN* >=64 Resistant ug/mL      * Cefazolin THANH breakpoints are for the treatment of uncomplicated urinary tract infections.  For the treatment of systemic infections, please contact the laboratory for additional testing.     CEFOXITIN <=4 Sensitive ug/mL     CEFTAZIDIME 16 Resistant ug/mL     CEFTRIAXONE >=64 Resistant ug/mL     CIPROFLOXACIN >=4 Resistant ug/mL     GENTAMICIN >=16 Resistant ug/mL     LEVOFLOXACIN >=8 Resistant ug/mL     NITROFURANTOIN 128 Resistant ug/mL     TOBRAMYCIN >=16 Resistant ug/mL      Trimethoprim/Sulfa >=16/304 Resistant ug/mL     AMPICILLIN/SULBACTAM >=32 Resistant ug/mL     Piperacillin/Tazo 16 Sensitive ug/mL     AMIKACIN <=2 Sensitive ug/mL     CEFEPIME  Resistant ug/mL     MEROPENEM* <=0.25 Sensitive ug/mL      * Cefazolin THANH breakpoints are for the treatment of uncomplicated urinary tract infections.  For the treatment of systemic infections, please contact the laboratory for additional testing.Enterobacteriaceae that are susceptible to meropenem are usually susceptible to ertapenem.   Results for orders placed or performed in visit on 10/16/19   Urine Culture Aerobic Bacterial   Result Value Ref Range    Specimen Description Midstream Urine     Culture Micro >100,000 colonies/mL  Klebsiella pneumoniae ESBL   (A)     Culture Micro       ESBL (extended beta lactamase) producing organisms require contact precautions.       Susceptibility    Klebsiella pneumoniae esbl - THANH     AMPICILLIN* >=32 Resistant ug/mL      * Intrinsically Resistant     CEFAZOLIN* >=64 Resistant ug/mL      * Cefazolin THANH breakpoints are for the treatment of uncomplicated urinary tract infections.  For the treatment of systemic infections, please contact the laboratory for additional testing.     CEFOXITIN <=4 Sensitive ug/mL     CEFTAZIDIME 16 Resistant ug/mL     CEFTRIAXONE >=64 Resistant ug/mL     CIPROFLOXACIN >=4 Resistant ug/mL     GENTAMICIN >=16 Resistant ug/mL     LEVOFLOXACIN >=8 Resistant ug/mL     NITROFURANTOIN 64 Intermediate ug/mL     TOBRAMYCIN 8 Intermediate ug/mL     Trimethoprim/Sulfa >=16/304 Resistant ug/mL     AMPICILLIN/SULBACTAM >=32 Resistant ug/mL     Piperacillin/Tazo 32 Intermediate ug/mL     AMIKACIN <=2 Sensitive ug/mL     CEFEPIME  Resistant ug/mL     MEROPENEM* <=0.25 Sensitive ug/mL      * Cefazolin THANH breakpoints are for the treatment of uncomplicated urinary tract infections.  For the treatment of systemic infections, please contact the laboratory for additional  testing.Enterobacteriaceae that are susceptible to meropenem are usually susceptible to ertapenem.       Results for orders placed or performed during the hospital encounter of 11/14/19   Glucose by meter     Status: Abnormal   Result Value Ref Range    Glucose 111 (H) 70 - 99 mg/dL            Discharge Instructions and Follow-Up:   Diet:   - Return to home diet    Activity:  - No strenuous exercise or sexual activity for 6 weeks.  - Wait to activate the device for the two week appointment at a minimum  - No lifting, pushing, pulling more than 15 pounds for 4 weeks.   - Do not strain with bowel movements.  - Do not drive until you can press the brake pedal quickly and fully without pain.   - Do not operate a motor vehicle while taking narcotic pain medications.     Incisions:  - Wait to inflate your device until you are instructed to do so at the 2 week follow up appointment with Dr. Tong; It is, however, recommended that you check the positioning of your device within the scrotum daily to become familiar with its positioning.   - You may shower and get incisions wet starting 48 hrs after surgery.  - Do not scrub incisions or submerge wounds (aka, bath, pool, hot tub, ect.) until advised by Dr. Tong  - Remove wound dressing 48 hours after surgery.   - You may notice small sutures in your scrotum over the incision. These will slowly dissolve and fall out in the coming weeks  - Leave incision open toair.  Cover with gauze only if needed for comfort or to protect clothing from drainage.     Medications:  PAIN: Oxycodone is a narcotic medication that has been prescribed for pain.  Narcotics will cause sleepiness and constipation and can become addictive, therefore it is best to stop or reduce them as soon as you can.  Any left over narcotics should be disposed of with an Authorized  for unneeded medications.  Contact your [employer] s or [facility] s [employer] trash and recycling service to learn about  "medication disposal options and guidelines for your area.  If you decide to store this medication at home it should be kept in a locked cabinet to prevent access to children or visitors. To reduce your narcotic use, takeTylenol (acetaminophen 625mg) every 4-6 hours.  This has been prescribed for you.  Do not take more than 4,000mg of Tylenol (acetaminophen/ APAP) from all sources in any 24 hour period since this can cause liver damage.  Do not take more than 2400mg of ibuprofen in any 24 hour period since this can cause kidney damage. Never drive, operate machinery or drink alcoholic beverages while you are taking narcotic pain medications.     BOWELS: Take stool softeners such as Miralax while you are using narcotics, but stop if you develop diarrhea.     Follow-Up:   - Schedule an appointment to be seen by your primary care provider within 7-10 days of discharge for a postoperative checkup.   - Follow up with Dr. Tong in two weeks for a wound check  - Call or return sooner than your regularly scheduled visit if you develop any of the following:  Fever (greater than 101.3F), uncontrolled pain, uncontrolled nausea or vomiting, concerns about bowel function, as well as increased redness, swelling, drainage from your wound/ penis or any concerns about urinating or catheterizing    Here are some phone numbers where you can reach us:  - Monday through Friday, 8am to 4:30pm - as long as it is not a holiday, IT IS BEST to call the Urology Clinic Triage Line at: [phone] with any non-emergent urology concerns.    - If it is a night, weekend, or holiday call the [facility] number at [phone] and ask the  to page the \"urology resident on call\".  Typically, the on-call provider should return your call within 30 minutes.  Please page the on-call provider again if you haven't been contacted as expected.  Rarely, the on-call provider will be unable to promptly return a call due to a hospital emergency.  " "If you have paged twice and are still not contacted, ask the hospital  to page the \"urology CHIEF-RESIDENT on call\".  - FOR EMERGENCIES, always call 911 or go to the Emergency Department         Discharge Disposition:   Discharged to home      Attestation: I have reviewed today's vital signs, notes, medications, labs and imaging.    Marisa Ambrosio PA-C  Urology Physician Assistant  Personal Pager: [phone]    Please call Job Code:   [phone] to reach the Urology resident or PA on call - Weekdays  [phone] to reach the Urology resident or PA on call - Weeknights and weekends    November 15, 2019         "

## 2019-11-15 NOTE — PLAN OF CARE
Observation Goals  Pain controlled with oral medication. Yes (pt denies acute pain and declines prn pain medications this shift).

## 2019-11-16 VITALS
OXYGEN SATURATION: 98 % | RESPIRATION RATE: 16 BRPM | SYSTOLIC BLOOD PRESSURE: 113 MMHG | WEIGHT: 150 LBS | BODY MASS INDEX: 24.11 KG/M2 | HEART RATE: 68 BPM | TEMPERATURE: 98.2 F | HEIGHT: 66 IN | DIASTOLIC BLOOD PRESSURE: 69 MMHG

## 2019-11-16 LAB
ANION GAP SERPL CALCULATED.3IONS-SCNC: 5 MMOL/L (ref 3–14)
BUN SERPL-MCNC: 13 MG/DL (ref 7–30)
CALCIUM SERPL-MCNC: 8.7 MG/DL (ref 8.5–10.1)
CHLORIDE SERPL-SCNC: 110 MMOL/L (ref 94–109)
CO2 SERPL-SCNC: 28 MMOL/L (ref 20–32)
CREAT SERPL-MCNC: 0.78 MG/DL (ref 0.66–1.25)
ERYTHROCYTE [DISTWIDTH] IN BLOOD BY AUTOMATED COUNT: 13.2 % (ref 10–15)
GFR SERPL CREATININE-BSD FRML MDRD: >90 ML/MIN/{1.73_M2}
GLUCOSE SERPL-MCNC: 104 MG/DL (ref 70–99)
HCT VFR BLD AUTO: 36.4 % (ref 40–53)
HGB BLD-MCNC: 11.7 G/DL (ref 13.3–17.7)
MCH RBC QN AUTO: 30.2 PG (ref 26.5–33)
MCHC RBC AUTO-ENTMCNC: 32.1 G/DL (ref 31.5–36.5)
MCV RBC AUTO: 94 FL (ref 78–100)
PLATELET # BLD AUTO: 164 10E9/L (ref 150–450)
POTASSIUM SERPL-SCNC: 3.9 MMOL/L (ref 3.4–5.3)
RBC # BLD AUTO: 3.88 10E12/L (ref 4.4–5.9)
SODIUM SERPL-SCNC: 143 MMOL/L (ref 133–144)
WBC # BLD AUTO: 5.9 10E9/L (ref 4–11)

## 2019-11-16 PROCEDURE — 36415 COLL VENOUS BLD VENIPUNCTURE: CPT | Performed by: STUDENT IN AN ORGANIZED HEALTH CARE EDUCATION/TRAINING PROGRAM

## 2019-11-16 PROCEDURE — 25000132 ZZH RX MED GY IP 250 OP 250 PS 637: Performed by: PHYSICIAN ASSISTANT

## 2019-11-16 PROCEDURE — 25000132 ZZH RX MED GY IP 250 OP 250 PS 637: Performed by: STUDENT IN AN ORGANIZED HEALTH CARE EDUCATION/TRAINING PROGRAM

## 2019-11-16 PROCEDURE — 85027 COMPLETE CBC AUTOMATED: CPT | Performed by: STUDENT IN AN ORGANIZED HEALTH CARE EDUCATION/TRAINING PROGRAM

## 2019-11-16 PROCEDURE — 80048 BASIC METABOLIC PNL TOTAL CA: CPT | Performed by: STUDENT IN AN ORGANIZED HEALTH CARE EDUCATION/TRAINING PROGRAM

## 2019-11-16 RX ADMIN — ACETAMINOPHEN 975 MG: 325 TABLET, FILM COATED ORAL at 08:49

## 2019-11-16 RX ADMIN — OXYCODONE HYDROCHLORIDE 10 MG: 5 TABLET ORAL at 08:50

## 2019-11-16 RX ADMIN — IBUPROFEN 600 MG: 600 TABLET, FILM COATED ORAL at 08:51

## 2019-11-16 RX ADMIN — METHOCARBAMOL 500 MG: 500 TABLET, FILM COATED ORAL at 08:52

## 2019-11-16 RX ADMIN — CARVEDILOL 6.25 MG: 6.25 TABLET, FILM COATED ORAL at 08:52

## 2019-11-16 RX ADMIN — METHOCARBAMOL 500 MG: 500 TABLET, FILM COATED ORAL at 12:11

## 2019-11-16 RX ADMIN — LIDOCAINE 1 PATCH: 560 PATCH PERCUTANEOUS; TOPICAL; TRANSDERMAL at 08:52

## 2019-11-16 RX ADMIN — OMEPRAZOLE 20 MG: 20 CAPSULE, DELAYED RELEASE ORAL at 08:51

## 2019-11-16 RX ADMIN — ACETAMINOPHEN 650 MG: 325 TABLET, FILM COATED ORAL at 12:11

## 2019-11-16 RX ADMIN — OXYCODONE HYDROCHLORIDE 10 MG: 5 TABLET ORAL at 12:12

## 2019-11-16 ASSESSMENT — PAIN DESCRIPTION - DESCRIPTORS
DESCRIPTORS: ACHING;SORE
DESCRIPTORS: ACHING;SORE

## 2019-11-16 NOTE — PROGRESS NOTES
"/69 (BP Location: Left arm)   Pulse 68   Temp 98.2  F (36.8  C) (Oral)   Resp 16   Ht 1.676 m (5' 6\")   Wt 68 kg (150 lb)   SpO2 98%   BMI 24.21 kg/m      Observation goals:   Pain controlled with oral medication: pain stated 8/10 this AM, gave PRN oxycodone 10 mg, tylenol, ibuprofen, and robaxin for discomfort. Decreased pain reported.     Pt is A&Ox4, VSS,   "

## 2019-11-16 NOTE — PLAN OF CARE
Shift: 0700 - 1330  VS: Temp: 98.2  F (36.8  C) Temp src: Oral BP: 113/69 Pulse: 68 Heart Rate: 70 Resp: 16 SpO2: 98 % O2 Device: None (Room air)    Pain: LLQ incisional pain, PRN oxycodone and tylenol given along with scheduled robaxin and other pain meds.   Neuro: A&Ox4. Cooperative with care. Paraplegia, no sensation below abdomen.   Cardiac:   WDL  Respiratory: Lung sounds clear on RA.   GI/Diet/Appetite: Regular diet with good appetite.   :  Straight cath to void at baseline. Maintains independently, declined assistance.   LDA's: L&R PIV removed prior to discharge.   Skin: LLQ and scrotal incision, approximated with dermabond, no drainage, GENARO.   Activity:  bound uses slide board to transfer to  independently.   Tests/Procedures:   Pertinent Labs/Lab Collection:      Plan: Patient discharge home with wife. Discharge orders reviewed, questions and concerns addressed. PIV's removed.

## 2019-11-16 NOTE — PLAN OF CARE
Pt alert and oriented this shift. Vitals WNL. Pt getting scheduled and PRN pain medication for pain control. Pt did not want to be woken up throughout night and was instructed to ask for pain meds if needed; pt agreed with this plan. Pt received IV antibiotic without any issues. Able to make needs known. Pt tolerating regular diet. Pt self caths. Call light within reach. Will continue to monitor.

## 2019-11-16 NOTE — PROGRESS NOTES
"Urology  Progress Note  11/16/2019    - Feels well this morning, pain is under much better control  - Was able to transfer yesterday  - Performing self cath without issues.     Exam  /54 (BP Location: Left arm)   Pulse 84   Temp 98  F (36.7  C) (Oral)   Resp 18   Ht 1.676 m (5' 6\")   Wt 68 kg (150 lb)   SpO2 96%   BMI 24.21 kg/m    No acute distress  Unlabored breathing  Abdomen soft, nontender, nondistended. Incision over left ectopic reservoir c/d/i.   penis with very mild ecchymosis, glans is pink and well perfused, incision at penoscrotal junction c/d/i.    Assessment/Plan  44 year old y/o male POD#2 s/p primary IPP placement for ED.     Neuro: Tylenol, oxy, robaxin, lidocaine patch prn for pain control. PTA lyrica  CV: HDS, home metoprolol  Pulm: incentive spirometry while awake  FEN/GI: Regular diet, no MIVF  Endo: No issues  : Performs self-catheterization at baseline. PTA oxybutynin.  Heme/ID: Discharge on 1 week course of Bactrim.  Activity: Ad ingrid.  PPx: SCDs  Dispo: Home today.    Seen and examined with staff, Dr. Baltazar.    Regi Holden MD  PGY-2 Urology  Pager [phone]       Contacting the Urology Team     Please use the following job codes to reach the Urology Team. Note that you must use an in house phone and that job codes cannot receive text pages.     On weekdays, dial [phone] (or star-star-star 777 on the [place] [facility] telephones) then [phone] to reach the Adult Urology resident or PA on call    On weekdays, dial [phone] (or star-star-star 777 on the [place] [facility] telephones) then [phone] to reach the Pediatric Urology resident    On weeknights and weekends, dial [phone] (or star-star-star 777 on the [place] [facility] telephones) then [phone] to reach the Urology resident on call (for both Adult and Pediatrics)              "

## 2019-11-19 ENCOUNTER — PRE VISIT (OUTPATIENT)
Dept: UROLOGY | Facility: CLINIC | Age: 44
End: 2019-11-19

## 2019-11-19 NOTE — TELEPHONE ENCOUNTER
Chief Complaint : Post Op- 11/14 IPP    Hx/Sx: s/p IPP     Records/Orders: Available     Pt Contacted: n/a    At Rooming: Normal, maybe dressing change

## 2019-11-20 ENCOUNTER — TELEPHONE (OUTPATIENT)
Dept: UROLOGY | Facility: CLINIC | Age: 44
End: 2019-11-20

## 2019-11-20 DIAGNOSIS — N52.1 ERECTILE DYSFUNCTION DUE TO DISEASES CLASSIFIED ELSEWHERE: ICD-10-CM

## 2019-11-20 RX ORDER — OXYCODONE HYDROCHLORIDE 10 MG/1
5-10 TABLET ORAL EVERY 4 HOURS PRN
Qty: 18 TABLET | Refills: 0 | Status: SHIPPED | OUTPATIENT
Start: 2019-11-20 | End: 2021-03-25

## 2019-11-20 NOTE — TELEPHONE ENCOUNTER
Patient called and his pain is 10 without meds  A 3 with medication  Spoke with dr vigil he oked another refill Dasia Guzman, MARIANON Staff Nurse

## 2019-11-20 NOTE — TELEPHONE ENCOUNTER
[facility] Call Center    Phone Message    May a detailed message be left on voicemail: yes    Reason for Call: Medication Refill Request    Has the patient contacted the pharmacy for the refill? Yes   Name of medication being requested: oxyCODONE (ROXICODONE) 10 MG tablet  Provider who prescribed the medication: Dr. Tong  Pharmacy: [id number] - [place], [place] - [address] [facility] [address]  Date medication is needed: *11/20/19.  Kinza states he only has 8 tablets left and is still having some pain when he works.  Please call him back to discuss         Action Taken: Message routed to:  [facility] MANDIE Bueno

## 2019-11-21 ENCOUNTER — PATIENT OUTREACH (OUTPATIENT)
Dept: UROLOGY | Facility: CLINIC | Age: 44
End: 2019-11-21

## 2019-11-21 NOTE — PROGRESS NOTES
[facility] post op call: Please see triage call with YUMI Hutson, RN, BSN  Care Coordinator- Reconstructive Urology

## 2019-11-22 ENCOUNTER — TELEPHONE (OUTPATIENT)
Dept: UROLOGY | Facility: CLINIC | Age: 44
End: 2019-11-22

## 2019-11-22 NOTE — TELEPHONE ENCOUNTER
[facility] Call Center    Phone Message    May a detailed message be left on voicemail: yes    Reason for Call: Medication Refill Request    Has the patient contacted the pharmacy for the refill? Yes   Name of medication being requested: oxyCODONE IR (ROXICODONE) 10 MG tablet    Provider who prescribed the medication: Guzman, Dasia, LPN  Pharmacy: [id number] - [place], [place] - [address] [facility] [address]  Date medication is needed: ASAP - Pt is out after today    Pt of Dr Tong     Action Taken: Message routed to:  [facility] urology

## 2019-11-22 NOTE — TELEPHONE ENCOUNTER
Patient states that his pain level in his abdominal area is a 10. Patient and his wife was notified that his pain medication for Oxycodone was left on the first floor  stand on 11/20/2019. Patient wife states that she would pick it up today.        Chiqui Jeffers MA

## 2019-11-27 ENCOUNTER — OFFICE VISIT (OUTPATIENT)
Dept: UROLOGY | Facility: CLINIC | Age: 44
End: 2019-11-27
Payer: COMMERCIAL

## 2019-11-27 VITALS
HEART RATE: 67 BPM | WEIGHT: 150 LBS | SYSTOLIC BLOOD PRESSURE: 118 MMHG | BODY MASS INDEX: 24.11 KG/M2 | HEIGHT: 66 IN | DIASTOLIC BLOOD PRESSURE: 62 MMHG

## 2019-11-27 DIAGNOSIS — N52.1 ERECTILE DYSFUNCTION DUE TO DISEASES CLASSIFIED ELSEWHERE: Primary | ICD-10-CM

## 2019-11-27 PROBLEM — R32 URINARY INCONTINENCE: Status: ACTIVE | Noted: 2019-11-27

## 2019-11-27 PROBLEM — M54.9 DORSALGIA, UNSPECIFIED: Status: ACTIVE | Noted: 2019-11-27

## 2019-11-27 PROBLEM — R13.10 DYSPHAGIA: Status: ACTIVE | Noted: 2019-11-27

## 2019-11-27 PROBLEM — S06.9XAS: Status: ACTIVE | Noted: 2019-11-27

## 2019-11-27 PROBLEM — Z93.1 S/P PERCUTANEOUS ENDOSCOPIC GASTROSTOMY (PEG) TUBE PLACEMENT (H): Status: ACTIVE | Noted: 2017-06-06

## 2019-11-27 PROBLEM — M51.369 OTHER INTERVERTEBRAL DISC DEGENERATION, LUMBAR REGION: Status: ACTIVE | Noted: 2019-04-15

## 2019-11-27 PROBLEM — K59.2 NEUROGENIC BOWEL: Status: ACTIVE | Noted: 2017-05-08

## 2019-11-27 PROBLEM — Z43.0: Status: ACTIVE | Noted: 2019-11-27

## 2019-11-27 PROBLEM — R09.02 HYPOXIA: Status: ACTIVE | Noted: 2019-07-23

## 2019-11-27 PROBLEM — N31.9 NEUROGENIC BLADDER: Status: ACTIVE | Noted: 2017-05-08

## 2019-11-27 PROBLEM — S14.3XXA BRACHIAL PLEXUS INJURY, LEFT: Status: ACTIVE | Noted: 2017-04-10

## 2019-11-27 PROBLEM — S83.422A TEAR OF LATERAL COLLATERAL LIGAMENT OF LEFT KNEE: Status: ACTIVE | Noted: 2017-04-10

## 2019-11-27 PROBLEM — J95.821 ACUTE RESPIRATORY FAILURE FOLLOWING TRAUMA AND SURGERY (H): Status: ACTIVE | Noted: 2017-03-21

## 2019-11-27 PROBLEM — R41.89 COGNITIVE IMPAIRMENT: Status: ACTIVE | Noted: 2017-03-21

## 2019-11-27 PROBLEM — G56.30: Status: ACTIVE | Noted: 2017-06-06

## 2019-11-27 PROBLEM — S33.4XXA: Status: ACTIVE | Noted: 2017-03-27

## 2019-11-27 PROBLEM — K21.9 GASTRO-ESOPHAGEAL REFLUX DISEASE WITHOUT ESOPHAGITIS: Status: ACTIVE | Noted: 2019-11-27

## 2019-11-27 PROBLEM — Z79.891 LONG TERM (CURRENT) USE OF OPIATE ANALGESIC: Status: ACTIVE | Noted: 2019-11-27

## 2019-11-27 PROBLEM — R53.81 PHYSICAL DECONDITIONING: Status: ACTIVE | Noted: 2017-03-21

## 2019-11-27 PROBLEM — E46 MALNUTRITION (H): Status: ACTIVE | Noted: 2017-03-21

## 2019-11-27 PROBLEM — S42.292A: Status: ACTIVE | Noted: 2017-05-08

## 2019-11-27 PROBLEM — G89.4 CHRONIC PAIN DISORDER: Status: ACTIVE | Noted: 2019-11-27

## 2019-11-27 PROBLEM — R65.10 SIRS (SYSTEMIC INFLAMMATORY RESPONSE SYNDROME) (H): Status: ACTIVE | Noted: 2019-07-22

## 2019-11-27 PROBLEM — R50.9 FEVER, UNSPECIFIED FEVER CAUSE: Status: ACTIVE | Noted: 2019-11-27

## 2019-11-27 RX ORDER — TRAMADOL HYDROCHLORIDE 50 MG/1
50 TABLET ORAL DAILY PRN
Refills: 2 | Status: ON HOLD | COMMUNITY
Start: 2019-11-04 | End: 2023-05-30

## 2019-11-27 ASSESSMENT — MIFFLIN-ST. JEOR: SCORE: 1503.15

## 2019-11-27 ASSESSMENT — PAIN SCALES - GENERAL: PAINLEVEL: SEVERE PAIN (7)

## 2019-11-27 NOTE — PATIENT INSTRUCTIONS
Please schedule a follow up appointment with Dr. Tong in 4 weeks.      It was a pleasure meeting with you today.  Thank you for allowing me and my team the privilege of caring for you today.  YOU are the reason we are here, and I truly hope we provided you with the excellent service you deserve.  Please let us know if there is anything else we can do for you so that we can be sure you are leaving completely satisfied with your care experience.

## 2019-11-27 NOTE — PROGRESS NOTES
Urology Postop Note    Kinza Teresa is a 44 year old male who is 2 weeks s/p IPP insertion (penoscrotal, LLL counter incision)  He has hx of SCI and neurogenic bladder so it was a complicated placement (also pelvic hardware)  He returns now. Incisions c/d/i. Minor fibrinous exudate.  He has a history of neurogenic DO which he feels is associated with UTI  He has been treated frequently.  He takes oxybutyin 15mg. He tried to increase previously but had side effects.  Had UDS earlier this year which demonstrated continued DO.    Vital signs reviewed    Exam:  Incision clean, dry, intact  No tenderness or evidence of cellulitis  No hematoma  Pump in good position.  Slightly asymmetric cylinders. No sign of infection or erosion. I cycled device. It works. I discussed keeping it tucked to the left.      A/P   2 weeks s/p IPP insertion  -UCx today. Might resist the urge to treat if again only IV options.  -Continue CIC  -Consider botox injection in the future  -F/u in 4 weeks for activation    Alejandro Tong MD
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
## 2019-11-27 NOTE — LETTER
11/27/2019       RE: Kinza Teresa  [address] [address]     Dear Colleague,    Thank you for referring your patient, Kinza Teresa, to the [facility] UROLOGY AND INST FOR PROSTATE AND UROLOGIC CANCERS at [facility]. Please see a copy of my visit note below.    Urology Postop Note    Kinza Teresa is a 44 year old male who is 2 weeks s/p IPP insertion (penoscrotal, LLL counter incision)  He has hx of SCI and neurogenic bladder so it was a complicated placement (also pelvic hardware)  He returns now. Incisions c/d/i. Minor fibrinous exudate.  He has a history of neurogenic DO which he feels is associated with UTI  He has been treated frequently.  He takes oxybutyin 15mg. He tried to increase previously but had side effects.  Had UDS earlier this year which demonstrated continued DO.    Vital signs reviewed    Exam:  Incision clean, dry, intact  No tenderness or evidence of cellulitis  No hematoma  Pump in good position.  Slightly asymmetric cylinders. No sign of infection or erosion. I cycled device. It works. I discussed keeping it tucked to the left.    A/P   2 weeks s/p IPP insertion  -UCx today. Might resist the urge to treat if again only IV options.  -Continue CIC  -Consider botox injection in the future  -F/u in 4 weeks for activation    Alejandro Tong MD

## 2019-11-29 LAB
BACTERIA SPEC CULT: ABNORMAL
Lab: ABNORMAL
SPECIMEN SOURCE: ABNORMAL

## 2019-12-04 ENCOUNTER — TELEPHONE (OUTPATIENT)
Dept: UROLOGY | Facility: CLINIC | Age: 44
End: 2019-12-04

## 2019-12-04 DIAGNOSIS — N39.0 URINARY TRACT INFECTION: Primary | ICD-10-CM

## 2019-12-04 RX ORDER — NITROFURANTOIN 25; 75 MG/1; MG/1
100 CAPSULE ORAL 2 TIMES DAILY
Qty: 20 CAPSULE | Refills: 0 | Status: SHIPPED | OUTPATIENT
Start: 2019-12-04 | End: 2019-12-14

## 2019-12-04 NOTE — TELEPHONE ENCOUNTER
[facility] Call Center    Phone Message    May a detailed message be left on voicemail: yes    Reason for Call: Other: Pt is requesting a letter from Dr Tong stating that he needs physical therapy before his upcoming appointment with him. Please place in pts chart and call pt once done, thanks!     Action Taken: Other: Routed to [facility]rology clinic

## 2019-12-05 ENCOUNTER — TELEPHONE (OUTPATIENT)
Dept: UROLOGY | Facility: CLINIC | Age: 44
End: 2019-12-05

## 2019-12-05 NOTE — TELEPHONE ENCOUNTER
Patients wife called and patient having mri in [place] and needed make and model of the IPP Dasia Guzman LPN Staff Nurse

## 2019-12-09 NOTE — TELEPHONE ENCOUNTER
Called number provided and number is no longer in service.    Yadira Jalloh, RN, BSN  Care Coordinator- Reconstructive Urology

## 2019-12-27 ENCOUNTER — PRE VISIT (OUTPATIENT)
Dept: UROLOGY | Facility: CLINIC | Age: 44
End: 2019-12-27

## 2019-12-27 NOTE — TELEPHONE ENCOUNTER
Chief Complaint : Return-6 weeks    Hx/Sx: IPP Activation     Records/Orders: Available     Pt Contacted: n/a    At Rooming: Normal

## 2020-01-08 ENCOUNTER — OFFICE VISIT (OUTPATIENT)
Dept: UROLOGY | Facility: CLINIC | Age: 45
End: 2020-01-08
Payer: COMMERCIAL

## 2020-01-08 VITALS
DIASTOLIC BLOOD PRESSURE: 77 MMHG | BODY MASS INDEX: 25.71 KG/M2 | HEIGHT: 66 IN | SYSTOLIC BLOOD PRESSURE: 131 MMHG | HEART RATE: 55 BPM | WEIGHT: 160 LBS

## 2020-01-08 DIAGNOSIS — N52.9 ORGANIC ERECTILE DYSFUNCTION: Primary | ICD-10-CM

## 2020-01-08 ASSESSMENT — MIFFLIN-ST. JEOR: SCORE: 1548.51

## 2020-01-08 ASSESSMENT — PAIN SCALES - GENERAL: PAINLEVEL: NO PAIN (0)

## 2020-01-08 NOTE — PATIENT INSTRUCTIONS
Please follow up with Dr. Tong with Botox the last wed in February. We will call you to set up the antibiotic injection prior to botox.    It was a pleasure meeting with you today.  Thank you for allowing me and my team the privilege of caring for you today.  YOU are the reason we are here, and I truly hope we provided you with the excellent service you deserve.  Please let us know if there is anything else we can do for you so that we can be sure you are leaving completely satisfied with your care experience.        Ricardo Queen, EMT

## 2020-01-08 NOTE — PROGRESS NOTES
"Kinza Teresa is a 44 year old male who is about 2 months s/p IPP insertion  He has a spinal cord injury.  He comes in today with his wife.  He had significant pain of left sided counter incision which is now resolved.  He also has a lot of pelvic hardware which we avoided during surgery.    /77   Pulse 55   Ht 1.676 m (5' 6\")   Wt 72.6 kg (160 lb)   BMI 25.82 kg/m      Exam:  Incision clean, dry, intact  No tenderness or evidence of cellulitis  No hematoma  Some floppy glans with rightward leaning glans. Device is within corporal body but glans flops to right a little bit. Can be corrected with manual manipulation.    A/P   Excellent outcome after IPP insertion.  Some mild floppy glans.  We discussed glansplasty but would prefer observation  Cycles nicely today in clinic.  Wife and patient demonstrated.  Ok to use.  Monitor closely. Discussed signs of erosion and need to cease use and RTC if needed.  Has persistent neurogenic DO on oxybutynin - demonstrated on uds. He has recurrent UTIs which he cannot clear. Wants to try Botox. Will set up for last week in February in clinic. Ertapenem day before and day of procedure.    Alejandro Tong MD    "

## 2020-01-08 NOTE — LETTER
"1/8/2020       RE: Kinza Teresa  [address] [address]     Dear Colleague,    Thank you for referring your patient, Kinza Teresa, to the [facility] UROLOGY AND INST FOR PROSTATE AND UROLOGIC CANCERS at [facility]. Please see a copy of my visit note below.    Kinza Teresa is a 44 year old male who is about 2 months s/p IPP insertion  He has a spinal cord injury.  He comes in today with his wife.  He had significant pain of left sided counter incision which is now resolved.  He also has a lot of pelvic hardware which we avoided during surgery.    /77   Pulse 55   Ht 1.676 m (5' 6\")   Wt 72.6 kg (160 lb)   BMI 25.82 kg/m       Exam:  Incision clean, dry, intact  No tenderness or evidence of cellulitis  No hematoma  Some floppy glans with rightward leaning glans. Device is within corporal body but glans flops to right a little bit. Can be corrected with manual manipulation.    A/P   Excellent outcome after IPP insertion.  Some mild floppy glans.  We discussed glansplasty but would prefer observation  Cycles nicely today in clinic.  Wife and patient demonstrated.  Ok to use.  Monitor closely. Discussed signs of erosion and need to cease use and RTC if needed.  Has persistent neurogenic DO on oxybutynin - demonstrated on uds. He has recurrent UTIs which he cannot clear. Wants to try Botox. Will set up for last week in February in clinic. Ertapenem day before and day of procedure.    Alejandro Tong MD      Again, thank you for allowing me to participate in the care of your patient.      Sincerely,    Alejandro Tong MD      "
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
## 2020-01-10 ENCOUNTER — TELEPHONE (OUTPATIENT)
Dept: UROLOGY | Facility: CLINIC | Age: 45
End: 2020-01-10

## 2020-01-10 DIAGNOSIS — Z29.89 NEED FOR PROPHYLAXIS AGAINST URINARY TRACT INFECTION: Primary | ICD-10-CM

## 2020-01-10 NOTE — TELEPHONE ENCOUNTER
[facility] Call Center    Phone Message    May a detailed message be left on voicemail: yes    Reason for Call: Form or Letter   Type or form/letter needing completion: return to exercise program at [facility]  Provider: Dr. Tong  Date form needed: 1/10/20  Once completed: Fax form to: [phone] Attention: Kinsey Randle      Action Taken: Message routed to:  [facility] uro

## 2020-01-15 NOTE — PROGRESS NOTES
Faxed letter to crissy vega at [phone].    Yadira Jalloh RN, BSN  Care Coordinator- Reconstructive Urology

## 2020-01-15 NOTE — TELEPHONE ENCOUNTER
Left Keshia a message to call back to discuss letter details.    Yadira Jalloh, RN, BSN  Care Coordinator- Reconstructive Urology

## 2020-01-15 NOTE — LETTER
January 15, 2020    Kinza Teresa  [address] [address]        To Whom it May Concern,    Mr. Kinza Teresa can resume back to all physical activity prior to his surgical procedure with Dr. Tong on November 14, 2019 and has no restrictions.      If you have any further questions please feel free to call the clinic at [phone].      Sincerely,    Alejandro Tong MD

## 2020-01-15 NOTE — TELEPHONE ENCOUNTER
[facility] Call Center    Phone Message    May a detailed message be left on voicemail: yes    Reason for Call: Other: please call spouse to confirm if this has been faxed.     Action Taken: Message routed to:  [facility] gadiel uro

## 2020-01-17 ENCOUNTER — TELEPHONE (OUTPATIENT)
Dept: UROLOGY | Facility: CLINIC | Age: 45
End: 2020-01-17

## 2020-01-17 DIAGNOSIS — N39.0 URINARY TRACT INFECTION: Primary | ICD-10-CM

## 2020-01-17 DIAGNOSIS — N39.0 URINARY TRACT INFECTION: ICD-10-CM

## 2020-01-17 LAB
ALBUMIN UR-MCNC: NEGATIVE MG/DL
APPEARANCE UR: ABNORMAL
BACTERIA #/AREA URNS HPF: ABNORMAL /HPF
BILIRUB UR QL STRIP: NEGATIVE
COLOR UR AUTO: YELLOW
GLUCOSE UR STRIP-MCNC: NEGATIVE MG/DL
HGB UR QL STRIP: ABNORMAL
KETONES UR STRIP-MCNC: NEGATIVE MG/DL
LEUKOCYTE ESTERASE UR QL STRIP: ABNORMAL
NITRATE UR QL: POSITIVE
PH UR STRIP: 5.5 PH (ref 5–7)
RBC #/AREA URNS AUTO: ABNORMAL /HPF
SOURCE: ABNORMAL
SP GR UR STRIP: 1.02 (ref 1–1.03)
UROBILINOGEN UR STRIP-ACNC: 0.2 EU/DL (ref 0.2–1)
WBC #/AREA URNS AUTO: ABNORMAL /HPF

## 2020-01-17 PROCEDURE — 87086 URINE CULTURE/COLONY COUNT: CPT | Performed by: INTERNAL MEDICINE

## 2020-01-17 PROCEDURE — 81001 URINALYSIS AUTO W/SCOPE: CPT | Performed by: INTERNAL MEDICINE

## 2020-01-17 PROCEDURE — 87088 URINE BACTERIA CULTURE: CPT | Performed by: INTERNAL MEDICINE

## 2020-01-17 PROCEDURE — 87186 SC STD MICRODIL/AGAR DIL: CPT | Performed by: INTERNAL MEDICINE

## 2020-01-17 NOTE — TELEPHONE ENCOUNTER
[facility] Call Center    Phone Message    May a detailed message be left on voicemail: yes    Reason for Call: Order(s): Other:   Reason for requested: urine culture order, pt's spouse called and stated the pt has not been feeling well. Would like a call back when order is in  Date needed: 1/17/20  Provider name: Dr. Tong      Action Taken: Message routed to:  [facility] uro

## 2020-01-17 NOTE — TELEPHONE ENCOUNTER
"Patients wife states has fevers on and off low grade and just feeling poorly.  She states his urine is \"dirty\" looking but that happens all the time.  He will call on Sunday for results Dasia Guzman LPN Staff Nurse    "

## 2020-01-19 ENCOUNTER — TELEPHONE (OUTPATIENT)
Dept: SURGERY | Facility: CLINIC | Age: 45
End: 2020-01-19

## 2020-01-19 DIAGNOSIS — N30.00 ACUTE CYSTITIS WITHOUT HEMATURIA: Primary | ICD-10-CM

## 2020-01-19 LAB
BACTERIA SPEC CULT: ABNORMAL
SPECIMEN SOURCE: ABNORMAL

## 2020-01-19 RX ORDER — SULFAMETHOXAZOLE/TRIMETHOPRIM 800-160 MG
1 TABLET ORAL 2 TIMES DAILY
Qty: 14 TABLET | Refills: 0 | Status: SHIPPED | OUTPATIENT
Start: 2020-01-19 | End: 2021-03-25

## 2020-01-19 NOTE — TELEPHONE ENCOUNTER
Telephone Encounter    Date: 4:09 PM; 1/19/2020  Patient Name: Kinza Teresa  MRN: [id number]    Kinza Teresa is 44 year old year old male with SCI and neurogenic bladder- on CIC, complicated by frequent UTIs. His wife called the urology clinic 2 days ago stating the patient had been experiencing intermittent low grade fevers and had not been feeling well- this was similar to previous UTI episodes. The patient was instructed to leave a urine sample on 1/17 and call in for the results today.    Urine culture demonstrated >100k pan-sensitive citrobacter.    Plans:  - BID Bactrim DS was sent to the patient's local pharmacy. He should complete a 7 day course of treatment.   - Patient advised that because this is an encounter performed over the telephone, I am not able to perform a physical exam and thus any advice given is limited in nature and may not be completely informed.  The patient accepts this risk and if they have concerns with it they should be seen and evaluated in person by a medical professional.   - Discussed strict return precautions, including but not limited to: fevers > 101.4, chills, an inability to keep food or fluids down, increasing hematuria, and an inability to urinate.  - Patient expressed understanding and was in agreement with plan.    --    Regi Holden MD  PGY-2 Urology  Pager [phone]

## 2020-01-20 ENCOUNTER — TELEPHONE (OUTPATIENT)
Dept: UROLOGY | Facility: CLINIC | Age: 45
End: 2020-01-20

## 2020-01-20 NOTE — TELEPHONE ENCOUNTER
Called patient to confirm has UTI positive culture and he is on sulfa from MD on call over week end Dasia Guzman LPN Staff Nurse

## 2020-02-24 ENCOUNTER — TELEPHONE (OUTPATIENT)
Dept: UROLOGY | Facility: CLINIC | Age: 45
End: 2020-02-24

## 2020-02-24 DIAGNOSIS — N39.0 URINARY TRACT INFECTION: Primary | ICD-10-CM

## 2020-02-24 NOTE — TELEPHONE ENCOUNTER
[facility] Call Center    Phone Message    May a detailed message be left on voicemail: yes     Reason for Call: pt returning call to Dasia, please call back     Action Taken: Message routed to:  [facility] U[facility]    Travel Screening: Not Applicable

## 2020-02-24 NOTE — TELEPHONE ENCOUNTER
Patient called and having symptoms of UTI  Incontinence and not feeling right  No fever no burning  Has no feeling for any of the other symptoms  UA UC ordered .jw

## 2020-02-24 NOTE — TELEPHONE ENCOUNTER
[facility] Call Center    Phone Message    May a detailed message be left on voicemail: yes     Reason for Call: Order(s): Other: Pt requests we submit orders for a UTI culture from labs at [facility]  Reason for requested: possible UTI  Date needed: 2824/2020  Provider name: Alejandro Tong MD      Action Taken: Message routed to:  [facility] urology    Travel Screening: Not Applicable

## 2020-02-25 DIAGNOSIS — N39.0 URINARY TRACT INFECTION: ICD-10-CM

## 2020-02-25 LAB
ALBUMIN UR-MCNC: 100 MG/DL
APPEARANCE UR: ABNORMAL
BACTERIA #/AREA URNS HPF: ABNORMAL /HPF
BILIRUB UR QL STRIP: NEGATIVE
COLOR UR AUTO: YELLOW
GLUCOSE UR STRIP-MCNC: NEGATIVE MG/DL
HGB UR QL STRIP: ABNORMAL
KETONES UR STRIP-MCNC: NEGATIVE MG/DL
LEUKOCYTE ESTERASE UR QL STRIP: ABNORMAL
NITRATE UR QL: POSITIVE
PH UR STRIP: 6 PH (ref 5–7)
RBC #/AREA URNS AUTO: ABNORMAL /HPF
SOURCE: ABNORMAL
SP GR UR STRIP: 1.02 (ref 1–1.03)
UROBILINOGEN UR STRIP-ACNC: 0.2 EU/DL (ref 0.2–1)
WBC #/AREA URNS AUTO: >100 /HPF

## 2020-02-25 PROCEDURE — 87088 URINE BACTERIA CULTURE: CPT | Performed by: INTERNAL MEDICINE

## 2020-02-25 PROCEDURE — 87186 SC STD MICRODIL/AGAR DIL: CPT | Performed by: FAMILY MEDICINE

## 2020-02-25 PROCEDURE — 81001 URINALYSIS AUTO W/SCOPE: CPT | Performed by: UROLOGY

## 2020-02-25 PROCEDURE — 87086 URINE CULTURE/COLONY COUNT: CPT | Performed by: UROLOGY

## 2020-02-27 ENCOUNTER — TELEPHONE (OUTPATIENT)
Dept: UROLOGY | Facility: CLINIC | Age: 45
End: 2020-02-27

## 2020-02-27 LAB
BACTERIA SPEC CULT: ABNORMAL
BACTERIA SPEC CULT: ABNORMAL
SPECIMEN SOURCE: ABNORMAL

## 2020-02-27 NOTE — TELEPHONE ENCOUNTER
----- Message from Hemanth Guzman LPN sent at 2/24/2020  3:16 PM CST -----    ----- Message -----  From: Hemanth Guzman LPN  Sent: 2/24/2020  10:06 AM CST  To: Hemanth Guzman LPN    [facility] hemanth

## 2020-02-27 NOTE — TELEPHONE ENCOUNTER
Hi Dr. Tong,      Please review the patient's UC result. Please provide treatment plan.       Thanks, Chiqui Jeffers MA

## 2020-02-28 DIAGNOSIS — N39.0 URINARY TRACT INFECTION: Primary | ICD-10-CM

## 2020-02-28 RX ORDER — NITROFURANTOIN 25; 75 MG/1; MG/1
100 CAPSULE ORAL 2 TIMES DAILY
Qty: 14 CAPSULE | Refills: 0 | Status: ON HOLD | OUTPATIENT
Start: 2020-02-28 | End: 2020-09-22

## 2020-02-28 NOTE — TELEPHONE ENCOUNTER
Patient is afebrile.  Per Dr. Tong Macrobid 100 mg BID x 7 days.    Yadira Jalloh, RN, BSN  Care Coordinator- Reconstructive Urology

## 2020-03-13 DIAGNOSIS — N39.0 URINARY TRACT INFECTION: Primary | ICD-10-CM

## 2020-03-13 DIAGNOSIS — Z29.89 NEED FOR PROPHYLAXIS AGAINST URINARY TRACT INFECTION: ICD-10-CM

## 2020-03-13 NOTE — PROGRESS NOTES
Botox orders only.     200units ordered   -PJ    IV Antibiotics not ordered.    Alejandro Tong MD sent to Ricardo Queen, EMT; Yadira Jalloh RN               Ertapenem is an IV only antibiotic that is very broad. We like using it because it is 24 hour dosing. He has resistant bugs when you check a UCx. Dose is 1g q 24 hr   Yes, 200u in 20mL     -Jaspreet

## 2020-03-17 ENCOUNTER — TELEPHONE (OUTPATIENT)
Dept: UROLOGY | Facility: CLINIC | Age: 45
End: 2020-03-17

## 2020-03-17 NOTE — TELEPHONE ENCOUNTER
[facility] Call Center    Phone Message    May a detailed message be left on voicemail: yes     Reason for Call: Other: Keshia calling to state  Medical sent Catheter orders to Dr. Tong to be signed and they have not received them yet. Please sign orders so pt can get his catheter supplies.      Action Taken: Message routed to:  Other: [facility] uro     Travel Screening: Not Applicable

## 2020-03-18 ENCOUNTER — TELEPHONE (OUTPATIENT)
Dept: UROLOGY | Facility: CLINIC | Age: 45
End: 2020-03-18

## 2020-03-18 NOTE — TELEPHONE ENCOUNTER
Called 03/18/20 9:33 AM and pt is open to rescheduling with Botox and antibiotic injections to ~ June due to COVID-19    Message sent to scheduling to help blessing appointments.    Ricardo Queen, EMT

## 2020-04-13 ENCOUNTER — TELEPHONE (OUTPATIENT)
Dept: UROLOGY | Facility: CLINIC | Age: 45
End: 2020-04-13

## 2020-04-13 DIAGNOSIS — N39.0 URINARY TRACT INFECTION: Primary | ICD-10-CM

## 2020-04-13 DIAGNOSIS — N39.0 URINARY TRACT INFECTION: ICD-10-CM

## 2020-04-13 LAB
ALBUMIN UR-MCNC: 100 MG/DL
APPEARANCE UR: ABNORMAL
BACTERIA #/AREA URNS HPF: ABNORMAL /HPF
BILIRUB UR QL STRIP: NEGATIVE
COLOR UR AUTO: YELLOW
GLUCOSE UR STRIP-MCNC: NEGATIVE MG/DL
HGB UR QL STRIP: ABNORMAL
KETONES UR STRIP-MCNC: NEGATIVE MG/DL
LEUKOCYTE ESTERASE UR QL STRIP: ABNORMAL
NITRATE UR QL: NEGATIVE
PH UR STRIP: 6 PH (ref 5–7)
RBC #/AREA URNS AUTO: ABNORMAL /HPF
SOURCE: ABNORMAL
SP GR UR STRIP: 1.02 (ref 1–1.03)
UROBILINOGEN UR STRIP-ACNC: 0.2 EU/DL (ref 0.2–1)
WBC #/AREA URNS AUTO: >100 /HPF
WBC CLUMPS #/AREA URNS HPF: PRESENT /HPF

## 2020-04-13 PROCEDURE — 81001 URINALYSIS AUTO W/SCOPE: CPT | Performed by: INTERNAL MEDICINE

## 2020-04-13 PROCEDURE — 87088 URINE BACTERIA CULTURE: CPT | Performed by: UROLOGY

## 2020-04-13 PROCEDURE — 87186 SC STD MICRODIL/AGAR DIL: CPT | Performed by: UROLOGY

## 2020-04-13 PROCEDURE — 87086 URINE CULTURE/COLONY COUNT: CPT | Performed by: INTERNAL MEDICINE

## 2020-04-13 NOTE — TELEPHONE ENCOUNTER
[facility] Call Center    Phone Message    May a detailed message be left on voicemail: yes     Reason for Call: Order(s): Other: UA/UC  Needs to get this done  Reason for requested:  Pt has a UTI  Date needed: ASAP 4/13/2020   Provider name: Dr Tong      Action Taken: Message routed to:  [facility] urology    Travel Screening: Not Applicable

## 2020-04-15 LAB
BACTERIA SPEC CULT: ABNORMAL
SPECIMEN SOURCE: ABNORMAL

## 2020-04-15 RX ORDER — NITROFURANTOIN 25; 75 MG/1; MG/1
100 CAPSULE ORAL 2 TIMES DAILY
Qty: 14 CAPSULE | Refills: 0 | Status: SHIPPED | OUTPATIENT
Start: 2020-04-15 | End: 2020-04-22

## 2020-04-30 ENCOUNTER — TELEPHONE (OUTPATIENT)
Dept: UROLOGY | Facility: CLINIC | Age: 45
End: 2020-04-30

## 2020-04-30 DIAGNOSIS — N39.0 RECURRENT UTI: Primary | ICD-10-CM

## 2020-04-30 NOTE — TELEPHONE ENCOUNTER
[facility] Call Center    Phone Message    May a detailed message be left on voicemail: yes     Reason for Call: Symptoms or Concerns     If patient has red-flag symptoms, warm transfer to triage line    Current symptom or concern: pt stated his UTI came back immediately after finishing his antibiotics, should he start IV antibiotics or is there another option? pts preferred pharmacy is [id number] - [place], [place] - [facility] [address] AT [facility] [address]  Please call pt thank you    Symptoms have been present for: ongoing UTI'S    Has patient previously been seen for this? Yes    By :     Date: 01/08/2020    Are there any new or worsening symptoms? No      Action Taken: Message routed to:  [facility] urology    Travel Screening: Not Applicable

## 2020-05-05 NOTE — TELEPHONE ENCOUNTER
I would refer to ID.   He has a resistant bug which I have treated many times with nitrofurantoin.   I think he needs some IV abx but not sure about duration, course, etc etc.

## 2020-05-05 NOTE — TELEPHONE ENCOUNTER
Hi Yadira,    Patient's wife Keshia was notified that we placed a ID referral for the patient to consult about IV antibiotics. Since Dr. Alejandro Tong has treated the patient with nitrofurantion many times. Keshia agreed with the plan. She will ID clinic at [phone] to schedule a consult visit.    Thanks, Chiqui Jeffers MA

## 2020-05-05 NOTE — TELEPHONE ENCOUNTER
Chong May,     Can you please set up IV antibiotics for this patient?     Thanks, Chiqui Jeffers MA

## 2020-05-05 NOTE — TELEPHONE ENCOUNTER
Dr. Alycia Metz wants him to be seen in ID so that they can make this decision since patient continues to have UTI that we treat with IV abx.  Please place ID referral and get set up.     Thanks,   Yadira

## 2020-05-07 NOTE — TELEPHONE ENCOUNTER
RECORDS RECEIVED FROM: Internal- recurrent UTI, internal referral and med recs   DATE RECEIVED: 05.14.2020   NOTES (Gather within 2 years) STATUS DETAILS   OFFICE NOTE from referring provider   Internal 04.30.2020 Alejandro Tong MD [facility]   OFFICE NOTE from other specialist Internal 11.13.2019   DISCHARGE SUMMARY from hospital N/A    DISCHARGE REPORT from the ER N/A    LABS (any labs) Internal    MEDICATION LIST Internal / CE    IMAGING  (NEED IMAGES AND REPORTS)     Osteomyelitis: Foot imaging  N/A    Liver Abscess: Abdominal imaging N/A    Other (anything related to diagnoses N/A

## 2020-05-14 ENCOUNTER — PRE VISIT (OUTPATIENT)
Dept: INFECTIOUS DISEASES | Facility: CLINIC | Age: 45
End: 2020-05-14

## 2020-05-14 ENCOUNTER — VIRTUAL VISIT (OUTPATIENT)
Dept: INFECTIOUS DISEASES | Facility: CLINIC | Age: 45
End: 2020-05-14
Attending: UROLOGY
Payer: COMMERCIAL

## 2020-05-14 DIAGNOSIS — N31.9 NEUROGENIC BLADDER: ICD-10-CM

## 2020-05-14 DIAGNOSIS — G82.20 PARAPLEGIA (H): ICD-10-CM

## 2020-05-14 DIAGNOSIS — N39.0 RECURRENT UTI: Primary | ICD-10-CM
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
## 2020-06-02 DIAGNOSIS — G47.33 OSA ON CPAP: Primary | ICD-10-CM

## 2020-06-02 DIAGNOSIS — G47.33 OBSTRUCTIVE SLEEP APNEA (ADULT) (PEDIATRIC): ICD-10-CM

## 2020-06-19 ENCOUNTER — PRE VISIT (OUTPATIENT)
Dept: UROLOGY | Facility: CLINIC | Age: 45
End: 2020-06-19

## 2020-06-19 NOTE — TELEPHONE ENCOUNTER
Chief Complaint : Cysto-Botox    Hx/Sx: UTI/Urinary leaking    Records/Orders: Available     Pt Contacted: n/a    At Rooming: Rigid scope flex needle   200u in 20mL   Dip Not needed pt has chronic UTI w/ IV antibiotics done before apt.

## 2020-06-22 ENCOUNTER — INFUSION THERAPY VISIT (OUTPATIENT)
Dept: INFUSION THERAPY | Facility: CLINIC | Age: 45
End: 2020-06-22
Attending: UROLOGY
Payer: COMMERCIAL

## 2020-06-22 VITALS
HEART RATE: 54 BPM | RESPIRATION RATE: 16 BRPM | TEMPERATURE: 98.3 F | OXYGEN SATURATION: 100 % | DIASTOLIC BLOOD PRESSURE: 88 MMHG | SYSTOLIC BLOOD PRESSURE: 145 MMHG

## 2020-06-22 DIAGNOSIS — Z29.89 NEED FOR PROPHYLAXIS AGAINST URINARY TRACT INFECTION: Primary | ICD-10-CM

## 2020-06-22 PROCEDURE — 25000125 ZZHC RX 250: Mod: ZF | Performed by: UROLOGY

## 2020-06-22 PROCEDURE — 25000128 H RX IP 250 OP 636: Mod: ZF | Performed by: UROLOGY

## 2020-06-22 PROCEDURE — 96374 THER/PROPH/DIAG INJ IV PUSH: CPT

## 2020-06-22 RX ADMIN — WATER 1 G: 1 INJECTION INTRAMUSCULAR; INTRAVENOUS; SUBCUTANEOUS at 10:18

## 2020-06-22 NOTE — LETTER
6/22/2020         RE: Kinza Teresa  [address]  [place] [address]        Dear Colleague,    Thank you for referring your patient, Kinza Teresa, to the [facility] [place] [facility]TY [facility] PROCEDURE. Please see a copy of my visit note below.    Nursing Note  Kinza Teresa [facility] for:   Chief Complaint   Patient presents with     Infusion     Rocephin     During today's [facility] appointment, orders from Dr. Tong were completed.  Frequency: today is dose 1 of 2 total   First dose at [facility]. Patient believes he has received this medication at some point in the past.     Progress note:  Patient identification verified by name and date of birth.  Assessment completed.  Vitals recorded in Doc Flowsheets.  Patient was provided with education regarding medication/procedure and possible side effects.  Patient verbalized understanding.     present during visit today: Not Applicable.    Treatment Conditions: 20 min observation post infusion as first dose in [facility].    Premedications: were not ordered.    Drug Waste Record: No    Infusion length and rate:  infusion given over approximately 5 minutes    Labs: were not ordered for this appointment.    Vascular access: peripheral IV placed today. Left in place for appointment tomorrow.     Post Infusion Assessment:  Patient tolerated infusion without incident.  Blood return noted pre and post infusion.  Site patent and intact, free from redness, edema or discomfort.  No evidence of extravasations.     Discharge Plan:   Follow up plan of care with: ongoing infusions at [facility].  Discharge instructions were reviewed with patient. AVS printed for patient prior to infusion and gone through with patient prior to infusion.   Patient/representative verbalized understanding of discharge instructions and all questions answered.  Patient  discharged from [facility] in stable condition.    Rita Weaver RN       Administrations This Visit     ertapenem (INVanz) 1 g in 10 mL SWFI for IVP     Admin Date  06/22/2020 Action  Given Dose  1 g Route  Intravenous Administered By  Rita Weaver RN                /78   Pulse 62   Temp 98.3  F (36.8  C) (Oral)   Resp 16   SpO2 100%       Again, thank you for allowing me to participate in the care of your patient.        Sincerely,        [facility]

## 2020-06-22 NOTE — PROGRESS NOTES
Nursing Note  Kinza Teresa presents [facility] for:   Chief Complaint   Patient presents with     Infusion     Rocephin     During [facility]'s [facility] appointment, orders from Dr. Tong were completed.  Frequency: today is dose 1 of 2 total   First dose at [facility]. Patient believes he has received this medication at some point in the past.     Progress note:  Patient identification verified by name and date of birth.  Assessment completed.  Vitals recorded in Doc Flowsheets.  Patient was provided with education regarding medication/procedure and possible side effects.  Patient verbalized understanding.     present during visit today: Not Applicable.    Treatment Conditions: 20 min observation post infusion as first dose in [facility].    Premedications: were not ordered.    Drug Waste Record: No    Infusion length and rate:  infusion given over approximately 5 minutes    Labs: were not ordered for this appointment.    Vascular access: peripheral IV placed today. Left in place for appointment tomorrow.     Post Infusion Assessment:  Patient tolerated infusion without incident.  Blood return noted pre and post infusion.  Site patent and intact, free from redness, edema or discomfort.  No evidence of extravasations.     Discharge Plan:   Follow up plan of care with: ongoing infusions at [facility].  Discharge instructions were reviewed with patient. AVS printed for patient prior to infusion and gone through with patient prior to infusion.   Patient/representative verbalized understanding of discharge instructions and all questions answered.  Patient discharged from [facility] in stable condition.    Rita Weaver RN       Administrations This Visit     ertapenem (INVanz) 1 g in 10 mL SWFI for IVP     Admin Date  06/22/2020 Action  Given Dose  1 g Route  Intravenous Administered By  Meg  MARLINE Salinas                /78   Pulse 62   Temp 98.3  F (36.8  C) (Oral)   Resp 16   SpO2 100%

## 2020-06-22 NOTE — PATIENT INSTRUCTIONS
Dear Kinza Teresa    Thank you for choosing [facility] ([facility]) for your infusion.  The following information is a summary of our appointment as well as important reminders.      Patient Education     Ertapenem Sodium Solution for injection  What is this medicine?  ERTAPENEM (er ta PEN em) is a carbapenem antibiotic. It is used to treat certain kinds of bacterial infections. It will not work for colds, flu, or other viral infections.  This medicine may be used for other purposes; ask your health care provider or pharmacist if you have questions.  What should I tell my health care provider before I take this medicine?  They need to know if you have any of these conditions:    brain tumor, lesion    kidney disease    seizure disorder    an unusual or allergic reaction to ertapenem, other antibiotics, amide local anesthetics like lidocaine, or other medicines, foods, dyes, or preservatives    pregnant or trying to get pregnant    breast-feeding  How should I use this medicine?  This medicine is infused into a vein or injected deep into a muscle. It is usually given by a health care professional in a hospital or clinic setting.  If you get this medicine at home, you will be taught how to prepare and give this medicine. Use exactly as directed. Take your medicine at regular intervals. Do not take your medicine more often than directed.  It is important that you put your used needles and syringes in a special sharps container. Do not put them in a trash can. If you do not have a sharps container, call your pharmacist or healthcare provider to get one.  Talk to your pediatrician regarding the use of this medicine in children. While this drug may be prescribed for children as young as 3 months old for selected conditions, precautions do apply.  Overdosage: If you think you have taken too much of this medicine contact a poison control center or emergency room at  once.  NOTE: This medicine is only for you. Do not share this medicine with others.  What if I miss a dose?  If you miss a dose, take it as soon as you can. If it is almost time for your next dose, take only that dose. Do not take double or extra doses.  What may interact with this medicine?    birth control pills    probenecid  This list may not describe all possible interactions. Give your health care provider a list of all the medicines, herbs, non-prescription drugs, or dietary supplements you use. Also tell them if you smoke, drink alcohol, or use illegal drugs. Some items may interact with your medicine.  What should I watch for while using this medicine?  Tell your doctor or health care professional if your symptoms do not improve or if you get new symptoms. Your doctor will monitor your condition and blood work as needed.  Do not treat diarrhea with over the counter products. Contact your doctor if you have diarrhea that lasts more than 2 days or if it is severe and watery.  What side effects may I notice from receiving this medicine?  Side effects that you should report to your doctor or health care professional as soon as possible:    allergic reactions like skin rash, itching or hives, swelling of the face, lips, or tongue    anxiety, confusion, dizzy    chest pain    difficulty breathing, wheezing    edema, swelling    fever    irregular heart rate, blood pressure    pain or difficulty passing urine    seizures    unusually weak or tired    white or red patches in mouth  Side effects that usually do not require medical attention (report to your doctor or health care professional if they continue or are bothersome):    constipation or diarrhea    difficulty sleeping    headache    nausea, vomiting    pain, swelling or irritation where injected    stomach upset    vaginal itch, irritation  This list may not describe all possible side effects. Call your doctor for medical advice about side effects. You may  report side effects to FDA at 3-237-FDA-3202.  Where should I keep my medicine?  Keep out of the reach of children.  You will be instructed on how to store this medicine. Throw away any unused medicine after the expiration date on the label.  NOTE:This sheet is a summary. It may not cover all possible information. If you have questions about this medicine, talk to your doctor, pharmacist, or health care provider. Copyright  2016 Gold Standard      We look forward in seeing you on your next appointment here at [facility] ([facility]).  Please don t hesitate to call us at [phone] to reschedule any of your appointments or to speak with one of the [facility] registered nurses.  It was a pleasure taking care of you today.    Sincerely,    [facility]  [facility]  [address] [place] [address]  [id number]  Phone:  [phone]

## 2020-06-23 ENCOUNTER — INFUSION THERAPY VISIT (OUTPATIENT)
Dept: INFUSION THERAPY | Facility: CLINIC | Age: 45
End: 2020-06-23
Attending: UROLOGY
Payer: COMMERCIAL

## 2020-06-23 VITALS
HEART RATE: 65 BPM | DIASTOLIC BLOOD PRESSURE: 69 MMHG | RESPIRATION RATE: 18 BRPM | OXYGEN SATURATION: 97 % | TEMPERATURE: 98.5 F | SYSTOLIC BLOOD PRESSURE: 117 MMHG

## 2020-06-23 DIAGNOSIS — N39.0 URINARY TRACT INFECTION ASSOCIATED WITH CATHETERIZATION OF URINARY TRACT, UNSPECIFIED INDWELLING URINARY CATHETER TYPE, SEQUELA: ICD-10-CM

## 2020-06-23 DIAGNOSIS — T83.511S URINARY TRACT INFECTION ASSOCIATED WITH CATHETERIZATION OF URINARY TRACT, UNSPECIFIED INDWELLING URINARY CATHETER TYPE, SEQUELA: ICD-10-CM

## 2020-06-23 DIAGNOSIS — Z29.89 NEED FOR PROPHYLAXIS AGAINST URINARY TRACT INFECTION: Primary | ICD-10-CM

## 2020-06-23 PROCEDURE — 96374 THER/PROPH/DIAG INJ IV PUSH: CPT

## 2020-06-23 PROCEDURE — 25000125 ZZHC RX 250: Mod: ZF | Performed by: UROLOGY

## 2020-06-23 PROCEDURE — 25000128 H RX IP 250 OP 636: Mod: ZF | Performed by: UROLOGY

## 2020-06-23 RX ADMIN — WATER 1 G: 1 INJECTION INTRAMUSCULAR; INTRAVENOUS; SUBCUTANEOUS at 12:37

## 2020-06-23 NOTE — PROGRESS NOTES
Nursing Note  Kinza Teresa presents [facility] for:   No chief complaint on file.    During today's [facility] appointment, orders from Dr. Tong were completed.  Frequency: today is dose 1 of 2 total   First dose at [facility]. Patient believes he has received this medication at some point in the past.     Progress note:  Patient identification verified by name and date of birth.  Assessment completed.  Vitals recorded in Doc Flowsheets.  Patient was provided with education regarding medication/procedure and possible side effects.  Patient verbalized understanding.     present during visit today: Not Applicable.    Treatment Conditions: 20 min     Premedications: were not ordered.    Drug Waste Record: No    Infusion length and rate:  infusion given over approximately 5 minutes    Labs: were not ordered for this appointment.    Vascular access: peripheral IV placed today. Left in place for appointment tomorrow.     Post Infusion Assessment:  Patient tolerated infusion without incident.  Blood return noted pre and post infusion.  Site patent and intact, free from redness, edema or discomfort.  No evidence of extravasations.     Discharge Plan:   Follow up plan of care with: ongoing infusions at [facility].  Discharge instructions were reviewed with patient. AVS printed for patient prior to infusion and gone through with patient prior to infusion.   Patient/representative verbalized understanding of discharge instructions and all questions answered.  Patient discharged from [facility] in stable condition.    Maggi Corley RN    Administrations This Visit     ertapenem (INVanz) 1 g in 10 mL SWFI for IVP     Admin Date  06/23/2020 Action  Given Dose  1 g Route  Intravenous Administered By  Maggi Corley RN                   /69   Pulse 65   Temp 98.5  F (36.9  C) (Oral)    Resp 18   SpO2 97%

## 2020-06-23 NOTE — LETTER
6/23/2020         RE: Kinza Teresa  [address] [address]        Dear Colleague,    Thank you for referring your patient, Kinza Teresa, to the [facility] [place] SPECIALTY AND PROCEDURE. Please see a copy of my visit note below.    Nursing Note  Kinza Teresa presents [facility] for:   No chief complaint on file.    During [facility]'s [facility] appointment, orders from Dr. Tong were completed.  Frequency: today is dose 1 of 2 total   First dose at [facility]. Patient believes he has received this medication at some point in the past.     Progress note:  Patient identification verified by name and date of birth.  Assessment completed.  Vitals recorded in Doc Flowsheets.  Patient was provided with education regarding medication/procedure and possible side effects.  Patient verbalized understanding.     present during visit today: Not Applicable.    Treatment Conditions: 20 min     Premedications: were not ordered.    Drug Waste Record: No    Infusion length and rate:  infusion given over approximately 5 minutes    Labs: were not ordered for this appointment.    Vascular access: peripheral IV placed today. Left in place for appointment tomorrow.     Post Infusion Assessment:  Patient tolerated infusion without incident.  Blood return noted pre and post infusion.  Site patent and intact, free from redness, edema or discomfort.  No evidence of extravasations.     Discharge Plan:   Follow up plan of care with: ongoing infusions at [facility].  Discharge instructions were reviewed with patient. AVS printed for patient prior to infusion and gone through with patient prior to infusion.   Patient/representative verbalized understanding of discharge instructions and all questions answered.  Patient discharged from [facility] in stable condition.    Maggi AUGUSTIN  Norah, MARLINE    Administrations This Visit     ertapenem (INVanz) 1 g in 10 mL SWFI for IVP     Admin Date  06/23/2020 Action  Given Dose  1 g Route  Intravenous Administered By  Maggi Corley RN                   /69   Pulse 65   Temp 98.5  F (36.9  C) (Oral)   Resp 18   SpO2 97%     Again, thank you for allowing me to participate in the care of your patient.        Sincerely,        [facility]

## 2020-06-24 ENCOUNTER — OFFICE VISIT (OUTPATIENT)
Dept: UROLOGY | Facility: CLINIC | Age: 45
End: 2020-06-24
Payer: COMMERCIAL

## 2020-06-24 ENCOUNTER — INFUSION THERAPY VISIT (OUTPATIENT)
Dept: INFUSION THERAPY | Facility: CLINIC | Age: 45
End: 2020-06-24
Attending: UROLOGY
Payer: COMMERCIAL

## 2020-06-24 VITALS
DIASTOLIC BLOOD PRESSURE: 77 MMHG | HEART RATE: 69 BPM | RESPIRATION RATE: 16 BRPM | SYSTOLIC BLOOD PRESSURE: 123 MMHG | OXYGEN SATURATION: 97 % | TEMPERATURE: 99.1 F

## 2020-06-24 VITALS — WEIGHT: 150 LBS | BODY MASS INDEX: 24.99 KG/M2 | HEIGHT: 65 IN

## 2020-06-24 DIAGNOSIS — N39.0 RECURRENT UTI: Primary | ICD-10-CM

## 2020-06-24 DIAGNOSIS — N39.0 URINARY TRACT INFECTION ASSOCIATED WITH CATHETERIZATION OF URINARY TRACT, UNSPECIFIED INDWELLING URINARY CATHETER TYPE, SEQUELA: ICD-10-CM

## 2020-06-24 DIAGNOSIS — N31.9 NEUROGENIC BLADDER: ICD-10-CM

## 2020-06-24 DIAGNOSIS — T83.511S URINARY TRACT INFECTION ASSOCIATED WITH CATHETERIZATION OF URINARY TRACT, UNSPECIFIED INDWELLING URINARY CATHETER TYPE, SEQUELA: ICD-10-CM

## 2020-06-24 DIAGNOSIS — Z29.89 NEED FOR PROPHYLAXIS AGAINST URINARY TRACT INFECTION: Primary | ICD-10-CM

## 2020-06-24 PROCEDURE — 25000128 H RX IP 250 OP 636: Mod: ZF | Performed by: UROLOGY

## 2020-06-24 PROCEDURE — 96374 THER/PROPH/DIAG INJ IV PUSH: CPT

## 2020-06-24 PROCEDURE — 25000125 ZZHC RX 250: Mod: ZF | Performed by: UROLOGY

## 2020-06-24 RX ADMIN — WATER 1 G: 1 INJECTION INTRAMUSCULAR; INTRAVENOUS; SUBCUTANEOUS at 12:27

## 2020-06-24 ASSESSMENT — PAIN SCALES - GENERAL
PAINLEVEL: NO PAIN (0)

## 2020-06-24 ASSESSMENT — MIFFLIN-ST. JEOR: SCORE: 1482.28

## 2020-06-24 NOTE — PATIENT INSTRUCTIONS
"Please follow up in 6 week for a video or phone appointment       It was a pleasure meeting with you today.  Thank you for allowing me and my team the privilege of caring for you today.  YOU are the reason we are here, and I truly hope we provided you with the excellent service you deserve.  Please let us know if there is anything else we can do for you so that we can be sure you are leaving completely satisfied with your care experience.          AFTER YOUR CYSTOSCOPY        You have just completed a cystoscopy, or \"cysto\", which allowed your physician to learn more about your bladder (or to remove a stent placed after surgery). We suggest that you continue to avoid caffeine, fruit juice, and alcohol for the next 24 hours, however, you are encouraged to return to your normal activities.         A few things that are considered normal after your cystoscopy:     * Small amount of bleeding (or spotting) that clears within the next 24 hours     * Slight burning sensation with urination     * Sensation to of needing to avoid more frequently     * The feeling of \"air\" in your urine     * Mild discomfort that is relieved with Tylenol        Please contact our office promptly if you:     * Develop a fever above 101 degrees     * Are unable to urinate     * Develop bright red blood that does not stop     * Severe pain or swelling         Please contact our office with any concerns or questions @[email or web address].  "

## 2020-06-24 NOTE — LETTER
6/24/2020       RE: Kinza Teresa  [address] [facility] [address]     Dear Colleague,    Thank you for referring your patient, Kinza Teresa, to the [facility] UROLOGY AND INST FOR PROSTATE AND UROLOGIC CANCERS at [facility]. Please see a copy of my visit note below.    PRE-OPERATIVE DIAGNOSIS:   1.  Neurogenic bladder    POST-OPERATIVE DIAGNOSIS:  1.  Neurogenic bladder    PROCEDURE:    1. Cystourethroscopy. With injection of botulinum toxin A 200units in 20cc sterile saline    Surgeon: Alejandro Tong MD    OPERATIVE INDICATIONS:  Kinza Teresa is a 45 year old male with neurogenic bladder due to spinal cord injury. He does not have an augment. He performs CIC per urethra. He takes oxybutynin. UDS demonstrates persistent neurogenic DO.  He experiences recurrent UTIs. He previously had an IPP which works well for him though has some glans hypermobility. He elects to proceed with botulinum toxin injection understanding the risks for urinary retention, infection, pain, bleeding, need for future procedures and risks of anesthesia.     OPERATIVE DETAILS:  The patient was taken to the operating room in usual state of health.  He was positioned in modified dorsal lithotomy with yellowfin stirrups.  The genitalia were prepped and draped in the standard fashion. A time-out was performed to ensure proper patient, procedure and positioning.  The patient received appropriate IV abx (3 doses with last dose today)    A rigid cystoscope was placed into the bladder.  The bladder mucosa appeared to be normal without stones, tumors or diverticulum on 360 degree inspection. The ureteral orifices were in the normal orthotopic position bilaterally.  We mixed 2 vials of Botox for 200u total.  We performed a template injection procedure with 5 columns of 4 injections. All injections were placed deep to the mucosa into the detrusor muscle.    We then emptied the bladder to re-inspect our  injection sites and found that there was a minimal amount of blood. Bladder was emptied.    PLAN:   Will assess symptoms in 4-8 weeks then determine plans - repeat botox vs. irrigation vs. Instillation.    Alejandro Tong MD               Regular rate and rhythm, Heart sounds S1 S2 present, no murmurs, rubs or gallops

## 2020-06-24 NOTE — LETTER
6/24/2020         RE: Kinza Teresa  [address] [address]        Dear Colleague,    Thank you for referring your patient, Kinza Teresa, to the [facility]RE[facility]MENT [place] SPECIALTY AND PROCEDURE. Please see a copy of my visit note below.    Nursing Note  Kinza Teresa presents [facility] for:   Chief Complaint   Patient presents with     Infusion     invanz     During today's [facility] appointment, orders from Alejandro Tong were completed.  Frequency: daily for 2 doses, this is 2nd dose    Progress note:  Patient identification verified by name and date of birth.  Assessment completed.  Vitals recorded in Doc Flowsheets.  Patient was provided with education regarding medication/procedure and possible side effects.  Patient verbalized understanding.     present during visit today: Not Applicable.    Treatment Conditions: Non-applicable.    Premedications: were not ordered.    Drug Waste Record: No    Infusion length and rate:  infusion given over approximately 5 minutes    Labs: were not ordered for this appointment.    Vascular access: peripheral IV was placed prior to appointment at [facility] on 6/23/2020.    Post Infusion Assessment:  Patient tolerated infusion without incident.  Blood return noted pre and post infusion.  Site patent and intact, free from redness, edema or discomfort.  No evidence of extravasations.  Access discontinued per protocol.     Discharge Plan:   Follow up plan of care with: ordering provider as scheduled.  Discharge instructions were reviewed with patient.  Patient/representative verbalized understanding of discharge instructions and all questions answered.  Patient discharged from [facility] in stable condition.    Christel Birmingham RN    Administrations This Visit     ertapenem (INVanz) 1 g in 10 mL SWFI for IVP      Admin Date  06/24/2020 Action  Given Dose  1 g Route  Intravenous Administered By  Christel Birmingham RN                /77   Pulse 69   Temp 99.1  F (37.3  C)   Resp 16   SpO2 97%         Again, thank you for allowing me to participate in the care of your patient.        Sincerely,        [facility]
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
## 2020-06-24 NOTE — PROGRESS NOTES
Nursing Note  Kinza Teresa presents today to [facility] for:   Chief Complaint   Patient presents with     Infusion     invanz     During today's [facility] appointment, orders from Alejandro Tong were completed.  Frequency: daily for 2 doses, this is 2nd dose    Progress note:  Patient identification verified by name and date of birth.  Assessment completed.  Vitals recorded in Doc Flowsheets.  Patient was provided with education regarding medication/procedure and possible side effects.  Patient verbalized understanding.     present during visit today: Not Applicable.    Treatment Conditions: Non-applicable.    Premedications: were not ordered.    Drug Waste Record: No    Infusion length and rate:  infusion given over approximately 5 minutes    Labs: were not ordered for this appointment.    Vascular access: peripheral IV was placed prior to appointment at [facility] on 6/23/2020.    Post Infusion Assessment:  Patient tolerated infusion without incident.  Blood return noted pre and post infusion.  Site patent and intact, free from redness, edema or discomfort.  No evidence of extravasations.  Access discontinued per protocol.     Discharge Plan:   Follow up plan of care with: ordering provider as scheduled.  Discharge instructions were reviewed with patient.  Patient/representative verbalized understanding of discharge instructions and all questions answered.  Patient discharged from [facility] in stable condition.    Christel Birmingham RN    Administrations This Visit     ertapenem (INVanz) 1 g in 10 mL SWFI for IVP     Admin Date  06/24/2020 Action  Given Dose  1 g Route  Intravenous Administered By  Christel Birmingham RN                /77   Pulse 69   Temp 99.1  F (37.3  C)   Resp 16   SpO2 97%

## 2020-06-26 NOTE — PROGRESS NOTES
PRE-OPERATIVE DIAGNOSIS:   1.  Neurogenic bladder    POST-OPERATIVE DIAGNOSIS:  1.  Neurogenic bladder    PROCEDURE:    1. Cystourethroscopy. With injection of botulinum toxin A 200units in 20cc sterile saline    Surgeon: Alejandro Tong MD    OPERATIVE INDICATIONS:  Kinaz Teresa is a 45 year old male with neurogenic bladder due to spinal cord injury. He does not have an augment. He performs CIC per urethra. He takes oxybutynin. UDS demonstrates persistent neurogenic DO.  He experiences recurrent UTIs. He previously had an IPP which works well for him though has some glans hypermobility. He elects to proceed with botulinum toxin injection understanding the risks for urinary retention, infection, pain, bleeding, need for future procedures and risks of anesthesia.     OPERATIVE DETAILS:  The patient was taken to the operating room in usual state of health.  He was positioned in modified dorsal lithotomy with yellowfin stirrups.  The genitalia were prepped and draped in the standard fashion. A time-out was performed to ensure proper patient, procedure and positioning.  The patient received appropriate IV abx (3 doses with last dose today)    A rigid cystoscope was placed into the bladder.  The bladder mucosa appeared to be normal without stones, tumors or diverticulum on 360 degree inspection. The ureteral orifices were in the normal orthotopic position bilaterally.  We mixed 2 vials of Botox for 200u total.  We performed a template injection procedure with 5 columns of 4 injections. All injections were placed deep to the mucosa into the detrusor muscle.  We then emptied the bladder to re-inspect our injection sites and found that there was a minimal amount of blood. Bladder was emptied.    PLAN:   Will assess symptoms in 4-8 weeks then determine plans - repeat botox vs. irrigation vs. Instillation.    Alejandro Tong MD

## 2020-07-24 ENCOUNTER — PRE VISIT (OUTPATIENT)
Dept: UROLOGY | Facility: CLINIC | Age: 45
End: 2020-07-24

## 2020-07-24 NOTE — TELEPHONE ENCOUNTER
Visit Type : Video-Return    Hx/Sx: sx check s/p Botox    Records/Orders: yes    Pt Contacted: n/a    At Rooming: [email or web address]    [phone]

## 2020-08-05 ENCOUNTER — TELEPHONE (OUTPATIENT)
Dept: SLEEP MEDICINE | Facility: CLINIC | Age: 45
End: 2020-08-05

## 2020-08-05 DIAGNOSIS — N31.9 NEUROGENIC BLADDER: ICD-10-CM

## 2020-08-05 NOTE — TELEPHONE ENCOUNTER
RETURNED PT CALL AND LVM FOR THE PT AND OR WIFE TO CALL [facility] TO HAVE A MASK CONSULT IF NEEDED [phone].

## 2020-08-06 RX ORDER — OXYBUTYNIN CHLORIDE 15 MG/1
15 TABLET, EXTENDED RELEASE ORAL DAILY
Qty: 90 TABLET | Refills: 2 | Status: ON HOLD | OUTPATIENT
Start: 2020-08-06 | End: 2020-09-22

## 2020-08-06 NOTE — TELEPHONE ENCOUNTER
Last Clinic Visit:6/24/2020  [facility]rolog[facility]

## 2020-08-07 ENCOUNTER — VIRTUAL VISIT (OUTPATIENT)
Dept: UROLOGY | Facility: CLINIC | Age: 45
End: 2020-08-07
Payer: COMMERCIAL

## 2020-08-07 DIAGNOSIS — N31.9 NEUROGENIC BLADDER: Primary | ICD-10-CM

## 2020-08-07 NOTE — PATIENT INSTRUCTIONS
Please schedule Botox in clinic in Nov 2020      It was a pleasure meeting with you today.  Thank you for allowing me and my team the privilege of caring for you today.  YOU are the reason we are here, and I truly hope we provided you with the excellent service you deserve.  Please let us know if there is anything else we can do for you so that we can be sure you are leaving completely satisfied with your care experience.
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
## 2020-08-07 NOTE — LETTER
"8/7/2020       RE: Kinza Teresa  [address] [facility] [address]     Dear Colleague,    Thank you for referring your patient, Kinza Teresa, to the [facility] PROSTATE AND UROLOGIC CANCERS at [facility]. Please see a copy of my visit note below.    Kinaz Teresa is a 45 year old male who is being evaluated via a billable telephone visit.      The patient has been notified of following:     \"This telephone visit will be conducted via a call between you and your physician/provider. We have found that certain health care needs can be provided without the need for a physical exam.  This service lets us provide the care you need with a short phone conversation.  If a prescription is necessary we can send it directly to your pharmacy.  If lab work is needed we can place an order for that and you can then stop by our lab to have the test done at a later time.    Telephone visits are billed at different rates depending on your insurance coverage. During this emergency period, for some insurers they may be billed the same as an in-person visit.  Please reach out to your insurance provider with any questions.    If during the course of the call the physician/provider feels a telephone visit is not appropriate, you will not be charged for this service.\"    Patient has given verbal consent for Telephone visit?  Yes    What phone number would you like to be contacted at? [phone]    How would you like to obtain your AVS? [employer]    Phone call duration: 5 minutes       Kinza Teresa is a 45 year old male with neurogenic bladder due to spinal cord injury. He does not have an augment. He performs CIC per urethra. He takes oxybutynin. UDS demonstrates persistent neurogenic DO.  He experiences recurrent UTIs. He previously had an IPP which works well for him though has some glans hypermobility.     He underwent Botox injection for the first time in June " 2020.  He says this worked well. Still some foul odor in urine. But UTI symptoms and leakage have stopped. Thus, he is very thankful and would like to continue with botox injection.    Will schedule Botox in clinic for Nov 2020.    Alejandro Tong MD

## 2020-08-07 NOTE — PROGRESS NOTES
"Kinza Teresa is a 45 year old male who is being evaluated via a billable telephone visit.      The patient has been notified of following:     \"This telephone visit will be conducted via a call between you and your physician/provider. We have found that certain health care needs can be provided without the need for a physical exam.  This service lets us provide the care you need with a short phone conversation.  If a prescription is necessary we can send it directly to your pharmacy.  If lab work is needed we can place an order for that and you can then stop by our lab to have the test done at a later time.    Telephone visits are billed at different rates depending on your insurance coverage. During this emergency period, for some insurers they may be billed the same as an in-person visit.  Please reach out to your insurance provider with any questions.    If during the course of the call the physician/provider feels a telephone visit is not appropriate, you will not be charged for this service.\"    Patient has given verbal consent for Telephone visit?  Yes    What phone number would you like to be contacted at? [phone]    How would you like to obtain your [facility]? [facility]    Phone call duration: 5 minutes       Kinza Teresa is a 45 year old male with neurogenic bladder due to spinal cord injury. He does not have an augment. He performs CIC per urethra. He takes oxybutynin. UDS demonstrates persistent neurogenic DO.  He experiences recurrent UTIs. He previously had an IPP which works well for him though has some glans hypermobility.     He underwent Botox injection for the first time in June 2020.  He says this worked well. Still some foul odor in urine. But UTI symptoms and leakage have stopped. Thus, he is very thankful and would like to continue with botox injection.    Will schedule Botox in clinic for Nov 2020.    Alejandro Tong MD          "

## 2020-08-11 ENCOUNTER — INFUSION THERAPY VISIT (OUTPATIENT)
Dept: UROLOGY | Facility: CLINIC | Age: 45
End: 2020-08-11

## 2020-08-11 DIAGNOSIS — Z29.89 NEED FOR PROPHYLAXIS AGAINST URINARY TRACT INFECTION: Primary | ICD-10-CM

## 2020-08-11 RX ORDER — HEPARIN SODIUM,PORCINE 10 UNIT/ML
5 VIAL (ML) INTRAVENOUS
Status: CANCELLED | OUTPATIENT
Start: 2020-11-16

## 2020-08-11 RX ORDER — HEPARIN SODIUM (PORCINE) LOCK FLUSH IV SOLN 100 UNIT/ML 100 UNIT/ML
5 SOLUTION INTRAVENOUS
Status: CANCELLED | OUTPATIENT
Start: 2020-11-16

## 2020-08-12 ENCOUNTER — VIRTUAL VISIT (OUTPATIENT)
Dept: INFECTIOUS DISEASES | Facility: CLINIC | Age: 45
End: 2020-08-12
Attending: STUDENT IN AN ORGANIZED HEALTH CARE EDUCATION/TRAINING PROGRAM
Payer: COMMERCIAL

## 2020-08-12 DIAGNOSIS — N31.9 NEUROGENIC BLADDER: ICD-10-CM

## 2020-08-12 DIAGNOSIS — N39.0 RECURRENT UTI: ICD-10-CM

## 2020-08-12 DIAGNOSIS — G82.20 PARAPLEGIA (H): ICD-10-CM

## 2020-08-12 DIAGNOSIS — N32.81 DETRUSOR OVERACTIVITY: Primary | ICD-10-CM
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
## 2020-08-19 ENCOUNTER — MEDICAL CORRESPONDENCE (OUTPATIENT)
Dept: HEALTH INFORMATION MANAGEMENT | Facility: CLINIC | Age: 45
End: 2020-08-19

## 2020-09-09 ENCOUNTER — TELEPHONE (OUTPATIENT)
Dept: UROLOGY | Facility: CLINIC | Age: 45
End: 2020-09-09

## 2020-09-09 DIAGNOSIS — N39.0 URINARY TRACT INFECTION WITHOUT HEMATURIA, SITE UNSPECIFIED: Primary | ICD-10-CM

## 2020-09-09 NOTE — TELEPHONE ENCOUNTER
Keshia-patient wife was notified that we  Certainly can treat for UTI if there is a sudden change in incontinence.   He is a botox patient who last had botox in June.   So he's 3 months out.   He's scheduled in November already.   If his incontinence continues, then we can move that up if he wants.   Keshia agreed with the plan and stated that she will like for us to place cath UA/UC in the patient's chart to rule out a UTI from the sudden incontinence issues.  UA/UC was placed.    Chiqui Jeffers MA

## 2020-09-09 NOTE — TELEPHONE ENCOUNTER
[facility] Call Center    Phone Message    May a detailed message be left on voicemail: no     Reason for Call: Other: Pt's wife, Anjali would like to speak to care team about Botox shot given a few months ago and effects.  Call: [phone]     Action Taken: Message routed to:  [facility] ([facility] Urology    Travel Screening: Not Applicable

## 2020-09-09 NOTE — TELEPHONE ENCOUNTER
Certainly can treat for UTI if there is a sudden change in incontinence.   He is a botox patient who last had botox in June.   So he's 3 months out.   He's scheduled in November already.   If his incontinence continues, then we can move that up if he wants.   -Jaspreet

## 2020-09-09 NOTE — TELEPHONE ENCOUNTER
Chong Tong,    Patient's wife Keshia states that the patient's incontinence symptoms have returned extremely bad over the pass 2 weeks. She would like to know the next steps. She states that the patient's ID provider states that in the pass we treated the urinary incontinence issues as an infection. She states that the ID provider states that we should not do that. Please advise.    Chiqui Jeffers MA

## 2020-09-14 DIAGNOSIS — N39.0 URINARY TRACT INFECTION WITHOUT HEMATURIA, SITE UNSPECIFIED: ICD-10-CM

## 2020-09-14 LAB
ALBUMIN UR-MCNC: 30 MG/DL
APPEARANCE UR: CLEAR
BACTERIA #/AREA URNS HPF: ABNORMAL /HPF
BILIRUB UR QL STRIP: NEGATIVE
COLOR UR AUTO: YELLOW
GLUCOSE UR STRIP-MCNC: NEGATIVE MG/DL
HGB UR QL STRIP: ABNORMAL
KETONES UR STRIP-MCNC: NEGATIVE MG/DL
LEUKOCYTE ESTERASE UR QL STRIP: ABNORMAL
NITRATE UR QL: NEGATIVE
NON-SQ EPI CELLS #/AREA URNS LPF: ABNORMAL /LPF
PH UR STRIP: 6 PH (ref 5–7)
RBC #/AREA URNS AUTO: ABNORMAL /HPF
SOURCE: ABNORMAL
SP GR UR STRIP: >1.03 (ref 1–1.03)
UROBILINOGEN UR STRIP-ACNC: 0.2 EU/DL (ref 0.2–1)
WBC #/AREA URNS AUTO: >100 /HPF

## 2020-09-14 PROCEDURE — 87186 SC STD MICRODIL/AGAR DIL: CPT | Performed by: FAMILY MEDICINE

## 2020-09-14 PROCEDURE — 87088 URINE BACTERIA CULTURE: CPT | Performed by: FAMILY MEDICINE

## 2020-09-14 PROCEDURE — 81001 URINALYSIS AUTO W/SCOPE: CPT | Performed by: FAMILY MEDICINE

## 2020-09-14 PROCEDURE — 87086 URINE CULTURE/COLONY COUNT: CPT | Performed by: FAMILY MEDICINE

## 2020-09-16 LAB
BACTERIA SPEC CULT: ABNORMAL
Lab: ABNORMAL
SPECIMEN SOURCE: ABNORMAL

## 2020-09-16 RX ORDER — SULFAMETHOXAZOLE/TRIMETHOPRIM 800-160 MG
1 TABLET ORAL 2 TIMES DAILY
Qty: 10 TABLET | Refills: 0 | Status: SHIPPED | OUTPATIENT
Start: 2020-09-16 | End: 2020-09-21

## 2020-09-16 NOTE — TELEPHONE ENCOUNTER
Keshia -patient's wife was notified that the patient has another UTI. His treatment plan would be Bactrim DS take one tablet BID for 5 days. Keshia agreed with the plan. Bactrim DS prescription was sent [facility] pharmacy.    Chiqui Jeffers MA

## 2020-09-21 ENCOUNTER — HOSPITAL ENCOUNTER (OUTPATIENT)
Facility: CLINIC | Age: 45
Setting detail: OBSERVATION
Discharge: HOME | End: 2020-09-22
Attending: INTERNAL MEDICINE | Admitting: INTERNAL MEDICINE
Payer: COMMERCIAL

## 2020-09-21 DIAGNOSIS — N39.0 COMPLICATED UTI (URINARY TRACT INFECTION): ICD-10-CM

## 2020-09-21 DIAGNOSIS — R50.9 HYPERTHERMIA-INDUCED DEFECT: ICD-10-CM

## 2020-09-21 DIAGNOSIS — U07.1 INFECTION DUE TO 2019-NCOV: ICD-10-CM

## 2020-09-21 DIAGNOSIS — N39.0 URINARY TRACT INFECTION, SITE NOT SPECIFIED: ICD-10-CM

## 2020-09-21 LAB
ALBUMIN SERPL-MCNC: 3.4 G/DL (ref 3.4–5)
ALBUMIN UR-MCNC: 30 MG/DL
ALP SERPL-CCNC: 70 U/L (ref 40–150)
ALT SERPL W P-5'-P-CCNC: 19 U/L (ref 0–70)
ANION GAP SERPL CALCULATED.3IONS-SCNC: 6 MMOL/L (ref 3–14)
APPEARANCE UR: ABNORMAL
AST SERPL W P-5'-P-CCNC: 20 U/L (ref 0–45)
BASOPHILS # BLD AUTO: 0 10E9/L (ref 0–0.2)
BASOPHILS NFR BLD AUTO: 0.4 %
BILIRUB SERPL-MCNC: 0.3 MG/DL (ref 0.2–1.3)
BILIRUB UR QL STRIP: NEGATIVE
BUN SERPL-MCNC: 16 MG/DL (ref 7–30)
CALCIUM SERPL-MCNC: 8.6 MG/DL (ref 8.5–10.1)
CHLORIDE SERPL-SCNC: 106 MMOL/L (ref 94–109)
CO2 SERPL-SCNC: 24 MMOL/L (ref 20–32)
COLOR UR AUTO: YELLOW
CREAT SERPL-MCNC: 0.99 MG/DL (ref 0.66–1.25)
DIFFERENTIAL METHOD BLD: ABNORMAL
EOSINOPHIL # BLD AUTO: 0 10E9/L (ref 0–0.7)
EOSINOPHIL NFR BLD AUTO: 0 %
ERYTHROCYTE [DISTWIDTH] IN BLOOD BY AUTOMATED COUNT: 12.7 % (ref 10–15)
GFR SERPL CREATININE-BSD FRML MDRD: >90 ML/MIN/{1.73_M2}
GLUCOSE SERPL-MCNC: 79 MG/DL (ref 70–99)
GLUCOSE UR STRIP-MCNC: NEGATIVE MG/DL
HCT VFR BLD AUTO: 40.4 % (ref 40–53)
HGB BLD-MCNC: 13.2 G/DL (ref 13.3–17.7)
HGB UR QL STRIP: ABNORMAL
IMM GRANULOCYTES # BLD: 0 10E9/L (ref 0–0.4)
IMM GRANULOCYTES NFR BLD: 0.4 %
KETONES UR STRIP-MCNC: NEGATIVE MG/DL
LACTATE BLD-SCNC: 0.6 MMOL/L (ref 0.7–2)
LEUKOCYTE ESTERASE UR QL STRIP: ABNORMAL
LYMPHOCYTES # BLD AUTO: 1.2 10E9/L (ref 0.8–5.3)
LYMPHOCYTES NFR BLD AUTO: 41.2 %
MCH RBC QN AUTO: 29.3 PG (ref 26.5–33)
MCHC RBC AUTO-ENTMCNC: 32.7 G/DL (ref 31.5–36.5)
MCV RBC AUTO: 90 FL (ref 78–100)
MONOCYTES # BLD AUTO: 0.2 10E9/L (ref 0–1.3)
MONOCYTES NFR BLD AUTO: 6.8 %
MUCOUS THREADS #/AREA URNS LPF: PRESENT /LPF
NEUTROPHILS # BLD AUTO: 1.4 10E9/L (ref 1.6–8.3)
NEUTROPHILS NFR BLD AUTO: 51.2 %
NITRATE UR QL: NEGATIVE
NRBC # BLD AUTO: 0 10*3/UL
NRBC BLD AUTO-RTO: 0 /100
PH UR STRIP: 6 PH (ref 5–7)
PLATELET # BLD AUTO: 137 10E9/L (ref 150–450)
POTASSIUM SERPL-SCNC: 4 MMOL/L (ref 3.4–5.3)
PROT SERPL-MCNC: 6.9 G/DL (ref 6.8–8.8)
RBC # BLD AUTO: 4.5 10E12/L (ref 4.4–5.9)
RBC #/AREA URNS AUTO: 9 /HPF (ref 0–2)
SODIUM SERPL-SCNC: 136 MMOL/L (ref 133–144)
SOURCE: ABNORMAL
SP GR UR STRIP: 1.02 (ref 1–1.03)
SQUAMOUS #/AREA URNS AUTO: 2 /HPF (ref 0–1)
TRANS CELLS #/AREA URNS HPF: <1 /HPF (ref 0–1)
UROBILINOGEN UR STRIP-MCNC: NORMAL MG/DL (ref 0–2)
WBC # BLD AUTO: 2.8 10E9/L (ref 4–11)
WBC #/AREA URNS AUTO: 128 /HPF (ref 0–5)

## 2020-09-21 PROCEDURE — 85025 COMPLETE CBC W/AUTO DIFF WBC: CPT | Performed by: INTERNAL MEDICINE

## 2020-09-21 PROCEDURE — 87086 URINE CULTURE/COLONY COUNT: CPT | Performed by: INTERNAL MEDICINE

## 2020-09-21 PROCEDURE — 99285 EMERGENCY DEPT VISIT HI MDM: CPT | Mod: 25

## 2020-09-21 PROCEDURE — 25800030 ZZH RX IP 258 OP 636: Performed by: INTERNAL MEDICINE

## 2020-09-21 PROCEDURE — G0378 HOSPITAL OBSERVATION PER HR: HCPCS

## 2020-09-21 PROCEDURE — C9803 HOPD COVID-19 SPEC COLLECT: HCPCS

## 2020-09-21 PROCEDURE — 96375 TX/PRO/DX INJ NEW DRUG ADDON: CPT

## 2020-09-21 PROCEDURE — 81001 URINALYSIS AUTO W/SCOPE: CPT | Performed by: INTERNAL MEDICINE

## 2020-09-21 PROCEDURE — 40000556 ZZH STATISTIC PERIPHERAL IV START W US GUIDANCE

## 2020-09-21 PROCEDURE — 25000128 H RX IP 250 OP 636: Performed by: NURSE PRACTITIONER

## 2020-09-21 PROCEDURE — 87040 BLOOD CULTURE FOR BACTERIA: CPT | Mod: XS | Performed by: INTERNAL MEDICINE

## 2020-09-21 PROCEDURE — 25000128 H RX IP 250 OP 636: Performed by: INTERNAL MEDICINE

## 2020-09-21 PROCEDURE — 25000132 ZZH RX MED GY IP 250 OP 250 PS 637: Performed by: INTERNAL MEDICINE

## 2020-09-21 PROCEDURE — 80053 COMPREHEN METABOLIC PANEL: CPT | Performed by: INTERNAL MEDICINE

## 2020-09-21 PROCEDURE — 96365 THER/PROPH/DIAG IV INF INIT: CPT

## 2020-09-21 PROCEDURE — 83605 ASSAY OF LACTIC ACID: CPT | Performed by: INTERNAL MEDICINE

## 2020-09-21 PROCEDURE — U0003 INFECTIOUS AGENT DETECTION BY NUCLEIC ACID (DNA OR RNA); SEVERE ACUTE RESPIRATORY SYNDROME CORONAVIRUS 2 (SARS-COV-2) (CORONAVIRUS DISEASE [COVID-19]), AMPLIFIED PROBE TECHNIQUE, MAKING USE OF HIGH THROUGHPUT TECHNOLOGIES AS DESCRIBED BY CMS-2020-01-R: HCPCS | Performed by: INTERNAL MEDICINE

## 2020-09-21 PROCEDURE — 96361 HYDRATE IV INFUSION ADD-ON: CPT

## 2020-09-21 PROCEDURE — 99220 ZZC INITIAL OBSERVATION CARE,LEVL III: CPT | Mod: Z6 | Performed by: NURSE PRACTITIONER

## 2020-09-21 RX ORDER — CARVEDILOL 6.25 MG/1
6.25 TABLET ORAL 2 TIMES DAILY WITH MEALS
Status: DISCONTINUED | OUTPATIENT
Start: 2020-09-22 | End: 2020-09-22 | Stop reason: HOSPADM

## 2020-09-21 RX ORDER — ONDANSETRON 4 MG/1
4 TABLET, ORALLY DISINTEGRATING ORAL EVERY 6 HOURS PRN
Status: DISCONTINUED | OUTPATIENT
Start: 2020-09-21 | End: 2020-09-22 | Stop reason: HOSPADM

## 2020-09-21 RX ORDER — BISACODYL 5 MG
5 TABLET, DELAYED RELEASE (ENTERIC COATED) ORAL DAILY PRN
Status: DISCONTINUED | OUTPATIENT
Start: 2020-09-21 | End: 2020-09-22 | Stop reason: HOSPADM

## 2020-09-21 RX ORDER — ONDANSETRON 2 MG/ML
4 INJECTION INTRAMUSCULAR; INTRAVENOUS EVERY 6 HOURS PRN
Status: DISCONTINUED | OUTPATIENT
Start: 2020-09-21 | End: 2020-09-22 | Stop reason: HOSPADM

## 2020-09-21 RX ORDER — LIDOCAINE 40 MG/G
CREAM TOPICAL
Status: DISCONTINUED | OUTPATIENT
Start: 2020-09-21 | End: 2020-09-22 | Stop reason: HOSPADM

## 2020-09-21 RX ORDER — SULFAMETHOXAZOLE/TRIMETHOPRIM 800-160 MG
1 TABLET ORAL 2 TIMES DAILY
Status: DISCONTINUED | OUTPATIENT
Start: 2020-09-21 | End: 2020-09-21 | Stop reason: CLARIF

## 2020-09-21 RX ORDER — IBUPROFEN 600 MG/1
600 TABLET, FILM COATED ORAL EVERY 6 HOURS PRN
Status: DISCONTINUED | OUTPATIENT
Start: 2020-09-21 | End: 2020-09-22 | Stop reason: HOSPADM

## 2020-09-21 RX ORDER — ERTAPENEM 1 G/1
1 INJECTION, POWDER, LYOPHILIZED, FOR SOLUTION INTRAMUSCULAR; INTRAVENOUS EVERY 24 HOURS
Status: DISCONTINUED | OUTPATIENT
Start: 2020-09-22 | End: 2020-09-22 | Stop reason: HOSPADM

## 2020-09-21 RX ORDER — SULFAMETHOXAZOLE/TRIMETHOPRIM 800-160 MG
1 TABLET ORAL 2 TIMES DAILY
Qty: 20 TABLET | Refills: 0 | Status: SHIPPED | OUTPATIENT
Start: 2020-09-21 | End: 2020-10-01

## 2020-09-21 RX ORDER — TRAMADOL HYDROCHLORIDE 50 MG/1
50 TABLET ORAL 2 TIMES DAILY PRN
Status: DISCONTINUED | OUTPATIENT
Start: 2020-09-21 | End: 2020-09-22 | Stop reason: HOSPADM

## 2020-09-21 RX ORDER — LIDOCAINE 4 G/G
2 PATCH TOPICAL EVERY 24 HOURS
Status: DISCONTINUED | OUTPATIENT
Start: 2020-09-21 | End: 2020-09-22 | Stop reason: HOSPADM

## 2020-09-21 RX ORDER — ACETAMINOPHEN 325 MG/1
650 TABLET ORAL EVERY 4 HOURS PRN
Status: DISCONTINUED | OUTPATIENT
Start: 2020-09-21 | End: 2020-09-22 | Stop reason: HOSPADM

## 2020-09-21 RX ORDER — NALOXONE HYDROCHLORIDE 0.4 MG/ML
.1-.4 INJECTION, SOLUTION INTRAMUSCULAR; INTRAVENOUS; SUBCUTANEOUS
Status: DISCONTINUED | OUTPATIENT
Start: 2020-09-21 | End: 2020-09-22 | Stop reason: HOSPADM

## 2020-09-21 RX ORDER — ACETAMINOPHEN 650 MG/1
650 SUPPOSITORY RECTAL EVERY 4 HOURS PRN
Status: DISCONTINUED | OUTPATIENT
Start: 2020-09-21 | End: 2020-09-22 | Stop reason: HOSPADM

## 2020-09-21 RX ORDER — ERTAPENEM 1 G/1
1 INJECTION, POWDER, LYOPHILIZED, FOR SOLUTION INTRAMUSCULAR; INTRAVENOUS ONCE
Status: COMPLETED | OUTPATIENT
Start: 2020-09-21 | End: 2020-09-21

## 2020-09-21 RX ORDER — PREGABALIN 100 MG/1
100 CAPSULE ORAL DAILY
Status: DISCONTINUED | OUTPATIENT
Start: 2020-09-22 | End: 2020-09-22 | Stop reason: HOSPADM

## 2020-09-21 RX ORDER — PREGABALIN 25 MG/1
50 CAPSULE ORAL DAILY
Status: DISCONTINUED | OUTPATIENT
Start: 2020-09-21 | End: 2020-09-22 | Stop reason: HOSPADM

## 2020-09-21 RX ADMIN — ERTAPENEM SODIUM 1 G: 1 INJECTION, POWDER, LYOPHILIZED, FOR SOLUTION INTRAMUSCULAR; INTRAVENOUS at 21:25

## 2020-09-21 RX ADMIN — SULFAMETHOXAZOLE AND TRIMETHOPRIM 1 TABLET: 800; 160 TABLET ORAL at 20:24

## 2020-09-21 RX ADMIN — ONDANSETRON 4 MG: 2 INJECTION INTRAMUSCULAR; INTRAVENOUS at 23:03

## 2020-09-21 RX ADMIN — SODIUM CHLORIDE, POTASSIUM CHLORIDE, SODIUM LACTATE AND CALCIUM CHLORIDE 1000 ML: 600; 310; 30; 20 INJECTION, SOLUTION INTRAVENOUS at 19:16

## 2020-09-21 ASSESSMENT — ENCOUNTER SYMPTOMS
COUGH: 0
FATIGUE: 1

## 2020-09-21 NOTE — ED AVS SNAPSHOT
[facility], [place], Emergency Department  [address] [address]  Phone:  [phone]                                    Kinza Teresa   MRN: [id number]    Department:  [facility], Emergency Department   Date of Visit:  9/21/2020           After Visit Summary Signature Page    I have received my discharge instructions, and my questions have been answered. I have discussed any challenges I see with this plan with the nurse or doctor.    ..........................................................................................................................................  Patient/Patient Representative Signature      ..........................................................................................................................................  Patient Representative Print Name and Relationship to Patient    ..................................................               ................................................  Date                                   Time    ..........................................................................................................................................  Reviewed by Signature/Title    ...................................................              ..............................................  Date                                               Time          22EPIC Rev 08/18
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
## 2020-09-22 ENCOUNTER — TELEPHONE (OUTPATIENT)
Dept: NURSING | Facility: CLINIC | Age: 45
End: 2020-09-22

## 2020-09-22 VITALS
DIASTOLIC BLOOD PRESSURE: 70 MMHG | SYSTOLIC BLOOD PRESSURE: 122 MMHG | OXYGEN SATURATION: 100 % | HEART RATE: 62 BPM | HEIGHT: 66 IN | RESPIRATION RATE: 16 BRPM | TEMPERATURE: 98.2 F | BODY MASS INDEX: 24.21 KG/M2

## 2020-09-22 LAB
ANION GAP SERPL CALCULATED.3IONS-SCNC: 5 MMOL/L (ref 3–14)
BACTERIA SPEC CULT: NORMAL
BUN SERPL-MCNC: 15 MG/DL (ref 7–30)
CALCIUM SERPL-MCNC: 8.6 MG/DL (ref 8.5–10.1)
CHLORIDE SERPL-SCNC: 109 MMOL/L (ref 94–109)
CO2 SERPL-SCNC: 24 MMOL/L (ref 20–32)
CREAT SERPL-MCNC: 0.96 MG/DL (ref 0.66–1.25)
ERYTHROCYTE [DISTWIDTH] IN BLOOD BY AUTOMATED COUNT: 12.8 % (ref 10–15)
GFR SERPL CREATININE-BSD FRML MDRD: >90 ML/MIN/{1.73_M2}
GLUCOSE SERPL-MCNC: 96 MG/DL (ref 70–99)
HCT VFR BLD AUTO: 41.1 % (ref 40–53)
HGB BLD-MCNC: 13 G/DL (ref 13.3–17.7)
Lab: NORMAL
MCH RBC QN AUTO: 29.1 PG (ref 26.5–33)
MCHC RBC AUTO-ENTMCNC: 31.6 G/DL (ref 31.5–36.5)
MCV RBC AUTO: 92 FL (ref 78–100)
PLATELET # BLD AUTO: 136 10E9/L (ref 150–450)
POTASSIUM SERPL-SCNC: 3.7 MMOL/L (ref 3.4–5.3)
RBC # BLD AUTO: 4.46 10E12/L (ref 4.4–5.9)
SARS-COV-2 RNA SPEC QL NAA+PROBE: ABNORMAL
SODIUM SERPL-SCNC: 139 MMOL/L (ref 133–144)
SPECIMEN SOURCE: ABNORMAL
SPECIMEN SOURCE: NORMAL
WBC # BLD AUTO: 2.1 10E9/L (ref 4–11)

## 2020-09-22 PROCEDURE — 85027 COMPLETE CBC AUTOMATED: CPT | Performed by: NURSE PRACTITIONER

## 2020-09-22 PROCEDURE — 25000132 ZZH RX MED GY IP 250 OP 250 PS 637: Performed by: NURSE PRACTITIONER

## 2020-09-22 PROCEDURE — G0378 HOSPITAL OBSERVATION PER HR: HCPCS

## 2020-09-22 PROCEDURE — 36415 COLL VENOUS BLD VENIPUNCTURE: CPT | Performed by: NURSE PRACTITIONER

## 2020-09-22 PROCEDURE — 80048 BASIC METABOLIC PNL TOTAL CA: CPT | Performed by: NURSE PRACTITIONER

## 2020-09-22 PROCEDURE — 99217 ZZC OBSERVATION CARE DISCHARGE: CPT | Mod: Z6 | Performed by: PHYSICIAN ASSISTANT

## 2020-09-22 RX ORDER — BISACODYL 10 MG
10 SUPPOSITORY, RECTAL RECTAL DAILY PRN
Status: DISCONTINUED | OUTPATIENT
Start: 2020-09-22 | End: 2020-09-22 | Stop reason: HOSPADM

## 2020-09-22 RX ADMIN — CARVEDILOL 6.25 MG: 6.25 TABLET, FILM COATED ORAL at 09:41

## 2020-09-22 RX ADMIN — PREGABALIN 100 MG: 100 CAPSULE ORAL at 09:41
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
## 2020-09-22 NOTE — DISCHARGE INSTRUCTIONS
Please follow-up with your primary doctor tomorrow.  Please have your blood drawn for a complete blood count to follow your white blood cell count in 2 days.  Please follow-up your urine culture results in 2 days.  If there is still growth please see your infectious disease specialist about changing antibiotics.  If you develop fever, vomiting, or any other concerning symptoms at any time please return to the ER.

## 2020-09-22 NOTE — PROGRESS NOTES
"SPIRITUAL HEALTH SERVICES  SPIRITUAL ASSESSMENT Progress Note  [facility] ([place]) ED OBS     REFERRAL SOURCE: Self initiated  visit, Zoroastrian specific.    Pt Kinza identifies as Zoroastrian and is of [place] descent. 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 His wife was present with him during our visit.    Kinza complained to me of his continuous UTIs that he claimed are happening \"every month\". He seemed to be somewhat saddened by his repeated visits to the hospital for the same problem each time. He said that this UTI presented with higher fevers and was more serious.     Kinza asked me for, \"continuous stormy (prayers) for me\". I provided Kinza with Islamic incantations of healing/wellness. I also provided him with an Maori copy of the Holy Quran.     PLAN: I will follow up with Kinza for the duration of his stay. [facility] is available to Kinza per request.     *This encounter was conducted in the Maori language.    Gabriel Metzger  Lead Zoroastrian   Pager [phone]    "
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
## 2020-09-22 NOTE — PLAN OF CARE
Outpatient/Observation goals to be met before discharge home:     - Diagnostic tests and consults completed and resulted if ordered : no  - Vital signs normal or at patient baseline : yes  - Tolerating oral intake to maintain hydration : yes  - Adequate pain control on oral analgesia : yes  - Tolerating oral antibiotics or has plans for home infusion setup: no   - Infection is improving : yes  - Safe disposition plan has been identified : yes

## 2020-09-22 NOTE — PROGRESS NOTES
"Observation goals  Start: 09/21/20 2300, PRIOR TO DISCHARGE, Routine      Comments: List all goals to be met before discharge home:   - Diagnostic tests and consults completed and resulted if ordered: No    - Vital signs normal or at patient baseline: yes,Blood pressure 106/60, pulse 59, temperature 97.8  F (36.6  C), temperature source Oral, resp. rate 16, height 1.676 m (5' 6\"), SpO2 97 %.       - Tolerating oral intake to maintain hydration: Yes     - Adequate pain control on oral analgesia: denies pain. Refused Lidoderm patches and Lyrica.    - Tolerating oral antibiotics or has plans for home infusion setup: IV Invanz daily    - Infection is improving: In Process    - Safe disposition plan has been identified ; Pending    - Nurse to notify provider when observation goals have been met and patient is ready for discharge.            "

## 2020-09-22 NOTE — PROGRESS NOTES
Observation goals  Start: 09/21/20 2300, PRIOR TO DISCHARGE, Routine      Comments: List all goals to be met before discharge home:   - Diagnostic tests and consults completed and resulted if ordered: No  - Vital signs normal or at patient baseline: yes     - Tolerating oral intake to maintain hydration: Yes     - Adequate pain control on oral analgesia: denies    - Tolerating oral antibiotics or has plans for home infusion setup: Yes    - Infection is improving: In Process    - Safe disposition plan has been identified ; Pending    - Nurse to notify provider when observation goals have been met and patient is ready for discharge.

## 2020-09-23 ENCOUNTER — TELEPHONE (OUTPATIENT)
Dept: INFECTIOUS DISEASES | Facility: CLINIC | Age: 45
End: 2020-09-23
Payer: COMMERCIAL

## 2020-09-23 NOTE — TELEPHONE ENCOUNTER
Spoke to patients wife.   Patient had symptoms of fever and fatigue 10 days ago. Was tested for UTI- pyuria and >100k ESBL E.coli. Started on bactrim 1 Ds bid with no improvement and hence went to ER 9/21. Given one dose of iv abx and he went home for social reasons. Found to be covid pos. By that time he had rcvd 5-7 days of abxs and he decided to stop abxs since his symptoms were likely sec to covid and not UTI. I agree.  He has been home now for 3 days since discharge and slowly getting better. His wife is also covid pos and recovering.     Phone call duration 13 mins    Francheska Cheung MD      ------------------------------------    [facility] Call Center    Phone Message    May a detailed message be left on voicemail: yes     Reason for Call: Other: Patient was discharged from [facility] on [facility]/22/20. Per discharge instructions, patient is to follow up with ID once patient has completed the course of antibiotic treatment he was prescribed but no specific time frame of completion date was noted on discharge instructions. Patient did not see an ID provider during time in hospital but has seen Dr. Cheung in clinic before. Please review to determine date of when patient needs to follow up and contact patient to schedule in appropriate time frame.     Action Taken: Message routed to:  [facility] ([facility]): ID    Travel Screening: Not Applicable

## 2020-09-23 NOTE — TELEPHONE ENCOUNTER
"Coronavirus (COVID-19) Notification    Caller Name (Patient, parent, daughter/son, grandparent, etc)  Patient: Kinza Teresa    Reason for call  Notify of Positive Coronavirus (COVID-19) lab results, assess symptoms,  review  "[employer] [place] recommendations    Lab Result    Lab test:  2019-nCoV rRt-PCR or SARS-CoV-2 PCR    Oropharyngeal AND/OR nasopharyngeal swabs is POSITIVE for 2019-nCoV RNA/SARS-COV-2 PCR (COVID-19 virus)    RN Recommendations/Instructions per [facility] Coronavirus COVID-19 recommendations    Brief introduction script  Introduce self then review script:  \"I am calling on behalf of [employer].  We were notified that your Coronavirus test (COVID-19) for was POSITIVE for the virus.  I have some information to relay to you but first I wanted to mention that the [place] Dept of Health will be contacting you shortly [it's possible MD already called Patient] to talk to you more about how you are feeling and other people you have had contact with who might now also have the virus.  Also,  "[employer] [place] is Partnering with the [facility] for Covid-19 research, you may be contacted directly by research staff.\"    Assessment (Inquire about Patient's current symptoms)   Assessment   Current Symptoms at time of phone call: (if no symptoms, document No symptoms] Asymptomatic.    Symptoms onset (if applicable) N/A     If at time of call, Patients symptoms hare worsened, the Patient should contact 911 or have someone drive them to Emergency Dept promptly:      If Patient calling 911, inform 911 personal that you have tested positive for the Coronavirus (COVID-19).  Place mask on and await 911 to arrive.    If Emergency Dept, If possible, please have another adult drive you to the Emergency Dept but you need to wear mask when in contact with other people.      Review information with Patient    Your result was positive. This means you have COVID-19 (coronavirus).  We have sent you a letter " that reviews the information that I'll be reviewing with you now.    How can I protect others?    If you have symptoms: stay home and away from others (self-isolate) until:    You've had no fever--and no medicine that reduces fever--for 1 full day (24 hours). And       Your other symptoms have gotten better. For example, your cough or breathing has improved. And     At least 10 days have passed since your symptoms started. (If you've been told by a doctor that you have a weak immune system, wait 20 days.)     If you don't have symptoms: Stay home and away from others (self-isolate) until at least 10 days have passed since your first positive COVID-19 test. (Date test collected)    During this time:    Stay in your own room, including for meals. Use your own bathroom if you can.    Stay away from others in your home. No hugging, kissing or shaking hands. No visitors.     Don't go to work, school or anywhere else.     Clean  high touch  surfaces often (doorknobs, counters, handles, etc.). Use a household cleaning spray or wipes. You'll find a full list on the EPA website at www.epa.gov/pesticide-registration/list-n-disinfectants-use-against-sars-cov-2.     Cover your mouth and nose with a mask, tissue or other face covering to avoid spreading germs.    Wash your hands and face often with soap and water.    Caregivers in these groups are at risk for severe illness due to COVID-19:  o People 65 years and older  o People who live in a nursing home or long-term care facility  o People with chronic disease (lung, heart, cancer, diabetes, kidney, liver, immunologic)  o People who have a weakened immune system, including those who:  - Are in cancer treatment  - Take medicine that weakens the immune system, such as corticosteroids  - Had a bone marrow or organ transplant  - Have an immune deficiency  - Have poorly controlled HIV or AIDS  - Are obese (body mass index of 40 or higher)  - Smoke regularly    Caregivers should  wear gloves while washing dishes, handling laundry and cleaning bedrooms and bathrooms.    Wash and dry laundry with special caution. Don't shake dirty laundry, and use the warmest water setting you can.    If you have a weakened immune system, ask your doctor about other actions you should take.    For more tips, go to www.cdc.gov/coronavirus/2019-ncov/downloads/10Things.pdf.    You should not go back to work until you meet the guidelines above for ending your home isolation. You should meet these along with any other guidelines that your employer has.    Employers: This document serves as formal notice of your employee's medical guidelines for going back to work. They must meet the above guidelines before going back to work in person.    How can I take care of myself?    1. Get lots of rest. Drink extra fluids (unless a doctor has told you not to).    2. Take Tylenol (acetaminophen) for fever or pain. If you have liver or kidney problems, ask your family doctor if it's okay to take Tylenol.     Take either:     650 mg (two 325 mg pills) every 4 to 6 hours, or     1,000 mg (two 500 mg pills) every 8 hours as needed.     Note: Don't take more than 3,000 mg in one day. Acetaminophen is found in many medicines (both prescribed and over-the-counter medicines). Read all labels to be sure you don't take too much.    For children, check the Tylenol bottle for the right dose (based on their age or weight).    3. If you have other health problems (like cancer, heart failure, an organ transplant or severe kidney disease): Call your specialty clinic if you don't feel better in the next 2 days.    4. Know when to call 911: Emergency warning signs include:    Trouble breathing or shortness of breath    Pain or pressure in the chest that doesn't go away    Feeling confused like you haven't felt before, or not being able to wake up    Bluish-colored lips or face    5. Sign up for [employer]. We know it's scary to hear that you  have COVID-19. We want to track your symptoms to make sure you're okay over the next 2 weeks. Please look for an email from [email or web address]--this is a free, online program that we'll use to keep in touch. To sign up, follow the link in the email. Learn more at [email or web address].    Where can I get more information?    [facility]: [email or web address]    Coronavirus Basics: [email or web address]/coronavirus/basics.html    What to Do If You're Sick: www.cdc.gov/coronavirus/2019-ncov/about/steps-when-sick.html    Ending Home Isolation: www.cdc.gov/coronavirus/2019-ncov/hcp/disposition-in-home-patients.html     Caring for Someone with COVID-19: www.cdc.gov/coronavirus/2019-ncov/if-you-are-sick/care-for-someone.html     [facility] clinical trials (COVID-19 research studies): [email or web address]-clinical-trials     A Positive COVID-19 letter will be sent via [employer] or the mail. (Exception, no letters sent to Presurgerical/Preprocedure Patients)    [Name]  Jorge Webster RN  [facility]

## 2020-09-25 ENCOUNTER — TRANSCRIBE ORDERS (OUTPATIENT)
Dept: OTHER | Age: 45
End: 2020-09-25

## 2020-09-25 DIAGNOSIS — S24.109S THORACIC SPINAL CORD INJURY, SEQUELA (H): Primary | ICD-10-CM

## 2020-09-27 LAB
BACTERIA SPEC CULT: NO GROWTH
BACTERIA SPEC CULT: NO GROWTH
SPECIMEN SOURCE: NORMAL
SPECIMEN SOURCE: NORMAL

## 2020-10-12 ENCOUNTER — ANCILLARY PROCEDURE (OUTPATIENT)
Dept: ULTRASOUND IMAGING | Facility: CLINIC | Age: 45
End: 2020-10-12
Attending: PHYSICAL MEDICINE & REHABILITATION
Payer: COMMERCIAL

## 2020-10-12 DIAGNOSIS — S24.109S THORACIC SPINAL CORD INJURY, SEQUELA (H): ICD-10-CM

## 2020-10-12 PROCEDURE — 76770 US EXAM ABDO BACK WALL COMP: CPT | Performed by: STUDENT IN AN ORGANIZED HEALTH CARE EDUCATION/TRAINING PROGRAM

## 2020-10-20 ENCOUNTER — TELEPHONE (OUTPATIENT)
Dept: UROLOGY | Facility: CLINIC | Age: 45
End: 2020-10-20

## 2020-11-11 ENCOUNTER — MEDICAL CORRESPONDENCE (OUTPATIENT)
Dept: HEALTH INFORMATION MANAGEMENT | Facility: CLINIC | Age: 45
End: 2020-11-11

## 2020-11-11 ENCOUNTER — PRE VISIT (OUTPATIENT)
Dept: UROLOGY | Facility: CLINIC | Age: 45
End: 2020-11-11

## 2020-11-12 NOTE — TELEPHONE ENCOUNTER
Reason for visit: cystoscopy with botox    Relevant information: neurogenic bladder with Fritz    Records/imaging/labs/orders: all records available     Pt called:     At Rooming: rigid scope, 200 units

## 2020-11-13 ENCOUNTER — TRANSCRIBE ORDERS (OUTPATIENT)
Dept: OTHER | Age: 45
End: 2020-11-13

## 2020-11-13 DIAGNOSIS — K64.9 HEMORRHOIDS, UNSPECIFIED HEMORRHOID TYPE: Primary | ICD-10-CM

## 2020-11-16 ENCOUNTER — INFUSION THERAPY VISIT (OUTPATIENT)
Dept: INFUSION THERAPY | Facility: CLINIC | Age: 45
End: 2020-11-16
Attending: UROLOGY
Payer: COMMERCIAL

## 2020-11-16 VITALS
HEART RATE: 53 BPM | OXYGEN SATURATION: 95 % | TEMPERATURE: 98.5 F | DIASTOLIC BLOOD PRESSURE: 87 MMHG | RESPIRATION RATE: 16 BRPM | SYSTOLIC BLOOD PRESSURE: 148 MMHG

## 2020-11-16 DIAGNOSIS — Z29.89 NEED FOR PROPHYLAXIS AGAINST URINARY TRACT INFECTION: Primary | ICD-10-CM

## 2020-11-16 DIAGNOSIS — N39.0 URINARY TRACT INFECTION ASSOCIATED WITH CATHETERIZATION OF URINARY TRACT, UNSPECIFIED INDWELLING URINARY CATHETER TYPE, SEQUELA: ICD-10-CM

## 2020-11-16 DIAGNOSIS — T83.511S URINARY TRACT INFECTION ASSOCIATED WITH CATHETERIZATION OF URINARY TRACT, UNSPECIFIED INDWELLING URINARY CATHETER TYPE, SEQUELA: ICD-10-CM

## 2020-11-16 PROCEDURE — 250N000011 HC RX IP 250 OP 636: Performed by: UROLOGY

## 2020-11-16 PROCEDURE — 250N000009 HC RX 250: Performed by: UROLOGY

## 2020-11-16 PROCEDURE — 96374 THER/PROPH/DIAG INJ IV PUSH: CPT

## 2020-11-16 RX ORDER — HEPARIN SODIUM,PORCINE 10 UNIT/ML
5 VIAL (ML) INTRAVENOUS
Status: CANCELLED | OUTPATIENT
Start: 2020-11-16

## 2020-11-16 RX ORDER — HEPARIN SODIUM (PORCINE) LOCK FLUSH IV SOLN 100 UNIT/ML 100 UNIT/ML
5 SOLUTION INTRAVENOUS
Status: CANCELLED | OUTPATIENT
Start: 2020-11-16

## 2020-11-16 RX ADMIN — ERTAPENEM SODIUM 1 G: 1 INJECTION, POWDER, LYOPHILIZED, FOR SOLUTION INTRAMUSCULAR; INTRAVENOUS at 11:45

## 2020-11-16 ASSESSMENT — PAIN SCALES - GENERAL: PAINLEVEL: MILD PAIN (2)

## 2020-11-16 NOTE — LETTER
11/16/2020         RE: Kinza Teresa  [address]  [place] [address]        Dear Colleague,    Thank you for referring your patient, Kinza Teresa, to the [facility] T[facility]AT[facility]. Please see a copy of my visit note below.    Nursing Note  Kinza Teresa [facility]:   Chief Complaint   Patient presents with     Infusion     Ertapenem     During [facility]'s [facility] appointment, orders from Dr Tong were completed.  Frequency: today is dose 1 of 2 total    Progress note:  Patient identification verified by name and date of birth.  Assessment completed.  Vitals recorded in Doc Flowsheets.  Patient was provided with education regarding medication/procedure and possible side effects.  Patient verbalized understanding.     present during visit today: Not Applicable.    Treatment Conditions: Non-applicable.    Premedications: were not ordered.    Drug Waste Record: No    Infusion length and rate:  infusion given over approximately 5 minutes    Labs: were not ordered for this appointment.    Vascular access: peripheral IV placed today. and peripheral IV left in place for next appoinment.    Post Infusion Assessment:  Patient tolerated infusion without incident.  Site patent and intact, free from redness, edema or discomfort.  No evidence of extravasations.     Discharge Plan:   Follow up plan of care with: ongoing infusions at [facility].  Discharge instructions were reviewed with patient.  Patient/representative verbalized understanding of discharge instructions and all questions answered.  Patient discharged from [facility] in stable condition.    PRIMO OVERTON RN    Administrations This Visit     ertapenem (INVanz) 1 g in 10 mL SWFI for IVP     Admin Date  11/16/2020 Action  Given Dose  1 g Route  Intravenous Administered  By  Alis Solares RN                BP (!) 148/87   Pulse 53   Temp 98.5  F (36.9  C) (Oral)   Resp 16   SpO2 95%           Again, thank you for allowing me to participate in the care of your patient.        Sincerely,        [facility]

## 2020-11-16 NOTE — PROGRESS NOTES
Nursing Note  Kinza Teresa presents [facility] for:   Chief Complaint   Patient presents with     Infusion     Ertapenem     During [facility] appointment, orders from Dr Tong were completed.  Frequency: today is dose 1 of 2 total    Progress note:  Patient identification verified by name and date of birth.  Assessment completed.  Vitals recorded in Doc Flowsheets.  Patient was provided with education regarding medication/procedure and possible side effects.  Patient verbalized understanding.     present during visit today: Not Applicable.    Treatment Conditions: Non-applicable.    Premedications: were not ordered.    Drug Waste Record: No    Infusion length and rate:  infusion given over approximately 5 minutes    Labs: were not ordered for this appointment.    Vascular access: peripheral IV placed today. and peripheral IV left in place for next appoinment.    Post Infusion Assessment:  Patient tolerated infusion without incident.  Site patent and intact, free from redness, edema or discomfort.  No evidence of extravasations.     Discharge Plan:   Follow up plan of care with: ongoing infusions [facility].  Discharge instructions were reviewed with patient.  Patient/representative verbalized understanding of discharge instructions and all questions answered.  Patient discharged [facility] in stable condition.    ALIS OVERTON, MARLINE    Administrations This Visit     ertapenem (INVanz) 1 g in 10 mL SWFI for IVP     Admin Date  11/16/2020 Action  Given Dose  1 g Route  Intravenous Administered By  Alis Overton, RN                BP (!) 148/87   Pulse 53   Temp 98.5  F (36.9  C) (Oral)   Resp 16   SpO2 95%

## 2020-11-17 ENCOUNTER — PRE VISIT (OUTPATIENT)
Dept: UROLOGY | Facility: CLINIC | Age: 45
End: 2020-11-17

## 2020-11-17 ENCOUNTER — INFUSION THERAPY VISIT (OUTPATIENT)
Dept: INFUSION THERAPY | Facility: CLINIC | Age: 45
End: 2020-11-17
Attending: UROLOGY
Payer: COMMERCIAL

## 2020-11-17 VITALS
OXYGEN SATURATION: 96 % | DIASTOLIC BLOOD PRESSURE: 80 MMHG | HEART RATE: 60 BPM | RESPIRATION RATE: 16 BRPM | SYSTOLIC BLOOD PRESSURE: 133 MMHG | TEMPERATURE: 97.6 F

## 2020-11-17 DIAGNOSIS — N39.0 URINARY TRACT INFECTION ASSOCIATED WITH CATHETERIZATION OF URINARY TRACT, UNSPECIFIED INDWELLING URINARY CATHETER TYPE, SEQUELA: ICD-10-CM

## 2020-11-17 DIAGNOSIS — T83.511S URINARY TRACT INFECTION ASSOCIATED WITH CATHETERIZATION OF URINARY TRACT, UNSPECIFIED INDWELLING URINARY CATHETER TYPE, SEQUELA: ICD-10-CM

## 2020-11-17 DIAGNOSIS — Z29.89 NEED FOR PROPHYLAXIS AGAINST URINARY TRACT INFECTION: Primary | ICD-10-CM

## 2020-11-17 PROCEDURE — 250N000011 HC RX IP 250 OP 636: Performed by: UROLOGY

## 2020-11-17 PROCEDURE — 250N000009 HC RX 250: Performed by: UROLOGY

## 2020-11-17 PROCEDURE — 96374 THER/PROPH/DIAG INJ IV PUSH: CPT

## 2020-11-17 RX ORDER — HEPARIN SODIUM (PORCINE) LOCK FLUSH IV SOLN 100 UNIT/ML 100 UNIT/ML
5 SOLUTION INTRAVENOUS
Status: CANCELLED | OUTPATIENT
Start: 2020-11-17

## 2020-11-17 RX ORDER — HEPARIN SODIUM,PORCINE 10 UNIT/ML
5 VIAL (ML) INTRAVENOUS
Status: CANCELLED | OUTPATIENT
Start: 2020-11-17

## 2020-11-17 RX ADMIN — ERTAPENEM SODIUM 1 G: 1 INJECTION, POWDER, LYOPHILIZED, FOR SOLUTION INTRAMUSCULAR; INTRAVENOUS at 11:24

## 2020-11-17 NOTE — PROGRESS NOTES
Nursing Note  Kinza Teresa presents [facility] for:   Chief Complaint   Patient presents with     Infusion     Invanz     During [facility]'s [facility] appointment, orders from Dr Tong were completed.  Frequency: today is dose 2 of 2 total    Progress note:  Patient identification verified by name and date of birth.  Assessment completed.  Vitals recorded in Doc Flowsheets.  Patient was provided with education regarding medication/procedure and possible side effects.  Patient verbalized understanding.     present during visit today: Not Applicable.    Treatment Conditions: Non-applicable.    Premedications: were not ordered.    Drug Waste Record: No    Infusion length and rate:  infusion given over approximately 5 minutes    Labs: were not ordered for this appointment.    Vascular access: peripheral IV left in place from yesterdays appointment. Removed today post infusion.    Post Infusion Assessment:  Patient tolerated infusion without incident.  Site patent and intact, free from redness, edema or discomfort.  No evidence of extravasations.     Discharge Plan:   Follow up plan of care with: ongoing infusions [facility].  Discharge instructions were reviewed with patient.  Patient/representative verbalized understanding of discharge instructions and all questions answered.  Patient discharged [facility] in stable condition.    Maggi Corley RN    Administrations This Visit     ertapenem (INVanz) 1 g in 10 mL SWFI for IVP     Admin Date  11/17/2020 Action  Given Dose  1 g Route  Intravenous Administered By  Maggi Corley RN                /80   Pulse 60   Temp 97.6  F (36.4  C) (Oral)   Resp 16   SpO2 96%

## 2020-11-17 NOTE — TELEPHONE ENCOUNTER
Reason for visit: Cystoscopy with Botox    Relevant information: neurogenic bladder    Records/imaging/labs/orders: all records available    Pt called: yes, pre antibiotics onboard    At Rooming: rigid scope

## 2020-11-17 NOTE — LETTER
11/17/2020         RE: Kinza Teresa  [address] [facility] [address]        Dear Colleague,    Thank you for referring your patient, Kinza Teresa, to the [facility]AT[facility]. Please see a copy of my visit note below.    Nursing Note  Kinza Teresa presents [facility] for:   Chief Complaint   Patient presents with     Infusion     Invanz     During [facility] appointment, orders from Dr Tong were completed.  Frequency: today is dose 2 of 2 total    Progress note:  Patient identification verified by name and date of birth.  Assessment completed.  Vitals recorded in Doc Flowsheets.  Patient was provided with education regarding medication/procedure and possible side effects.  Patient verbalized understanding.     present during visit today: Not Applicable.    Treatment Conditions: Non-applicable.    Premedications: were not ordered.    Drug Waste Record: No    Infusion length and rate:  infusion given over approximately 5 minutes    Labs: were not ordered for this appointment.    Vascular access: peripheral IV left in place from yesterdays appointment. Removed today post infusion.    Post Infusion Assessment:  Patient tolerated infusion without incident.  Site patent and intact, free from redness, edema or discomfort.  No evidence of extravasations.     Discharge Plan:   Follow up plan of care with: ongoing infusions at [facility].  Discharge instructions were reviewed with patient.  Patient/representative verbalized understanding of discharge instructions and all questions answered.  Patient discharged from [facility] in stable condition.    Maggi Almazan RN    Administrations This Visit     ertapenem (INVanz) 1 g in 10 mL SWFI for IVP     Admin Date  11/17/2020 Action  Given Dose  1 g Route  Intravenous  Administered By  Maggi Corley RN                /80   Pulse 60   Temp 97.6  F (36.4  C) (Oral)   Resp 16   SpO2 96%         Again, thank you for allowing me to participate in the care of your patient.        Sincerely,        [facility]

## 2020-11-17 NOTE — TELEPHONE ENCOUNTER
RECORDS RECEIVED FROM: Internal    DATE RECEIVED: 1/8/2021   NOTES STATUS DETAILS   OFFICE NOTE from referring provider  Internal Referral 11/13/20

## 2020-11-18 ENCOUNTER — OFFICE VISIT (OUTPATIENT)
Dept: UROLOGY | Facility: CLINIC | Age: 45
End: 2020-11-18
Payer: COMMERCIAL

## 2020-11-18 VITALS — SYSTOLIC BLOOD PRESSURE: 137 MMHG | DIASTOLIC BLOOD PRESSURE: 87 MMHG | HEART RATE: 131 BPM

## 2020-11-18 DIAGNOSIS — N31.9 NEUROGENIC BLADDER: Primary | ICD-10-CM

## 2020-11-18 PROCEDURE — 52287 CYSTOSCOPY CHEMODENERVATION: CPT | Performed by: UROLOGY

## 2020-11-18 ASSESSMENT — PAIN SCALES - GENERAL: PAINLEVEL: NO PAIN (0)
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
## 2020-11-18 NOTE — LETTER
11/18/2020       RE: Kinza Teresa  [address] [address]     Dear Colleague,    Thank you for referring your patient, Kinza Teresa, to the [facility] [place] [facility]. Please see a copy of my visit note below.    PRE-OPERATIVE DIAGNOSIS:   1.  Neurogenic bladder     POST-OPERATIVE DIAGNOSIS:  1.  Neurogenic bladder     PROCEDURE:    1. Cystourethroscopy. With injection of botulinum toxin A 200units in 20cc sterile saline     Surgeon: Alejandro Tong MD     OPERATIVE INDICATIONS:  Kinza Teresa is a 45 year old male with neurogenic bladder due to spinal cord injury. He does not have an augment. He performs CIC per urethra. He takes oxybutynin. UDS demonstrates persistent neurogenic DO.  He experiences recurrent UTIs. He previously had an IPP which works well for him though has some glans hypermobility. He likes the IPP. His last Botox was 200u in June 2020. He has only got 1 UTI since that. 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 He presents today to get a repeat injection     OPERATIVE DETAILS:  The patient was taken to the procedure room. He was left in his chair. Penis was prepped.      A Flexible cystoscope was placed into the bladder and a flexible needle  Was preloaded.  The bladder mucosa appeared to be normal without stones, tumors or diverticulum on 360 degree inspection. The ureteral orifices were in the normal orthotopic position bilaterally.  We mixed 2 vials of Botox for 200u total.  We performed a template injection procedure with 5 columns of 4 injections. All injections were placed deep to the mucosa into the detrusor muscle.  We then emptied the bladder to re-inspect our injection sites and found that there was a minimal amount of blood. Bladder was emptied.     PLAN:   Followup in 4-5 months for repeat  injection. Ideally Ertapenem 1g IV x 1 dose morning before, and 1 dose morning of Botox injection.    Alejandro Tong MD

## 2020-11-18 NOTE — PATIENT INSTRUCTIONS
"Return in 4 months for a cystoscopy with Botox with Dr. Tong.    It was a pleasure meeting with you today.  Thank you for allowing me and my team the privilege of caring for you today.  YOU are the reason we are here, and I truly hope we provided you with the excellent service you deserve.  Please let us know if there is anything else we can do for you so that we can be sure you are leaving completely satisfied with your care experience.        Ashely Rodriguez CMA    AFTER YOUR CYSTOSCOPY        You have just completed a cystoscopy, or \"cysto\", which allowed your physician to learn more about your bladder (or to remove a stent placed after surgery). We suggest that you continue to avoid caffeine, fruit juice, and alcohol for the next 24 hours, however, you are encouraged to return to your normal activities.         A few things that are considered normal after your cystoscopy:     * Small amount of bleeding (or spotting) that clears within the next 24 hours     * Slight burning sensation with urination     * Sensation to of needing to avoid more frequently     * The feeling of \"air\" in your urine     * Mild discomfort that is relieved with Tylenol        Please contact our office promptly if you:     * Develop a fever above 101 degrees     * Are unable to urinate     * Develop bright red blood that does not stop     * Severe pain or swelling         Please contact our office with any concerns or questions @[email or web address].  "

## 2020-11-19 NOTE — PROGRESS NOTES
PRE-OPERATIVE DIAGNOSIS:   1.  Neurogenic bladder     POST-OPERATIVE DIAGNOSIS:  1.  Neurogenic bladder     PROCEDURE:    1. Cystourethroscopy. With injection of botulinum toxin A 200units in 20cc sterile saline     Surgeon: Alejandro Tong MD     OPERATIVE INDICATIONS:  Kinza Teresa is a 45 year old male with neurogenic bladder due to spinal cord injury. He does not have an augment. He performs CIC per urethra. He takes oxybutynin. UDS demonstrates persistent neurogenic DO.  He experiences recurrent UTIs. He previously had an IPP which works well for him though has some glans hypermobility. He likes the IPP. His last Botox was 200u in June 2020. He has only got 1 UTI since that. 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 He presents today to get a repeat injection     OPERATIVE DETAILS:  The patient was taken to the procedure room. He was left in his chair. Penis was prepped.      A Flexible cystoscope was placed into the bladder and a flexible needle  Was preloaded.  The bladder mucosa appeared to be normal without stones, tumors or diverticulum on 360 degree inspection. The ureteral orifices were in the normal orthotopic position bilaterally.  We mixed 2 vials of Botox for 200u total.  We performed a template injection procedure with 5 columns of 4 injections. All injections were placed deep to the mucosa into the detrusor muscle.  We then emptied the bladder to re-inspect our injection sites and found that there was a minimal amount of blood. Bladder was emptied.     PLAN:   Followup in 4-5 months for repeat injection. Ideally Ertapenem 1g IV x 1 dose morning before, and 1 dose morning of Botox injection.    Alejandro Tong MD

## 2021-01-08 ENCOUNTER — PRE VISIT (OUTPATIENT)
Dept: SURGERY | Facility: CLINIC | Age: 46
End: 2021-01-08

## 2021-01-08 ENCOUNTER — OFFICE VISIT (OUTPATIENT)
Dept: SURGERY | Facility: CLINIC | Age: 46
End: 2021-01-08
Payer: COMMERCIAL

## 2021-01-08 VITALS
HEIGHT: 66 IN | SYSTOLIC BLOOD PRESSURE: 135 MMHG | BODY MASS INDEX: 24.11 KG/M2 | WEIGHT: 150 LBS | DIASTOLIC BLOOD PRESSURE: 78 MMHG | OXYGEN SATURATION: 99 % | HEART RATE: 57 BPM

## 2021-01-08 DIAGNOSIS — K64.8 INTERNAL HEMORRHOIDS: Primary | ICD-10-CM

## 2021-01-08 DIAGNOSIS — K62.5 RECTAL BLEEDING: ICD-10-CM

## 2021-01-08 PROCEDURE — 46221 LIGATION OF HEMORRHOID(S): CPT | Performed by: NURSE PRACTITIONER

## 2021-01-08 PROCEDURE — 99203 OFFICE O/P NEW LOW 30 MIN: CPT | Mod: 25 | Performed by: NURSE PRACTITIONER

## 2021-01-08 ASSESSMENT — PAIN SCALES - GENERAL: PAINLEVEL: NO PAIN (0)

## 2021-01-08 ASSESSMENT — MIFFLIN-ST. JEOR: SCORE: 1498.15
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
## 2021-01-08 NOTE — NURSING NOTE
"Chief Complaint   Patient presents with     New Patient     hemorrhoids with bleeding       Vitals:    01/08/21 0952   BP: 135/78   BP Location: Right arm   Patient Position: Sitting   Cuff Size: Adult Regular   Pulse: 57   SpO2: 99%   Weight: 150 lb   Height: 5' 6\"       Body mass index is 24.21 kg/m .      Pravin Valencia LPN                        "
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
## 2021-03-16 RX ORDER — HEPARIN SODIUM,PORCINE 10 UNIT/ML
5 VIAL (ML) INTRAVENOUS
Status: CANCELLED | OUTPATIENT
Start: 2021-03-16

## 2021-03-16 RX ORDER — ERTAPENEM 1 G/1
1 INJECTION, POWDER, LYOPHILIZED, FOR SOLUTION INTRAMUSCULAR; INTRAVENOUS ONCE
Status: CANCELLED | OUTPATIENT
Start: 2021-03-16 | End: 2021-03-16

## 2021-03-16 RX ORDER — HEPARIN SODIUM (PORCINE) LOCK FLUSH IV SOLN 100 UNIT/ML 100 UNIT/ML
5 SOLUTION INTRAVENOUS
Status: CANCELLED | OUTPATIENT
Start: 2021-03-16

## 2021-03-19 ENCOUNTER — PRE VISIT (OUTPATIENT)
Dept: UROLOGY | Facility: CLINIC | Age: 46
End: 2021-03-19

## 2021-03-19 DIAGNOSIS — N31.9 NEUROGENIC BLADDER: Primary | ICD-10-CM

## 2021-03-19 NOTE — TELEPHONE ENCOUNTER
Reason for visit: cystoscopy with Botox     Relevant information: Neurogenic bladder    Records/imaging/labs/orders: all records available    Pt called: yes, pt will be getting IV antibiotics    At Rooming: standard prep - verify scope, 200 Units

## 2021-03-23 ENCOUNTER — INFUSION THERAPY VISIT (OUTPATIENT)
Dept: INFUSION THERAPY | Facility: CLINIC | Age: 46
End: 2021-03-23
Attending: UROLOGY
Payer: COMMERCIAL

## 2021-03-23 VITALS
DIASTOLIC BLOOD PRESSURE: 74 MMHG | OXYGEN SATURATION: 98 % | SYSTOLIC BLOOD PRESSURE: 127 MMHG | TEMPERATURE: 98 F | RESPIRATION RATE: 16 BRPM | HEART RATE: 56 BPM

## 2021-03-23 DIAGNOSIS — T83.511S URINARY TRACT INFECTION ASSOCIATED WITH CATHETERIZATION OF URINARY TRACT, UNSPECIFIED INDWELLING URINARY CATHETER TYPE, SEQUELA: ICD-10-CM

## 2021-03-23 DIAGNOSIS — Z29.89 NEED FOR PROPHYLAXIS AGAINST URINARY TRACT INFECTION: Primary | ICD-10-CM

## 2021-03-23 DIAGNOSIS — N39.0 URINARY TRACT INFECTION ASSOCIATED WITH CATHETERIZATION OF URINARY TRACT, UNSPECIFIED INDWELLING URINARY CATHETER TYPE, SEQUELA: ICD-10-CM

## 2021-03-23 PROCEDURE — 250N000011 HC RX IP 250 OP 636: Performed by: UROLOGY

## 2021-03-23 PROCEDURE — 96374 THER/PROPH/DIAG INJ IV PUSH: CPT

## 2021-03-23 PROCEDURE — 250N000009 HC RX 250: Performed by: UROLOGY

## 2021-03-23 RX ORDER — HEPARIN SODIUM,PORCINE 10 UNIT/ML
5 VIAL (ML) INTRAVENOUS
Status: CANCELLED | OUTPATIENT
Start: 2021-03-23

## 2021-03-23 RX ORDER — HEPARIN SODIUM (PORCINE) LOCK FLUSH IV SOLN 100 UNIT/ML 100 UNIT/ML
5 SOLUTION INTRAVENOUS
Status: CANCELLED | OUTPATIENT
Start: 2021-03-23

## 2021-03-23 RX ADMIN — WATER 1 G: 1 INJECTION INTRAMUSCULAR; INTRAVENOUS; SUBCUTANEOUS at 10:27

## 2021-03-23 NOTE — PATIENT INSTRUCTIONS
Dear Kinza Teresa    Thank you for choosing [facility] ([facility]) for your IV antibiotic (Ertapnem/Invanz) infusion.  The following information is a summary of our appointment as well as important reminders.      Patient Education     Ertapenem Sodium Solution for injection  What is this medicine?  ERTAPENEM (er ta PEN em) is a carbapenem antibiotic. It is used to treat certain kinds of bacterial infections. It will not work for colds, flu, or other viral infections.  This medicine may be used for other purposes; ask your health care provider or pharmacist if you have questions.  What should I tell my health care provider before I take this medicine?  They need to know if you have any of these conditions:    brain tumor, lesion    kidney disease    seizure disorder    an unusual or allergic reaction to ertapenem, other antibiotics, amide local anesthetics like lidocaine, or other medicines, foods, dyes, or preservatives    pregnant or trying to get pregnant    breast-feeding  How should I use this medicine?  This medicine is infused into a vein or injected deep into a muscle. It is usually given by a health care professional in a hospital or clinic setting.  If you get this medicine at home, you will be taught how to prepare and give this medicine. Use exactly as directed. Take your medicine at regular intervals. Do not take your medicine more often than directed.  It is important that you put your used needles and syringes in a special sharps container. Do not put them in a trash can. If you do not have a sharps container, call your pharmacist or healthcare provider to get one.  Talk to your pediatrician regarding the use of this medicine in children. While this drug may be prescribed for children as young as 3 months old for selected conditions, precautions do apply.  Overdosage: If you think you have taken too much of this medicine contact a poison  control center or emergency room at once.  NOTE: This medicine is only for you. Do not share this medicine with others.  What if I miss a dose?  If you miss a dose, take it as soon as you can. If it is almost time for your next dose, take only that dose. Do not take double or extra doses.  What may interact with this medicine?    birth control pills    probenecid  This list may not describe all possible interactions. Give your health care provider a list of all the medicines, herbs, non-prescription drugs, or dietary supplements you use. Also tell them if you smoke, drink alcohol, or use illegal drugs. Some items may interact with your medicine.  What should I watch for while using this medicine?  Tell your doctor or health care professional if your symptoms do not improve or if you get new symptoms. Your doctor will monitor your condition and blood work as needed.  Do not treat diarrhea with over the counter products. Contact your doctor if you have diarrhea that lasts more than 2 days or if it is severe and watery.  What side effects may I notice from receiving this medicine?  Side effects that you should report to your doctor or health care professional as soon as possible:    allergic reactions like skin rash, itching or hives, swelling of the face, lips, or tongue    anxiety, confusion, dizzy    chest pain    difficulty breathing, wheezing    edema, swelling    fever    irregular heart rate, blood pressure    pain or difficulty passing urine    seizures    unusually weak or tired    white or red patches in mouth  Side effects that usually do not require medical attention (report to your doctor or health care professional if they continue or are bothersome):    constipation or diarrhea    difficulty sleeping    headache    nausea, vomiting    pain, swelling or irritation where injected    stomach upset    vaginal itch, irritation  This list may not describe all possible side effects. Call your doctor for medical  advice about side effects. You may report side effects to FDA at 1-220-GSW-7075.  Where should I keep my medicine?  Keep out of the reach of children.  You will be instructed on how to store this medicine. Throw away any unused medicine after the expiration date on the label.  NOTE:This sheet is a summary. It may not cover all possible information. If you have questions about this medicine, talk to your doctor, pharmacist, or health care provider. Copyright  2016 Gold Standard             We look forward in seeing you on your next appointment here at [facility] ([facility]).  Please don t hesitate to call us at [phone] to reschedule any of your appointments or to speak with one of the [facility] registered nurses.  It was a pleasure taking care of you today.    Sincerely,    [facility]  [facility]  [address] [place] [address]  [address]  Phone:  [phone]

## 2021-03-23 NOTE — LETTER
3/23/2021         RE: Kinza Teresa  [address]  [facility] [address]        Dear Colleague,    Thank you for referring your patient, Kinza Teresa, to the [facility]. Please see a copy of my visit note below.    Nursing Note  Kinza Teresa [facility] for:   Chief Complaint   Patient presents with     Infusion     Ertapenem     During [facility]'s [facility] appointment, orders from Dr. Alejandro Tong were completed.  Frequency: daily x 2 doses. Today is dose 1/2.    Progress note:  Patient identification verified by name and date of birth.  Assessment completed.  Vitals recorded in Doc Flowsheets.  Patient was provided with education regarding medication/procedure and possible side effects.  Patient verbalized understanding.     present during visit today: Not Applicable.    Treatment Conditions: Non-applicable.    Premedications: were not ordered.    Drug Waste Record: No    Infusion length and rate:    Invanz given via IVP over 5 minutes.    Labs: were not ordered for this appointment.    Vascular access: peripheral IV placed today.    Is the next appt scheduled? tomorrow    Post Infusion Assessment:  Patient tolerated infusion without incident.     Discharge Plan:   Follow up plan of care with: ongoing infusions at [facility]., ordering provider as scheduled. and after visit summary declined by patient  Discharge instructions were reviewed with patient.  Patient/representative verbalized understanding of discharge instructions and all questions answered.  Patient discharged from [facility] in stable condition.    Lenka Garcia RN       Administrations This Visit     ertapenem (INVanz) 1 g in 10 mL SWFI for IVP     Admin Date  03/23/2021 Action  Given Dose  1 g Route  Intravenous Administered By  Lenka Garcia,  RN                /74   Pulse 56   Temp 98  F (36.7  C) (Oral)   Resp 16   SpO2 98%         Again, thank you for allowing me to participate in the care of your patient.        Sincerely,        [facility]

## 2021-03-23 NOTE — PROGRESS NOTES
Nursing Note  Kinza Teresa presents [facility] for:   Chief Complaint   Patient presents with     Infusion     Ertapenem     During [facility]'s [facility] appointment, orders from Dr. Alejandro Tong were completed.  Frequency: daily x 2 doses. Today is dose 1/2.    Progress note:  Patient identification verified by name and date of birth.  Assessment completed.  Vitals recorded in Doc Flowsheets.  Patient was provided with education regarding medication/procedure and possible side effects.  Patient verbalized understanding.     present during visit today: Not Applicable.    Treatment Conditions: Non-applicable.    Premedications: were not ordered.    Drug Waste Record: No    Infusion length and rate:    Invanz given via IVP over 5 minutes.    Labs: were not ordered for this appointment.    Vascular access: peripheral IV placed today.    Is the next appt scheduled? tomorrow    Post Infusion Assessment:  Patient tolerated infusion without incident.     Discharge Plan:   Follow up plan of care with: ongoing infusions at [facility] ordering provider as scheduled. and after visit summary declined by patient  Discharge instructions were reviewed with patient.  Patient/representative verbalized understanding of discharge instructions and all questions answered.  Patient discharged from [facility] in stable condition.    Lenka Garcia RN       Administrations This Visit     ertapenem (INVanz) 1 g in 10 mL SWFI for IVP     Admin Date  03/23/2021 Action  Given Dose  1 g Route  Intravenous Administered By  Lenka Garcia, RN                /74   Pulse 56   Temp 98  F (36.7  C) (Oral)   Resp 16   SpO2 98%

## 2021-03-24 ENCOUNTER — OFFICE VISIT (OUTPATIENT)
Dept: UROLOGY | Facility: CLINIC | Age: 46
End: 2021-03-24
Payer: COMMERCIAL

## 2021-03-24 ENCOUNTER — INFUSION THERAPY VISIT (OUTPATIENT)
Dept: INFUSION THERAPY | Facility: CLINIC | Age: 46
End: 2021-03-24
Attending: UROLOGY
Payer: COMMERCIAL

## 2021-03-24 VITALS
OXYGEN SATURATION: 98 % | SYSTOLIC BLOOD PRESSURE: 145 MMHG | DIASTOLIC BLOOD PRESSURE: 67 MMHG | TEMPERATURE: 98.1 F | HEART RATE: 62 BPM

## 2021-03-24 VITALS — DIASTOLIC BLOOD PRESSURE: 90 MMHG | SYSTOLIC BLOOD PRESSURE: 147 MMHG | HEART RATE: 61 BPM

## 2021-03-24 DIAGNOSIS — N39.0 RECURRENT UTI: ICD-10-CM

## 2021-03-24 DIAGNOSIS — T83.511S URINARY TRACT INFECTION ASSOCIATED WITH CATHETERIZATION OF URINARY TRACT, UNSPECIFIED INDWELLING URINARY CATHETER TYPE, SEQUELA: ICD-10-CM

## 2021-03-24 DIAGNOSIS — N32.89 BLADDER MASS: ICD-10-CM

## 2021-03-24 DIAGNOSIS — Z29.89 NEED FOR PROPHYLAXIS AGAINST URINARY TRACT INFECTION: Primary | ICD-10-CM

## 2021-03-24 DIAGNOSIS — N39.0 URINARY TRACT INFECTION ASSOCIATED WITH CATHETERIZATION OF URINARY TRACT, UNSPECIFIED INDWELLING URINARY CATHETER TYPE, SEQUELA: ICD-10-CM

## 2021-03-24 DIAGNOSIS — R33.9 URINARY RETENTION: ICD-10-CM

## 2021-03-24 DIAGNOSIS — N32.89 BLADDER MASS: Primary | ICD-10-CM

## 2021-03-24 DIAGNOSIS — Z11.59 ENCOUNTER FOR SCREENING FOR OTHER VIRAL DISEASES: ICD-10-CM

## 2021-03-24 DIAGNOSIS — N31.9 NEUROGENIC BLADDER: Primary | ICD-10-CM

## 2021-03-24 PROCEDURE — 99207 PR NO CHARGE NURSE ONLY: CPT

## 2021-03-24 PROCEDURE — 52287 CYSTOSCOPY CHEMODENERVATION: CPT | Performed by: UROLOGY

## 2021-03-24 PROCEDURE — 250N000011 HC RX IP 250 OP 636: Performed by: UROLOGY

## 2021-03-24 PROCEDURE — 96374 THER/PROPH/DIAG INJ IV PUSH: CPT

## 2021-03-24 PROCEDURE — 250N000009 HC RX 250: Performed by: UROLOGY

## 2021-03-24 PROCEDURE — 88305 TISSUE EXAM BY PATHOLOGIST: CPT | Performed by: PATHOLOGY

## 2021-03-24 RX ORDER — HEPARIN SODIUM,PORCINE 10 UNIT/ML
5 VIAL (ML) INTRAVENOUS
Status: CANCELLED | OUTPATIENT
Start: 2021-03-24

## 2021-03-24 RX ORDER — ERTAPENEM 1 G/1
1 INJECTION, POWDER, LYOPHILIZED, FOR SOLUTION INTRAMUSCULAR; INTRAVENOUS
Status: CANCELLED | OUTPATIENT
Start: 2021-03-24

## 2021-03-24 RX ORDER — HEPARIN SODIUM (PORCINE) LOCK FLUSH IV SOLN 100 UNIT/ML 100 UNIT/ML
5 SOLUTION INTRAVENOUS
Status: CANCELLED | OUTPATIENT
Start: 2021-03-24

## 2021-03-24 RX ADMIN — ERTAPENEM SODIUM 1 G: 1 INJECTION, POWDER, LYOPHILIZED, FOR SOLUTION INTRAMUSCULAR; INTRAVENOUS at 10:42

## 2021-03-24 ASSESSMENT — PAIN SCALES - GENERAL: PAINLEVEL: NO PAIN (0)
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
## 2021-03-24 NOTE — LETTER
3/24/2021       RE: Kinza Teresa  [address] [facility] [address]     Dear Colleague,    Thank you for referring your patient, Kinza Teresa, to the [facility] [place] at [facility]. Please see a copy of my visit note below.    PRE-OPERATIVE DIAGNOSIS:   1.  Neurogenic bladder     POST-OPERATIVE DIAGNOSIS:  1.  Neurogenic bladder  2. Bladder mass     PROCEDURE:    1. Cystourethroscopy With injection of botulinum toxin A 200units in 20cc sterile saline     Surgeon: Alejandro Tong MD     OPERATIVE INDICATIONS:  Kinza Teresa is a 45 year old male with neurogenic bladder due to spinal cord injury. He does not have an augment. He performs CIC per urethra. He takes oxybutynin. UDS demonstrates persistent neurogenic DO.  He experiences recurrent UTIs. He previously had an IPP which works well for him though has some glans hypermobility. He likes the IPP. His last Botox was 200u in Nov 2020.. He has had NO UTI since that. 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 He presents today to get a repeat injection     OPERATIVE DETAILS:  The patient was taken to the procedure room. He was left in his chair. Penis was prepped.      A Flexible cystoscope was placed into the bladder and a flexible needle  Was preloaded.  The bladder mucosa appeared to be normal without stones, tumors or diverticulum on 360 degree inspection. The ureteral orifices were in the normal orthotopic position bilaterally.  We mixed 2 vials of Botox for 200u total.  We performed a template injection procedure with 5 columns of 4 injections. All injections were placed deep to the mucosa into the detrusor muscle.  We then emptied the bladder to re-inspect our injection sites and found that there was a minimal amount of blood.    On the lateral left side of the bladder, there  was an indentation present with some fibrotic tissue overlying this area. Patient visualized this as well. A small portion was scratched off with the scope and sent for pathology. However, it was unable to be completely removed.    Scope was removed.     PLAN:     Plan for operative biopsy and fulguration of this abnormal bladder site.    Followup in 4-5 months for repeat injection. Ideally Ertapenem 1g IV x 1 dose morning before, and 1 dose morning of Botox injection.     Alejandro Tong MD

## 2021-03-24 NOTE — PATIENT INSTRUCTIONS
"Schedule a cystoscopy with Botox in 4 months. 200 units, flex scope.    It was a pleasure meeting with you today.  Thank you for allowing me and my team the privilege of caring for you today.  YOU are the reason we are here, and I truly hope we provided you with the excellent service you deserve.  Please let us know if there is anything else we can do for you so that we can be sure you are leaving completely satisfied with your care experience.        Ashely Rodriguez CMA    AFTER YOUR CYSTOSCOPY        You have just completed a cystoscopy, or \"cysto\", which allowed your physician to learn more about your bladder (or to remove a stent placed after surgery). We suggest that you continue to avoid caffeine, fruit juice, and alcohol for the next 24 hours, however, you are encouraged to return to your normal activities.         A few things that are considered normal after your cystoscopy:     * Small amount of bleeding (or spotting) that clears within the next 24 hours     * Slight burning sensation with urination     * Sensation to of needing to avoid more frequently     * The feeling of \"air\" in your urine     * Mild discomfort that is relieved with Tylenol        Please contact our office promptly if you:     * Develop a fever above 101 degrees     * Are unable to urinate     * Develop bright red blood that does not stop     * Severe pain or swelling         Please contact our office with any concerns or questions @[email or web address].  "

## 2021-03-24 NOTE — PROGRESS NOTES
BEFORE SURGERY:March 30 th, 2021    -NO IBUPROFEN , MOTRIN, ALEVE, GARLIC SUPPLEMENTS, ASPIRIN PRODUCTS  OR FISH OIL FOR 7 DAYS PRIOR TO SURGERY. Tylenol is fine, generally.( You will need specific instructions from primary care if on blood thinners).    -Read pre-operative pamphlets/ booklets related to your procedure, call or contact the clinic with questions.    - Pre-op History and Physical to be done by primary care physician within 30 days of surgery date. This form is in the packet. Patient refused appointment in Pac would like to set up Preop at [facility].    - Restrictions on food and fluid the day of surgery as per packet. Generally, nothing solid to eat 8 hours prior to the procedure. Okay to have clear liquids up to 2 hours before (this includes; water, apple juice, black coffee without any cream or sugar).    - Pre-op soap, neck down, directions are in your packet. Use the night before surgery and the day of surgery. Soap given.    - You will need a  the day of surgery. Patient aware    - You will need someone to remain with you for 24 hours following your procedure.   Patient aware    AFTER SURGERY:    - You will follow up Virtual Appointment on April 14th at 12:45.    - Call [phone] for scheduling or general questions     For urgent needs after hours call [phone]. You will speak with the hospital  and should ask to have the urology resident on call paged.    - Please contact our clinic if you note any of the following:          -Foul smelling drainage from your surgical site          -Persistent pain after pain medication          -Fever>100 degrees x 24 hours or longer          -Significant dizziness, especially of new onset          -Any questions or concerns about your care    Covid testing at [facility] on Friday March 26th at 1130.    Kim Traore RN

## 2021-03-24 NOTE — PROGRESS NOTES
Nursing Note  Kinza Teresa [facility] for:   Chief Complaint   Patient presents with     Infusion     ertapenem     During today's [facility] appointment, orders from Dr. Tong were completed.  Frequency: today is dose 2 of 2 total daily    Progress note:  Patient identification verified by name and date of birth.  Assessment completed.  Vitals recorded in Doc Flowsheets.  Patient was provided with education regarding medication/procedure and possible side effects.  Patient verbalized understanding.     present during visit today: Not Applicable.    Treatment Conditions: Non-applicable.    Premedications: were not ordered.    Drug Waste Record: No    Infusion length and rate:  IVP over 5 mins    Labs: were not ordered for this appointment.    Vascular access: peripheral IV placed today.    Is the next appt scheduled? yes  Asymptomatic COVID test completed? no    Post Infusion Assessment:  Patient tolerated infusion without incident.     Discharge Plan:   Follow up plan of care with: ongoing infusions at [facility]. and primary care provider,  Discharge instructions were reviewed with patient.  Patient/representative verbalized understanding of discharge instructions and all questions answered.  Patient discharged from [facility] in stable condition.    Linda Suarez RN       Administrations This Visit     ertapenem (INVanz) 1 g in 10 mL SWFI for IVP     Admin Date  03/24/2021 Action  Given Dose  1 g Route  Intravenous Administered By  Linda Suarez, MARLINE                  BP (!) 145/67   Pulse 62   Temp 98.1  F (36.7  C) (Oral)   SpO2 98%

## 2021-03-24 NOTE — LETTER
3/24/2021         RE: Kinza Teresa  [address]  [facility] [address]        Dear Colleague,    Thank you for referring your patient, Kinza Teresa, to the [facility]. Please see a copy of my visit note below.    Nursing Note  Kinza Teresa [facility] for:   Chief Complaint   Patient presents with     Infusion     ertapenem     During today's [facility] appointment, orders from Dr. Tong were completed.  Frequency: today is dose 2 of 2 total daily    Progress note:  Patient identification verified by name and date of birth.  Assessment completed.  Vitals recorded in Doc Flowsheets.  Patient was provided with education regarding medication/procedure and possible side effects.  Patient verbalized understanding.     present during visit today: Not Applicable.    Treatment Conditions: Non-applicable.    Premedications: were not ordered.    Drug Waste Record: No    Infusion length and rate:  IVP over 5 mins    Labs: were not ordered for this appointment.    Vascular access: peripheral IV placed today.    Is the next appt scheduled? yes  Asymptomatic COVID test completed? no    Post Infusion Assessment:  Patient tolerated infusion without incident.     Discharge Plan:   Follow up plan of care with: ongoing infusions at [facility]. and primary care provider,  Discharge instructions were reviewed with patient.  Patient/representative verbalized understanding of discharge instructions and all questions answered.  Patient discharged from [facility] in stable condition.    Linda Suarez RN       Administrations This Visit     ertapenem (INVanz) 1 g in 10 mL SWFI for IVP     Admin Date  03/24/2021 Action  Given Dose  1 g Route  Intravenous Administered By  Linda Suarez RN                  BP (!) 145/67   Pulse 62   Temp  98.1  F (36.7  C) (Oral)   SpO2 98%           Again, thank you for allowing me to participate in the care of your patient.        Sincerely,        [facility]

## 2021-03-25 ENCOUNTER — RECORDS - HEALTHEAST (OUTPATIENT)
Dept: LAB | Facility: CLINIC | Age: 46
End: 2021-03-25

## 2021-03-25 ENCOUNTER — TRANSFERRED RECORDS (OUTPATIENT)
Dept: HEALTH INFORMATION MANAGEMENT | Facility: CLINIC | Age: 46
End: 2021-03-25

## 2021-03-25 LAB
ANION GAP SERPL CALCULATED.3IONS-SCNC: 11 MMOL/L (ref 5–18)
BUN SERPL-MCNC: 15 MG/DL (ref 8–22)
CALCIUM SERPL-MCNC: 9.3 MG/DL (ref 8.5–10.5)
CHLORIDE BLD-SCNC: 105 MMOL/L (ref 98–107)
CO2 SERPL-SCNC: 27 MMOL/L (ref 22–31)
COPATH REPORT: NORMAL
CREAT SERPL-MCNC: 0.85 MG/DL (ref 0.7–1.3)
CREAT SERPL-MCNC: 0.85 MG/DL (ref 0.7–1.3)
GFR SERPL CREATININE-BSD FRML MDRD: >60 ML/MIN/1.73M2
GFR SERPL CREATININE-BSD FRML MDRD: >60 ML/MIN/1.73M2
GLUCOSE BLD-MCNC: 100 MG/DL (ref 70–125)
GLUCOSE SERPL-MCNC: 100 MG/DL (ref 70–125)
POTASSIUM BLD-SCNC: 4.8 MMOL/L (ref 3.5–5)
POTASSIUM SERPL-SCNC: 4.8 MMOL/L (ref 3.5–5)
SODIUM SERPL-SCNC: 143 MMOL/L (ref 136–145)

## 2021-03-26 DIAGNOSIS — Z11.59 ENCOUNTER FOR SCREENING FOR OTHER VIRAL DISEASES: ICD-10-CM

## 2021-03-26 LAB
LABORATORY COMMENT REPORT: NORMAL
SARS-COV-2 RNA RESP QL NAA+PROBE: NEGATIVE
SARS-COV-2 RNA RESP QL NAA+PROBE: NORMAL
SPECIMEN SOURCE: NORMAL
SPECIMEN SOURCE: NORMAL

## 2021-03-26 PROCEDURE — U0003 INFECTIOUS AGENT DETECTION BY NUCLEIC ACID (DNA OR RNA); SEVERE ACUTE RESPIRATORY SYNDROME CORONAVIRUS 2 (SARS-COV-2) (CORONAVIRUS DISEASE [COVID-19]), AMPLIFIED PROBE TECHNIQUE, MAKING USE OF HIGH THROUGHPUT TECHNOLOGIES AS DESCRIBED BY CMS-2020-01-R: HCPCS | Performed by: UROLOGY

## 2021-03-26 PROCEDURE — U0005 INFEC AGEN DETEC AMPLI PROBE: HCPCS | Performed by: UROLOGY

## 2021-03-27 NOTE — PROGRESS NOTES
PRE-OPERATIVE DIAGNOSIS:   1.  Neurogenic bladder     POST-OPERATIVE DIAGNOSIS:  1.  Neurogenic bladder  2. Bladder mass     PROCEDURE:    1. Cystourethroscopy With injection of botulinum toxin A 200units in 20cc sterile saline     Surgeon: Alejandro Tong MD     OPERATIVE INDICATIONS:  Kinza Teresa is a 45 year old male with neurogenic bladder due to spinal cord injury. He does not have an augment. He performs CIC per urethra. He takes oxybutynin. UDS demonstrates persistent neurogenic DO.  He experiences recurrent UTIs. He previously had an IPP which works well for him though has some glans hypermobility. He likes the IPP. His last Botox was 200u in Nov 2020.. He has had NO UTI since that. 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 He presents today to get a repeat injection     OPERATIVE DETAILS:  The patient was taken to the procedure room. He was left in his chair. Penis was prepped.      A Flexible cystoscope was placed into the bladder and a flexible needle  Was preloaded.  The bladder mucosa appeared to be normal without stones, tumors or diverticulum on 360 degree inspection. The ureteral orifices were in the normal orthotopic position bilaterally.  We mixed 2 vials of Botox for 200u total.  We performed a template injection procedure with 5 columns of 4 injections. All injections were placed deep to the mucosa into the detrusor muscle.  We then emptied the bladder to re-inspect our injection sites and found that there was a minimal amount of blood.    On the lateral left side of the bladder, there was an indentation present with some fibrotic tissue overlying this area. Patient visualized this as well. A small portion was scratched off with the scope and sent for pathology. However, it was unable to be completely removed.    Scope was removed.     PLAN:     Plan for operative biopsy and fulguration of  this abnormal bladder site.    Followup in 4-5 months for repeat injection. Ideally Ertapenem 1g IV x 1 dose morning before, and 1 dose morning of Botox injection.     Alejandro Tong MD

## 2021-03-29 ENCOUNTER — ANESTHESIA EVENT (OUTPATIENT)
Dept: SURGERY | Facility: AMBULATORY SURGERY CENTER | Age: 46
End: 2021-03-29

## 2021-03-29 NOTE — ANESTHESIA PREPROCEDURE EVALUATION
Anesthesia Pre-Procedure Evaluation    Patient: Kinza Teresa   MRN: [id number] : 1975        Preoperative Diagnosis: Bladder mass [N32.89]   Procedure : Procedure(s):  CYSTOSCOPY, WITH BLADDER BIOPSY     Past Medical History:   Diagnosis Date     Constipation      Sleep apnea      Spinal cord injury at T7-T12 level with spinal cord lesion (H) 2017      Past Surgical History:   Procedure Laterality Date     IMPLANT PROSTHESIS PENIS INFLATABLE N/A 2019    Procedure: Insertion of Inflatable Penile Prosthesis;  Surgeon: Alejandro Tong MD;  Location: [facility]     ORTHOPEDIC SURGERY      spinal fx     ORTHOPEDIC SURGERY      pelvic fracture      No Known Allergies   Social History     Tobacco Use     Smoking status: Never Smoker     Smokeless tobacco: Never Used   Substance Use Topics     Alcohol use: No      Wt Readings from Last 1 Encounters:   21 68 kg (150 lb)        Anesthesia Evaluation            ROS/MED HX  ENT/Pulmonary:     (+) sleep apnea, uses CPAP,     Neurologic: Comment: T11 spinal cord injury      Cardiovascular:    (-) GENTILE and orthopnea/PND   METS/Exercise Tolerance: 4 - Raking leaves, gardening Comment: Uses upper extremities to push himself in wheelchair   Hematologic:       Musculoskeletal:       GI/Hepatic:     (+) GERD (no symptoms), Asymptomatic on medication,     Renal/Genitourinary:       Endo:       Psychiatric/Substance Use:       Infectious Disease:       Malignancy:       Other:            Physical Exam    Airway        Mallampati: II   TM distance: > 3 FB   Neck ROM: full   Mouth opening: > 3 cm    Respiratory Devices and Support         Dental  no notable dental history         Cardiovascular          Rhythm and rate: regular and normal     Pulmonary           breath sounds clear to auscultation           OUTSIDE LABS:  CBC:   Lab Results   Component Value Date    WBC 2.1 (L) 2020    WBC 2.8 (L) 2020    HGB 13.0 (L) 2020    HGB 13.2 (L) 2020     HCT 41.1 09/22/2020    HCT 40.4 09/21/2020     (L) 09/22/2020     (L) 09/21/2020     BMP:   Lab Results   Component Value Date     09/22/2020     09/21/2020    POTASSIUM 3.7 09/22/2020    POTASSIUM 4.0 09/21/2020    CHLORIDE 109 09/22/2020    CHLORIDE 106 09/21/2020    CO2 24 09/22/2020    CO2 24 09/21/2020    BUN 15 09/22/2020    BUN 16 09/21/2020    CR 0.96 09/22/2020    CR 0.99 09/21/2020    GLC 96 09/22/2020    GLC 79 09/21/2020     COAGS:   Lab Results   Component Value Date    PTT 32 10/25/2018    INR 0.93 10/25/2018     POC:   Lab Results   Component Value Date     (H) 11/14/2019     HEPATIC:   Lab Results   Component Value Date    ALBUMIN 3.4 09/21/2020    PROTTOTAL 6.9 09/21/2020    ALT 19 09/21/2020    AST 20 09/21/2020    ALKPHOS 70 09/21/2020    BILITOTAL 0.3 09/21/2020     OTHER:   Lab Results   Component Value Date    LACT 0.6 (L) 09/21/2020    LAYLA 8.6 09/22/2020    MAG 2.1 10/25/2018    LIPASE 89 10/25/2018       Anesthesia Plan    ASA Status:  2   NPO Status:  NPO Appropriate    Anesthesia Type: MAC.     - Reason for MAC: straight local not clinically adequate              Consents    Anesthesia Plan(s) and associated risks, benefits, and realistic alternatives discussed. Questions answered and patient/representative(s) expressed understanding.     - Discussed with:  Patient         Postoperative Care            Comments:    Discussed plan for MAC, including risk of aspiration pneumonia, backup plan of general anesthesia and risks of general anesthesia, including sore throat/hoarse voice, abrasions/damage to lips/tongue/teeth, nausea, rare complications (including medication reactions, cardiac, pulmonary).              Alis Ellington MD

## 2021-03-30 ENCOUNTER — HOSPITAL ENCOUNTER (OUTPATIENT)
Facility: AMBULATORY SURGERY CENTER | Age: 46
Discharge: HOME | End: 2021-03-30
Attending: UROLOGY | Admitting: UROLOGY
Payer: COMMERCIAL

## 2021-03-30 ENCOUNTER — ANESTHESIA (OUTPATIENT)
Dept: SURGERY | Facility: AMBULATORY SURGERY CENTER | Age: 46
End: 2021-03-30

## 2021-03-30 VITALS
RESPIRATION RATE: 17 BRPM | BODY MASS INDEX: 24.11 KG/M2 | SYSTOLIC BLOOD PRESSURE: 108 MMHG | HEIGHT: 66 IN | DIASTOLIC BLOOD PRESSURE: 56 MMHG | OXYGEN SATURATION: 98 % | TEMPERATURE: 97.2 F | HEART RATE: 52 BPM | WEIGHT: 150 LBS

## 2021-03-30 DIAGNOSIS — N32.89 BLADDER MASS: ICD-10-CM

## 2021-03-30 PROCEDURE — 88305 TISSUE EXAM BY PATHOLOGIST: CPT | Performed by: PATHOLOGY

## 2021-03-30 PROCEDURE — 52204 CYSTOSCOPY W/BIOPSY(S): CPT

## 2021-03-30 RX ORDER — LIDOCAINE HYDROCHLORIDE 20 MG/ML
JELLY TOPICAL PRN
Status: DISCONTINUED | OUTPATIENT
Start: 2021-03-30 | End: 2021-03-30 | Stop reason: HOSPADM

## 2021-03-30 RX ORDER — SODIUM CHLORIDE, SODIUM LACTATE, POTASSIUM CHLORIDE, CALCIUM CHLORIDE 600; 310; 30; 20 MG/100ML; MG/100ML; MG/100ML; MG/100ML
INJECTION, SOLUTION INTRAVENOUS CONTINUOUS
Status: DISCONTINUED | OUTPATIENT
Start: 2021-03-30 | End: 2021-03-30 | Stop reason: HOSPADM

## 2021-03-30 RX ORDER — NALOXONE HYDROCHLORIDE 0.4 MG/ML
0.4 INJECTION, SOLUTION INTRAMUSCULAR; INTRAVENOUS; SUBCUTANEOUS
Status: DISCONTINUED | OUTPATIENT
Start: 2021-03-30 | End: 2021-03-31 | Stop reason: HOSPADM

## 2021-03-30 RX ORDER — NALOXONE HYDROCHLORIDE 0.4 MG/ML
0.2 INJECTION, SOLUTION INTRAMUSCULAR; INTRAVENOUS; SUBCUTANEOUS
Status: DISCONTINUED | OUTPATIENT
Start: 2021-03-30 | End: 2021-03-31 | Stop reason: HOSPADM

## 2021-03-30 RX ORDER — ERTAPENEM 1 G/1
1 INJECTION, POWDER, LYOPHILIZED, FOR SOLUTION INTRAMUSCULAR; INTRAVENOUS
Status: COMPLETED | OUTPATIENT
Start: 2021-03-30 | End: 2021-03-30

## 2021-03-30 RX ORDER — MEPERIDINE HYDROCHLORIDE 25 MG/ML
12.5 INJECTION INTRAMUSCULAR; INTRAVENOUS; SUBCUTANEOUS
Status: DISCONTINUED | OUTPATIENT
Start: 2021-03-30 | End: 2021-03-31 | Stop reason: HOSPADM

## 2021-03-30 RX ORDER — ONDANSETRON 4 MG/1
4 TABLET, ORALLY DISINTEGRATING ORAL EVERY 30 MIN PRN
Status: DISCONTINUED | OUTPATIENT
Start: 2021-03-30 | End: 2021-03-31 | Stop reason: HOSPADM

## 2021-03-30 RX ORDER — PROPOFOL 10 MG/ML
INJECTION, EMULSION INTRAVENOUS PRN
Status: DISCONTINUED | OUTPATIENT
Start: 2021-03-30 | End: 2021-03-30

## 2021-03-30 RX ORDER — ACETAMINOPHEN 325 MG/1
975 TABLET ORAL ONCE
Status: COMPLETED | OUTPATIENT
Start: 2021-03-30 | End: 2021-03-30

## 2021-03-30 RX ORDER — LIDOCAINE HYDROCHLORIDE 20 MG/ML
INJECTION, SOLUTION INFILTRATION; PERINEURAL PRN
Status: DISCONTINUED | OUTPATIENT
Start: 2021-03-30 | End: 2021-03-30

## 2021-03-30 RX ORDER — SODIUM CHLORIDE, SODIUM LACTATE, POTASSIUM CHLORIDE, CALCIUM CHLORIDE 600; 310; 30; 20 MG/100ML; MG/100ML; MG/100ML; MG/100ML
INJECTION, SOLUTION INTRAVENOUS CONTINUOUS PRN
Status: DISCONTINUED | OUTPATIENT
Start: 2021-03-30 | End: 2021-03-30

## 2021-03-30 RX ORDER — PROPOFOL 10 MG/ML
INJECTION, EMULSION INTRAVENOUS CONTINUOUS PRN
Status: DISCONTINUED | OUTPATIENT
Start: 2021-03-30 | End: 2021-03-30

## 2021-03-30 RX ORDER — GABAPENTIN 300 MG/1
300 CAPSULE ORAL ONCE
Status: COMPLETED | OUTPATIENT
Start: 2021-03-30 | End: 2021-03-30

## 2021-03-30 RX ORDER — FENTANYL CITRATE 50 UG/ML
INJECTION, SOLUTION INTRAMUSCULAR; INTRAVENOUS PRN
Status: DISCONTINUED | OUTPATIENT
Start: 2021-03-30 | End: 2021-03-30

## 2021-03-30 RX ORDER — ONDANSETRON 2 MG/ML
4 INJECTION INTRAMUSCULAR; INTRAVENOUS EVERY 30 MIN PRN
Status: DISCONTINUED | OUTPATIENT
Start: 2021-03-30 | End: 2021-03-31 | Stop reason: HOSPADM

## 2021-03-30 RX ORDER — SODIUM CHLORIDE, SODIUM LACTATE, POTASSIUM CHLORIDE, CALCIUM CHLORIDE 600; 310; 30; 20 MG/100ML; MG/100ML; MG/100ML; MG/100ML
INJECTION, SOLUTION INTRAVENOUS CONTINUOUS
Status: DISCONTINUED | OUTPATIENT
Start: 2021-03-30 | End: 2021-03-31 | Stop reason: HOSPADM

## 2021-03-30 RX ADMIN — SODIUM CHLORIDE, SODIUM LACTATE, POTASSIUM CHLORIDE, CALCIUM CHLORIDE: 600; 310; 30; 20 INJECTION, SOLUTION INTRAVENOUS at 10:02

## 2021-03-30 RX ADMIN — GABAPENTIN 300 MG: 300 CAPSULE ORAL at 09:49

## 2021-03-30 RX ADMIN — PROPOFOL 20 MG: 10 INJECTION, EMULSION INTRAVENOUS at 10:09

## 2021-03-30 RX ADMIN — ERTAPENEM 1 G: 1 INJECTION, POWDER, LYOPHILIZED, FOR SOLUTION INTRAMUSCULAR; INTRAVENOUS at 10:10

## 2021-03-30 RX ADMIN — PROPOFOL 150 MCG/KG/MIN: 10 INJECTION, EMULSION INTRAVENOUS at 10:09

## 2021-03-30 RX ADMIN — LIDOCAINE HYDROCHLORIDE 60 MG: 20 INJECTION, SOLUTION INFILTRATION; PERINEURAL at 10:09

## 2021-03-30 RX ADMIN — ACETAMINOPHEN 975 MG: 325 TABLET ORAL at 09:49

## 2021-03-30 RX ADMIN — FENTANYL CITRATE 25 MCG: 50 INJECTION, SOLUTION INTRAMUSCULAR; INTRAVENOUS at 10:09

## 2021-03-30 ASSESSMENT — MIFFLIN-ST. JEOR: SCORE: 1498.15

## 2021-03-30 ASSESSMENT — ENCOUNTER SYMPTOMS: ORTHOPNEA: 0

## 2021-03-30 NOTE — DISCHARGE INSTRUCTIONS
[facility]  Home Care Following Anesthesia  For 24 hours after surgery:  1. Get plenty of rest.  A responsible adult must stay with you for at least 24 hours after you leave the surgery center.  2. Do not drive or use heavy equipment.  If you have weakness or tingling, don't drive or use heavy equipment until this feeling goes away.   3. Do not drink alcohol.   4. Avoid strenuous or risky activities.  Ask for help when climbing stairs.  5. You may feel lightheaded.  IF so, sit for a few minutes before standing.  Have someone help you get up.   6. If you have nausea (feel sick to your stomach): Drink only clear liquids such as apple juice, ginger ale, broth or 7-Up.  Rest may also help.  Be sure to drink enough fluids.  Move to a regular diet as you feel able.   7. You may have a slight fever.  Call the doctor if your fever is over 100 F (37.7 C) (taken under the tongue) or lasts longer than 24 hours.  8. You may have a dry mouth, a sore throat, muscle aches or trouble sleeping. These should go away after 24 hours.  9. Do not make important or legal decisions.               Tips for taking pain medications  To get the best pain relief possible, remember these points:    Take pain medications as directed, before pain becomes severe.    Pain medication can upset your stomach: taking it with food may help.    Constipation is a common side effect of pain medication. Drink plenty of  fluids.    Eat foods high in fiber. Take a stool softener if recommended by your doctor or pharmacist.    Do not drink alcohol, drive or operate machinery while taking pain medications.    Ask about other ways to control pain, such as with heat, ice or relaxation.    Tylenol/Acetaminophen Consumption  To help encourage the safe use of acetaminophen, the makers of TYLENOL  have lowered the maximum daily dose for single-ingredient Extra Strength TYLENOL  (acetaminophen) products sold in the [place] from 8  pills per day (4,000 mg) to 6 pills per day (3,000 mg). The dosing interval has also changed from 2 pills every 4-6 hours to 2 pills every 6 hours.    If you feel your pain relief is insufficient, you may take Tylenol/Acetaminophen in addition to your narcotic pain medication.     Be careful not to exceed 3,000 mg of Tylenol/Acetaminophen in a 24 hour period from all sources.    If you are taking extra strength Tylenol/acetaminophen (500 mg), the maximum dose is 6 tablets in 24 hours.    If you are taking regular strength acetaminophen (325 mg), the maximum dose is 9 tablets in 24 hours.    Call a doctor for any of the followin. Signs of infection (fever, growing tenderness at the surgery site, a large amount of drainage or bleeding, severe pain, foul-smelling drainage, redness, swelling).  2. It has been over 8 to 10 hours since surgery and you are still not able to urinate (pass water).  3. Headache for over 24 hours.  4. Numbness, tingling or weakness the day after surgery (if you had spinal anesthesia).  5. Signs of Covid-19 infection (temperature over 100 degrees, shortness of breath, cough, loss of taste/smell, generalized body aches, persistent headache, chills, sore throat, nausea/vomiting/diarrhea)  Your doctor is:       Dr. Alejandro Tong, Prostate and Urology: [phone]               Or dial [phone] and ask for the resident on call for:  Prostate Urology  For emergency care, call the:  [facility]:  [phone] (TTY for hearing impaired: [phone])

## 2021-03-30 NOTE — BRIEF OP NOTE
[facility]    Brief Operative Note    Pre-operative diagnosis: Bladder mass [N32.89]  Post-operative diagnosis Same as pre-operative diagnosis    Procedure: Procedure(s):  CYSTOSCOPY, WITH BLADDER BIOPSY  Surgeon: Surgeon(s) and Role:     * Alejandro Tong MD - Primary     * Regi Holden MD - Resident - Assisting     * Sada Mitchell MD - Fellow - Assisting  Anesthesia: Monitor Anesthesia Care   Estimated blood loss: Minimal  Drains: None  Specimens:   ID Type Source Tests Collected by Time Destination   A : bladder biopsy Tissue Bladder SURGICAL PATHOLOGY EXAM Alejandro Tong MD 3/30/2021 10:24 AM    B : bladder biopsy - deep Tissue Bladder SURGICAL PATHOLOGY EXAM Alejandro Tong MD 3/30/2021 10:25 AM      Findings:   1 cm Keritanized lesion on the left lateral bladder wall- no papillary features. No additional bladder lesions identified.   Complications: None.  Implants: * No implants in log *    Dispo: PACU then home. RTC in 2 weeks for pathology review.     Regi Holden MD  PGY-3 Urology  Pager [phone]

## 2021-03-30 NOTE — ANESTHESIA CARE TRANSFER NOTE
Patient: Kinza Teresa    Procedure(s):  CYSTOSCOPY, WITH BLADDER BIOPSY    Diagnosis: Bladder mass [N32.89]  Diagnosis Additional Information: No value filed.    Anesthesia Type:   MAC     Note:      Level of Consciousness: drowsy  Oxygen Supplementation: room air    Independent Airway: airway patency satisfactory and stable        Patient transferred to: Phase II    Handoff Report: Identifed the Patient, Identified the Reponsible Provider, Reviewed the pertinent medical history, Discussed the surgical course, Reviewed Intra-OP anesthesia mangement and issues during anesthesia, Set expectations for post-procedure period and Allowed opportunity for questions and acknowledgement of understanding      Vitals: (Last set prior to Anesthesia Care Transfer)  CRNA VITALS  3/30/2021 1003 - 3/30/2021 1039      3/30/2021             EKG:  NSR        Electronically Signed By: JOSE Vargas CRNA  March 30, 2021  10:39 AM

## 2021-03-30 NOTE — OP NOTE
OPERATIVE REPORT    PREOPERATIVE DIAGNOSIS:  Bladder lesion    POSTOPERATIVE DIAGNOSIS: Same    PROCEDURES PERFORMED:   1. Cystourethroscopy with bladder biopsy    STAFF SURGEON: Alejandro Tong MD  RESIDENT: Regi Holden MD    ANESTHESIA: Monitor Anesthesia Care    ESTIMATED BLOOD LOSS: < 10 mL.     DRAINS: None    OPERATIVE INDICATIONS:   Kinza Teresa is a 45 year old male with a history of neurogenic bladder 2/2 SCI. He recently underwent an office cystoscopy for bladder botox and he was found to have a ~1 cm keratinizing bladder lesion on the left lateral wall which was unable to be removed in clinic. The patient elected for biopsy and maximal fulguration in the OR, after a discussion of all risks, benefits, and alternatives.    DESCRIPTION OF PROCEDURE:   After verification of informed consent was obtained, the patient was brought to the operating room, and moved to the operating table. After adequate anesthesia was induced, the patient was repositioned in dorsal lithotomy position and prepped and draped in the usual sterile fashion. A formal timeout was performed to confirm the correct patient, procedure and operative site. His IPP was completely deflated prior to beginning the case.     A 22-Hebrew rigid cystoscope was inserted into a well lubricated urethra. We began by performing a white light cystourethroscopy. The urethra was unremarkable, and the prostate was open. The patient did have a very high bladder neck. The media was cloudy. The ureteral orifices were in their orthotopic positions bilaterally. There were no intravesical stones or diverticuli and the bladder was moderately trabeculated. We identified the previously seen keratinized lesion on the left lateral wall. This had no papillary features. There were no other lesions or tumors identified.     We used a rigid cold-cup biopsy forceps to peel the lesion off the bladder wall. This was passed off the field for pathologic examination. The base  of the lesion appeared to be consistent with normal-appearing bladder mucosa. Two deeper bites of the bladder base were taken and sent for pathologic examination. A Bugbee was used to fulgurate the biopsied sites and any bleeding areas. The bladder was re-examined under low pressure and hemostasis was confirmed. At this point, the bladder was drained and the cystoscope withdrawn. A lidojet was instilled into the urethra at the conclusion of the case.     The procedure was then concluded. The patient was transferred to the postanesthesia care unit in stable condition and tolerated the procedure well.    POSTOP PLAN:  1. PACU then home.  2. The patient will resume his home CIC regimen  3. Return to clinic in 2 weeks for pathology review.     Regi Holden MD  PGY-3 Urology  Pager [phone]    As attending surgeon, Alejandro RICE MD, was present for the entire procedure.

## 2021-03-30 NOTE — ANESTHESIA POSTPROCEDURE EVALUATION
Patient: Kinza Teresa    Procedure(s):  CYSTOSCOPY, WITH BLADDER BIOPSY    Diagnosis:Bladder mass [N32.89]  Diagnosis Additional Information: No value filed.    Anesthesia Type:  MAC    Note:  Disposition: Outpatient   Postop Pain Control: Uneventful            Sign Out: Well controlled pain   PONV: No   Neuro/Psych: Uneventful            Sign Out: Acceptable/Baseline neuro status   Airway/Respiratory: Uneventful            Sign Out: Acceptable/Baseline resp. status   CV/Hemodynamics: Uneventful            Sign Out: Acceptable CV status   Other NRE: NONE   DID A NON-ROUTINE EVENT OCCUR? No         Last vitals:  Vitals:    03/30/21 0859 03/30/21 1034 03/30/21 1049   BP: 125/74 113/57 108/56   Pulse: 57 62 52   Resp: 18 16 17   Temp: 36.9  C (98.4  F) 36.2  C (97.2  F)    SpO2: 100% 97% 98%       Last vitals prior to Anesthesia Care Transfer:  CRNA VITALS  3/30/2021 1003 - 3/30/2021 1103      3/30/2021             EKG:  NSR          Electronically Signed By: Alis Ellington MD  March 30, 2021  2:06 PM

## 2021-03-31 LAB — COPATH REPORT: NORMAL

## 2021-04-06 ENCOUNTER — PRE VISIT (OUTPATIENT)
Dept: UROLOGY | Facility: CLINIC | Age: 46
End: 2021-04-06

## 2021-04-06 NOTE — TELEPHONE ENCOUNTER
Reason for visit: 2 week post op follow up     Relevant information: Cystoscopy completed on 3/30/21    Records/imaging/labs/orders: in EPIC    Pt called: no    At Rooming: normal virtual

## 2021-04-14 ENCOUNTER — VIRTUAL VISIT (OUTPATIENT)
Dept: UROLOGY | Facility: CLINIC | Age: 46
End: 2021-04-14
Payer: COMMERCIAL

## 2021-04-14 DIAGNOSIS — N32.89 BLADDER MASS: ICD-10-CM

## 2021-04-14 DIAGNOSIS — N31.9 NEUROGENIC BLADDER: Primary | ICD-10-CM

## 2021-04-14 PROCEDURE — 99212 OFFICE O/P EST SF 10 MIN: CPT | Mod: TEL | Performed by: UROLOGY

## 2021-04-14 NOTE — LETTER
4/14/2021       RE: Kinza Teresa  [address] [address]     Dear Colleague,    Thank you for referring your patient, Kinza Teresa, to the [facility] [place] [facility]. Please see a copy of my visit note below.    Kinza is a 45 year old who is being evaluated via a billable telephone visit.      What phone number would you like to be contacted at? [phone]   How would you like to obtain your AVS? Mail a copy  Phone call duration: 6 minutes    HPI:  Kinza Teresa is a 45 year old male being seen for neurogenic bladder.  He underwent a cystoscopic bladder biopsy on 3/30/2021.  It was keratinizing metaplasia.  He is back to CIC.  He also had an IPP.  He gets Botox for neurogenic DO and recurrent UTI      Assessment & Plan   Neurogenic bladder  Bladder lesion which was benign on recent biopsu  -continue cic  -already has apt for botox in clinic in July 2021.  Will also look at biopsied area during that visit.    Alejandro Tong MD  Reconstructive Urology  [facility]

## 2021-04-14 NOTE — PROGRESS NOTES
Kinza is a 45 year old who is being evaluated via a billable telephone visit.      What phone number would you like to be contacted at? [phone]   How would you like to obtain your AVS? Mail a copy  Phone call duration: 6 minutes    HPI:  Kinza Teresa is a 45 year old male being seen for neurogenic bladder.  He underwent a cystoscopic bladder biopsy on 3/30/2021.  It was keratinizing metaplasia.  He is back to CIC.  He also had an IPP.  He gets Botox for neurogenic DO and recurrent UTI      Assessment & Plan   Neurogenic bladder  Bladder lesion which was benign on recent biopsu  -continue cic  -already has apt for botox in clinic in July 2021.  Will also look at biopsied area during that visit.    Alejandro Tong MD  Reconstructive Urology  [facility]

## 2021-06-05 ENCOUNTER — RECORDS - HEALTHEAST (OUTPATIENT)
Dept: CT IMAGING | Facility: CLINIC | Age: 46
End: 2021-06-05

## 2021-06-05 DIAGNOSIS — T14.8XXA CLOSED FRACTURE OF BONE: ICD-10-CM

## 2021-06-28 ENCOUNTER — NURSE TRIAGE (OUTPATIENT)
Dept: NURSING | Facility: CLINIC | Age: 46
End: 2021-06-28

## 2021-06-28 DIAGNOSIS — N39.0 URINARY TRACT INFECTION: Primary | ICD-10-CM

## 2021-06-28 NOTE — TELEPHONE ENCOUNTER
Wife called and orders placed for uauc Dasia Guzman LPN Staff Nurse  Wife reporting, Pt is very tired and run down for the last 3 weeks.     Pt can sleep 8 hours and still feel like they can lay down and sleep more.    Pt self cath's and has been having issues with leakage for the past week.   Urine is very cloudy and strong odor.      Wife is wondering if Pt has a UTI.     Wife is requesting a Urine test for Pt care.   Or if something can be sent to the pharmacy for Pt care for the symptoms they are having.     Please call the wife back @ [phone] for further assistance.     Thank you     Navya Peter RN  Central Triage Red Flags/Med Refills      Reason for Disposition    Symptoms arising from use of a urinary catheter (Long or Coude)    Cloudy urine lasts > 24 hours and not cleared by increasing fluid intake    Additional Information    Negative: Shock suspected (e.g., cold/pale/clammy skin, too weak to stand, low BP, rapid pulse)    Negative: Sounds like a life-threatening emergency to the triager    Negative: Followed a genital area injury    Negative: Followed a genital area injury (penis, scrotum)    Negative: Vaginal discharge    Negative: Pus (white, yellow) or bloody discharge from end of penis    Negative: Discomfort (pain, burning or stinging) when passing urine and pregnant    Negative: Discomfort (pain, burning or stinging) when passing urine and female    Negative: Discomfort (pain, burning or stinging) when passing urine and male    Negative: Pain or itching in the vulvar area    Negative: Pain in scrotum is main symptom    Negative: Blood in the urine is main symptom    Negative: Shock suspected (e.g., cold/pale/clammy skin, too weak to stand, low BP, rapid pulse)    Negative: Sounds like a life-threatening emergency to the triager    Negative: Catheter was accidentally pulled-out and bright red continuous bleeding    Negative: SEVERE abdominal pain    Negative: Fever > 100.4 F (38.0 C)     Negative: Drinking very little and dehydration suspected (e.g., no urine > 12 hours, very dry mouth, very lightheaded)    Negative: Patient sounds very sick or weak to the triager    Negative: Catheter was accidentally pulled-out    Negative: Bleeding around catheter (e.g., from penis or female urethra)    Negative: Lower abdominal pain or distention    Negative: Tea-colored or slightly red urine lasts > 24 hours that is not cleared by increasing fluid intake, and no recent prostate or bladder surgery    Protocols used: URINARY SYMPTOMS-A-OH, URINARY CATHETER SYMPTOMS AND [id number]-CQ

## 2021-06-29 DIAGNOSIS — N39.0 URINARY TRACT INFECTION: ICD-10-CM

## 2021-06-29 LAB
ALBUMIN UR-MCNC: 100 MG/DL
APPEARANCE UR: ABNORMAL
BACTERIA #/AREA URNS HPF: ABNORMAL /HPF
BILIRUB UR QL STRIP: NEGATIVE
COLOR UR AUTO: YELLOW
GLUCOSE UR STRIP-MCNC: NEGATIVE MG/DL
HGB UR QL STRIP: ABNORMAL
KETONES UR STRIP-MCNC: NEGATIVE MG/DL
LEUKOCYTE ESTERASE UR QL STRIP: ABNORMAL
NITRATE UR QL: POSITIVE
PH UR STRIP: 6 PH (ref 5–7)
RBC #/AREA URNS AUTO: ABNORMAL /HPF
SOURCE: ABNORMAL
SP GR UR STRIP: >1.03 (ref 1–1.03)
UROBILINOGEN UR STRIP-ACNC: 0.2 EU/DL (ref 0.2–1)
WBC #/AREA URNS AUTO: >100 /HPF

## 2021-06-29 PROCEDURE — 87088 URINE BACTERIA CULTURE: CPT | Performed by: UROLOGY

## 2021-06-29 PROCEDURE — 81001 URINALYSIS AUTO W/SCOPE: CPT | Performed by: UROLOGY

## 2021-06-29 PROCEDURE — 87086 URINE CULTURE/COLONY COUNT: CPT | Performed by: UROLOGY

## 2021-06-29 PROCEDURE — 87186 SC STD MICRODIL/AGAR DIL: CPT | Performed by: UROLOGY

## 2021-06-30 LAB
BACTERIA SPEC CULT: ABNORMAL
Lab: ABNORMAL
SPECIMEN SOURCE: ABNORMAL

## 2021-07-01 ENCOUNTER — TELEPHONE (OUTPATIENT)
Dept: UROLOGY | Facility: CLINIC | Age: 46
End: 2021-07-01

## 2021-07-01 DIAGNOSIS — N39.0 URINARY TRACT INFECTION: Primary | ICD-10-CM

## 2021-07-01 RX ORDER — CIPROFLOXACIN 500 MG/1
500 TABLET, FILM COATED ORAL 2 TIMES DAILY
Qty: 10 TABLET | Refills: 0 | Status: SHIPPED | OUTPATIENT
Start: 2021-07-01 | End: 2023-04-24

## 2021-07-01 NOTE — TELEPHONE ENCOUNTER
Anjali called clinic back to discuss. They will  the Cipro today.  Ivette Parikh LPN  Urology Clinic Service   [facility]

## 2021-07-01 NOTE — TELEPHONE ENCOUNTER
LVM for patient to call clinic back to discuss positive UCx. Will treat per protocol.    Ivette Parikh LPN  Urology Clinic Service   [facility]

## 2021-07-02 ENCOUNTER — HOSPITAL ENCOUNTER (OUTPATIENT)
Dept: RESEARCH | Facility: CLINIC | Age: 46
End: 2021-07-02
Attending: PHYSICAL MEDICINE & REHABILITATION
Payer: COMMERCIAL

## 2021-07-02 PROCEDURE — 300N000004 HC RESEARCH SPECIMEN PROCESSING, MODERATE

## 2021-07-02 PROCEDURE — 510N000017 HC CRU PATIENT CARE, PER 15 MIN

## 2021-07-02 PROCEDURE — 510N000009 HC RESEARCH FACILITY, PER 15 MIN

## 2021-07-05 ENCOUNTER — PRE VISIT (OUTPATIENT)
Dept: UROLOGY | Facility: CLINIC | Age: 46
End: 2021-07-05

## 2021-07-05 DIAGNOSIS — G47.33 OSA ON CPAP: Primary | ICD-10-CM

## 2021-07-05 NOTE — TELEPHONE ENCOUNTER
Reason for visit: Cystoscopy with Botox     Relevant information: hx of neurogenic bladder    Records/imaging/labs/orders: in Epic; PA completed    Pt called: no    At Rooming: saline syringe; botox needle; botox

## 2021-07-06 NOTE — ADDENDUM NOTE
Encounter addended by: Parag Jin RN on: 7/6/2021 1:15 PM   Actions taken: Charge Capture section accepted

## 2021-07-07 DIAGNOSIS — N39.0 URINARY TRACT INFECTION ASSOCIATED WITH CATHETERIZATION OF URINARY TRACT, UNSPECIFIED INDWELLING URINARY CATHETER TYPE, SEQUELA: ICD-10-CM

## 2021-07-07 DIAGNOSIS — R32 URINARY INCONTINENCE: ICD-10-CM

## 2021-07-07 DIAGNOSIS — T83.511S URINARY TRACT INFECTION ASSOCIATED WITH CATHETERIZATION OF URINARY TRACT, UNSPECIFIED INDWELLING URINARY CATHETER TYPE, SEQUELA: ICD-10-CM

## 2021-07-07 DIAGNOSIS — Z29.89 NEED FOR PROPHYLAXIS AGAINST URINARY TRACT INFECTION: ICD-10-CM

## 2021-07-07 DIAGNOSIS — Z01.812 PRE-PROCEDURE LAB EXAM: Primary | ICD-10-CM

## 2021-07-07 DIAGNOSIS — N39.0 URINARY TRACT INFECTION: ICD-10-CM

## 2021-07-07 RX ORDER — ERTAPENEM 1 G/1
1 INJECTION, POWDER, LYOPHILIZED, FOR SOLUTION INTRAMUSCULAR; INTRAVENOUS EVERY 24 HOURS
Status: DISCONTINUED | OUTPATIENT
Start: 2021-07-19 | End: 2021-07-12 | Stop reason: HOSPADM

## 2021-07-12 ENCOUNTER — TELEPHONE (OUTPATIENT)
Dept: UROLOGY | Facility: CLINIC | Age: 46
End: 2021-07-12

## 2021-07-12 NOTE — TELEPHONE ENCOUNTER
Called wife and left message regarding the appointment set up and to contact wallace if she has not heard from schedulers Dasia Guzman LPN Staff Nurse  Please call wife back, we are planning for infusion for the 20 th and 21 st of July, orders are in.  She should hear from the  ASAP.  If she has not heard from them by Wednesday, she can call me directly at .  Wallace

## 2021-07-12 NOTE — TELEPHONE ENCOUNTER
[facility] Call Center    Phone Message    May a detailed message be left on voicemail: yes     Reason for Call: Other: Pt wife requesting a call back to discuss antibiotics that Pt needs before appt with Dr. Tong.  Please call at .     Action Taken: Message routed to:  [facility] ([facility] Urology    Travel Screening: Not Applicable

## 2021-07-19 RX ORDER — ERTAPENEM 1 G/1
1 INJECTION, POWDER, LYOPHILIZED, FOR SOLUTION INTRAMUSCULAR; INTRAVENOUS ONCE
Status: CANCELLED
Start: 2021-07-20 | End: 2021-07-20

## 2021-07-19 RX ORDER — ALBUTEROL SULFATE 0.83 MG/ML
2.5 SOLUTION RESPIRATORY (INHALATION)
Status: CANCELLED | OUTPATIENT
Start: 2021-07-20

## 2021-07-19 RX ORDER — HEPARIN SODIUM (PORCINE) LOCK FLUSH IV SOLN 100 UNIT/ML 100 UNIT/ML
5 SOLUTION INTRAVENOUS
Status: CANCELLED | OUTPATIENT
Start: 2021-07-20

## 2021-07-19 RX ORDER — ERTAPENEM 1 G/1
1 INJECTION, POWDER, LYOPHILIZED, FOR SOLUTION INTRAMUSCULAR; INTRAVENOUS EVERY 24 HOURS
Status: CANCELLED
Start: 2021-07-20

## 2021-07-19 RX ORDER — MEPERIDINE HYDROCHLORIDE 25 MG/ML
25 INJECTION INTRAMUSCULAR; INTRAVENOUS; SUBCUTANEOUS EVERY 30 MIN PRN
Status: CANCELLED | OUTPATIENT
Start: 2021-07-20

## 2021-07-19 RX ORDER — EPINEPHRINE 1 MG/ML
0.3 INJECTION, SOLUTION, CONCENTRATE INTRAVENOUS EVERY 5 MIN PRN
Status: CANCELLED | OUTPATIENT
Start: 2021-07-20

## 2021-07-19 RX ORDER — METHYLPREDNISOLONE SODIUM SUCCINATE 125 MG/2ML
125 INJECTION, POWDER, LYOPHILIZED, FOR SOLUTION INTRAMUSCULAR; INTRAVENOUS
Status: CANCELLED
Start: 2021-07-20

## 2021-07-19 RX ORDER — HEPARIN SODIUM,PORCINE 10 UNIT/ML
5 VIAL (ML) INTRAVENOUS
Status: CANCELLED | OUTPATIENT
Start: 2021-07-20

## 2021-07-19 RX ORDER — NALOXONE HYDROCHLORIDE 0.4 MG/ML
0.2 INJECTION, SOLUTION INTRAMUSCULAR; INTRAVENOUS; SUBCUTANEOUS
Status: CANCELLED | OUTPATIENT
Start: 2021-07-20

## 2021-07-19 RX ORDER — ALBUTEROL SULFATE 90 UG/1
1-2 AEROSOL, METERED RESPIRATORY (INHALATION)
Status: CANCELLED
Start: 2021-07-20

## 2021-07-19 RX ORDER — DIPHENHYDRAMINE HYDROCHLORIDE 50 MG/ML
50 INJECTION INTRAMUSCULAR; INTRAVENOUS
Status: CANCELLED
Start: 2021-07-20

## 2021-07-20 ENCOUNTER — INFUSION THERAPY VISIT (OUTPATIENT)
Dept: INFUSION THERAPY | Facility: CLINIC | Age: 46
End: 2021-07-20
Attending: UROLOGY
Payer: COMMERCIAL

## 2021-07-20 VITALS
TEMPERATURE: 98.2 F | SYSTOLIC BLOOD PRESSURE: 134 MMHG | HEART RATE: 62 BPM | DIASTOLIC BLOOD PRESSURE: 84 MMHG | RESPIRATION RATE: 16 BRPM

## 2021-07-20 DIAGNOSIS — Z29.89 NEED FOR PROPHYLAXIS AGAINST URINARY TRACT INFECTION: Primary | ICD-10-CM

## 2021-07-20 DIAGNOSIS — T83.511S URINARY TRACT INFECTION ASSOCIATED WITH CATHETERIZATION OF URINARY TRACT, UNSPECIFIED INDWELLING URINARY CATHETER TYPE, SEQUELA: ICD-10-CM

## 2021-07-20 DIAGNOSIS — N39.0 URINARY TRACT INFECTION ASSOCIATED WITH CATHETERIZATION OF URINARY TRACT, UNSPECIFIED INDWELLING URINARY CATHETER TYPE, SEQUELA: ICD-10-CM

## 2021-07-20 PROCEDURE — 250N000011 HC RX IP 250 OP 636: Performed by: UROLOGY

## 2021-07-20 PROCEDURE — 96374 THER/PROPH/DIAG INJ IV PUSH: CPT

## 2021-07-20 PROCEDURE — 250N000009 HC RX 250: Performed by: UROLOGY

## 2021-07-20 RX ORDER — DIPHENHYDRAMINE HYDROCHLORIDE 50 MG/ML
50 INJECTION INTRAMUSCULAR; INTRAVENOUS
Status: CANCELLED
Start: 2021-07-21

## 2021-07-20 RX ORDER — HEPARIN SODIUM,PORCINE 10 UNIT/ML
5 VIAL (ML) INTRAVENOUS
Status: CANCELLED | OUTPATIENT
Start: 2021-07-21

## 2021-07-20 RX ORDER — HEPARIN SODIUM (PORCINE) LOCK FLUSH IV SOLN 100 UNIT/ML 100 UNIT/ML
5 SOLUTION INTRAVENOUS
Status: CANCELLED | OUTPATIENT
Start: 2021-07-21

## 2021-07-20 RX ORDER — ALBUTEROL SULFATE 90 UG/1
1-2 AEROSOL, METERED RESPIRATORY (INHALATION)
Status: CANCELLED
Start: 2021-07-21

## 2021-07-20 RX ORDER — EPINEPHRINE 1 MG/ML
0.3 INJECTION, SOLUTION INTRAMUSCULAR; SUBCUTANEOUS EVERY 5 MIN PRN
Status: CANCELLED | OUTPATIENT
Start: 2021-07-21

## 2021-07-20 RX ORDER — MEPERIDINE HYDROCHLORIDE 25 MG/ML
25 INJECTION INTRAMUSCULAR; INTRAVENOUS; SUBCUTANEOUS EVERY 30 MIN PRN
Status: CANCELLED | OUTPATIENT
Start: 2021-07-21

## 2021-07-20 RX ORDER — ALBUTEROL SULFATE 0.83 MG/ML
2.5 SOLUTION RESPIRATORY (INHALATION)
Status: CANCELLED | OUTPATIENT
Start: 2021-07-21

## 2021-07-20 RX ORDER — METHYLPREDNISOLONE SODIUM SUCCINATE 125 MG/2ML
125 INJECTION, POWDER, LYOPHILIZED, FOR SOLUTION INTRAMUSCULAR; INTRAVENOUS
Status: CANCELLED
Start: 2021-07-21

## 2021-07-20 RX ORDER — NALOXONE HYDROCHLORIDE 0.4 MG/ML
0.2 INJECTION, SOLUTION INTRAMUSCULAR; INTRAVENOUS; SUBCUTANEOUS
Status: CANCELLED | OUTPATIENT
Start: 2021-07-21

## 2021-07-20 RX ADMIN — ERTAPENEM SODIUM 1 G: 1 INJECTION, POWDER, LYOPHILIZED, FOR SOLUTION INTRAMUSCULAR; INTRAVENOUS at 08:43

## 2021-07-20 ASSESSMENT — PAIN SCALES - GENERAL: PAINLEVEL: NO PAIN (0)

## 2021-07-20 NOTE — PROGRESS NOTES
Infusion Nursing Note:  Kinza Teresa presents today for ertapenum.    Patient seen by provider today: No   present during visit today: Not Applicable.    Note: N/A.  Patient did not did/did not meet criteria for an asymptomatic covid-19 PCR test in infusion today. Patient declined accepted/declined the covid-19 test.    Intravenous Access:  Peripheral IV placed.    Administrations This Visit     ertapenem (INVanz) 1 g in 10 mL SWFI for IVP     Admin Date  07/20/2021 Action  Given Dose  1 g Route  Intravenous Administered By  Genesis Koehler RN              Given over 5 min      Post Infusion Assessment:  Patient tolerated infusion without incident.       Discharge Plan:   Patient and/or family verbalized understanding of discharge instructions and all questions answered. Pt to return tomorrow for last injection.      Genesis Koehler RN

## 2021-07-20 NOTE — LETTER
7/20/2021         RE: Kinza Teresa  [address]  [facility] [address]        Dear Colleague,    Thank you for referring your patient, Kinza Teresa, to the [facility]. Please see a copy of my visit note below.    Infusion Nursing Note:  Kinza Teresa presents today for ertapenum.    Patient seen by provider today: No   present during visit today: Not Applicable.    Note: N/A.  Patient did not did/did not meet criteria for an asymptomatic covid-19 PCR test in infusion today. Patient declined accepted/declined the covid-19 test.    Intravenous Access:  Peripheral IV placed.    Administrations This Visit     ertapenem (INVanz) 1 g in 10 mL SWFI for IVP     Admin Date  07/20/2021 Action  Given Dose  1 g Route  Intravenous Administered By  Genesis Koehler, RN              Given over 5 min      Post Infusion Assessment:  Patient tolerated infusion without incident.       Discharge Plan:   Patient and/or family verbalized understanding of discharge instructions and all questions answered. Pt to return tomorrow for last injection.      Genesis Koehler RN                          Again, thank you for allowing me to participate in the care of your patient.        Sincerely,        [facility]

## 2021-07-21 ENCOUNTER — INFUSION THERAPY VISIT (OUTPATIENT)
Dept: INFUSION THERAPY | Facility: CLINIC | Age: 46
End: 2021-07-21
Attending: UROLOGY
Payer: COMMERCIAL

## 2021-07-21 ENCOUNTER — PRE VISIT (OUTPATIENT)
Dept: UROLOGY | Facility: CLINIC | Age: 46
End: 2021-07-21

## 2021-07-21 ENCOUNTER — OFFICE VISIT (OUTPATIENT)
Dept: UROLOGY | Facility: CLINIC | Age: 46
End: 2021-07-21
Payer: COMMERCIAL

## 2021-07-21 VITALS
TEMPERATURE: 98.4 F | HEART RATE: 71 BPM | RESPIRATION RATE: 16 BRPM | SYSTOLIC BLOOD PRESSURE: 130 MMHG | OXYGEN SATURATION: 98 % | DIASTOLIC BLOOD PRESSURE: 79 MMHG

## 2021-07-21 VITALS
HEART RATE: 58 BPM | SYSTOLIC BLOOD PRESSURE: 129 MMHG | WEIGHT: 150 LBS | HEIGHT: 66 IN | BODY MASS INDEX: 24.11 KG/M2 | DIASTOLIC BLOOD PRESSURE: 84 MMHG

## 2021-07-21 DIAGNOSIS — N39.0 URINARY TRACT INFECTION ASSOCIATED WITH CATHETERIZATION OF URINARY TRACT, UNSPECIFIED INDWELLING URINARY CATHETER TYPE, SEQUELA: Primary | ICD-10-CM

## 2021-07-21 DIAGNOSIS — Z29.89 NEED FOR PROPHYLAXIS AGAINST URINARY TRACT INFECTION: ICD-10-CM

## 2021-07-21 DIAGNOSIS — N31.9 NEUROGENIC BLADDER: Primary | ICD-10-CM

## 2021-07-21 DIAGNOSIS — T83.511S URINARY TRACT INFECTION ASSOCIATED WITH CATHETERIZATION OF URINARY TRACT, UNSPECIFIED INDWELLING URINARY CATHETER TYPE, SEQUELA: Primary | ICD-10-CM

## 2021-07-21 PROCEDURE — 250N000009 HC RX 250: Performed by: UROLOGY

## 2021-07-21 PROCEDURE — 96374 THER/PROPH/DIAG INJ IV PUSH: CPT

## 2021-07-21 PROCEDURE — 250N000011 HC RX IP 250 OP 636: Performed by: UROLOGY

## 2021-07-21 PROCEDURE — 52287 CYSTOSCOPY CHEMODENERVATION: CPT | Performed by: UROLOGY

## 2021-07-21 PROCEDURE — 999N000127 HC STATISTIC PERIPHERAL IV START W US GUIDANCE

## 2021-07-21 RX ORDER — METHYLPREDNISOLONE SODIUM SUCCINATE 125 MG/2ML
125 INJECTION, POWDER, LYOPHILIZED, FOR SOLUTION INTRAMUSCULAR; INTRAVENOUS
Status: CANCELLED
Start: 2021-07-22

## 2021-07-21 RX ORDER — NALOXONE HYDROCHLORIDE 0.4 MG/ML
0.2 INJECTION, SOLUTION INTRAMUSCULAR; INTRAVENOUS; SUBCUTANEOUS
Status: CANCELLED | OUTPATIENT
Start: 2021-07-22

## 2021-07-21 RX ORDER — ALBUTEROL SULFATE 0.83 MG/ML
2.5 SOLUTION RESPIRATORY (INHALATION)
Status: CANCELLED | OUTPATIENT
Start: 2021-07-22

## 2021-07-21 RX ORDER — MEPERIDINE HYDROCHLORIDE 25 MG/ML
25 INJECTION INTRAMUSCULAR; INTRAVENOUS; SUBCUTANEOUS EVERY 30 MIN PRN
Status: CANCELLED | OUTPATIENT
Start: 2021-07-22

## 2021-07-21 RX ORDER — EPINEPHRINE 1 MG/ML
0.3 INJECTION, SOLUTION INTRAMUSCULAR; SUBCUTANEOUS EVERY 5 MIN PRN
Status: CANCELLED | OUTPATIENT
Start: 2021-07-22

## 2021-07-21 RX ORDER — HEPARIN SODIUM,PORCINE 10 UNIT/ML
5 VIAL (ML) INTRAVENOUS
Status: CANCELLED | OUTPATIENT
Start: 2021-07-22

## 2021-07-21 RX ORDER — ALBUTEROL SULFATE 90 UG/1
1-2 AEROSOL, METERED RESPIRATORY (INHALATION)
Status: CANCELLED
Start: 2021-07-22

## 2021-07-21 RX ORDER — HEPARIN SODIUM (PORCINE) LOCK FLUSH IV SOLN 100 UNIT/ML 100 UNIT/ML
5 SOLUTION INTRAVENOUS
Status: CANCELLED | OUTPATIENT
Start: 2021-07-22

## 2021-07-21 RX ORDER — DIPHENHYDRAMINE HYDROCHLORIDE 50 MG/ML
50 INJECTION INTRAMUSCULAR; INTRAVENOUS
Status: CANCELLED
Start: 2021-07-22

## 2021-07-21 RX ADMIN — ERTAPENEM SODIUM 1 G: 1 INJECTION, POWDER, LYOPHILIZED, FOR SOLUTION INTRAMUSCULAR; INTRAVENOUS at 09:38

## 2021-07-21 ASSESSMENT — MIFFLIN-ST. JEOR: SCORE: 1493.15

## 2021-07-21 ASSESSMENT — PAIN SCALES - GENERAL: PAINLEVEL: NO PAIN (0)

## 2021-07-21 NOTE — LETTER
7/21/2021         RE: Kinza Teresa  [address]  [facility] [address]        Dear Colleague,    Thank you for referring your patient, Kinza Teresa, to the [facility]. Please see a copy of my visit note below.    Infusion Nursing Note:  Chief Complaint   Patient presents with     Infusion     Invanz      Kinza Teresa presents today for Invanz.    Patient seen by provider today: No  During appointment orders from Storm Tong MD completed today.  Frequency:dose 2/2     present during visit today: Not Applicable.    Note:     Intravenous Access:  Peripheral IV placed by vascular access RN      Treatment Conditions:  Not Applicable.    Premedications: n/a    Labs: n/a    Infusion Length and rate: infused over 5 min    Post Infusion Assessment:  Patient tolerated infusion without incident.  Blood return noted pre and post infusion.  Site patent and intact, free from redness, edema or discomfort.  No evidence of extravasations.  Access discontinued per protocol.       Discharge Plan:   AVS to patient via MYCHART.  Patient will return prior to next procedure for next appointment.   Patient discharged in stable condition accompanied by: self.  Departure Mode: Wheelchair.         Administrations This Visit     ertapenem (INVanz) 1 g in 10 mL SWFI for IVP     Admin Date  07/21/2021 Action  Given Dose  1 g Route  Intravenous Administered By  Rita Weaver RN Alyssa Dahmen, RN    /79   Pulse 71   Temp 98.4  F (36.9  C) (Oral)   Resp 16   SpO2 98%                    Again, thank you for allowing me to participate in the care of your patient.        Sincerely,        [facility]

## 2021-07-21 NOTE — LETTER
7/21/2021       RE: Kinza Teresa  [address] [facility] [address]     Dear Colleague,    Thank you for referring your patient, Kinza Teresa, to the [facility] [place] at [facility]. Please see a copy of my visit note below.    PRE-OPERATIVE DIAGNOSIS:   1.  Neurogenic bladder     POST-OPERATIVE DIAGNOSIS:  1.  Neurogenic bladder     PROCEDURE:    1. Cystourethroscopy With injection of botulinum toxin A 200units in 20cc sterile saline     Surgeon: Alejandro Tong MD     OPERATIVE INDICATIONS:  Kinza Teresa is a 45 year old male with neurogenic bladder due to spinal cord injury. He does not have an augment. He performs CIC per urethra. He takes oxybutynin. UDS demonstrates persistent neurogenic DO.  He experiences recurrent UTIs. He previously had an IPP which works well for him though has some glans hypermobility. He likes the IPP. His last Botox was 4 months ago. He gets Ertapenem x 2 doses for this procedure - one yesterday and one today    OPERATIVE DETAILS:  The patient was taken to the procedure room. He was left in his chair. Penis was prepped.      A Flexible cystoscope was placed into the bladder and a flexible needle  Was preloaded.  The bladder mucosa appeared to be normal without stones, tumors or diverticulum on 360 degree inspection. The ureteral orifices were in the normal orthotopic position bilaterally.  We mixed 2 vials of Botox for 200u total.  We performed a template injection procedure with 5 columns of 4 injections. All injections were placed deep to the mucosa into the detrusor muscle.  We then emptied the bladder to re-inspect our injection sites and found that there was a minimal amount of blood.    Scope was removed.     PLAN:     Repeat Botox injection in 4 months  Ertapenem day before and day of.     Alejandro Tong MD

## 2021-07-21 NOTE — PATIENT INSTRUCTIONS
"Please return in four months in clinic with Dr. Tong for another cystoscopy with botox.    It was a pleasure meeting with you today.  Thank you for allowing me and my team the privilege of caring for you today.  YOU are the reason we are here, and I truly hope we provided you with the excellent service you deserve.  Please let us know if there is anything else we can do for you so that we can be sure you are leaving completely satisfied with your care experience.           AFTER YOUR CYSTOSCOPY        You have just completed a cystoscopy, or \"cysto\", which allowed your physician to learn more about your bladder (or to remove a stent placed after surgery). We suggest that you continue to avoid caffeine, fruit juice, and alcohol for the next 24 hours, however, you are encouraged to return to your normal activities.         A few things that are considered normal after your cystoscopy:     * Small amount of bleeding (or spotting) that clears within the next 24 hours     * Slight burning sensation with urination     * Sensation to of needing to avoid more frequently     * The feeling of \"air\" in your urine     * Mild discomfort that is relieved with Tylenol        Please contact our office promptly if you:     * Develop a fever above 101 degrees     * Are unable to urinate     * Develop bright red blood that does not stop     * Severe pain or swelling         Please contact our office with any concerns or questions @[email or web address].  "
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
## 2021-07-21 NOTE — PATIENT INSTRUCTIONS
Dear Kinza Teresa    Thank you for choosing [facility] ([facility]) for your infusion.  The following information is a summary of our appointment as well as important reminders.      Patient Education     Ertapenem Sodium Solution for injection  What is this medicine?  ERTAPENEM (er ta PEN em) is a carbapenem antibiotic. It is used to treat certain kinds of bacterial infections. It will not work for colds, flu, or other viral infections.  This medicine may be used for other purposes; ask your health care provider or pharmacist if you have questions.  What should I tell my health care provider before I take this medicine?  They need to know if you have any of these conditions:    brain tumor, lesion    kidney disease    seizure disorder    an unusual or allergic reaction to ertapenem, other antibiotics, amide local anesthetics like lidocaine, or other medicines, foods, dyes, or preservatives    pregnant or trying to get pregnant    breast-feeding  How should I use this medicine?  This medicine is infused into a vein or injected deep into a muscle. It is usually given by a health care professional in a hospital or clinic setting.  If you get this medicine at home, you will be taught how to prepare and give this medicine. Use exactly as directed. Take your medicine at regular intervals. Do not take your medicine more often than directed.  It is important that you put your used needles and syringes in a special sharps container. Do not put them in a trash can. If you do not have a sharps container, call your pharmacist or healthcare provider to get one.  Talk to your pediatrician regarding the use of this medicine in children. While this drug may be prescribed for children as young as 3 months old for selected conditions, precautions do apply.  Overdosage: If you think you have taken too much of this medicine contact a poison control center or emergency room at  once.  NOTE: This medicine is only for you. Do not share this medicine with others.  What if I miss a dose?  If you miss a dose, take it as soon as you can. If it is almost time for your next dose, take only that dose. Do not take double or extra doses.  What may interact with this medicine?    birth control pills    probenecid  This list may not describe all possible interactions. Give your health care provider a list of all the medicines, herbs, non-prescription drugs, or dietary supplements you use. Also tell them if you smoke, drink alcohol, or use illegal drugs. Some items may interact with your medicine.  What should I watch for while using this medicine?  Tell your doctor or health care professional if your symptoms do not improve or if you get new symptoms. Your doctor will monitor your condition and blood work as needed.  Do not treat diarrhea with over the counter products. Contact your doctor if you have diarrhea that lasts more than 2 days or if it is severe and watery.  What side effects may I notice from receiving this medicine?  Side effects that you should report to your doctor or health care professional as soon as possible:    allergic reactions like skin rash, itching or hives, swelling of the face, lips, or tongue    anxiety, confusion, dizzy    chest pain    difficulty breathing, wheezing    edema, swelling    fever    irregular heart rate, blood pressure    pain or difficulty passing urine    seizures    unusually weak or tired    white or red patches in mouth  Side effects that usually do not require medical attention (report to your doctor or health care professional if they continue or are bothersome):    constipation or diarrhea    difficulty sleeping    headache    nausea, vomiting    pain, swelling or irritation where injected    stomach upset    vaginal itch, irritation  This list may not describe all possible side effects. Call your doctor for medical advice about side effects. You may  report side effects to FDA at 9-145-FDA-2824.  Where should I keep my medicine?  Keep out of the reach of children.  You will be instructed on how to store this medicine. Throw away any unused medicine after the expiration date on the label.  NOTE:This sheet is a summary. It may not cover all possible information. If you have questions about this medicine, talk to your doctor, pharmacist, or health care provider. Copyright  2016 Gold Standard             We look forward in seeing you on your next appointment here at [facility] ([facility]).  Please don t hesitate to call us at [phone] to reschedule any of your appointments or to speak with one of the [facility] registered nurses.  It was a pleasure taking care of you today.    Sincerely,    [facility]  [address] [place] [address]  [address]  Phone:  [phone]

## 2021-07-21 NOTE — PROGRESS NOTES
Infusion Nursing Note:  Chief Complaint   Patient presents with     Infusion     Invanz      Kinza Teresa presents today for Invanz.    Patient seen by provider today: No  During appointment orders from Storm Tong MD completed today.  Frequency:dose 2/2     present during visit today: Not Applicable.    Note:     Intravenous Access:  Peripheral IV placed by vascular access RN      Treatment Conditions:  Not Applicable.    Premedications: n/a    Labs: n/a    Infusion Length and rate: infused over 5 min    Post Infusion Assessment:  Patient tolerated infusion without incident.  Blood return noted pre and post infusion.  Site patent and intact, free from redness, edema or discomfort.  No evidence of extravasations.  Access discontinued per protocol.       Discharge Plan:   AVS to patient via MYCHART.  Patient will return prior to next procedure for next appointment.   Patient discharged in stable condition accompanied by: self.  Departure Mode: Wheelchair.         Administrations This Visit     ertapenem (INVanz) 1 g in 10 mL SWFI for IVP     Admin Date  07/21/2021 Action  Given Dose  1 g Route  Intravenous Administered By  Rita Weaver RN Alyssa Dahmen, RN    /79   Pulse 71   Temp 98.4  F (36.9  C) (Oral)   Resp 16   SpO2 98%

## 2021-07-24 NOTE — PROGRESS NOTES
PRE-OPERATIVE DIAGNOSIS:   1.  Neurogenic bladder     POST-OPERATIVE DIAGNOSIS:  1.  Neurogenic bladder     PROCEDURE:    1. Cystourethroscopy With injection of botulinum toxin A 200units in 20cc sterile saline     Surgeon: Alejandro Tong MD     OPERATIVE INDICATIONS:  Kinza Teresa is a 45 year old male with neurogenic bladder due to spinal cord injury. He does not have an augment. He performs CIC per urethra. He takes oxybutynin. UDS demonstrates persistent neurogenic DO.  He experiences recurrent UTIs. He previously had an IPP which works well for him though has some glans hypermobility. He likes the IPP. His last Botox was 4 months ago. He gets Ertapenem x 2 doses for this procedure - one yesterday and one today    OPERATIVE DETAILS:  The patient was taken to the procedure room. He was left in his chair. Penis was prepped.      A Flexible cystoscope was placed into the bladder and a flexible needle  Was preloaded.  The bladder mucosa appeared to be normal without stones, tumors or diverticulum on 360 degree inspection. The ureteral orifices were in the normal orthotopic position bilaterally.  We mixed 2 vials of Botox for 200u total.  We performed a template injection procedure with 5 columns of 4 injections. All injections were placed deep to the mucosa into the detrusor muscle.  We then emptied the bladder to re-inspect our injection sites and found that there was a minimal amount of blood.    Scope was removed.     PLAN:     Repeat Botox injection in 4 months  Ertapenem day before and day of.     Alejandro Tong MD

## 2021-08-24 ENCOUNTER — HOSPITAL ENCOUNTER (OUTPATIENT)
Dept: RESEARCH | Facility: CLINIC | Age: 46
End: 2021-08-24
Attending: PHYSICAL MEDICINE & REHABILITATION
Payer: COMMERCIAL

## 2021-08-24 PROCEDURE — 300N000004 HC RESEARCH SPECIMEN PROCESSING, MODERATE

## 2021-08-24 PROCEDURE — 510N000017 HC CRU PATIENT CARE, PER 15 MIN

## 2021-08-24 PROCEDURE — 510N000009 HC RESEARCH FACILITY, PER 15 MIN

## 2021-08-24 PROCEDURE — 510N000023 HC CRU B&I PATIENT CARE, PER 15 MIN

## 2021-08-24 NOTE — ADDENDUM NOTE
Encounter addended by: Tawnya Bagley on: 8/24/2021 3:21 PM   Actions taken: Charge Capture section accepted

## 2021-08-30 NOTE — ADDENDUM NOTE
Encounter addended by: Bernice Boss RMA on: 8/30/2021 11:20 AM   Actions taken: Charge Capture section accepted

## 2021-09-07 ENCOUNTER — TELEPHONE (OUTPATIENT)
Dept: UROLOGY | Facility: CLINIC | Age: 46
End: 2021-09-07
Payer: COMMERCIAL

## 2021-09-07 NOTE — TELEPHONE ENCOUNTER
[facility] Call Center    Phone Message    May a detailed message be left on voicemail: yes     Reason for Call: Other: Keshia calling to follow up on her message that was sent on 9/1 as she has not heard from anyone regarding it.  Please review the encounter for more information and call her back to discuss     Action Taken: Message routed to:  [facility]  Urology    Travel Screening: Not Applicable

## 2021-09-08 ENCOUNTER — TELEPHONE (OUTPATIENT)
Dept: UROLOGY | Facility: CLINIC | Age: 46
End: 2021-09-08

## 2021-09-08 DIAGNOSIS — N39.0 URINARY TRACT INFECTION: Primary | ICD-10-CM

## 2021-09-08 NOTE — TELEPHONE ENCOUNTER
Patient's wife called again and stated no response from message last Wednesday.  He had botox in July and within one week leakage returned worse than before botox and has been happening every day since.  I ordered UAUC today but wife would like plan of action going forward with the blood in urine and leakage Dasia Guzman, MARIANON Staff Nurse

## 2021-09-14 ENCOUNTER — LAB (OUTPATIENT)
Dept: LAB | Facility: CLINIC | Age: 46
End: 2021-09-14
Payer: COMMERCIAL

## 2021-09-14 DIAGNOSIS — N39.0 URINARY TRACT INFECTION: ICD-10-CM

## 2021-09-14 LAB
ALBUMIN UR-MCNC: 30 MG/DL
APPEARANCE UR: ABNORMAL
BACTERIA #/AREA URNS HPF: ABNORMAL /HPF
BILIRUB UR QL STRIP: NEGATIVE
COLOR UR AUTO: YELLOW
GLUCOSE UR STRIP-MCNC: NEGATIVE MG/DL
HGB UR QL STRIP: ABNORMAL
KETONES UR STRIP-MCNC: NEGATIVE MG/DL
LEUKOCYTE ESTERASE UR QL STRIP: ABNORMAL
NITRATE UR QL: POSITIVE
PH UR STRIP: 6 [PH] (ref 5–7)
RBC #/AREA URNS AUTO: ABNORMAL /HPF
SP GR UR STRIP: >=1.03 (ref 1–1.03)
SQUAMOUS #/AREA URNS AUTO: ABNORMAL /LPF
UROBILINOGEN UR STRIP-ACNC: 0.2 E.U./DL
WBC #/AREA URNS AUTO: >100 /HPF

## 2021-09-14 PROCEDURE — 81001 URINALYSIS AUTO W/SCOPE: CPT

## 2021-09-14 PROCEDURE — 87086 URINE CULTURE/COLONY COUNT: CPT

## 2021-09-16 LAB — BACTERIA UR CULT: NORMAL

## 2021-09-28 ENCOUNTER — HOSPITAL ENCOUNTER (OUTPATIENT)
Dept: RESEARCH | Facility: CLINIC | Age: 46
End: 2021-09-28
Attending: PHYSICAL MEDICINE & REHABILITATION
Payer: COMMERCIAL

## 2021-09-28 PROCEDURE — 300N000004 HC RESEARCH SPECIMEN PROCESSING, MODERATE

## 2021-09-28 PROCEDURE — 510N000009 HC RESEARCH FACILITY, PER 15 MIN

## 2021-09-28 PROCEDURE — 510N000017 HC CRU PATIENT CARE, PER 15 MIN

## 2021-09-28 NOTE — ADDENDUM NOTE
Encounter addended by: Tawnya Bagley on: 9/28/2021 1:54 PM   Actions taken: Charge Capture section accepted

## 2021-10-11 ENCOUNTER — TELEPHONE (OUTPATIENT)
Dept: UROLOGY | Facility: CLINIC | Age: 46
End: 2021-10-11

## 2021-10-11 NOTE — TELEPHONE ENCOUNTER
[facility] Call Center    Phone Message    May a detailed message be left on voicemail: yes     Reason for Call: Other: Pt wife would like a call back as pt needs orders for IV antibiotics prior to appt, Please reach out to discuss     Action Taken: Message routed to:  [facility] Uro    Travel Screening: Not Applicable

## 2021-10-13 ENCOUNTER — TELEPHONE (OUTPATIENT)
Dept: UROLOGY | Facility: CLINIC | Age: 46
End: 2021-10-13

## 2021-10-13 NOTE — TELEPHONE ENCOUNTER
[facility] Call Center    Phone Message    May a detailed message be left on voicemail: yes     Reason for Call: Other: Pt is needing to have IV antibiotics prior to cysto. There has not been a call to have this scheduled, and his wife is a little concerned. Please call wife Anjali to discuss. Thanks    Action Taken: Message routed to:  [facility] uro    Travel Screening: Not Applicable

## 2021-10-14 ENCOUNTER — TELEPHONE (OUTPATIENT)
Dept: UROLOGY | Facility: CLINIC | Age: 46
End: 2021-10-14

## 2021-10-14 NOTE — TELEPHONE ENCOUNTER
[facility] Call Center    Phone Message    May a detailed message be left on voicemail: yes     Reason for Call: Other: Pt wife would like a call back , she sent to encounters and has not heard back about the IV orders and is concerned     Action Taken: Message routed to:  [facility] Urology    Travel Screening: Not Applicable

## 2021-10-15 RX ORDER — ERTAPENEM 1 G/1
1 INJECTION, POWDER, LYOPHILIZED, FOR SOLUTION INTRAMUSCULAR; INTRAVENOUS ONCE
Status: CANCELLED
Start: 2021-10-15 | End: 2021-10-15

## 2021-10-19 ENCOUNTER — INFUSION THERAPY VISIT (OUTPATIENT)
Dept: INFUSION THERAPY | Facility: CLINIC | Age: 46
End: 2021-10-19
Attending: UROLOGY
Payer: COMMERCIAL

## 2021-10-19 VITALS
TEMPERATURE: 98.3 F | HEART RATE: 67 BPM | SYSTOLIC BLOOD PRESSURE: 135 MMHG | RESPIRATION RATE: 16 BRPM | DIASTOLIC BLOOD PRESSURE: 77 MMHG | OXYGEN SATURATION: 100 %

## 2021-10-19 DIAGNOSIS — Z29.89 NEED FOR PROPHYLAXIS AGAINST URINARY TRACT INFECTION: Primary | ICD-10-CM

## 2021-10-19 DIAGNOSIS — T83.511S URINARY TRACT INFECTION ASSOCIATED WITH CATHETERIZATION OF URINARY TRACT, UNSPECIFIED INDWELLING URINARY CATHETER TYPE, SEQUELA: ICD-10-CM

## 2021-10-19 DIAGNOSIS — N39.0 URINARY TRACT INFECTION ASSOCIATED WITH CATHETERIZATION OF URINARY TRACT, UNSPECIFIED INDWELLING URINARY CATHETER TYPE, SEQUELA: ICD-10-CM

## 2021-10-19 PROCEDURE — 999N000127 HC STATISTIC PERIPHERAL IV START W US GUIDANCE

## 2021-10-19 PROCEDURE — 250N000009 HC RX 250: Performed by: UROLOGY

## 2021-10-19 PROCEDURE — 250N000011 HC RX IP 250 OP 636: Performed by: UROLOGY

## 2021-10-19 PROCEDURE — 96374 THER/PROPH/DIAG INJ IV PUSH: CPT

## 2021-10-19 RX ORDER — ALBUTEROL SULFATE 90 UG/1
1-2 AEROSOL, METERED RESPIRATORY (INHALATION)
Status: CANCELLED
Start: 2021-10-20

## 2021-10-19 RX ORDER — NALOXONE HYDROCHLORIDE 0.4 MG/ML
0.2 INJECTION, SOLUTION INTRAMUSCULAR; INTRAVENOUS; SUBCUTANEOUS
Status: CANCELLED | OUTPATIENT
Start: 2021-10-20

## 2021-10-19 RX ORDER — MEPERIDINE HYDROCHLORIDE 25 MG/ML
25 INJECTION INTRAMUSCULAR; INTRAVENOUS; SUBCUTANEOUS EVERY 30 MIN PRN
Status: CANCELLED | OUTPATIENT
Start: 2021-10-20

## 2021-10-19 RX ORDER — METHYLPREDNISOLONE SODIUM SUCCINATE 125 MG/2ML
125 INJECTION, POWDER, LYOPHILIZED, FOR SOLUTION INTRAMUSCULAR; INTRAVENOUS
Status: CANCELLED
Start: 2021-10-20

## 2021-10-19 RX ORDER — HEPARIN SODIUM (PORCINE) LOCK FLUSH IV SOLN 100 UNIT/ML 100 UNIT/ML
5 SOLUTION INTRAVENOUS
Status: CANCELLED | OUTPATIENT
Start: 2021-10-20

## 2021-10-19 RX ORDER — DIPHENHYDRAMINE HYDROCHLORIDE 50 MG/ML
50 INJECTION INTRAMUSCULAR; INTRAVENOUS
Status: CANCELLED
Start: 2021-10-20

## 2021-10-19 RX ORDER — ALBUTEROL SULFATE 0.83 MG/ML
2.5 SOLUTION RESPIRATORY (INHALATION)
Status: CANCELLED | OUTPATIENT
Start: 2021-10-20

## 2021-10-19 RX ORDER — HEPARIN SODIUM,PORCINE 10 UNIT/ML
5 VIAL (ML) INTRAVENOUS
Status: CANCELLED | OUTPATIENT
Start: 2021-10-20

## 2021-10-19 RX ORDER — EPINEPHRINE 1 MG/ML
0.3 INJECTION, SOLUTION INTRAMUSCULAR; SUBCUTANEOUS EVERY 5 MIN PRN
Status: CANCELLED | OUTPATIENT
Start: 2021-10-20

## 2021-10-19 RX ADMIN — ERTAPENEM SODIUM 1 G: 1 INJECTION, POWDER, LYOPHILIZED, FOR SOLUTION INTRAMUSCULAR; INTRAVENOUS at 15:10

## 2021-10-19 NOTE — PATIENT INSTRUCTIONS
Dear Kinza Teresa    Thank you for choosing [facility] ([facility]) for your Ceftriaxone infusion.  The following information is a summary of our appointment as well as important reminders.      Patient Education     Ceftriaxone Sodium Solution for injection  What is this medicine?  CEFTRIAXONE (sef try AX one) is a cephalosporin antibiotic. It is used to treat certain kinds of bacterial infections. It will not work for colds, flu, or other viral infections.  This medicine may be used for other purposes; ask your health care provider or pharmacist if you have questions.  What should I tell my health care provider before I take this medicine?  They need to know if you have any of these conditions:    any chronic illness    bowel disease, like colitis    both kidney and liver disease    high bilirubin level in  patients    an unusual or allergic reaction to ceftriaxone, other cephalosporin or penicillin antibiotics, foods, dyes, or preservatives    pregnant or trying to get pregnant    breast-feeding  How should I use this medicine?  This medicine is injected into a muscle or infused it into a vein. It is usually given in a medical office or clinic. If you are to give this medicine you will be taught how to inject it. Follow instructions carefully. Use your doses at regular intervals. Do not take your medicine more often than directed. Do not skip doses or stop your medicine early even if you feel better. Do not stop taking except on your doctor's advice.  Talk to your pediatrician regarding the use of this medicine in children. Special care may be needed.  Overdosage: If you think you have taken too much of this medicine contact a poison control center or emergency room at once.  NOTE: This medicine is only for you. Do not share this medicine with others.  What if I miss a dose?  If you miss a dose, take it as soon as you can. If it is almost time for  your next dose, take only that dose. Do not take double or extra doses.  What may interact with this medicine?  Do not take this medicine with any of the following medications:    intravenous calcium  This medicine may also interact with the following medications:    birth control pills  This list may not describe all possible interactions. Give your health care provider a list of all the medicines, herbs, non-prescription drugs, or dietary supplements you use. Also tell them if you smoke, drink alcohol, or use illegal drugs. Some items may interact with your medicine.  What should I watch for while using this medicine?  Tell your doctor or health care professional if your symptoms do not improve or if they get worse.  Do not treat diarrhea with over the counter products. Contact your doctor if you have diarrhea that lasts more than 2 days or if it is severe and watery.  If you are being treated for a sexually transmitted disease, avoid sexual contact until you have finished your treatment. Having sex can infect your sexual partner.  Calcium may bind to this medicine and cause lung or kidney problems. Avoid calcium products while taking this medicine and for 48 hours after taking the last dose of this medicine.  What side effects may I notice from receiving this medicine?  Side effects that you should report to your doctor or health care professional as soon as possible:    allergic reactions like skin rash, itching or hives, swelling of the face, lips, or tongue    breathing problems    fever, chills    irregular heartbeat    pain when passing urine    seizures    stomach pain, cramps    unusual bleeding, bruising    unusually weak or tired  Side effects that usually do not require medical attention (report to your doctor or health care professional if they continue or are bothersome):    diarrhea    dizzy, drowsy    headache    nausea, vomiting    pain, swelling, irritation where injected    stomach  upset    sweating  This list may not describe all possible side effects. Call your doctor for medical advice about side effects. You may report side effects to FDA at 9-343-LCL-5168.  Where should I keep my medicine?  Keep out of the reach of children.  Store at room temperature below 25 degrees C (77 degrees F). Protect from light. Throw away any unused vials after the expiration date.  NOTE:This sheet is a summary. It may not cover all possible information. If you have questions about this medicine, talk to your doctor, pharmacist, or health care provider. Copyright  2016 Gold Standard             We look forward in seeing you on your next appointment here at [facility] ([facility]).  Please don t hesitate to call us at [phone] to reschedule any of your appointments or to speak with one of the [facility] registered nurses.  It was a pleasure taking care of you today.    Sincerely,    [facility]  [facility]  [address] [place] [address]  [id number]  Phone:  [phone]

## 2021-10-19 NOTE — PROGRESS NOTES
Nursing Note  Kinza Teresa presents today [facility] for:   Chief Complaint   Patient presents with     Infusion     Invanz     During today's [facility] appointment, orders from Dr. Alejandro Tong were completed.  Frequency: daily x 2 doses (prior to urology procedure q 3 months). Today is dose 1/2 for this round of infusions.    Progress note:  Patient identification verified by name and date of birth.  Assessment completed.  Vitals recorded in Doc Flowsheets.  Patient was provided with education regarding medication/procedure and possible side effects.  Patient verbalized understanding.     present during visit today: Not Applicable.    Treatment Conditions: Non-applicable.    Premedications: were not ordered.    Drug Waste Record: No    Infusion length and rate:  Ceftriaxone given via IVP over approximately 5 minutes.    Labs: were not ordered for this appointment.    Vascular access: peripheral IV was placed by vascular access nurse. Secured and kept in place for tomorrow's infusion appointment-okay per telephone order from Dr. Tong.    Is the next appt scheduled? tomorrow  Asymptomatic COVID test completed? NA    Post Infusion Assessment:  Patient tolerated infusion without incident.     Discharge Plan:   Follow up plan of care with: ongoing infusions at [facility]., ordering provider as scheduled. and after visit summary given to patient  Discharge instructions were reviewed with patient.  Patient/representative verbalized understanding of discharge instructions and all questions answered.  Patient discharged from [facility] in stable condition.    Lenka Garcia RN       Administrations This Visit     ertapenem (INVanz) 1 g in 10 mL SWFI for IVP     Admin Date  10/19/2021 Action  Given Dose  1 g Route  Intravenous Administered By  Lenka Garcia RN                BP  135/77   Pulse 67   Temp 98.3  F (36.8  C) (Oral)   Resp 16   SpO2 100%

## 2021-10-19 NOTE — LETTER
10/19/2021         RE: Kinza Teresa  [address]  [place] [facility] [address]        Dear Colleague,    Thank you for referring your patient, Kinza Teresa, to the [facility]. Please see a copy of my visit note below.    Nursing Note  Kinza Teresa [facility] for:   Chief Complaint   Patient presents with     Infusion     Invanz     During [facility]'s [facility] appointment, orders from Dr. Alejandro Tong were completed.  Frequency: daily x 2 doses (prior to urology procedure q 3 months). Today is dose 1/2 for this round of infusions.    Progress note:  Patient identification verified by name and date of birth.  Assessment completed.  Vitals recorded in Doc Flowsheets.  Patient was provided with education regarding medication/procedure and possible side effects.  Patient verbalized understanding.     present during visit today: Not Applicable.    Treatment Conditions: Non-applicable.    Premedications: were not ordered.    Drug Waste Record: No    Infusion length and rate:  Ceftriaxone given via IVP over approximately 5 minutes.    Labs: were not ordered for this appointment.    Vascular access: peripheral IV was placed by vascular access nurse. Secured and kept in place for tomorrow's infusion appointment-okay per telephone order from Dr. Tong.    Is the next appt scheduled? tomorrow  Asymptomatic COVID test completed? NA    Post Infusion Assessment:  Patient tolerated infusion without incident.     Discharge Plan:   Follow up plan of care with: ongoing infusions at [facility]., ordering provider as scheduled. and after visit summary given to patient  Discharge instructions were reviewed with patient.  Patient/representative verbalized understanding of discharge instructions and all questions answered.  Patient discharged from [facility] in stable condition.    Lenka Garcia, MARLINE       Administrations This Visit     ertapenem (INVanz) 1 g in 10 mL SWFI for IVP     Admin Date  10/19/2021 Action  Given Dose  1 g Route  Intravenous Administered By  Lenka Garcia RN                /77   Pulse 67   Temp 98.3  F (36.8  C) (Oral)   Resp 16   SpO2 100%         Again, thank you for allowing me to participate in the care of your patient.        Sincerely,        [facility]

## 2021-10-20 ENCOUNTER — INFUSION THERAPY VISIT (OUTPATIENT)
Dept: INFUSION THERAPY | Facility: CLINIC | Age: 46
End: 2021-10-20
Attending: UROLOGY
Payer: COMMERCIAL

## 2021-10-20 ENCOUNTER — OFFICE VISIT (OUTPATIENT)
Dept: UROLOGY | Facility: CLINIC | Age: 46
End: 2021-10-20
Payer: COMMERCIAL

## 2021-10-20 VITALS
DIASTOLIC BLOOD PRESSURE: 89 MMHG | SYSTOLIC BLOOD PRESSURE: 154 MMHG | TEMPERATURE: 98.6 F | HEART RATE: 51 BPM | OXYGEN SATURATION: 100 % | RESPIRATION RATE: 16 BRPM

## 2021-10-20 VITALS — HEART RATE: 51 BPM | DIASTOLIC BLOOD PRESSURE: 89 MMHG | SYSTOLIC BLOOD PRESSURE: 154 MMHG

## 2021-10-20 DIAGNOSIS — N39.0 URINARY TRACT INFECTION ASSOCIATED WITH CATHETERIZATION OF URINARY TRACT, UNSPECIFIED INDWELLING URINARY CATHETER TYPE, SEQUELA: ICD-10-CM

## 2021-10-20 DIAGNOSIS — T83.511S URINARY TRACT INFECTION ASSOCIATED WITH CATHETERIZATION OF URINARY TRACT, UNSPECIFIED INDWELLING URINARY CATHETER TYPE, SEQUELA: ICD-10-CM

## 2021-10-20 DIAGNOSIS — Z29.89 NEED FOR PROPHYLAXIS AGAINST URINARY TRACT INFECTION: Primary | ICD-10-CM

## 2021-10-20 DIAGNOSIS — N31.9 NEUROGENIC BLADDER: Primary | ICD-10-CM

## 2021-10-20 PROCEDURE — 250N000011 HC RX IP 250 OP 636: Performed by: UROLOGY

## 2021-10-20 PROCEDURE — 250N000009 HC RX 250: Performed by: UROLOGY

## 2021-10-20 PROCEDURE — 96374 THER/PROPH/DIAG INJ IV PUSH: CPT

## 2021-10-20 PROCEDURE — 52287 CYSTOSCOPY CHEMODENERVATION: CPT | Performed by: UROLOGY

## 2021-10-20 RX ORDER — HEPARIN SODIUM (PORCINE) LOCK FLUSH IV SOLN 100 UNIT/ML 100 UNIT/ML
5 SOLUTION INTRAVENOUS
Status: CANCELLED | OUTPATIENT
Start: 2021-10-21

## 2021-10-20 RX ORDER — HEPARIN SODIUM,PORCINE 10 UNIT/ML
5 VIAL (ML) INTRAVENOUS
Status: CANCELLED | OUTPATIENT
Start: 2021-10-21

## 2021-10-20 RX ORDER — ALBUTEROL SULFATE 90 UG/1
1-2 AEROSOL, METERED RESPIRATORY (INHALATION)
Status: CANCELLED
Start: 2021-10-21

## 2021-10-20 RX ORDER — NALOXONE HYDROCHLORIDE 0.4 MG/ML
0.2 INJECTION, SOLUTION INTRAMUSCULAR; INTRAVENOUS; SUBCUTANEOUS
Status: CANCELLED | OUTPATIENT
Start: 2021-10-21

## 2021-10-20 RX ORDER — MEPERIDINE HYDROCHLORIDE 25 MG/ML
25 INJECTION INTRAMUSCULAR; INTRAVENOUS; SUBCUTANEOUS EVERY 30 MIN PRN
Status: CANCELLED | OUTPATIENT
Start: 2021-10-21

## 2021-10-20 RX ORDER — METHYLPREDNISOLONE SODIUM SUCCINATE 125 MG/2ML
125 INJECTION, POWDER, LYOPHILIZED, FOR SOLUTION INTRAMUSCULAR; INTRAVENOUS
Status: CANCELLED
Start: 2021-10-21

## 2021-10-20 RX ORDER — ALBUTEROL SULFATE 0.83 MG/ML
2.5 SOLUTION RESPIRATORY (INHALATION)
Status: CANCELLED | OUTPATIENT
Start: 2021-10-21

## 2021-10-20 RX ORDER — EPINEPHRINE 1 MG/ML
0.3 INJECTION, SOLUTION INTRAMUSCULAR; SUBCUTANEOUS EVERY 5 MIN PRN
Status: CANCELLED | OUTPATIENT
Start: 2021-10-21

## 2021-10-20 RX ORDER — DIPHENHYDRAMINE HYDROCHLORIDE 50 MG/ML
50 INJECTION INTRAMUSCULAR; INTRAVENOUS
Status: CANCELLED
Start: 2021-10-21

## 2021-10-20 RX ADMIN — ERTAPENEM SODIUM 1 G: 1 INJECTION, POWDER, LYOPHILIZED, FOR SOLUTION INTRAMUSCULAR; INTRAVENOUS at 09:37

## 2021-10-20 ASSESSMENT — PAIN SCALES - GENERAL: PAINLEVEL: NO PAIN (0)
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
## 2021-10-20 NOTE — LETTER
10/20/2021       RE: Kinza Teresa  [address] [facility] [address] [address]     Dear Colleague,    Thank you for referring your patient, Kinza Teresa, to the [facility] [place] [facility]. Please see a copy of my visit note below.    PRE-OPERATIVE DIAGNOSIS:   1.  Neurogenic bladder     POST-OPERATIVE DIAGNOSIS:  1.  Neurogenic bladder     PROCEDURE:    1. Cystourethroscopy With injection of botulinum toxin A 200units in 10cc sterile saline     Surgeon: Alejandro Tong MD     OPERATIVE INDICATIONS:  Kinza Teresa is a 46 year old male with neurogenic bladder due to spinal cord injury. He does not have an augment. He performs CIC per urethra. He takes oxybutynin. UDS demonstrates persistent neurogenic DO.  He experiences recurrent UTIs. He previously had an IPP which works well for him though has some glans hypermobility. He likes the IPP. His last Botox was July 2021. He gets Ertapenem x 2 doses for this procedure - one yesterday and one today     OPERATIVE DETAILS:  The patient was taken to the procedure room. He was left in his chair. Penis was prepped.      A Flexible cystoscope was placed into the bladder and a flexible needle  Was preloaded.  The bladder mucosa appeared to be normal without stones, tumors or diverticulum on 360 degree inspection. The ureteral orifices were in the normal orthotopic position bilaterally.  We mixed 2 vials of Botox for 200u total.  We performed a template injection procedure with 10 injections. All injections were placed deep to the mucosa into the detrusor muscle.  We then emptied the bladder to re-inspect our injection sites and found that there was a minimal amount of blood.     Scope was removed.  Patient was catheterized to empty completely.     PLAN:      Repeat Botox injection in 3-4 months  Ertapenem 1g IV day before and day of.     Alejandro Tong MD

## 2021-10-20 NOTE — PROGRESS NOTES
Nursing Note  Kinza Teresa [facility] for:   Chief Complaint   Patient presents with     Infusion     Antibioitic     During today's [facility] appointment, orders from Dr. Tong were completed.  Frequency: today is dose 2 of 2 total daily    Progress note:  Patient identification verified by name and date of birth.  Assessment completed.  Vitals recorded in Doc Flowsheets.  Patient was provided with education regarding medication/procedure and possible side effects.  Patient verbalized understanding.     present during visit today: Not Applicable.    Treatment Conditions: Non-applicable.    Infusion length and rate:  infusion given over approximately 5 minutes IVP minutes    Labs: were not ordered for this appointment.    Vascular access: peripheral IV was placed by vascular access nurse yesterday    Is the next appt scheduled? yes  Asymptomatic COVID test completed? no    Post Infusion Assessment:  Patient tolerated infusion without incident.     Discharge Plan:   Follow up plan of care with: ongoing infusions at [facility].  Discharge instructions were reviewed with patient.  Patient/representative verbalized understanding of discharge instructions and all questions answered.  Patient discharged from [facility] in stable condition.    Pura Flores RN        BP (!) 154/89   Pulse 51   Temp 98.6  F (37  C) (Oral)   Resp 16   SpO2 100%

## 2021-10-20 NOTE — LETTER
10/20/2021         RE: Kinza Teresa  [address] [address]        Dear Colleague,    Thank you for referring your patient, Kinza Teresa, to the [facility]. Please see a copy of my visit note below.    Nursing Note  Kinza Teresa presents [facility] to [facility] for:   Chief Complaint   Patient presents with     Infusion     Antibioitic     During today's [facility] appointment, orders from Dr. Tong were completed.  Frequency: today is dose 2 of 2 total daily    Progress note:  Patient identification verified by name and date of birth.  Assessment completed.  Vitals recorded in Doc Flowsheets.  Patient was provided with education regarding medication/procedure and possible side effects.  Patient verbalized understanding.     present during visit today: Not Applicable.    Treatment Conditions: Non-applicable.    Infusion length and rate:  infusion given over approximately 5 minutes IVP minutes    Labs: were not ordered for this appointment.    Vascular access: peripheral IV was placed by vascular access nurse yesterday    Is the next appt scheduled? yes  Asymptomatic COVID test completed? no    Post Infusion Assessment:  Patient tolerated infusion without incident.     Discharge Plan:   Follow up plan of care with: ongoing infusions at [facility].  Discharge instructions were reviewed with patient.  Patient/representative verbalized understanding of discharge instructions and all questions answered.  Patient discharged from [facility] in stable condition.    Pura Flores RN        BP (!) 154/89   Pulse 51   Temp 98.6  F (37  C) (Oral)   Resp 16   SpO2 100%         Again, thank you for allowing me to participate in the care of your patient.        Sincerely,        [facility]

## 2021-10-20 NOTE — PATIENT INSTRUCTIONS
"Schedule a cystoscopy with Botox in 3 months and schedule infusion visits the day before, the same day.    It was a pleasure meeting with you today.  Thank you for allowing me and my team the privilege of caring for you today.  YOU are the reason we are here, and I truly hope we provided you with the excellent service you deserve.  Please let us know if there is anything else we can do for you so that we can be sure you are leaving completely satisfied with your care experience.        Ashely Rodriguez CMA      .  AFTER YOUR CYSTOSCOPY        You have just completed a cystoscopy, or \"cysto\", which allowed your physician to learn more about your bladder (or to remove a stent placed after surgery). We suggest that you continue to avoid caffeine, fruit juice, and alcohol for the next 24 hours, however, you are encouraged to return to your normal activities.         A few things that are considered normal after your cystoscopy:     * Small amount of bleeding (or spotting) that clears within the next 24 hours     * Slight burning sensation with urination     * Sensation to of needing to avoid more frequently     * The feeling of \"air\" in your urine     * Mild discomfort that is relieved with Tylenol        Please contact our office promptly if you:     * Develop a fever above 101 degrees     * Are unable to urinate     * Develop bright red blood that does not stop     * Severe pain or swelling         Please contact [facility] with any concerns or questions [facility]  "

## 2021-10-21 NOTE — PROGRESS NOTES
PRE-OPERATIVE DIAGNOSIS:   1.  Neurogenic bladder     POST-OPERATIVE DIAGNOSIS:  1.  Neurogenic bladder     PROCEDURE:    1. Cystourethroscopy With injection of botulinum toxin A 200units in 10cc sterile saline     Surgeon: Alejandro Tong MD     OPERATIVE INDICATIONS:  Kinza Teresa is a 46 year old male with neurogenic bladder due to spinal cord injury. He does not have an augment. He performs CIC per urethra. He takes oxybutynin. UDS demonstrates persistent neurogenic DO.  He experiences recurrent UTIs. He previously had an IPP which works well for him though has some glans hypermobility. He likes the IPP. His last Botox was July 2021. He gets Ertapenem x 2 doses for this procedure - one yesterday and one today     OPERATIVE DETAILS:  The patient was taken to the procedure room. He was left in his chair. Penis was prepped.      A Flexible cystoscope was placed into the bladder and a flexible needle  Was preloaded.  The bladder mucosa appeared to be normal without stones, tumors or diverticulum on 360 degree inspection. The ureteral orifices were in the normal orthotopic position bilaterally.  We mixed 2 vials of Botox for 200u total.  We performed a template injection procedure with 10 injections. All injections were placed deep to the mucosa into the detrusor muscle.  We then emptied the bladder to re-inspect our injection sites and found that there was a minimal amount of blood.     Scope was removed.  Patient was catheterized to empty completely.     PLAN:      Repeat Botox injection in 3-4 months  Ertapenem 1g IV day before and day of.     Alejandro Tong MD

## 2021-11-06 ENCOUNTER — HEALTH MAINTENANCE LETTER (OUTPATIENT)
Age: 46
End: 2021-11-06

## 2021-11-17 DIAGNOSIS — N39.0 URINARY TRACT INFECTION ASSOCIATED WITH CATHETERIZATION OF URINARY TRACT, UNSPECIFIED INDWELLING URINARY CATHETER TYPE, SEQUELA: ICD-10-CM

## 2021-11-17 DIAGNOSIS — T83.511S URINARY TRACT INFECTION ASSOCIATED WITH CATHETERIZATION OF URINARY TRACT, UNSPECIFIED INDWELLING URINARY CATHETER TYPE, SEQUELA: ICD-10-CM

## 2021-11-17 DIAGNOSIS — Z29.89 NEED FOR PROPHYLAXIS AGAINST URINARY TRACT INFECTION: Primary | ICD-10-CM

## 2022-01-03 ENCOUNTER — PRE VISIT (OUTPATIENT)
Dept: UROLOGY | Facility: CLINIC | Age: 47
End: 2022-01-03
Payer: COMMERCIAL

## 2022-01-03 NOTE — TELEPHONE ENCOUNTER
Reason for visit: Cystoscopy     Relevant information: with botox    Records/imaging/labs/orders: in Epic; antibiotics Ertapenem 1g IV day before and day of    Pt called: no    At [facility] unit(s) botox in 10ccs sterile saline

## 2022-01-25 ENCOUNTER — INFUSION THERAPY VISIT (OUTPATIENT)
Dept: INFUSION THERAPY | Facility: CLINIC | Age: 47
End: 2022-01-25
Attending: UROLOGY
Payer: COMMERCIAL

## 2022-01-25 VITALS
OXYGEN SATURATION: 100 % | SYSTOLIC BLOOD PRESSURE: 149 MMHG | HEART RATE: 67 BPM | TEMPERATURE: 97.7 F | DIASTOLIC BLOOD PRESSURE: 97 MMHG | RESPIRATION RATE: 18 BRPM

## 2022-01-25 DIAGNOSIS — Z29.89 NEED FOR PROPHYLAXIS AGAINST URINARY TRACT INFECTION: Primary | ICD-10-CM

## 2022-01-25 DIAGNOSIS — N39.0 URINARY TRACT INFECTION ASSOCIATED WITH CATHETERIZATION OF URINARY TRACT, UNSPECIFIED INDWELLING URINARY CATHETER TYPE, SEQUELA: ICD-10-CM

## 2022-01-25 DIAGNOSIS — T83.511S URINARY TRACT INFECTION ASSOCIATED WITH CATHETERIZATION OF URINARY TRACT, UNSPECIFIED INDWELLING URINARY CATHETER TYPE, SEQUELA: ICD-10-CM

## 2022-01-25 PROCEDURE — 250N000011 HC RX IP 250 OP 636: Performed by: UROLOGY

## 2022-01-25 PROCEDURE — 96374 THER/PROPH/DIAG INJ IV PUSH: CPT

## 2022-01-25 PROCEDURE — 250N000009 HC RX 250: Performed by: UROLOGY

## 2022-01-25 RX ORDER — EPINEPHRINE 1 MG/ML
0.3 INJECTION, SOLUTION INTRAMUSCULAR; SUBCUTANEOUS EVERY 5 MIN PRN
Status: CANCELLED | OUTPATIENT
Start: 2022-01-26

## 2022-01-25 RX ORDER — ALBUTEROL SULFATE 0.83 MG/ML
2.5 SOLUTION RESPIRATORY (INHALATION)
Status: CANCELLED | OUTPATIENT
Start: 2022-01-26

## 2022-01-25 RX ORDER — DIPHENHYDRAMINE HYDROCHLORIDE 50 MG/ML
50 INJECTION INTRAMUSCULAR; INTRAVENOUS
Status: CANCELLED
Start: 2022-01-26

## 2022-01-25 RX ORDER — METHYLPREDNISOLONE SODIUM SUCCINATE 125 MG/2ML
125 INJECTION, POWDER, LYOPHILIZED, FOR SOLUTION INTRAMUSCULAR; INTRAVENOUS
Status: CANCELLED
Start: 2022-01-26

## 2022-01-25 RX ORDER — HEPARIN SODIUM (PORCINE) LOCK FLUSH IV SOLN 100 UNIT/ML 100 UNIT/ML
5 SOLUTION INTRAVENOUS
Status: CANCELLED | OUTPATIENT
Start: 2022-01-26

## 2022-01-25 RX ORDER — NALOXONE HYDROCHLORIDE 0.4 MG/ML
0.2 INJECTION, SOLUTION INTRAMUSCULAR; INTRAVENOUS; SUBCUTANEOUS
Status: CANCELLED | OUTPATIENT
Start: 2022-01-26

## 2022-01-25 RX ORDER — HEPARIN SODIUM,PORCINE 10 UNIT/ML
5 VIAL (ML) INTRAVENOUS
Status: CANCELLED | OUTPATIENT
Start: 2022-01-26

## 2022-01-25 RX ORDER — MEPERIDINE HYDROCHLORIDE 25 MG/ML
25 INJECTION INTRAMUSCULAR; INTRAVENOUS; SUBCUTANEOUS EVERY 30 MIN PRN
Status: CANCELLED | OUTPATIENT
Start: 2022-01-26

## 2022-01-25 RX ORDER — ALBUTEROL SULFATE 90 UG/1
1-2 AEROSOL, METERED RESPIRATORY (INHALATION)
Status: CANCELLED
Start: 2022-01-26

## 2022-01-25 RX ADMIN — ERTAPENEM SODIUM 1 G: 1 INJECTION, POWDER, LYOPHILIZED, FOR SOLUTION INTRAMUSCULAR; INTRAVENOUS at 11:15

## 2022-01-25 NOTE — PROGRESS NOTES
"Chief Complaint   Patient presents with     Infusion     IV ertapenem     Infusion Nursing Note:  Kinza Teresa presents today for IV ertapenem.  Dose 1 of 2, Q 3 months prior to procedure.  Patient seen by provider today: No   present during visit today: Not Applicable.    Note: IVP over 5 minutes.      Intravenous Access:  Peripheral IV placed. Patient preferred to not leave PIV in place for tomorrow's visit.    Treatment Conditions:  Not Applicable.      Post Infusion Assessment:  Patient tolerated infusion without incident.  Blood return noted pre and post infusion.  Site patent and intact, free from redness, edema or discomfort.  No evidence of extravasations.  Access discontinued per protocol.       Discharge Plan:   AVS to patient via MYCHART.  Patient will return tomorrow for next appointment.   Patient discharged in stable condition accompanied by: self.  Departure Mode: Ambulatory, baseline.    Administrations This Visit     ertapenem (INVanz) 1 g in 10 mL SWFI for IVP     Admin Date  01/25/2022 Action  Given Dose  1 g Route  Intravenous Administered By  Jose Reddy RN                Vital signs:  Temp: 97.7  F (36.5  C) Temp src: Oral BP: (!) 149/97 (asymptomatic) Pulse: 67   Resp: 18 SpO2: 100 % O2 Device: None (Room air)        Estimated body mass index is 24.21 kg/m  as calculated from the following:    Height as of 7/21/21: 1.676 m (5' 6\").    Weight as of 7/21/21: 68 kg (150 lb).                    "

## 2022-01-25 NOTE — LETTER
"    1/25/2022         RE: Kinza Teresa  [address] [facility] [address]        Dear Colleague,    Thank you for referring your patient, Kinza Teresa, to the [facility]. Please see a copy of my visit note below.    Chief Complaint   Patient presents with     Infusion     IV ertapenem     Infusion Nursing Note:  Kinza Teresa presents today for IV ertapenem.  Dose 1 of 2, Q 3 months prior to procedure.  Patient seen by provider today: No   present during visit today: Not Applicable.    Note: IVP over 5 minutes.      Intravenous Access:  Peripheral IV placed. Patient preferred to not leave PIV in place for tomorrow's visit.    Treatment Conditions:  Not Applicable.      Post Infusion Assessment:  Patient tolerated infusion without incident.  Blood return noted pre and post infusion.  Site patent and intact, free from redness, edema or discomfort.  No evidence of extravasations.  Access discontinued per protocol.       Discharge Plan:   AVS to patient via MYCHART.  Patient will return tomorrow for next appointment.   Patient discharged in stable condition accompanied by: self.  Departure Mode: Ambulatory, baseline.    Administrations This Visit     ertapenem (INVanz) 1 g in 10 mL SWFI for IVP     Admin Date  01/25/2022 Action  Given Dose  1 g Route  Intravenous Administered By  Jose Reddy RN              Vital signs:  Temp: 97.7  F (36.5  C) Temp src: Oral BP: (!) 149/97 (asymptomatic) Pulse: 67   Resp: 18 SpO2: 100 % O2 Device: None (Room air)        Estimated body mass index is 24.21 kg/m  as calculated from the following:    Height as of 7/21/21: 1.676 m (5' 6\").    Weight as of 7/21/21: 68 kg (150 lb).          Again, thank you for allowing me to participate in the care of your patient.      Sincerely,    [facility]"

## 2022-01-26 ENCOUNTER — INFUSION THERAPY VISIT (OUTPATIENT)
Dept: INFUSION THERAPY | Facility: CLINIC | Age: 47
End: 2022-01-26
Attending: UROLOGY
Payer: COMMERCIAL

## 2022-01-26 ENCOUNTER — OFFICE VISIT (OUTPATIENT)
Dept: UROLOGY | Facility: CLINIC | Age: 47
End: 2022-01-26
Payer: COMMERCIAL

## 2022-01-26 VITALS
HEIGHT: 66 IN | BODY MASS INDEX: 24.11 KG/M2 | HEART RATE: 49 BPM | WEIGHT: 150 LBS | DIASTOLIC BLOOD PRESSURE: 85 MMHG | SYSTOLIC BLOOD PRESSURE: 138 MMHG

## 2022-01-26 VITALS
RESPIRATION RATE: 17 BRPM | OXYGEN SATURATION: 99 % | SYSTOLIC BLOOD PRESSURE: 137 MMHG | TEMPERATURE: 97.7 F | DIASTOLIC BLOOD PRESSURE: 77 MMHG | HEART RATE: 61 BPM

## 2022-01-26 DIAGNOSIS — N39.0 URINARY TRACT INFECTION ASSOCIATED WITH CATHETERIZATION OF URINARY TRACT, UNSPECIFIED INDWELLING URINARY CATHETER TYPE, SEQUELA: Primary | ICD-10-CM

## 2022-01-26 DIAGNOSIS — Z29.89 NEED FOR PROPHYLAXIS AGAINST URINARY TRACT INFECTION: ICD-10-CM

## 2022-01-26 DIAGNOSIS — N31.9 NEUROGENIC BLADDER: Primary | ICD-10-CM

## 2022-01-26 DIAGNOSIS — T83.511S URINARY TRACT INFECTION ASSOCIATED WITH CATHETERIZATION OF URINARY TRACT, UNSPECIFIED INDWELLING URINARY CATHETER TYPE, SEQUELA: Primary | ICD-10-CM

## 2022-01-26 PROCEDURE — 999N000128 HC STATISTIC PERIPHERAL IV START W/O US GUIDANCE

## 2022-01-26 PROCEDURE — 52287 CYSTOSCOPY CHEMODENERVATION: CPT | Performed by: UROLOGY

## 2022-01-26 PROCEDURE — 96374 THER/PROPH/DIAG INJ IV PUSH: CPT

## 2022-01-26 PROCEDURE — 250N000009 HC RX 250: Performed by: UROLOGY

## 2022-01-26 PROCEDURE — 250N000011 HC RX IP 250 OP 636: Performed by: UROLOGY

## 2022-01-26 RX ORDER — HEPARIN SODIUM (PORCINE) LOCK FLUSH IV SOLN 100 UNIT/ML 100 UNIT/ML
5 SOLUTION INTRAVENOUS
Status: CANCELLED | OUTPATIENT
Start: 2022-05-03

## 2022-01-26 RX ORDER — HEPARIN SODIUM,PORCINE 10 UNIT/ML
5 VIAL (ML) INTRAVENOUS
Status: CANCELLED | OUTPATIENT
Start: 2022-05-03

## 2022-01-26 RX ORDER — METHYLPREDNISOLONE SODIUM SUCCINATE 125 MG/2ML
125 INJECTION, POWDER, LYOPHILIZED, FOR SOLUTION INTRAMUSCULAR; INTRAVENOUS
Status: CANCELLED
Start: 2022-05-03

## 2022-01-26 RX ORDER — ALBUTEROL SULFATE 90 UG/1
1-2 AEROSOL, METERED RESPIRATORY (INHALATION)
Status: CANCELLED
Start: 2022-05-03

## 2022-01-26 RX ORDER — NALOXONE HYDROCHLORIDE 0.4 MG/ML
0.2 INJECTION, SOLUTION INTRAMUSCULAR; INTRAVENOUS; SUBCUTANEOUS
Status: CANCELLED | OUTPATIENT
Start: 2022-05-03

## 2022-01-26 RX ORDER — EPINEPHRINE 1 MG/ML
0.3 INJECTION, SOLUTION INTRAMUSCULAR; SUBCUTANEOUS EVERY 5 MIN PRN
Status: CANCELLED | OUTPATIENT
Start: 2022-05-03

## 2022-01-26 RX ORDER — MEPERIDINE HYDROCHLORIDE 25 MG/ML
25 INJECTION INTRAMUSCULAR; INTRAVENOUS; SUBCUTANEOUS EVERY 30 MIN PRN
Status: CANCELLED | OUTPATIENT
Start: 2022-05-03

## 2022-01-26 RX ORDER — DIPHENHYDRAMINE HYDROCHLORIDE 50 MG/ML
50 INJECTION INTRAMUSCULAR; INTRAVENOUS
Status: CANCELLED
Start: 2022-05-03

## 2022-01-26 RX ORDER — ALBUTEROL SULFATE 0.83 MG/ML
2.5 SOLUTION RESPIRATORY (INHALATION)
Status: CANCELLED | OUTPATIENT
Start: 2022-05-03

## 2022-01-26 RX ADMIN — ERTAPENEM SODIUM 1 G: 1 INJECTION, POWDER, LYOPHILIZED, FOR SOLUTION INTRAMUSCULAR; INTRAVENOUS at 10:28

## 2022-01-26 ASSESSMENT — MIFFLIN-ST. JEOR: SCORE: 1493.15

## 2022-01-26 ASSESSMENT — PAIN SCALES - GENERAL
PAINLEVEL: NO PAIN (0)
PAINLEVEL: NO PAIN (0)

## 2022-01-26 NOTE — LETTER
1/26/2022         RE: Kinza Teresa  [address]  [facility] [address]        Dear Colleague,    Thank you for referring your patient, Kinza Teresa, to the [facility]. Please see a copy of my visit note below.    Infusion Nursing Note:  Kinza Teresa presents today for Invanz.    Patient seen by provider today: No   present during visit today: Not Applicable.    Note:   -Invanz given IVP over 5 min    Intravenous Access:  Peripheral IV placed.    Treatment Conditions:  Not Applicable.      Post Infusion Assessment:  Patient tolerated infusion without incident.  No evidence of extravasations.  Access discontinued per protocol.       Discharge Plan:   Patient discharged in stable condition accompanied by: self.  Departure Mode: Wheelchair.    Queenie Cruz RN    /77   Pulse 61   Temp 97.7  F (36.5  C) (Oral)   Resp 17   SpO2 99%     Administrations This Visit     ertapenem (INVanz) 1 g in 10 mL SWFI for IVP     Admin Date  01/26/2022 Action  Given Dose  1 g Route  Intravenous Administered By  Queenie Cruz, MARLINE                            Again, thank you for allowing me to participate in the care of your patient.        Sincerely,        [facility]
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
## 2022-01-26 NOTE — PATIENT INSTRUCTIONS
"Please follow up in 3 months with Dr. Tong in clinic for cystoscopy with botox.    It was a pleasure meeting with you today.  Thank you for allowing me and my team the privilege of caring for you today.  YOU are the reason we are here, and I truly hope we provided you with the excellent service you deserve.  Please let us know if there is anything else we can do for you so that we can be sure you are leaving completely satisfied with your care experience.          AFTER YOUR CYSTOSCOPY        You have just completed a cystoscopy, or \"cysto\", which allowed your physician to learn more about your bladder (or to remove a stent placed after surgery). We suggest that you continue to avoid caffeine, fruit juice, and alcohol for the next 24 hours, however, you are encouraged to return to your normal activities.         A few things that are considered normal after your cystoscopy:     * Small amount of bleeding (or spotting) that clears within the next 24 hours     * Slight burning sensation with urination     * Sensation to of needing to avoid more frequently     * The feeling of \"air\" in your urine     * Mild discomfort that is relieved with Tylenol        Please contact our office promptly if you:     * Develop a fever above 101 degrees     * Are unable to urinate     * Develop bright red blood that does not stop     * Severe pain or swelling         Please contact our office with any concerns or questions @[email or web address].  "

## 2022-01-26 NOTE — LETTER
1/26/2022       RE: Kinza Teresa  [address] [facility] [address]     Dear Colleague,    Thank you for referring your patient, Kinza Teresa, to the [facility] [place] at [facility]. Please see a copy of my visit note below.    PRE-OPERATIVE DIAGNOSIS:   1.  Neurogenic bladder     POST-OPERATIVE DIAGNOSIS:  1.  Neurogenic bladder     PROCEDURE:    1. Cystourethroscopy With injection of botulinum toxin A 200units in 10cc sterile saline     Surgeon: Alejandro Tong MD     OPERATIVE INDICATIONS:  Kinza Teresa is a 46 year old male with neurogenic bladder due to spinal cord injury. He does not have an augment. He performs CIC per urethra. He takes oxybutynin. UDS demonstrates persistent neurogenic DO.  He experiences recurrent UTIs. He does irrigations. He previously had an IPP which works well for him though has some glans hypermobility. He likes the IPP. His last Botox was Oct 2021. He gets Ertapenem x 2 doses for this procedure - one yesterday and one today. He is not interested in bladder augmentation.     OPERATIVE DETAILS:  The patient was taken to the procedure room. He was left in his chair. Penis was prepped.      A Flexible cystoscope was placed into the bladder and a flexible needle  Was preloaded.  The bladder mucosa appeared to be normal without stones, tumors or diverticulum on 360 degree inspection. The ureteral orifices were in the normal orthotopic position bilaterally.  We mixed 2 vials of Botox for 200u total.  We performed a template injection procedure with 10 injections. All injections were placed deep to the mucosa into the detrusor muscle.  We then emptied the bladder to re-inspect our injection sites and found that there was a minimal amount of blood.     Scope was removed.  Patient was catheterized to empty completely.     PLAN:   Repeat Botox injection in 3-4 months  Ertapenem 1g IV day before and day  of.     Alejandro Tong MD

## 2022-01-28 NOTE — PROGRESS NOTES
PRE-OPERATIVE DIAGNOSIS:   1.  Neurogenic bladder     POST-OPERATIVE DIAGNOSIS:  1.  Neurogenic bladder     PROCEDURE:    1. Cystourethroscopy With injection of botulinum toxin A 200units in 10cc sterile saline     Surgeon: Alejandro Tong MD     OPERATIVE INDICATIONS:  Kinza Teresa is a 46 year old male with neurogenic bladder due to spinal cord injury. He does not have an augment. He performs CIC per urethra. He takes oxybutynin. UDS demonstrates persistent neurogenic DO.  He experiences recurrent UTIs. He does irrigations. He previously had an IPP which works well for him though has some glans hypermobility. He likes the IPP. His last Botox was Oct 2021. He gets Ertapenem x 2 doses for this procedure - one yesterday and one today. He is not interested in bladder augmentation.     OPERATIVE DETAILS:  The patient was taken to the procedure room. He was left in his chair. Penis was prepped.      A Flexible cystoscope was placed into the bladder and a flexible needle  Was preloaded.  The bladder mucosa appeared to be normal without stones, tumors or diverticulum on 360 degree inspection. The ureteral orifices were in the normal orthotopic position bilaterally.  We mixed 2 vials of Botox for 200u total.  We performed a template injection procedure with 10 injections. All injections were placed deep to the mucosa into the detrusor muscle.  We then emptied the bladder to re-inspect our injection sites and found that there was a minimal amount of blood.     Scope was removed.  Patient was catheterized to empty completely.     PLAN:   Repeat Botox injection in 3-4 months  Ertapenem 1g IV day before and day of.     Alejandro Tong MD

## 2022-04-04 ENCOUNTER — PRE VISIT (OUTPATIENT)
Dept: UROLOGY | Facility: CLINIC | Age: 47
End: 2022-04-04
Payer: COMMERCIAL

## 2022-04-04 NOTE — TELEPHONE ENCOUNTER
Reason for visit: Cystoscopy     Relevant information: with botox    Records/imaging/labs/orders: in Epic; IV Ertapenem 1g day before and day of    Pt called: no    At [place] units botox in 10 ccs sterile saline

## 2022-04-20 ENCOUNTER — TELEPHONE (OUTPATIENT)
Dept: UROLOGY | Facility: CLINIC | Age: 47
End: 2022-04-20
Payer: COMMERCIAL

## 2022-04-20 DIAGNOSIS — Z29.89 NEED FOR PROPHYLAXIS AGAINST URINARY TRACT INFECTION: Primary | ICD-10-CM

## 2022-04-20 DIAGNOSIS — N31.9 NEUROGENIC BLADDER: ICD-10-CM

## 2022-04-20 DIAGNOSIS — N39.0 URINARY TRACT INFECTION: ICD-10-CM

## 2022-04-20 NOTE — TELEPHONE ENCOUNTER
Writer received phone call from call center with pt on the line. Pt wondering how to set up IV abx. Writer discussed with Kim Traore RN who stated this would be initiated by her or infusion.

## 2022-04-20 NOTE — TELEPHONE ENCOUNTER
[facility] Call Center    Phone Message    May a detailed message be left on voicemail: yes     Reason for Call: Patient calling to set up appt for IV antibiotic. Please reach out to patient. Thank you    Action Taken: Message routed to:  [facility] Uro    Travel Screening: Not Applicable

## 2022-04-26 ENCOUNTER — INFUSION THERAPY VISIT (OUTPATIENT)
Dept: INFUSION THERAPY | Facility: CLINIC | Age: 47
End: 2022-04-26
Attending: UROLOGY
Payer: COMMERCIAL

## 2022-04-26 VITALS
RESPIRATION RATE: 16 BRPM | DIASTOLIC BLOOD PRESSURE: 89 MMHG | TEMPERATURE: 98.4 F | SYSTOLIC BLOOD PRESSURE: 144 MMHG | OXYGEN SATURATION: 100 % | HEART RATE: 53 BPM

## 2022-04-26 DIAGNOSIS — T83.511S URINARY TRACT INFECTION ASSOCIATED WITH CATHETERIZATION OF URINARY TRACT, UNSPECIFIED INDWELLING URINARY CATHETER TYPE, SEQUELA: ICD-10-CM

## 2022-04-26 DIAGNOSIS — N39.0 URINARY TRACT INFECTION ASSOCIATED WITH CATHETERIZATION OF URINARY TRACT, UNSPECIFIED INDWELLING URINARY CATHETER TYPE, SEQUELA: ICD-10-CM

## 2022-04-26 DIAGNOSIS — Z29.89 NEED FOR PROPHYLAXIS AGAINST URINARY TRACT INFECTION: Primary | ICD-10-CM

## 2022-04-26 PROCEDURE — 250N000009 HC RX 250: Performed by: UROLOGY

## 2022-04-26 PROCEDURE — 250N000011 HC RX IP 250 OP 636: Performed by: UROLOGY

## 2022-04-26 PROCEDURE — 96374 THER/PROPH/DIAG INJ IV PUSH: CPT

## 2022-04-26 RX ORDER — NALOXONE HYDROCHLORIDE 0.4 MG/ML
0.2 INJECTION, SOLUTION INTRAMUSCULAR; INTRAVENOUS; SUBCUTANEOUS
Status: CANCELLED | OUTPATIENT
Start: 2022-08-03

## 2022-04-26 RX ORDER — EPINEPHRINE 1 MG/ML
0.3 INJECTION, SOLUTION INTRAMUSCULAR; SUBCUTANEOUS EVERY 5 MIN PRN
Status: CANCELLED | OUTPATIENT
Start: 2022-08-03

## 2022-04-26 RX ORDER — ALBUTEROL SULFATE 0.83 MG/ML
2.5 SOLUTION RESPIRATORY (INHALATION)
Status: CANCELLED | OUTPATIENT
Start: 2022-08-03

## 2022-04-26 RX ORDER — HEPARIN SODIUM,PORCINE 10 UNIT/ML
5 VIAL (ML) INTRAVENOUS
Status: CANCELLED | OUTPATIENT
Start: 2022-08-03

## 2022-04-26 RX ORDER — HEPARIN SODIUM (PORCINE) LOCK FLUSH IV SOLN 100 UNIT/ML 100 UNIT/ML
5 SOLUTION INTRAVENOUS
Status: CANCELLED | OUTPATIENT
Start: 2022-08-03

## 2022-04-26 RX ORDER — METHYLPREDNISOLONE SODIUM SUCCINATE 125 MG/2ML
125 INJECTION, POWDER, LYOPHILIZED, FOR SOLUTION INTRAMUSCULAR; INTRAVENOUS
Status: CANCELLED
Start: 2022-08-03

## 2022-04-26 RX ORDER — DIPHENHYDRAMINE HYDROCHLORIDE 50 MG/ML
50 INJECTION INTRAMUSCULAR; INTRAVENOUS
Status: CANCELLED
Start: 2022-08-03

## 2022-04-26 RX ORDER — ALBUTEROL SULFATE 90 UG/1
1-2 AEROSOL, METERED RESPIRATORY (INHALATION)
Status: CANCELLED
Start: 2022-08-03

## 2022-04-26 RX ORDER — MEPERIDINE HYDROCHLORIDE 25 MG/ML
25 INJECTION INTRAMUSCULAR; INTRAVENOUS; SUBCUTANEOUS EVERY 30 MIN PRN
Status: CANCELLED | OUTPATIENT
Start: 2022-08-03

## 2022-04-26 RX ADMIN — ERTAPENEM SODIUM 1 G: 1 INJECTION, POWDER, LYOPHILIZED, FOR SOLUTION INTRAMUSCULAR; INTRAVENOUS at 10:55

## 2022-04-26 ASSESSMENT — PAIN SCALES - GENERAL: PAINLEVEL: NO PAIN (0)

## 2022-04-26 NOTE — PATIENT INSTRUCTIONS
Dear Kinza Teresa    Thank you for choosing [facility] ([facility]) for your infusion.  The following information is a summary of our appointment as well as important reminders.      We look forward in seeing you on your next appointment here at [facility] tomorrow 4/27 @ 10am.  Please don t hesitate to call us at [phone] to reschedule any of your appointments or to speak with one of the [facility] registered nurses.  It was a pleasure taking care of you today.    Sincerely,    [facility]  [address] [place] [address]  [id number]  Phone:  [phone]

## 2022-04-26 NOTE — TELEPHONE ENCOUNTER
Ray, Kim, RN  You 14 hours ago (5:32 PM)     JR    He is on for Tuesday and Wednesday, thank you fir your help.

## 2022-04-26 NOTE — PROGRESS NOTES
Infusion Nursing Note:  Kinza Teresa presents today for IV ertapenem.    Patient seen by provider today: No   present during visit today: Not Applicable.    Note:   1g ertapenem given IVP over 5 minutes.    Intravenous Access:  Peripheral IV placed by vascular access RN.    Treatment Conditions:  Not Applicable.    Administrations This Visit     ertapenem (INVanz) 1 g in 10 mL SWFI for IVP     Admin Date  04/26/2022 Action  Given Dose  1 g Route  Intravenous Administered By  Candace Aguilar RN              BP (!) 144/89   Pulse 53   Temp 98.4  F (36.9  C) (Oral)   Resp 16   SpO2 100%     Post Infusion Assessment:  Patient tolerated infusion without incident.  Site patent and intact, free from redness, edema or discomfort.  No evidence of extravasations.  Access discontinued per protocol.       Discharge Plan:   Discharge instructions reviewed with: Patient.  Patient and/or family verbalized understanding of discharge instructions and all questions answered.  AVS to patient via MYCHART.  Patient will return tomorrow for next appointment.   Patient discharged in stable condition accompanied by: self.  Departure Mode: Wheelchair.      Candace Aguilar RN

## 2022-04-26 NOTE — LETTER
4/26/2022         RE: Kinza Teresa  [address]  [facility] [address]        Dear Colleague,    Thank you for referring your patient, Kinza Teresa, to the [facility]. Please see a copy of my visit note below.    Infusion Nursing Note:  Kinza Teresa presents today for IV ertapenem.    Patient seen by provider today: No   present during visit today: Not Applicable.    Note:   1g ertapenem given IVP over 5 minutes.    Intravenous Access:  Peripheral IV placed by vascular access RN.    Treatment Conditions:  Not Applicable.    Administrations This Visit     ertapenem (INVanz) 1 g in 10 mL SWFI for IVP     Admin Date  04/26/2022 Action  Given Dose  1 g Route  Intravenous Administered By  Candace Aguilar, RN              BP (!) 144/89   Pulse 53   Temp 98.4  F (36.9  C) (Oral)   Resp 16   SpO2 100%     Post Infusion Assessment:  Patient tolerated infusion without incident.  Site patent and intact, free from redness, edema or discomfort.  No evidence of extravasations.  Access discontinued per protocol.       Discharge Plan:   Discharge instructions reviewed with: Patient.  Patient and/or family verbalized understanding of discharge instructions and all questions answered.  AVS to patient via PhonetimeHART.  Patient will return tomorrow for next appointment.   Patient discharged in stable condition accompanied by: self.  Departure Mode: Wheelchair.    Candace Aguilar RN          Again, thank you for allowing me to participate in the care of your patient.      Sincerely,    [facility]

## 2022-04-27 ENCOUNTER — OFFICE VISIT (OUTPATIENT)
Dept: UROLOGY | Facility: CLINIC | Age: 47
End: 2022-04-27
Payer: COMMERCIAL

## 2022-04-27 ENCOUNTER — INFUSION THERAPY VISIT (OUTPATIENT)
Dept: INFUSION THERAPY | Facility: CLINIC | Age: 47
End: 2022-04-27
Attending: UROLOGY
Payer: COMMERCIAL

## 2022-04-27 VITALS
HEART RATE: 63 BPM | SYSTOLIC BLOOD PRESSURE: 133 MMHG | OXYGEN SATURATION: 99 % | TEMPERATURE: 97.6 F | RESPIRATION RATE: 16 BRPM | DIASTOLIC BLOOD PRESSURE: 78 MMHG

## 2022-04-27 DIAGNOSIS — N39.0 URINARY TRACT INFECTION ASSOCIATED WITH CATHETERIZATION OF URINARY TRACT, UNSPECIFIED INDWELLING URINARY CATHETER TYPE, SEQUELA: ICD-10-CM

## 2022-04-27 DIAGNOSIS — N31.9 NEUROGENIC BLADDER: Primary | ICD-10-CM

## 2022-04-27 DIAGNOSIS — T83.511S URINARY TRACT INFECTION ASSOCIATED WITH CATHETERIZATION OF URINARY TRACT, UNSPECIFIED INDWELLING URINARY CATHETER TYPE, SEQUELA: ICD-10-CM

## 2022-04-27 DIAGNOSIS — Z29.89 NEED FOR PROPHYLAXIS AGAINST URINARY TRACT INFECTION: Primary | ICD-10-CM

## 2022-04-27 PROCEDURE — 52287 CYSTOSCOPY CHEMODENERVATION: CPT | Performed by: UROLOGY

## 2022-04-27 PROCEDURE — 250N000011 HC RX IP 250 OP 636: Performed by: UROLOGY

## 2022-04-27 PROCEDURE — 999N000248 HC STATISTIC IV INSERT WITH US BY RN

## 2022-04-27 PROCEDURE — 96374 THER/PROPH/DIAG INJ IV PUSH: CPT

## 2022-04-27 PROCEDURE — 250N000009 HC RX 250: Performed by: UROLOGY

## 2022-04-27 RX ORDER — ALBUTEROL SULFATE 0.83 MG/ML
2.5 SOLUTION RESPIRATORY (INHALATION)
Status: CANCELLED | OUTPATIENT
Start: 2022-11-03

## 2022-04-27 RX ORDER — ALBUTEROL SULFATE 90 UG/1
1-2 AEROSOL, METERED RESPIRATORY (INHALATION)
Status: CANCELLED
Start: 2022-11-03

## 2022-04-27 RX ORDER — HEPARIN SODIUM,PORCINE 10 UNIT/ML
5 VIAL (ML) INTRAVENOUS
Status: CANCELLED | OUTPATIENT
Start: 2022-11-03

## 2022-04-27 RX ORDER — MEPERIDINE HYDROCHLORIDE 25 MG/ML
25 INJECTION INTRAMUSCULAR; INTRAVENOUS; SUBCUTANEOUS EVERY 30 MIN PRN
Status: CANCELLED | OUTPATIENT
Start: 2022-11-03

## 2022-04-27 RX ORDER — NALOXONE HYDROCHLORIDE 0.4 MG/ML
0.2 INJECTION, SOLUTION INTRAMUSCULAR; INTRAVENOUS; SUBCUTANEOUS
Status: CANCELLED | OUTPATIENT
Start: 2022-11-03

## 2022-04-27 RX ORDER — HEPARIN SODIUM (PORCINE) LOCK FLUSH IV SOLN 100 UNIT/ML 100 UNIT/ML
5 SOLUTION INTRAVENOUS
Status: CANCELLED | OUTPATIENT
Start: 2022-11-03

## 2022-04-27 RX ORDER — DIPHENHYDRAMINE HYDROCHLORIDE 50 MG/ML
50 INJECTION INTRAMUSCULAR; INTRAVENOUS
Status: CANCELLED
Start: 2022-11-03

## 2022-04-27 RX ORDER — METHYLPREDNISOLONE SODIUM SUCCINATE 125 MG/2ML
125 INJECTION, POWDER, LYOPHILIZED, FOR SOLUTION INTRAMUSCULAR; INTRAVENOUS
Status: CANCELLED
Start: 2022-11-03

## 2022-04-27 RX ORDER — EPINEPHRINE 1 MG/ML
0.3 INJECTION, SOLUTION INTRAMUSCULAR; SUBCUTANEOUS EVERY 5 MIN PRN
Status: CANCELLED | OUTPATIENT
Start: 2022-11-03

## 2022-04-27 RX ADMIN — ERTAPENEM SODIUM 1 G: 1 INJECTION, POWDER, LYOPHILIZED, FOR SOLUTION INTRAMUSCULAR; INTRAVENOUS at 10:36

## 2022-04-27 NOTE — LETTER
4/27/2022       RE: Kinza Teresa  [address] [facility] [address]     Dear Colleague,    Thank you for referring your patient, Kinza Teresa, to the [facility]OLOG[facility] CL[facility] [place] at [facility]. Please see a copy of my visit note below.    PRE-OPERATIVE DIAGNOSIS:   1.  Neurogenic bladder     POST-OPERATIVE DIAGNOSIS:  1.  Neurogenic bladder     PROCEDURE:    1. Cystourethroscopy With injection of botulinum toxin A 200units in 10cc sterile saline     Surgeon: Alejandro Tong MD     OPERATIVE INDICATIONS:  Kinza Teresa is a 47 year old male with neurogenic bladder due to spinal cord injury. He does not have an augment. He performs CIC per urethra. He takes oxybutynin. UDS demonstrates persistent neurogenic DO.  He experiences recurrent UTIs. He does irrigations. He previously had an IPP which works well for him though has some glans hypermobility. He likes the IPP. His last botox was Jan 2022. He gets Ertapenem x 2 doses for this procedure - one yesterday and one today. He is not interested in bladder augmentation.     OPERATIVE DETAILS:  The patient was taken to the procedure room. He was left in his chair. Penis was prepped.      A Flexible cystoscope was placed into the bladder and a flexible needle  Was preloaded.  The bladder mucosa appeared to be normal without stones, tumors or diverticulum on 360 degree inspection. The ureteral orifices were in the normal orthotopic position bilaterally.  We mixed 2 vials of Botox for 200u total.  We performed a template injection procedure with 10 injections. All injections were placed deep to the mucosa into the detrusor muscle.  We then emptied the bladder to re-inspect our injection sites and found that there was a minimal amount of blood.     Scope was removed.  Patient was catheterized to empty completely.     PLAN:   Repeat Botox injection in 3-4 months  Ertapenem 1g IV day before and day  of.     Alejandro Tong MD

## 2022-04-27 NOTE — LETTER
4/27/2022         RE: Kinza Teresa  [address]  [facility] [address]        Dear Colleague,    Thank you for referring your patient, Kinza Teresa, to the [facility] TREATMENT [facility]. Please see a copy of my visit note below.    Nursing Note  Kinza Teresa [facility]:   Chief Complaint   Patient presents with     Infusion     Invanz       During [facility]'s [facility] appointment, orders from Dr. Tong were completed.  Frequency: today is dose 2 of 2 total    Progress note:  Patient identification verified by name and date of birth.  Assessment completed.  Vitals recorded in Doc Flowsheets.  Patient was provided with education regarding medication/procedure and possible side effects.  Patient verbalized understanding.     present during visit today: Not Applicable.    Treatment Conditions: Non-applicable.    Infusion length and rate:  infusion given over approximately 5 minutes    Labs: were not ordered for this appointment.    Vascular access: peripheral IV was placed by vascular access nurse.    Is the next appt scheduled? no  Asymptomatic COVID test completed? no    Post Infusion Assessment:  Patient tolerated infusion without incident.     Discharge Plan:   Follow up plan of care with: ongoing infusions at [facility].  Discharge instructions were reviewed with patient.  Patient/representative verbalized understanding of discharge instructions and all questions answered.  Patient discharged from [facility] in stable condition.    Pura Flores RN    Administrations This Visit     ertapenem (INVanz) 1 g in 10 mL SWFI for IVP     Admin Date  04/27/2022 Action  Given Dose  1 g Route  Intravenous Administered By  Kim Abdi RN                Again, thank you for allowing me to participate in the care of your patient.       Sincerely,    [facility]

## 2022-04-27 NOTE — PROGRESS NOTES
Nursing Note  Kinza Teresa presents [facility] for:   Chief Complaint   Patient presents with     Infusion     Invanz       During [facility]'s [facility] appointment, orders from Dr. Tong were completed.  Frequency: today is dose 2 of 2 total    Progress note:  Patient identification verified by name and date of birth.  Assessment completed.  Vitals recorded in Doc Flowsheets.  Patient was provided with education regarding medication/procedure and possible side effects.  Patient verbalized understanding.     present during visit today: Not Applicable.    Treatment Conditions: Non-applicable.    Infusion length and rate:  infusion given over approximately 5 minutes    Labs: were not ordered for this appointment.    Vascular access: peripheral IV was placed by vascular access nurse.    Is the next appt scheduled? no  Asymptomatic COVID test completed? no    Post Infusion Assessment:  Patient tolerated infusion without incident.     Discharge Plan:   Follow up plan of care with: ongoing inf[facility].  Discharge instructions were reviewed with patient.  Patient/representative verbalized understanding of discharge instructions and all questions answered.  [facility] discharged [facility] in stable condition.    Pura Flores RN    Administrations This Visit     ertapenem (INVanz) 1 g in 10 mL SWFI for IVP     Admin Date  04/27/2022 Action  Given Dose  1 g Route  Intravenous Administered By  Kim Abdi RN

## 2022-04-27 NOTE — NURSING NOTE
The following medication was given:     MEDICATION:  Botox  ROUTE: IM  SITE: bladder wall  DOSE: 200 units/ 10 mL  LOT #: [id number]  : Gabi  EXPIRATION DATE: 10/2024  NDC#: 5836-7595-66   Was there drug waste? No    Prior to injection, verified patient identity using patient's name and date of birth.  Due to injection administration, patient instructed to remain in clinic for 15 minutes  afterwards, and to report any adverse reaction to me immediately.      Drug Amount Wasted:  None.  Vial/Syringe: Single dose vial    Ashely Rodriguez CMA  April 27, 2022  3:30 PM

## 2022-04-29 NOTE — PROGRESS NOTES
PRE-OPERATIVE DIAGNOSIS:   1.  Neurogenic bladder     POST-OPERATIVE DIAGNOSIS:  1.  Neurogenic bladder     PROCEDURE:    1. Cystourethroscopy With injection of botulinum toxin A 200units in 10cc sterile saline     Surgeon: Alejandro Tong MD     OPERATIVE INDICATIONS:  Kinza Teresa is a 47 year old male with neurogenic bladder due to spinal cord injury. He does not have an augment. He performs CIC per urethra. He takes oxybutynin. UDS demonstrates persistent neurogenic DO.  He experiences recurrent UTIs. He does irrigations. He previously had an IPP which works well for him though has some glans hypermobility. He likes the IPP. His last botox was Jan 2022. He gets Ertapenem x 2 doses for this procedure - one yesterday and one today. He is not interested in bladder augmentation.     OPERATIVE DETAILS:  The patient was taken to the procedure room. He was left in his chair. Penis was prepped.      A Flexible cystoscope was placed into the bladder and a flexible needle  Was preloaded.  The bladder mucosa appeared to be normal without stones, tumors or diverticulum on 360 degree inspection. The ureteral orifices were in the normal orthotopic position bilaterally.  We mixed 2 vials of Botox for 200u total.  We performed a template injection procedure with 10 injections. All injections were placed deep to the mucosa into the detrusor muscle.  We then emptied the bladder to re-inspect our injection sites and found that there was a minimal amount of blood.     Scope was removed.  Patient was catheterized to empty completely.     PLAN:   Repeat Botox injection in 3-4 months  Ertapenem 1g IV day before and day of.     Alejandro Tong MD

## 2022-06-17 ENCOUNTER — VIRTUAL VISIT (OUTPATIENT)
Dept: UROLOGY | Facility: CLINIC | Age: 47
End: 2022-06-17
Payer: COMMERCIAL

## 2022-06-17 ENCOUNTER — PRE VISIT (OUTPATIENT)
Dept: UROLOGY | Facility: CLINIC | Age: 47
End: 2022-06-17
Payer: COMMERCIAL

## 2022-06-17 DIAGNOSIS — S24.109S SPINAL CORD INJURY, THORACIC REGION, SEQUELA (H): Primary | ICD-10-CM

## 2022-06-17 PROCEDURE — 99213 OFFICE O/P EST LOW 20 MIN: CPT | Mod: 95 | Performed by: UROLOGY

## 2022-06-17 NOTE — PROGRESS NOTES
Kinaz is a 47 year old who is being evaluated via a billable video visit.      How would you like to obtain your AVS? MyChart  If the video visit is dropped, the invitation should be resent by: Text to cell phone: [phone]  Will anyone else be joining your video visit? No        Video-Visit Details    Video Start Time: 8:44 AM    Type of service:  Video Visit    Video End Time:8:58 AM    Originating Location (pt. Location): Home    Distant Location (provider location):  [facility] [place]     Platform used for Video Visit: [facility]       Dear Dr. Sierra, it was my pleasure to see Mr. Kinza Teresa, a 47 year old male here in consultation today for an introductory visit for osteocalcin study, to discuss assisted ejaculation procedures to help obtained a semen analysis for the study.    Kinza has history of T11 SCI, 3/5/2107  No sensation below T10 (umbilicus)  Not noting erections, and has not had ejaculation since his SCI.  No history of autonomic dysreflexia.    Bladder emptying is managed with SIC.  4x/day.    He had 3 children prior to his injury, ages 27, 25, and 16 yo.    PAST MEDICAL HISTORY:    Past Medical History:   Diagnosis Date     Constipation      Sleep apnea      Spinal cord injury at T7-T12 level with spinal cord lesion (H) 03/2017        PAST SURG HISTORY  Past Surgical History:   Procedure Laterality Date     CYSTOSCOPY, BIOPSY BLADDER, COMBINED N/A 3/30/2021    Procedure: CYSTOSCOPY, WITH BLADDER BIOPSY;  Surgeon: Alejandro Tong MD;  Location: [facility] OR     IMPLANT PROSTHESIS PENIS INFLATABLE N/A 11/14/2019    Procedure: Insertion of Inflatable Penile Prosthesis;  Surgeon: Alejandro Tong MD;  Location: [facility] OR     ORTHOPEDIC SURGERY      spinal fx     ORTHOPEDIC SURGERY      pelvic fracture        Medications as of 6/17/2022:  Current Outpatient Medications   Medication Sig     bisacodyl (DULCOLAX) 5 MG EC tablet Take 5 mg by mouth daily as needed for constipation 5-10  mg daily prn     calcium carbonate (OSCAL 500) 1250 (500 CA) MG TABS tablet Take 1,200 mg by mouth With vitamin D to equal 1000 units/day     carvedilol (COREG) 6.25 MG tablet Take 6.25 mg by mouth 2 times daily (with meals)      cholecalciferol 25 MCG (1000 UT) TABS Take 1 tablet by mouth daily     ibuprofen (ADVIL/MOTRIN) 600 MG tablet Take 1 tablet (600 mg) by mouth every 6 hours as needed for moderate pain     Misc. Devices (WHEEL CHAIR K1 BASIC DESK ARM) MISC Wheelchair:  Tilt and space  with leg rests  Length of need: 6 months     omeprazole 20 MG tablet Take 20 mg by mouth 2 times daily      polyethylene glycol (MIRALAX/GLYCOLAX) packet Take 17 g by mouth daily     pregabalin (LYRICA) 50 MG capsule Take 50 mg by mouth Per patient the dosing is 100 mg in AM,  and 50mg  at HS     Psyllium (METAMUCIL PO) Take by mouth At Bedtime      traMADol (ULTRAM) 50 MG tablet TK 1 T PO BID PRF PAIN     ciprofloxacin (CIPRO) 500 MG tablet Take 1 tablet (500 mg) by mouth 2 times daily (Patient not taking: Reported on 6/17/2022)     Current Facility-Administered Medications   Medication     Botulinum Toxin Type A (BOTOX) 200 units injection 200 Units        ALLERGY:   No Known Allergies    SOCIAL HISTORY:  No tobacco or alcohol.      GENERAL PHYSICAL EXAM  Exam  General- Alert, oriented, nad.  Pleasant and conversant.  Resps- normal, non-labored.  No cough   exam- deferred.   Neurological - no tremors  Skin - no discoloration/ lesions noted  Psychiatric - no anxiety, alert & oriented.    The rest of a comprehensive physical examination is deferred due to video visit restrictions.       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    Discussion of assisted ejaculation for SCI study.    PLAN:    Assessment & Plan   1. Schedule penile vibratory stimulation, possible electroejaculation procedure for sperm acquisition for osteocalcin study.  I discussed the protocol  and minimal risks associated with with both of these procedures.  a. Coordinate with [facility] [phone]   b. He is a good candidate for penile vibratory stimulation and/or EEJ.  c. He is not at risk of autonomic dysreflexia, due to SCI below thoracic level 6.  d. SA and Retrograde SA ordered.      Thank-you for allowing me to care for your patient.  Sincerely,    Elder Kruse MD      CC: Mariela          Additional Coding Information:    Problems:  4 -- two or more stable chronic illnesses    Data Reviewed  N/A     Tests ordered/pending: semen analysis with RA    Notes from other providers reviewed: N/A     Level of risk:  3 -- low risk (e.g., OTC medication or observation, minor surgery without risks)    Time spent:  23 minutes spent on the date of the encounter doing chart review, history and exam, documentation and further activities per the note

## 2022-06-17 NOTE — LETTER
6/17/2022       RE: Kinza Teresa  [address]  [facility] [address]     Dear Colleague,    Thank you for referring your patient, Kinza Teresa, to the [facility] [place] at [facility]. Please see a copy of my visit note below.    Kinza is a 47 year old who is being evaluated via a billable video visit.      How would you like to obtain your AVS? MyChart  If the video visit is dropped, the invitation should be resent by: Text to cell phone: [phone]  Will anyone else be joining your video visit? No        Video-Visit Details    Video Start Time: 8:44 AM    Type of service:  Video Visit    Video End Time:8:58 AM    Originating Location (pt. Location): Home    Distant Location (provider location):  [facility] [place]     Platform used for Video Visit: [employer]       Dear Dr. Sierra, it was my pleasure to see Mr. Kinza Teresa, a 47 year old male here in consultation today for an introductory visit for osteocalcin study, to discuss assisted ejaculation procedures to help obtained a semen analysis for the study.    Kinza has history of T11 SCI, 3/5/2107  No sensation below T10 (umbilicus)  Not noting erections, and has not had ejaculation since his SCI.  No history of autonomic dysreflexia.    Bladder emptying is managed with SIC.  4x/day.    He had 3 children prior to his injury, ages 27, 25, and 16 yo.    PAST MEDICAL HISTORY:    Past Medical History:   Diagnosis Date     Constipation      Sleep apnea      Spinal cord injury at T7-T12 level with spinal cord lesion (H) 03/2017        PAST SURG HISTORY  Past Surgical History:   Procedure Laterality Date     CYSTOSCOPY, BIOPSY BLADDER, COMBINED N/A 3/30/2021    Procedure: CYSTOSCOPY, WITH BLADDER BIOPSY;  Surgeon: Alejandro Tong MD;  Location: [facility] OR     IMPLANT PROSTHESIS PENIS INFLATABLE N/A 11/14/2019    Procedure: Insertion of Inflatable Penile Prosthesis;   Surgeon: Alejandro Tong MD;  Location: [facility] OR     ORTHOPEDIC SURGERY      spinal fx     ORTHOPEDIC SURGERY      pelvic fracture        Medications as of 6/17/2022:  Current Outpatient Medications   Medication Sig     bisacodyl (DULCOLAX) 5 MG EC tablet Take 5 mg by mouth daily as needed for constipation 5-10 mg daily prn     calcium carbonate (OSCAL 500) 1250 (500 CA) MG TABS tablet Take 1,200 mg by mouth With vitamin D to equal 1000 units/day     carvedilol (COREG) 6.25 MG tablet Take 6.25 mg by mouth 2 times daily (with meals)      cholecalciferol 25 MCG (1000 UT) TABS Take 1 tablet by mouth daily     ibuprofen (ADVIL/MOTRIN) 600 MG tablet Take 1 tablet (600 mg) by mouth every 6 hours as needed for moderate pain     Misc. Devices (WHEEL CHAIR K1 BASIC DESK ARM) MISC Wheelchair:  Tilt and space  with leg rests  Length of need: 6 months     omeprazole 20 MG tablet Take 20 mg by mouth 2 times daily      polyethylene glycol (MIRALAX/GLYCOLAX) packet Take 17 g by mouth daily     pregabalin (LYRICA) 50 MG capsule Take 50 mg by mouth Per patient the dosing is 100 mg in AM,  and 50mg  at HS     Psyllium (METAMUCIL PO) Take by mouth At Bedtime      traMADol (ULTRAM) 50 MG tablet TK 1 T PO BID PRF PAIN     ciprofloxacin (CIPRO) 500 MG tablet Take 1 tablet (500 mg) by mouth 2 times daily (Patient not taking: Reported on 6/17/2022)     Current Facility-Administered Medications   Medication     Botulinum Toxin Type A (BOTOX) 200 units injection 200 Units        ALLERGY:   No Known Allergies    SOCIAL HISTORY:  No tobacco or alcohol.      GENERAL PHYSICAL EXAM  Exam  General- Alert, oriented, nad.  Pleasant and conversant.  Resps- normal, non-labored.  No cough   exam- deferred.   Neurological - no tremors  Skin - no discoloration/ lesions noted  Psychiatric - no anxiety, alert & oriented.    The rest of a comprehensive physical examination is deferred due to video visit restrictions.       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    Discussion of assisted ejaculation for SCI study.    PLAN:    Assessment & Plan   1. Schedule penile vibratory stimulation, possible electroejaculation procedure for sperm acquisition for osteocalcin study.  I discussed the protocol and minimal risks associated with with both of these procedures.  a. Coordinate with [facility] [phone]   b. He is a good candidate for penile vibratory stimulation and/or EEJ.  c. He is not at risk of autonomic dysreflexia, due to SCI below thoracic level 6.  d. SA and Retrograde SA ordered.      Thank-you for allowing me to care for your patient.  Sincerely,    Elder Kruse MD      CC: Mariela          Additional Coding Information:    Problems:  4 -- two or more stable chronic illnesses    Data Reviewed  N/A     Tests ordered/pending: semen analysis with RA    Notes from other providers reviewed: N/A     Level of risk:  3 -- low risk (e.g., OTC medication or observation, minor surgery without risks)    Time spent:  23 minutes spent on the date of the encounter doing chart review, history and exam, documentation and further activities per the note

## 2022-06-27 ENCOUNTER — PRE VISIT (OUTPATIENT)
Dept: UROLOGY | Facility: CLINIC | Age: 47
End: 2022-06-27

## 2022-07-19 ENCOUNTER — INFUSION THERAPY VISIT (OUTPATIENT)
Dept: INFUSION THERAPY | Facility: CLINIC | Age: 47
End: 2022-07-19
Attending: UROLOGY
Payer: COMMERCIAL

## 2022-07-19 VITALS
SYSTOLIC BLOOD PRESSURE: 145 MMHG | TEMPERATURE: 97.8 F | DIASTOLIC BLOOD PRESSURE: 101 MMHG | RESPIRATION RATE: 16 BRPM | HEART RATE: 78 BPM

## 2022-07-19 DIAGNOSIS — N39.0 URINARY TRACT INFECTION ASSOCIATED WITH CATHETERIZATION OF URINARY TRACT, UNSPECIFIED INDWELLING URINARY CATHETER TYPE, SEQUELA: ICD-10-CM

## 2022-07-19 DIAGNOSIS — Z29.89 NEED FOR PROPHYLAXIS AGAINST URINARY TRACT INFECTION: Primary | ICD-10-CM

## 2022-07-19 DIAGNOSIS — T83.511S URINARY TRACT INFECTION ASSOCIATED WITH CATHETERIZATION OF URINARY TRACT, UNSPECIFIED INDWELLING URINARY CATHETER TYPE, SEQUELA: ICD-10-CM

## 2022-07-19 PROCEDURE — 250N000011 HC RX IP 250 OP 636: Performed by: UROLOGY

## 2022-07-19 PROCEDURE — 999N000127 HC STATISTIC PERIPHERAL IV START W US GUIDANCE

## 2022-07-19 PROCEDURE — 250N000009 HC RX 250: Performed by: UROLOGY

## 2022-07-19 PROCEDURE — 96374 THER/PROPH/DIAG INJ IV PUSH: CPT

## 2022-07-19 RX ORDER — NALOXONE HYDROCHLORIDE 0.4 MG/ML
0.2 INJECTION, SOLUTION INTRAMUSCULAR; INTRAVENOUS; SUBCUTANEOUS
Status: CANCELLED | OUTPATIENT
Start: 2022-11-03

## 2022-07-19 RX ORDER — ALBUTEROL SULFATE 90 UG/1
1-2 AEROSOL, METERED RESPIRATORY (INHALATION)
Status: CANCELLED
Start: 2022-11-03

## 2022-07-19 RX ORDER — DIPHENHYDRAMINE HYDROCHLORIDE 50 MG/ML
50 INJECTION INTRAMUSCULAR; INTRAVENOUS
Status: CANCELLED
Start: 2022-11-03

## 2022-07-19 RX ORDER — METHYLPREDNISOLONE SODIUM SUCCINATE 125 MG/2ML
125 INJECTION, POWDER, LYOPHILIZED, FOR SOLUTION INTRAMUSCULAR; INTRAVENOUS
Status: CANCELLED
Start: 2022-11-03

## 2022-07-19 RX ORDER — HEPARIN SODIUM,PORCINE 10 UNIT/ML
5 VIAL (ML) INTRAVENOUS
Status: CANCELLED | OUTPATIENT
Start: 2022-11-03

## 2022-07-19 RX ORDER — HEPARIN SODIUM (PORCINE) LOCK FLUSH IV SOLN 100 UNIT/ML 100 UNIT/ML
5 SOLUTION INTRAVENOUS
Status: CANCELLED | OUTPATIENT
Start: 2022-11-03

## 2022-07-19 RX ORDER — ALBUTEROL SULFATE 0.83 MG/ML
2.5 SOLUTION RESPIRATORY (INHALATION)
Status: CANCELLED | OUTPATIENT
Start: 2022-11-03

## 2022-07-19 RX ORDER — MEPERIDINE HYDROCHLORIDE 25 MG/ML
25 INJECTION INTRAMUSCULAR; INTRAVENOUS; SUBCUTANEOUS EVERY 30 MIN PRN
Status: CANCELLED | OUTPATIENT
Start: 2022-11-03

## 2022-07-19 RX ORDER — EPINEPHRINE 1 MG/ML
0.3 INJECTION, SOLUTION INTRAMUSCULAR; SUBCUTANEOUS EVERY 5 MIN PRN
Status: CANCELLED | OUTPATIENT
Start: 2022-11-03

## 2022-07-19 RX ADMIN — ERTAPENEM SODIUM 1 G: 1 INJECTION, POWDER, LYOPHILIZED, FOR SOLUTION INTRAMUSCULAR; INTRAVENOUS at 09:59

## 2022-07-19 ASSESSMENT — PAIN SCALES - GENERAL: PAINLEVEL: NO PAIN (0)

## 2022-07-19 NOTE — LETTER
7/19/2022         RE: Kinza Teresa  [address] [address]        Dear Colleague,    Thank you for referring your patient, Kinza Teresa, to the [facility] TREATMENT [facility]. Please see a copy of my visit note below.    Nursing Note  Kinza Teresa [facility] for:   Chief Complaint   Patient presents with     Infusion     IVP ertanpenem (invanz)     During today's [facility] appointment, orders from Dr. Tong were completed.  Frequency: today is dose 1 of 2 total    Progress note:  Patient identification verified by name and date of birth.  Assessment completed.  Vitals recorded in Doc Flowsheets.  Patient was provided with education regarding medication/procedure and possible side effects.  Patient verbalized understanding.     present during visit today: Not Applicable.  Treatment Conditions: Non-applicable.    Premedications: were not ordered.  Drug Waste Record: No  Infusion length and rate:  IVP over 5 minutes  Labs: were not ordered for this appointment.  Vascular access: peripheral IV was placed by vascular access nurse.  Is the next appt scheduled? yes  Asymptomatic COVID test completed? no    Post Infusion Assessment:  Patient tolerated infusion without incident.  Site patent and intact, free from redness, edema or discomfort.  No evidence of extravasations.  Access discontinued per protocol.     Discharge Plan:   Follow up plan of care with: ongoing infusions at [facility].  Discharge instructions were reviewed with patient.  Patient/representative verbalized understanding of discharge instructions and all questions answered.  Patient discharged from [facility] in stable condition.    Ladan Ozuna RN    Administrations This Visit     ertapenem (INVanz) 1 g in 10 mL SWFI for IVP     Admin Date  07/19/2022  Action  Given Dose  1 g Route  Intravenous Administered By  Ladan Ozuna RN          sodium chloride (PF) 0.9% PF flush 3-20 mL     Admin Date  07/19/2022 Action  Given Dose  20 mL Route  Intracatheter Administered By  Ladan Ozuna RN                BP (!) 145/101   Pulse 78   Temp 97.8  F (36.6  C) (Oral)   Resp 16         Again, thank you for allowing me to participate in the care of your patient.        Sincerely,        [facility]

## 2022-07-19 NOTE — PROGRESS NOTES
Nursing Note  Kinza Teresa [facility] for:   Chief Complaint   Patient presents with     Infusion     IVP ertanpenem (invanz)     During [facility]'s [facility] appointment, orders from Dr. Tong were completed.  Frequency: today is dose 1 of 2 total    Progress note:  Patient identification verified by name and date of birth.  Assessment completed.  Vitals recorded in Doc Flowsheets.  Patient was provided with education regarding medication/procedure and possible side effects.  Patient verbalized understanding.     present during visit today: Not Applicable.  Treatment Conditions: Non-applicable.    Premedications: were not ordered.  Drug Waste Record: No  Infusion length and rate:  IVP over 5 minutes  Labs: were not ordered for this appointment.  Vascular access: peripheral IV was placed by vascular access nurse.  Is the next appt scheduled? yes  Asymptomatic COVID test completed? no    Post Infusion Assessment:  Patient tolerated infusion without incident.  Site patent and intact, free from redness, edema or discomfort.  No evidence of extravasations.  Access discontinued per protocol.     Discharge Plan:   Follow up plan of care with: ongoing [facility].  Discharge instructions were reviewed with patient.  Patient/representative verbalized understanding of discharge instructions and all questions answered.  [facility] in stable condition.    Ladan Ozuna RN    Administrations This Visit     ertapenem (INVanz) 1 g in 10 mL SWFI for IVP     Admin Date  07/19/2022 Action  Given Dose  1 g Route  Intravenous Administered By  Ladan Ozuna RN          sodium chloride (PF) 0.9% PF flush 3-20 mL     Admin Date  07/19/2022 Action  Given Dose  20 mL Route  Intracatheter Administered By  Ladan Ozuna RN                BP (!)  145/101   Pulse 78   Temp 97.8  F (36.6  C) (Oral)   Resp 16

## 2022-07-20 ENCOUNTER — OFFICE VISIT (OUTPATIENT)
Dept: UROLOGY | Facility: CLINIC | Age: 47
End: 2022-07-20
Payer: COMMERCIAL

## 2022-07-20 ENCOUNTER — INFUSION THERAPY VISIT (OUTPATIENT)
Dept: INFUSION THERAPY | Facility: CLINIC | Age: 47
End: 2022-07-20
Attending: UROLOGY
Payer: COMMERCIAL

## 2022-07-20 VITALS
SYSTOLIC BLOOD PRESSURE: 131 MMHG | TEMPERATURE: 97.5 F | OXYGEN SATURATION: 97 % | RESPIRATION RATE: 16 BRPM | DIASTOLIC BLOOD PRESSURE: 60 MMHG | HEART RATE: 59 BPM

## 2022-07-20 DIAGNOSIS — N39.0 URINARY TRACT INFECTION ASSOCIATED WITH CATHETERIZATION OF URINARY TRACT, UNSPECIFIED INDWELLING URINARY CATHETER TYPE, SEQUELA: Primary | ICD-10-CM

## 2022-07-20 DIAGNOSIS — S24.109S SPINAL CORD INJURY, THORACIC REGION, SEQUELA (H): Primary | ICD-10-CM

## 2022-07-20 DIAGNOSIS — R33.9 URINARY RETENTION: ICD-10-CM

## 2022-07-20 DIAGNOSIS — Z29.89 NEED FOR PROPHYLAXIS AGAINST URINARY TRACT INFECTION: ICD-10-CM

## 2022-07-20 DIAGNOSIS — N31.9 NEUROGENIC BLADDER: ICD-10-CM

## 2022-07-20 DIAGNOSIS — T83.511S URINARY TRACT INFECTION ASSOCIATED WITH CATHETERIZATION OF URINARY TRACT, UNSPECIFIED INDWELLING URINARY CATHETER TYPE, SEQUELA: Primary | ICD-10-CM

## 2022-07-20 PROCEDURE — 999N000127 HC STATISTIC PERIPHERAL IV START W US GUIDANCE

## 2022-07-20 PROCEDURE — 250N000009 HC RX 250: Performed by: UROLOGY

## 2022-07-20 PROCEDURE — 250N000011 HC RX IP 250 OP 636: Performed by: UROLOGY

## 2022-07-20 PROCEDURE — 96374 THER/PROPH/DIAG INJ IV PUSH: CPT

## 2022-07-20 PROCEDURE — 52287 CYSTOSCOPY CHEMODENERVATION: CPT | Performed by: UROLOGY

## 2022-07-20 RX ORDER — ALBUTEROL SULFATE 90 UG/1
1-2 AEROSOL, METERED RESPIRATORY (INHALATION)
Status: CANCELLED
Start: 2023-02-03

## 2022-07-20 RX ORDER — HEPARIN SODIUM (PORCINE) LOCK FLUSH IV SOLN 100 UNIT/ML 100 UNIT/ML
5 SOLUTION INTRAVENOUS
Status: CANCELLED | OUTPATIENT
Start: 2023-02-03

## 2022-07-20 RX ORDER — METHYLPREDNISOLONE SODIUM SUCCINATE 125 MG/2ML
125 INJECTION, POWDER, LYOPHILIZED, FOR SOLUTION INTRAMUSCULAR; INTRAVENOUS
Status: CANCELLED
Start: 2023-02-03

## 2022-07-20 RX ORDER — NALOXONE HYDROCHLORIDE 0.4 MG/ML
0.2 INJECTION, SOLUTION INTRAMUSCULAR; INTRAVENOUS; SUBCUTANEOUS
Status: CANCELLED | OUTPATIENT
Start: 2023-02-03

## 2022-07-20 RX ORDER — DIPHENHYDRAMINE HYDROCHLORIDE 50 MG/ML
50 INJECTION INTRAMUSCULAR; INTRAVENOUS
Status: CANCELLED
Start: 2023-02-03

## 2022-07-20 RX ORDER — HEPARIN SODIUM,PORCINE 10 UNIT/ML
5 VIAL (ML) INTRAVENOUS
Status: CANCELLED | OUTPATIENT
Start: 2023-02-03

## 2022-07-20 RX ORDER — ALBUTEROL SULFATE 0.83 MG/ML
2.5 SOLUTION RESPIRATORY (INHALATION)
Status: CANCELLED | OUTPATIENT
Start: 2023-02-03

## 2022-07-20 RX ORDER — EPINEPHRINE 1 MG/ML
0.3 INJECTION, SOLUTION INTRAMUSCULAR; SUBCUTANEOUS EVERY 5 MIN PRN
Status: CANCELLED | OUTPATIENT
Start: 2023-02-03

## 2022-07-20 RX ORDER — MEPERIDINE HYDROCHLORIDE 25 MG/ML
25 INJECTION INTRAMUSCULAR; INTRAVENOUS; SUBCUTANEOUS EVERY 30 MIN PRN
Status: CANCELLED | OUTPATIENT
Start: 2023-02-03

## 2022-07-20 RX ADMIN — ERTAPENEM SODIUM 1 G: 1 INJECTION, POWDER, LYOPHILIZED, FOR SOLUTION INTRAMUSCULAR; INTRAVENOUS at 10:32

## 2022-07-20 ASSESSMENT — PAIN SCALES - GENERAL: PAINLEVEL: NO PAIN (0)

## 2022-07-20 NOTE — PROGRESS NOTES
PRE-OPERATIVE DIAGNOSIS:   1.  Neurogenic bladder     POST-OPERATIVE DIAGNOSIS:  1.  Neurogenic bladder     PROCEDURE:    1. Cystourethroscopy With injection of botulinum toxin A 200units in 10cc sterile saline     Surgeon: Alejandro Tong MD     OPERATIVE INDICATIONS:  Kinza Teresa is a 47 year old male with neurogenic bladder due to spinal cord injury. He does not have an augment. He performs CIC per urethra. He takes oxybutynin. UDS demonstrates persistent neurogenic DO.  He experiences recurrent UTIs. He does irrigations. He previously had an IPP which works well for him though has some glans hypermobility. He likes the IPP. His last botox was April 2022. He gets Ertapenem x 2 doses for this procedure - one yesterday and one today. He is not interested in bladder augmentation.     OPERATIVE DETAILS:  The patient was taken to the procedure room. He was left in his chair. Penis was prepped.      A Flexible cystoscope was placed into the bladder and a flexible needle  Was preloaded.  The bladder mucosa appeared to be normal without stones, tumors or diverticulum on 360 degree inspection. The ureteral orifices were in the normal orthotopic position bilaterally.  We mixed 2 vials of Botox for 200u total.  We performed a template injection procedure with 10 injections. All injections were placed deep to the mucosa into the detrusor muscle.  We then emptied the bladder to re-inspect our injection sites and found that there was a minimal amount of blood.    Patient was emptied through the scope to completely empty     Scope was removed.       PLAN:   Repeat Botox injection in 3-4 months  Ertapenem 1g IV day before and day of.     Alejandro Tong MD

## 2022-07-20 NOTE — LETTER
7/20/2022         RE: Kinza Teresa  [address]  [facility] [address]        Dear Colleague,    Thank you for referring your patient, Kinza Teresa, to the [facility]. Please see a copy of my visit note below.    Infusion Nursing Note:  Kinza Teresa presents today for IV ABX prior to procedure.    Patient seen by provider today: No   present during visit today: Not Applicable.    Note:   -Invanz given IVP over ~5min    Intravenous Access:  Peripheral IV placed by [facility].    Treatment Conditions:  Not Applicable.    Post Infusion Assessment:  Patient tolerated infusion without incident.  Site patent and intact, free from redness, edema or discomfort.  No evidence of extravasations.  Access discontinued per protocol.     Discharge Plan:   [facility]  Patient will return in 3 months for next appointment. Message sent to .  Patient discharged in stable condition accompanied by: self.  Departure Mode: Ambulatory.    Queenie Cruz RN    /60   Pulse 59   Temp 97.5  F (36.4  C)   Resp 16   SpO2 97%     Administrations This Visit     ertapenem (INVanz) 1 g in 10 mL SWFI for IVP     Admin Date  07/20/2022 Action  Given Dose  1 g Route  Intravenous Administered By  Queenie Cruz, MARLINE                                  Again, thank you for allowing me to participate in the care of your patient.        Sincerely,        [facility]    
Alert and oriented, no focal deficits, no motor or sensory deficits.

## 2022-07-20 NOTE — PATIENT INSTRUCTIONS
Dear Kinza Teresa    Thank you for choosing [facility] ([facility]) for your infusion.  The following information is a summary of our appointment as well as important reminders.      We look forward in seeing you on your next appointment here at [facility] ([facility]).  Please don t hesitate to call us at [phone] to reschedule any of your appointments or to speak with one of the [facility] registered nurses.  It was a pleasure taking care of you today.    Sincerely,    [facility]  [facility]  [address] [place] [address]  [address]  Phone:  [phone]

## 2022-07-20 NOTE — PROGRESS NOTES
Infusion Nursing Note:  Kinza Teresa presents today for IV ABX prior to procedure.    Patient seen by provider today: No   present during visit today: Not Applicable.    Note:   -Invanz given IVP over ~5min    Intravenous Access:  Peripheral IV placed by [facility].    Treatment Conditions:  Not Applicable.    Post Infusion Assessment:  Patient tolerated infusion without incident.  Site patent and intact, free from redness, edema or discomfort.  No evidence of extravasations.  Access discontinued per protocol.     Discharge Plan:   AVS to patient via MYCHART.  Patient will return in 3 months for next appointment. Message sent to .  Patient discharged in stable condition accompanied by: self.  Departure Mode: Ambulatory.    Queenie Cruz RN    /60   Pulse 59   Temp 97.5  F (36.4  C)   Resp 16   SpO2 97%     Administrations This Visit     ertapenem (INVanz) 1 g in 10 mL SWFI for IVP     Admin Date  07/20/2022 Action  Given Dose  1 g Route  Intravenous Administered By  Queenie Cruz RN

## 2022-07-20 NOTE — PATIENT INSTRUCTIONS
"Please follow up in three months for a cystoscopy with botox.      AFTER YOUR CYSTOSCOPY        You have just completed a cystoscopy, or \"cysto\", which allowed your physician to learn more about your bladder (or to remove a stent placed after surgery). We suggest that you continue to avoid caffeine, fruit juice, and alcohol for the next 24 hours, however, you are encouraged to return to your normal activities.         A few things that are considered normal after your cystoscopy:     * Small amount of bleeding (or spotting) that clears within the next 24 hours     * Slight burning sensation with urination     * Sensation to of needing to avoid more frequently     * The feeling of \"air\" in your urine     * Mild discomfort that is relieved with Tylenol        Please contact our office promptly if you:     * Develop a fever above 101 degrees     * Are unable to urinate     * Develop bright red blood that does not stop     * Severe pain or swelling         Please contact our office with any concerns or questions @[email or web address].  "

## 2022-07-20 NOTE — LETTER
7/20/2022       RE: Kinza Teresa  [address] [facility] [address]     Dear Colleague,    Thank you for referring your patient, Kinza Teresa, to the [facility] [place] at [facility]. Please see a copy of my visit note below.    PRE-OPERATIVE DIAGNOSIS:   1.  Neurogenic bladder     POST-OPERATIVE DIAGNOSIS:  1.  Neurogenic bladder     PROCEDURE:    1. Cystourethroscopy With injection of botulinum toxin A 200units in 10cc sterile saline     Surgeon: Alejandro Tong MD     OPERATIVE INDICATIONS:  Kinza Teresa is a 47 year old male with neurogenic bladder due to spinal cord injury. He does not have an augment. He performs CIC per urethra. He takes oxybutynin. UDS demonstrates persistent neurogenic DO.  He experiences recurrent UTIs. He does irrigations. He previously had an IPP which works well for him though has some glans hypermobility. He likes the IPP. His last botox was April 2022. He gets Ertapenem x 2 doses for this procedure - one yesterday and one today. He is not interested in bladder augmentation.     OPERATIVE DETAILS:  The patient was taken to the procedure room. He was left in his chair. Penis was prepped.      A Flexible cystoscope was placed into the bladder and a flexible needle  Was preloaded.  The bladder mucosa appeared to be normal without stones, tumors or diverticulum on 360 degree inspection. The ureteral orifices were in the normal orthotopic position bilaterally.  We mixed 2 vials of Botox for 200u total.  We performed a template injection procedure with 10 injections. All injections were placed deep to the mucosa into the detrusor muscle.  We then emptied the bladder to re-inspect our injection sites and found that there was a minimal amount of blood.    Patient was emptied through the scope to completely empty     Scope was removed.       PLAN:   Repeat Botox injection in 3-4 months  Ertapenem 1g IV day  before and day of.     Alejandro Tong MD

## 2022-08-01 DIAGNOSIS — G47.33 OBSTRUCTIVE SLEEP APNEA (ADULT) (PEDIATRIC): Primary | ICD-10-CM

## 2022-08-23 DIAGNOSIS — Z00.6 EXAMINATION OF PARTICIPANT OR CONTROL IN CLINICAL RESEARCH: Primary | ICD-10-CM

## 2022-09-20 ENCOUNTER — PRE VISIT (OUTPATIENT)
Dept: UROLOGY | Facility: CLINIC | Age: 47
End: 2022-09-20

## 2022-09-20 NOTE — TELEPHONE ENCOUNTER
Reason for visit: Vinnie/KAREN     Relevant information: thoracic spinal injury    Records/imaging/labs/orders: in EPIC    Pt called: no    At Rooming: KAREN; charge Vinnie

## 2022-10-24 ENCOUNTER — TELEPHONE (OUTPATIENT)
Dept: UROLOGY | Facility: CLINIC | Age: 47
End: 2022-10-24

## 2022-10-24 NOTE — TELEPHONE ENCOUNTER
[facility] Call Center    Phone Message    May a detailed message be left on voicemail: yes     Reason for Call: Pt called to set up bladder botox appointment. Please call to set up. Thank you    Action Taken: Message routed to:  [facility] Uro    Travel Screening: Not Applicable

## 2022-10-28 DIAGNOSIS — Z29.89 NEED FOR PROPHYLAXIS AGAINST URINARY TRACT INFECTION: ICD-10-CM

## 2022-10-28 DIAGNOSIS — N39.0 URINARY TRACT INFECTION ASSOCIATED WITH CATHETERIZATION OF URINARY TRACT, UNSPECIFIED INDWELLING URINARY CATHETER TYPE, SEQUELA: ICD-10-CM

## 2022-10-28 DIAGNOSIS — T83.511S URINARY TRACT INFECTION ASSOCIATED WITH CATHETERIZATION OF URINARY TRACT, UNSPECIFIED INDWELLING URINARY CATHETER TYPE, SEQUELA: ICD-10-CM

## 2022-10-28 RX ORDER — DIPHENHYDRAMINE HYDROCHLORIDE 50 MG/ML
50 INJECTION INTRAMUSCULAR; INTRAVENOUS
Status: CANCELLED
Start: 2023-05-03

## 2022-10-28 RX ORDER — MEPERIDINE HYDROCHLORIDE 25 MG/ML
25 INJECTION INTRAMUSCULAR; INTRAVENOUS; SUBCUTANEOUS EVERY 30 MIN PRN
Status: CANCELLED | OUTPATIENT
Start: 2023-05-03

## 2022-10-28 RX ORDER — HEPARIN SODIUM,PORCINE 10 UNIT/ML
5 VIAL (ML) INTRAVENOUS
Status: CANCELLED | OUTPATIENT
Start: 2023-05-03

## 2022-10-28 RX ORDER — ALBUTEROL SULFATE 0.83 MG/ML
2.5 SOLUTION RESPIRATORY (INHALATION)
Status: CANCELLED | OUTPATIENT
Start: 2023-05-03

## 2022-10-28 RX ORDER — ALBUTEROL SULFATE 90 UG/1
1-2 AEROSOL, METERED RESPIRATORY (INHALATION)
Status: CANCELLED
Start: 2023-05-03

## 2022-10-28 RX ORDER — METHYLPREDNISOLONE SODIUM SUCCINATE 125 MG/2ML
125 INJECTION, POWDER, LYOPHILIZED, FOR SOLUTION INTRAMUSCULAR; INTRAVENOUS
Status: CANCELLED
Start: 2023-05-03

## 2022-10-28 RX ORDER — HEPARIN SODIUM (PORCINE) LOCK FLUSH IV SOLN 100 UNIT/ML 100 UNIT/ML
5 SOLUTION INTRAVENOUS
Status: CANCELLED | OUTPATIENT
Start: 2023-05-03

## 2022-10-28 RX ORDER — NALOXONE HYDROCHLORIDE 0.4 MG/ML
0.2 INJECTION, SOLUTION INTRAMUSCULAR; INTRAVENOUS; SUBCUTANEOUS
Status: CANCELLED | OUTPATIENT
Start: 2023-05-03

## 2022-10-28 RX ORDER — EPINEPHRINE 1 MG/ML
0.3 INJECTION, SOLUTION, CONCENTRATE INTRAVENOUS EVERY 5 MIN PRN
Status: CANCELLED | OUTPATIENT
Start: 2023-05-03

## 2022-10-28 NOTE — TELEPHONE ENCOUNTER
Invanz orders updated by RN care coordinator.     Pt scheduled for infusion day of and day after Botox.    Pt states he usually has the infusion day before and day of. Pt transferred to correct schedule and to schedule next set of infusions.    Ashely Rodriguez CMA  10/28/22  2:17 PM

## 2022-10-30 ENCOUNTER — HEALTH MAINTENANCE LETTER (OUTPATIENT)
Age: 47
End: 2022-10-30

## 2022-10-31 ENCOUNTER — NURSE TRIAGE (OUTPATIENT)
Dept: NURSING | Facility: CLINIC | Age: 47
End: 2022-10-31

## 2022-10-31 ENCOUNTER — NURSE TRIAGE (OUTPATIENT)
Dept: UROLOGY | Facility: CLINIC | Age: 47
End: 2022-10-31

## 2022-10-31 ENCOUNTER — LAB (OUTPATIENT)
Dept: LAB | Facility: CLINIC | Age: 47
End: 2022-10-31
Payer: COMMERCIAL

## 2022-10-31 DIAGNOSIS — N39.0 RECURRENT UTI: Primary | ICD-10-CM

## 2022-10-31 DIAGNOSIS — N39.0 URINARY TRACT INFECTION: ICD-10-CM

## 2022-10-31 DIAGNOSIS — N39.0 RECURRENT UTI: ICD-10-CM

## 2022-10-31 LAB
ALBUMIN UR-MCNC: 100 MG/DL
APPEARANCE UR: ABNORMAL
BACTERIA #/AREA URNS HPF: ABNORMAL /HPF
BILIRUB UR QL STRIP: NEGATIVE
COLOR UR AUTO: YELLOW
GLUCOSE UR STRIP-MCNC: NEGATIVE MG/DL
HGB UR QL STRIP: ABNORMAL
KETONES UR STRIP-MCNC: NEGATIVE MG/DL
LEUKOCYTE ESTERASE UR QL STRIP: ABNORMAL
NITRATE UR QL: POSITIVE
PH UR STRIP: 5.5 [PH] (ref 5–7)
RBC #/AREA URNS AUTO: ABNORMAL /HPF
SP GR UR STRIP: 1.01 (ref 1–1.03)
SQUAMOUS #/AREA URNS AUTO: ABNORMAL /LPF
UROBILINOGEN UR STRIP-ACNC: 0.2 E.U./DL
WBC #/AREA URNS AUTO: >100 /HPF
WBC CLUMPS #/AREA URNS HPF: PRESENT /HPF

## 2022-10-31 PROCEDURE — 87088 URINE BACTERIA CULTURE: CPT | Performed by: FAMILY MEDICINE

## 2022-10-31 PROCEDURE — 87088 URINE BACTERIA CULTURE: CPT

## 2022-10-31 PROCEDURE — 81001 URINALYSIS AUTO W/SCOPE: CPT

## 2022-10-31 PROCEDURE — 87186 SC STD MICRODIL/AGAR DIL: CPT | Performed by: FAMILY MEDICINE

## 2022-10-31 PROCEDURE — 87086 URINE CULTURE/COLONY COUNT: CPT

## 2022-10-31 NOTE — TELEPHONE ENCOUNTER
Nurse Triage SBAR    Is this a 2nd Level Triage? YES, LICENSED PRACTITIONER REVIEW IS REQUIRED    Situation: Spoke to pt. Pt reports possible UTI.     Background: Pt receives Botox injections. Last received in July. Next scheduled in December.   History of recurring UTI.   Pt does CIC.     Assessment: Symptoms started yesterday. Urinary leakage from urethra started a few days ago. Fever went up to 100.4 this morning. Pt reports right flank pain is better than yesterday. Pain did get up to 10/10. Pt took ibuprofen for relief. Pt is able to do CIC without issues. Urine is always cloudy and noted red in it. Blood has been noted for last 2-3 weeks. Urine is pink. No urgency. Chart and problems list reviewed.    Protocol Recommended Disposition:   See in Office Today    Recommendation: UA/UC.   Increase water intake.   Take Tylenol, ibuprofen, and heating pad as needed.        Routed to provider    Does the patient meet one of the following criteria for ADS visit consideration? No     Yesika Cordova RN MSN    Orders Placed This Encounter   Procedures     Routine UA with microscopic - No culture     Standing Status:   Future     Standing Expiration Date:   10/31/2023     Urine Culture Aerobic Bacterial     Standing Status:   Future     Standing Expiration Date:   10/31/2023         Reason for Disposition    Side (flank) or lower back pain present    Protocols used: URINARY SYMPTOMS-A-OH

## 2022-10-31 NOTE — TELEPHONE ENCOUNTER
[facility] Call Center    Phone Message    May a detailed message be left on voicemail: yes     Reason for Call: Other: . pts wife anjali is calling stating pt has a fever, and R flank pain. She believes its a possible infection, Anjali would like pt to come in for a urine test, please place order if appropriate and call Anjali, thank you     Action Taken: Message routed to:  [facility] uro    Travel Screening: Not Applicable

## 2022-10-31 NOTE — PROGRESS NOTES
Spoke to pt. Informed pt that earlier cysto not available, but is on a waitlist for it. Pt verbalized understanding.     Yesika Cordova RN MSN

## 2022-11-02 LAB
BACTERIA UR CULT: ABNORMAL
BACTERIA UR CULT: ABNORMAL

## 2022-11-03 RX ORDER — SULFAMETHOXAZOLE/TRIMETHOPRIM 800-160 MG
1 TABLET ORAL 2 TIMES DAILY
Qty: 10 TABLET | Refills: 0 | Status: SHIPPED | OUTPATIENT
Start: 2022-11-03 | End: 2023-04-24

## 2022-11-03 NOTE — PROGRESS NOTES
Attempted to call pt. Left detailed message to call clinic back at [phone].   To inform pt of order sent to treat UTI noted in UC results.     Yesika Dill RN MSN    Signed Prescriptions:                        Disp   Refills    sulfamethoxazole-trimethoprim (BACTRIM DS)*10 tab*0        Sig: Take 1 tablet by mouth 2 times daily  Authorizing Provider: MARIBELL LANGLEY  Ordering User: YESIKA DILL

## 2022-11-14 ENCOUNTER — PRE VISIT (OUTPATIENT)
Dept: UROLOGY | Facility: CLINIC | Age: 47
End: 2022-11-14

## 2022-11-14 DIAGNOSIS — R33.9 URINARY RETENTION: ICD-10-CM

## 2022-11-14 DIAGNOSIS — N31.9 NEUROGENIC BLADDER: Primary | ICD-10-CM

## 2022-11-14 NOTE — TELEPHONE ENCOUNTER
Reason for visit: Cystoscopy     Relevant information: with botox    Records/imaging/labs/orders: in EPIC    Pt called: no    At [place] units in 10 [facility]; flex scope flex needle

## 2022-12-06 ENCOUNTER — INFUSION THERAPY VISIT (OUTPATIENT)
Dept: INFUSION THERAPY | Facility: CLINIC | Age: 47
End: 2022-12-06
Attending: UROLOGY
Payer: COMMERCIAL

## 2022-12-06 VITALS
RESPIRATION RATE: 16 BRPM | HEART RATE: 74 BPM | OXYGEN SATURATION: 100 % | DIASTOLIC BLOOD PRESSURE: 90 MMHG | SYSTOLIC BLOOD PRESSURE: 149 MMHG | TEMPERATURE: 98.3 F

## 2022-12-06 DIAGNOSIS — N39.0 URINARY TRACT INFECTION ASSOCIATED WITH CATHETERIZATION OF URINARY TRACT, UNSPECIFIED INDWELLING URINARY CATHETER TYPE, SEQUELA: ICD-10-CM

## 2022-12-06 DIAGNOSIS — T83.511S URINARY TRACT INFECTION ASSOCIATED WITH CATHETERIZATION OF URINARY TRACT, UNSPECIFIED INDWELLING URINARY CATHETER TYPE, SEQUELA: ICD-10-CM

## 2022-12-06 DIAGNOSIS — Z29.89 NEED FOR PROPHYLAXIS AGAINST URINARY TRACT INFECTION: Primary | ICD-10-CM

## 2022-12-06 PROCEDURE — 999N000128 HC STATISTIC PERIPHERAL IV START W/O US GUIDANCE

## 2022-12-06 PROCEDURE — 96374 THER/PROPH/DIAG INJ IV PUSH: CPT

## 2022-12-06 PROCEDURE — 250N000011 HC RX IP 250 OP 636: Performed by: UROLOGY

## 2022-12-06 PROCEDURE — 250N000009 HC RX 250: Performed by: UROLOGY

## 2022-12-06 RX ORDER — HEPARIN SODIUM,PORCINE 10 UNIT/ML
5 VIAL (ML) INTRAVENOUS
Status: CANCELLED | OUTPATIENT
Start: 2023-05-03

## 2022-12-06 RX ORDER — DIPHENHYDRAMINE HYDROCHLORIDE 50 MG/ML
50 INJECTION INTRAMUSCULAR; INTRAVENOUS
Status: CANCELLED
Start: 2023-05-03

## 2022-12-06 RX ORDER — HEPARIN SODIUM (PORCINE) LOCK FLUSH IV SOLN 100 UNIT/ML 100 UNIT/ML
5 SOLUTION INTRAVENOUS
Status: CANCELLED | OUTPATIENT
Start: 2023-05-03

## 2022-12-06 RX ORDER — EPINEPHRINE 1 MG/ML
0.3 INJECTION, SOLUTION INTRAMUSCULAR; SUBCUTANEOUS EVERY 5 MIN PRN
Status: CANCELLED | OUTPATIENT
Start: 2023-05-03

## 2022-12-06 RX ORDER — METHYLPREDNISOLONE SODIUM SUCCINATE 125 MG/2ML
125 INJECTION, POWDER, LYOPHILIZED, FOR SOLUTION INTRAMUSCULAR; INTRAVENOUS
Status: CANCELLED
Start: 2023-05-03

## 2022-12-06 RX ORDER — NALOXONE HYDROCHLORIDE 0.4 MG/ML
0.2 INJECTION, SOLUTION INTRAMUSCULAR; INTRAVENOUS; SUBCUTANEOUS
Status: CANCELLED | OUTPATIENT
Start: 2023-05-03

## 2022-12-06 RX ORDER — ALBUTEROL SULFATE 90 UG/1
1-2 AEROSOL, METERED RESPIRATORY (INHALATION)
Status: CANCELLED
Start: 2023-05-03

## 2022-12-06 RX ORDER — MEPERIDINE HYDROCHLORIDE 25 MG/ML
25 INJECTION INTRAMUSCULAR; INTRAVENOUS; SUBCUTANEOUS EVERY 30 MIN PRN
Status: CANCELLED | OUTPATIENT
Start: 2023-05-03

## 2022-12-06 RX ORDER — ALBUTEROL SULFATE 0.83 MG/ML
2.5 SOLUTION RESPIRATORY (INHALATION)
Status: CANCELLED | OUTPATIENT
Start: 2023-05-03

## 2022-12-06 RX ADMIN — ERTAPENEM SODIUM 1 G: 1 INJECTION, POWDER, LYOPHILIZED, FOR SOLUTION INTRAMUSCULAR; INTRAVENOUS at 09:42

## 2022-12-06 ASSESSMENT — PAIN SCALES - GENERAL: PAINLEVEL: NO PAIN (0)

## 2022-12-06 NOTE — PATIENT INSTRUCTIONS
Dear Kinza Teresa    Thank you for choosing [facility] ([facility]) for your Antibiotic infusion.  The following information is a summary of our appointment as well as important reminders.      We look forward to seeing you on 12/7/22 for your next appointment here at [facility] ([facility]).  Please don t hesitate to call us at [phone] to reschedule any of your appointments or to speak with one of the [facility] registered nurses.  It was a pleasure taking care of you today.    Sincerely,    [facility]  [facility]  [address] [place] [address]  [address]  Phone:  [phone]

## 2022-12-06 NOTE — LETTER
12/6/2022         RE: Kinza Teresa  [address]  [facility] [address]        Dear Colleague,    Thank you for referring your patient, Kinza Teresa, to the [facility]. Please see a copy of my visit note below.    Infusion Nursing Note:  Kinza Teresa presents today for   Chief Complaint   Patient presents with     Infusion     ertapenem (INVanz)       Patient seen by provider today: No   present during visit today: Not Applicable.    Note:   -Orders from Alejandro Tong MD completed. Frequency: daily x2 every 3 months prior to urology procedure; today is day 1/2.  -Invanz given IV push over ~5min.    Intravenous Access:  Peripheral IV placed in Rt forearm by vascular access team.    Treatment Conditions:  Not Applicable.    Post Infusion Assessment:  Patient tolerated infusion without incident.  Blood return noted pre and post infusion.  Site patent and intact, free from redness, edema or discomfort.  No evidence of extravasations.  Access discontinued per protocol.     Discharge Plan:   Discharge instructions reviewed with: Patient.  Patient and/or family verbalized understanding of discharge instructions and all questions answered.  AVS to patient via [employer].  Patient will return 12/7/22 for next appointment.   Patient discharged in stable condition accompanied by: self.  Departure Mode: Wheelchair.      Ladan Varghese RN    BP (!) 149/90 (BP Location: Left arm, Patient Position: Sitting, Cuff Size: Adult Regular)   Pulse 74   Temp 98.3  F (36.8  C)   Resp 16   SpO2 100%     Administrations This Visit     ertapenem (INVanz) 1 g in 10 mL SWFI for IVP     Admin Date  12/06/2022 Action  Given Dose  1 g Route  Intravenous Administered By  Ladan Varghese RN                                  Again, thank you for allowing me to participate in the care of your patient.        Sincerely,        No name on file

## 2022-12-06 NOTE — LETTER
Date:December 6, 2022      Provider requested that no letter be sent. Do not send.       [facility]

## 2022-12-06 NOTE — PROGRESS NOTES
Infusion Nursing Note:  Kinza Teresa presents today for   Chief Complaint   Patient presents with     Infusion     ertapenem (INVanz)       Patient seen by provider today: No   present during visit today: Not Applicable.    Note:   -Orders from Alejandro Tong MD completed. Frequency: daily x2 every 3 months prior to urology procedure; today is day 1/2.  -Invanz given IV push over ~5min.    Intravenous Access:  Peripheral IV placed in Rt forearm by vascular access team.    Treatment Conditions:  Not Applicable.    Post Infusion Assessment:  Patient tolerated infusion without incident.  Blood return noted pre and post infusion.  Site patent and intact, free from redness, edema or discomfort.  No evidence of extravasations.  Access discontinued per protocol.     Discharge Plan:   Discharge instructions reviewed with: Patient.  Patient and/or family verbalized understanding of discharge instructions and all questions answered.  AVS to patient via [employer].  Patient will return 12/7/22 for next appointment.   Patient discharged in stable condition accompanied by: self.  Departure Mode: Wheelchair.      Ladan Varghese RN    BP (!) 149/90 (BP Location: Left arm, Patient Position: Sitting, Cuff Size: Adult Regular)   Pulse 74   Temp 98.3  F (36.8  C)   Resp 16   SpO2 100%     Administrations This Visit     ertapenem (INVanz) 1 g in 10 mL SWFI for IVP     Admin Date  12/06/2022 Action  Given Dose  1 g Route  Intravenous Administered By  Ladan Varghese RN

## 2022-12-07 ENCOUNTER — INFUSION THERAPY VISIT (OUTPATIENT)
Dept: INFUSION THERAPY | Facility: CLINIC | Age: 47
End: 2022-12-07
Attending: UROLOGY
Payer: COMMERCIAL

## 2022-12-07 ENCOUNTER — OFFICE VISIT (OUTPATIENT)
Dept: UROLOGY | Facility: CLINIC | Age: 47
End: 2022-12-07
Payer: COMMERCIAL

## 2022-12-07 VITALS
DIASTOLIC BLOOD PRESSURE: 89 MMHG | SYSTOLIC BLOOD PRESSURE: 142 MMHG | HEART RATE: 82 BPM | TEMPERATURE: 98 F | OXYGEN SATURATION: 98 % | RESPIRATION RATE: 16 BRPM

## 2022-12-07 DIAGNOSIS — N31.9 NEUROGENIC BLADDER: Primary | ICD-10-CM

## 2022-12-07 DIAGNOSIS — N39.0 URINARY TRACT INFECTION ASSOCIATED WITH CATHETERIZATION OF URINARY TRACT, UNSPECIFIED INDWELLING URINARY CATHETER TYPE, SEQUELA: Primary | ICD-10-CM

## 2022-12-07 DIAGNOSIS — Z29.89 NEED FOR PROPHYLAXIS AGAINST URINARY TRACT INFECTION: ICD-10-CM

## 2022-12-07 DIAGNOSIS — T83.511S URINARY TRACT INFECTION ASSOCIATED WITH CATHETERIZATION OF URINARY TRACT, UNSPECIFIED INDWELLING URINARY CATHETER TYPE, SEQUELA: Primary | ICD-10-CM

## 2022-12-07 PROCEDURE — 99207 PR NO BILLABLE SERVICE THIS VISIT: CPT

## 2022-12-07 PROCEDURE — 250N000009 HC RX 250: Performed by: UROLOGY

## 2022-12-07 PROCEDURE — 52287 CYSTOSCOPY CHEMODENERVATION: CPT | Performed by: UROLOGY

## 2022-12-07 PROCEDURE — 250N000011 HC RX IP 250 OP 636: Performed by: UROLOGY

## 2022-12-07 PROCEDURE — 96374 THER/PROPH/DIAG INJ IV PUSH: CPT

## 2022-12-07 RX ORDER — EPINEPHRINE 1 MG/ML
0.3 INJECTION, SOLUTION INTRAMUSCULAR; SUBCUTANEOUS EVERY 5 MIN PRN
Status: CANCELLED | OUTPATIENT
Start: 2023-08-03

## 2022-12-07 RX ORDER — HEPARIN SODIUM (PORCINE) LOCK FLUSH IV SOLN 100 UNIT/ML 100 UNIT/ML
5 SOLUTION INTRAVENOUS
Status: CANCELLED | OUTPATIENT
Start: 2023-08-03

## 2022-12-07 RX ORDER — METHYLPREDNISOLONE SODIUM SUCCINATE 125 MG/2ML
125 INJECTION, POWDER, LYOPHILIZED, FOR SOLUTION INTRAMUSCULAR; INTRAVENOUS
Status: CANCELLED
Start: 2023-08-03

## 2022-12-07 RX ORDER — HEPARIN SODIUM,PORCINE 10 UNIT/ML
5 VIAL (ML) INTRAVENOUS
Status: CANCELLED | OUTPATIENT
Start: 2023-08-03

## 2022-12-07 RX ORDER — ALBUTEROL SULFATE 90 UG/1
1-2 AEROSOL, METERED RESPIRATORY (INHALATION)
Status: CANCELLED
Start: 2023-08-03

## 2022-12-07 RX ORDER — ALBUTEROL SULFATE 0.83 MG/ML
2.5 SOLUTION RESPIRATORY (INHALATION)
Status: CANCELLED | OUTPATIENT
Start: 2023-08-03

## 2022-12-07 RX ORDER — DIPHENHYDRAMINE HYDROCHLORIDE 50 MG/ML
50 INJECTION INTRAMUSCULAR; INTRAVENOUS
Status: CANCELLED
Start: 2023-08-03

## 2022-12-07 RX ORDER — MEPERIDINE HYDROCHLORIDE 25 MG/ML
25 INJECTION INTRAMUSCULAR; INTRAVENOUS; SUBCUTANEOUS EVERY 30 MIN PRN
Status: CANCELLED | OUTPATIENT
Start: 2023-08-03

## 2022-12-07 RX ORDER — NALOXONE HYDROCHLORIDE 0.4 MG/ML
0.2 INJECTION, SOLUTION INTRAMUSCULAR; INTRAVENOUS; SUBCUTANEOUS
Status: CANCELLED | OUTPATIENT
Start: 2023-08-03

## 2022-12-07 RX ADMIN — WATER 1 G: 1 INJECTION INTRAMUSCULAR; INTRAVENOUS; SUBCUTANEOUS at 08:43
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
## 2022-12-07 NOTE — PATIENT INSTRUCTIONS
Dear Kinza Teresa    Thank you for choosing [facility] ([facility]) for your antibiotic infusion (Ertapenem).  The following information is a summary of our appointment as well as important reminders.      We look forward in seeing you on your next appointment here at [facility] ([facility]).  Please don t hesitate to call us at [phone] to reschedule any of your appointments or to speak with one of the [facility] registered nurses.  It was a pleasure taking care of you today.    Sincerely,    [facility]  [facility]  [address] [place] [address]  [address]  Phone:  [phone]

## 2022-12-07 NOTE — LETTER
12/7/2022         RE: Kinza Teresa  [address]  [facility] [address]        Dear Colleague,    Thank you for referring your patient, Kinza Teresa, to the [facility]. Please see a copy of my visit note below.    Nursing Note  Kinza Teresa [facility] for:   Chief Complaint   Patient presents with     Infusion     Ertapenem     During today's [facility] appointment, orders from Dr. Alejandro Tong were completed.  Frequency: x 2 consecutive days every 3 months, prior to urology procedure. Today is dose 2/2 for this round of infusions.    Progress note:  Patient identification verified by name and date of birth.  Assessment completed.  Vitals recorded in Doc Flowsheets.  Patient was provided with education regarding medication/procedure and possible side effects.  Patient verbalized understanding.     present during visit today: Not Applicable.    Treatment Conditions: Non-applicable.    Premedications: were not ordered.    Drug Waste Record: No    Infusion length and rate:  infusion given over approximately 5 minutes    Labs: were not ordered for this appointment.    Vascular access: peripheral IV was placed by vascular access nurse.    Is the next appt scheduled? Scheduling messaged    Post Infusion Assessment:  Patient tolerated infusion without incident.     Discharge Plan:   Follow up plan of care with: ongoing infusions at [facility]., ordering provider as scheduled. [facility]  Discharge instructions were reviewed with patient.  Patient/representative verbalized understanding of discharge instructions and all questions answered.  Patient discharged from [facility] in stable condition.    Lenka Marc RN    Administrations This Visit     ertapenem (INVanz) 1 g in 10 mL SWFI for IVP     Admin  Date  12/07/2022 Action  Given Dose  1 g Route  Intravenous Administered By  Lenka Garcia RN                BP (!) 142/89 (BP Location: Left arm, Patient Position: Sitting, Cuff Size: Adult Regular)   Pulse 82   Temp 98  F (36.7  C) (Oral)   Resp 16   SpO2 98%         Again, thank you for allowing me to participate in the care of your patient.        Sincerely,        No name on file

## 2022-12-07 NOTE — LETTER
Date:December 8, 2022      Provider requested that no letter be sent. Do not send.       [facility]

## 2022-12-07 NOTE — LETTER
12/7/2022       RE: Kinza Teresa  [address] [facility] [id number]     Dear Colleague,    Thank you for referring your patient, Kinza Teresa, to the [facility] [place] at [facility]. Please see a copy of my visit note below.    PRE-OPERATIVE DIAGNOSIS:   1.  Neurogenic bladder     POST-OPERATIVE DIAGNOSIS:  1.  Neurogenic bladder     PROCEDURE:    1. Cystourethroscopy With injection of botulinum toxin A 200units in 10cc sterile saline     Surgeon: Alejandro Tong MD     OPERATIVE INDICATIONS:  Kinza Teresa is a 47 year old male with neurogenic bladder due to spinal cord injury. He does not have an augment. He performs CIC per urethra. He takes oxybutynin. UDS demonstrates persistent neurogenic DO.  He experiences recurrent UTIs. He does irrigations. He previously had an IPP which works well for him though has some glans hypermobility. He likes the IPP. His last botox was July 2022. He gets Ertapenem x 2 doses for this procedure - one yesterday and one today. He is not interested in bladder augmentation.     OPERATIVE DETAILS:  The patient was taken to the procedure room. He was left in his chair. Penis was prepped.      A Flexible cystoscope was placed into the bladder and a flexible needle  Was preloaded.  The bladder mucosa appeared to be normal without stones, tumors or diverticulum on 360 degree inspection. The ureteral orifices were in the normal orthotopic position bilaterally.  We mixed 2 vials of Botox for 200u total.  We performed a template injection procedure with 10 injections. All injections were placed deep to the mucosa into the detrusor muscle.  We then emptied the bladder to re-inspect our injection sites and found that there was a minimal amount of blood.     Scope was removed.       PLAN:   Repeat Botox injection in ~4 months  Ertapenem 1g IV day before and day of.     Alejandro Tong MD

## 2022-12-07 NOTE — PROGRESS NOTES
Nursing Note  Kinza Teresa [facility] for:   Chief Complaint   Patient presents with     Infusion     Ertapenem     During today's [facility] appointment, orders from Dr. Alejandro Tong were completed.  Frequency: x 2 consecutive days every 3 months, prior to urology procedure. Today is dose 2/2 for this round of infusions.    Progress note:  Patient identification verified by name and date of birth.  Assessment completed.  Vitals recorded in Doc Flowsheets.  Patient was provided with education regarding medication/procedure and possible side effects.  Patient verbalized understanding.     present during visit today: Not Applicable.    Treatment Conditions: Non-applicable.    Premedications: were not ordered.    Drug Waste Record: No    Infusion length and rate:  infusion given over approximately 5 minutes    Labs: were not ordered for this appointment.    Vascular access: peripheral IV was placed by vascular access nurse.    Is the next appt scheduled? Scheduling messaged    Post Infusion Assessment:  Patient tolerated infusion without incident.     Discharge Plan:   Follow up plan of care with: ongoing infusions at [facility]., ordering provider as scheduled. and AVS to [facility]  Discharge instructions were reviewed with patient.  Patient/representative verbalized understanding of discharge instructions and all questions answered.  Patient discharged from [facility] in stable condition.    Lenka Garcia RN    Administrations This Visit     ertapenem (INVanz) 1 g in 10 mL SWFI for IVP     Admin Date  12/07/2022 Action  Given Dose  1 g Route  Intravenous Administered By  Lenka Garcia, RN                BP (!) 142/89 (BP Location: Left arm, Patient Position: Sitting, Cuff Size: Adult Regular)   Pulse 82   Temp 98  F (36.7  C) (Oral)   Resp 16   SpO2 98%

## 2022-12-10 NOTE — PROGRESS NOTES
PRE-OPERATIVE DIAGNOSIS:   1.  Neurogenic bladder     POST-OPERATIVE DIAGNOSIS:  1.  Neurogenic bladder     PROCEDURE:    1. Cystourethroscopy With injection of botulinum toxin A 200units in 10cc sterile saline     Surgeon: Alejandro Tong MD     OPERATIVE INDICATIONS:  Kinza Teresa is a 47 year old male with neurogenic bladder due to spinal cord injury. He does not have an augment. He performs CIC per urethra. He takes oxybutynin. UDS demonstrates persistent neurogenic DO.  He experiences recurrent UTIs. He does irrigations. He previously had an IPP which works well for him though has some glans hypermobility. He likes the IPP. His last botox was July 2022. He gets Ertapenem x 2 doses for this procedure - one yesterday and one today. He is not interested in bladder augmentation.     OPERATIVE DETAILS:  The patient was taken to the procedure room. He was left in his chair. Penis was prepped.      A Flexible cystoscope was placed into the bladder and a flexible needle  Was preloaded.  The bladder mucosa appeared to be normal without stones, tumors or diverticulum on 360 degree inspection. The ureteral orifices were in the normal orthotopic position bilaterally.  We mixed 2 vials of Botox for 200u total.  We performed a template injection procedure with 10 injections. All injections were placed deep to the mucosa into the detrusor muscle.  We then emptied the bladder to re-inspect our injection sites and found that there was a minimal amount of blood.     Scope was removed.       PLAN:   Repeat Botox injection in ~4 months  Ertapenem 1g IV day before and day of.     Alejandro Tong MD

## 2022-12-11 ENCOUNTER — HEALTH MAINTENANCE LETTER (OUTPATIENT)
Age: 47
End: 2022-12-11

## 2023-02-21 ENCOUNTER — PRE VISIT (OUTPATIENT)
Dept: UROLOGY | Facility: CLINIC | Age: 48
End: 2023-02-21

## 2023-02-21 DIAGNOSIS — N31.9 NEUROGENIC BLADDER: Primary | ICD-10-CM

## 2023-02-21 NOTE — TELEPHONE ENCOUNTER
Reason for visit: Cysto with botox     Relevant information: neurogenic bladder    Records/imaging/labs/orders: in Epic; med ordered    Pt called: no    At Rooming: flex needle flex scope; 200 units in 10 ccs

## 2023-03-07 ENCOUNTER — INFUSION THERAPY VISIT (OUTPATIENT)
Dept: INFUSION THERAPY | Facility: CLINIC | Age: 48
End: 2023-03-07
Attending: UROLOGY
Payer: COMMERCIAL

## 2023-03-07 VITALS
SYSTOLIC BLOOD PRESSURE: 132 MMHG | HEART RATE: 72 BPM | RESPIRATION RATE: 16 BRPM | OXYGEN SATURATION: 99 % | DIASTOLIC BLOOD PRESSURE: 71 MMHG | TEMPERATURE: 98 F

## 2023-03-07 DIAGNOSIS — Z29.89 NEED FOR PROPHYLAXIS AGAINST URINARY TRACT INFECTION: Primary | ICD-10-CM

## 2023-03-07 DIAGNOSIS — N39.0 URINARY TRACT INFECTION ASSOCIATED WITH CATHETERIZATION OF URINARY TRACT, UNSPECIFIED INDWELLING URINARY CATHETER TYPE, SEQUELA: ICD-10-CM

## 2023-03-07 DIAGNOSIS — T83.511S URINARY TRACT INFECTION ASSOCIATED WITH CATHETERIZATION OF URINARY TRACT, UNSPECIFIED INDWELLING URINARY CATHETER TYPE, SEQUELA: ICD-10-CM

## 2023-03-07 PROCEDURE — 250N000011 HC RX IP 250 OP 636: Performed by: UROLOGY

## 2023-03-07 PROCEDURE — 96374 THER/PROPH/DIAG INJ IV PUSH: CPT

## 2023-03-07 PROCEDURE — 250N000009 HC RX 250: Performed by: UROLOGY

## 2023-03-07 PROCEDURE — 999N000128 HC STATISTIC PERIPHERAL IV START W/O US GUIDANCE

## 2023-03-07 RX ORDER — ALBUTEROL SULFATE 0.83 MG/ML
2.5 SOLUTION RESPIRATORY (INHALATION)
Status: CANCELLED | OUTPATIENT
Start: 2023-11-03

## 2023-03-07 RX ORDER — METHYLPREDNISOLONE SODIUM SUCCINATE 125 MG/2ML
125 INJECTION, POWDER, LYOPHILIZED, FOR SOLUTION INTRAMUSCULAR; INTRAVENOUS
Status: CANCELLED
Start: 2023-11-03

## 2023-03-07 RX ORDER — DIPHENHYDRAMINE HYDROCHLORIDE 50 MG/ML
50 INJECTION INTRAMUSCULAR; INTRAVENOUS
Status: CANCELLED
Start: 2023-11-03

## 2023-03-07 RX ORDER — HEPARIN SODIUM (PORCINE) LOCK FLUSH IV SOLN 100 UNIT/ML 100 UNIT/ML
5 SOLUTION INTRAVENOUS
Status: CANCELLED | OUTPATIENT
Start: 2023-11-03

## 2023-03-07 RX ORDER — EPINEPHRINE 1 MG/ML
0.3 INJECTION, SOLUTION INTRAMUSCULAR; SUBCUTANEOUS EVERY 5 MIN PRN
Status: CANCELLED | OUTPATIENT
Start: 2023-11-03

## 2023-03-07 RX ORDER — ALBUTEROL SULFATE 90 UG/1
1-2 AEROSOL, METERED RESPIRATORY (INHALATION)
Status: CANCELLED
Start: 2023-11-03

## 2023-03-07 RX ORDER — NALOXONE HYDROCHLORIDE 0.4 MG/ML
0.2 INJECTION, SOLUTION INTRAMUSCULAR; INTRAVENOUS; SUBCUTANEOUS
Status: CANCELLED | OUTPATIENT
Start: 2023-11-03

## 2023-03-07 RX ORDER — MEPERIDINE HYDROCHLORIDE 25 MG/ML
25 INJECTION INTRAMUSCULAR; INTRAVENOUS; SUBCUTANEOUS EVERY 30 MIN PRN
Status: CANCELLED | OUTPATIENT
Start: 2023-11-03

## 2023-03-07 RX ORDER — HEPARIN SODIUM,PORCINE 10 UNIT/ML
5 VIAL (ML) INTRAVENOUS
Status: CANCELLED | OUTPATIENT
Start: 2023-11-03

## 2023-03-07 RX ADMIN — ERTAPENEM SODIUM 1 G: 1 INJECTION, POWDER, LYOPHILIZED, FOR SOLUTION INTRAMUSCULAR; INTRAVENOUS at 11:21

## 2023-03-07 ASSESSMENT — PAIN SCALES - GENERAL: PAINLEVEL: NO PAIN (0)

## 2023-03-07 NOTE — PATIENT INSTRUCTIONS
Dear Kinza Teresa    Thank you for choosing [facility] ([facility]) for your Invanz infusion.      We look forward to seeing you on 3/8/23 for your next appointment here at [facility] ([facility]).  Please don t hesitate to call us at [phone] to reschedule any of your appointments or to speak with one of the [facility] registered nurses.  It was a pleasure taking care of you today.    Sincerely,    [facility]  [facility]  [address] [place] [address]  [id number]  Phone:  [phone]

## 2023-03-07 NOTE — PROGRESS NOTES
Infusion Nursing Note:  Kinza Teresa presents today for   Chief Complaint   Patient presents with     Infusion     ertapenem (INVanz)       Patient seen by provider today: No   present during visit today: Not Applicable.    Note:   -Orders from Alejandro Tong MD completed. Frequency: 2 consecutive days every 3months, prior to urology procedure; today is day 1/2.  -Invanz given IV push over ~5min.    Intravenous Access:  Peripheral IV placed in Rt forearm by vascular access team.    Treatment Conditions:  Not Applicable.    Post Infusion Assessment:  Patient tolerated infusion without incident.  Blood return noted pre and post infusion.  Site patent and intact, free from redness, edema or discomfort.  No evidence of extravasations.  Access discontinued per protocol.     Discharge Plan:   Discharge instructions reviewed with: Patient.  Patient and/or family verbalized understanding of discharge instructions and all questions answered.  AVS to patient via [employer].  Patient will return 3/8/23 for next appointment.   Patient discharged in stable condition accompanied by: self.  Departure Mode: Wheelchair.      Ladan Varghese RN    /71 (BP Location: Left arm, Patient Position: Sitting, Cuff Size: Adult Regular)   Pulse 72   Temp 98  F (36.7  C)   Resp 16   SpO2 99%     Administrations This Visit     ertapenem (INVanz) 1 g in 10 mL SWFI for IVP     Admin Date  03/07/2023 Action  $Given Dose  1 g Route  Intravenous Administered By  Ladan Varghese, MARLINE

## 2023-03-07 NOTE — LETTER
Date:March 8, 2023      Provider requested that no letter be sent. Do not send.       [facility]

## 2023-03-07 NOTE — LETTER
3/7/2023         RE: Kinza Teresa  [address]  [facility] [address]        Dear Colleague,    Thank you for referring your patient, Kinza Teresa, to the [facility]. Please see a copy of my visit note below.    Infusion Nursing Note:  Kinza Teresa presents today for   Chief Complaint   Patient presents with     Infusion     ertapenem (INVanz)       Patient seen by provider today: No   present during visit today: Not Applicable.    Note:   -Orders from Alejandro Tong MD completed. Frequency: 2 consecutive days every 3months, prior to urology procedure; today is day 1/2.  -Invanz given IV push over ~5min.    Intravenous Access:  Peripheral IV placed in Rt forearm by vascular access team.    Treatment Conditions:  Not Applicable.    Post Infusion Assessment:  Patient tolerated infusion without incident.  Blood return noted pre and post infusion.  Site patent and intact, free from redness, edema or discomfort.  No evidence of extravasations.  Access discontinued per protocol.     Discharge Plan:   Discharge instructions reviewed with: Patient.  Patient and/or family verbalized understanding of discharge instructions and all questions answered.  AVS to patient via [employer].  Patient will return 3/8/23 for next appointment.   Patient discharged in stable condition accompanied by: self.  Departure Mode: Wheelchair.      Ladan Varghese RN    /71 (BP Location: Left arm, Patient Position: Sitting, Cuff Size: Adult Regular)   Pulse 72   Temp 98  F (36.7  C)   Resp 16   SpO2 99%     Administrations This Visit     ertapenem (INVanz) 1 g in 10 mL SWFI for IVP     Admin Date  03/07/2023 Action  $Given Dose  1 g Route  Intravenous Administered By  Ladan Varghese RN                                Again, thank you for allowing me to participate in the care of your patient.        Sincerely,        [facility] Nurse

## 2023-03-08 ENCOUNTER — OFFICE VISIT (OUTPATIENT)
Dept: UROLOGY | Facility: CLINIC | Age: 48
End: 2023-03-08
Payer: COMMERCIAL

## 2023-03-08 ENCOUNTER — INFUSION THERAPY VISIT (OUTPATIENT)
Dept: INFUSION THERAPY | Facility: CLINIC | Age: 48
End: 2023-03-08
Attending: UROLOGY
Payer: COMMERCIAL

## 2023-03-08 VITALS
RESPIRATION RATE: 16 BRPM | TEMPERATURE: 98 F | SYSTOLIC BLOOD PRESSURE: 124 MMHG | HEART RATE: 73 BPM | DIASTOLIC BLOOD PRESSURE: 89 MMHG

## 2023-03-08 VITALS — SYSTOLIC BLOOD PRESSURE: 139 MMHG | HEART RATE: 65 BPM | DIASTOLIC BLOOD PRESSURE: 86 MMHG

## 2023-03-08 DIAGNOSIS — T83.511S URINARY TRACT INFECTION ASSOCIATED WITH CATHETERIZATION OF URINARY TRACT, UNSPECIFIED INDWELLING URINARY CATHETER TYPE, SEQUELA: ICD-10-CM

## 2023-03-08 DIAGNOSIS — Z29.89 NEED FOR PROPHYLAXIS AGAINST URINARY TRACT INFECTION: Primary | ICD-10-CM

## 2023-03-08 DIAGNOSIS — N31.9 NEUROGENIC BLADDER: Primary | ICD-10-CM

## 2023-03-08 DIAGNOSIS — N39.0 URINARY TRACT INFECTION ASSOCIATED WITH CATHETERIZATION OF URINARY TRACT, UNSPECIFIED INDWELLING URINARY CATHETER TYPE, SEQUELA: ICD-10-CM

## 2023-03-08 PROCEDURE — 96374 THER/PROPH/DIAG INJ IV PUSH: CPT

## 2023-03-08 PROCEDURE — 999N000128 HC STATISTIC PERIPHERAL IV START W/O US GUIDANCE

## 2023-03-08 PROCEDURE — 250N000009 HC RX 250: Performed by: UROLOGY

## 2023-03-08 PROCEDURE — 52287 CYSTOSCOPY CHEMODENERVATION: CPT | Performed by: UROLOGY

## 2023-03-08 PROCEDURE — 250N000011 HC RX IP 250 OP 636: Performed by: UROLOGY

## 2023-03-08 RX ORDER — DIPHENHYDRAMINE HYDROCHLORIDE 50 MG/ML
50 INJECTION INTRAMUSCULAR; INTRAVENOUS
Status: CANCELLED
Start: 2024-02-03

## 2023-03-08 RX ORDER — ALBUTEROL SULFATE 90 UG/1
1-2 AEROSOL, METERED RESPIRATORY (INHALATION)
Status: CANCELLED
Start: 2024-02-03

## 2023-03-08 RX ORDER — ALBUTEROL SULFATE 0.83 MG/ML
2.5 SOLUTION RESPIRATORY (INHALATION)
Status: CANCELLED | OUTPATIENT
Start: 2024-02-03

## 2023-03-08 RX ORDER — MEPERIDINE HYDROCHLORIDE 25 MG/ML
25 INJECTION INTRAMUSCULAR; INTRAVENOUS; SUBCUTANEOUS EVERY 30 MIN PRN
Status: CANCELLED | OUTPATIENT
Start: 2024-02-03

## 2023-03-08 RX ORDER — HEPARIN SODIUM (PORCINE) LOCK FLUSH IV SOLN 100 UNIT/ML 100 UNIT/ML
5 SOLUTION INTRAVENOUS
Status: CANCELLED | OUTPATIENT
Start: 2024-02-03

## 2023-03-08 RX ORDER — HEPARIN SODIUM,PORCINE 10 UNIT/ML
5 VIAL (ML) INTRAVENOUS
Status: CANCELLED | OUTPATIENT
Start: 2024-02-03

## 2023-03-08 RX ORDER — EPINEPHRINE 1 MG/ML
0.3 INJECTION, SOLUTION INTRAMUSCULAR; SUBCUTANEOUS EVERY 5 MIN PRN
Status: CANCELLED | OUTPATIENT
Start: 2024-02-03

## 2023-03-08 RX ORDER — METHYLPREDNISOLONE SODIUM SUCCINATE 125 MG/2ML
125 INJECTION, POWDER, LYOPHILIZED, FOR SOLUTION INTRAMUSCULAR; INTRAVENOUS
Status: CANCELLED
Start: 2024-02-03

## 2023-03-08 RX ORDER — NALOXONE HYDROCHLORIDE 0.4 MG/ML
0.2 INJECTION, SOLUTION INTRAMUSCULAR; INTRAVENOUS; SUBCUTANEOUS
Status: CANCELLED | OUTPATIENT
Start: 2024-02-03

## 2023-03-08 RX ADMIN — ERTAPENEM SODIUM 1 G: 1 INJECTION, POWDER, LYOPHILIZED, FOR SOLUTION INTRAMUSCULAR; INTRAVENOUS at 11:00

## 2023-03-08 ASSESSMENT — PAIN SCALES - GENERAL: PAINLEVEL: NO PAIN (0)

## 2023-03-08 NOTE — PROGRESS NOTES
Infusion Nursing Note:  Kinza Teresa presents today for an ertapenum infusion over 5 min.    Pt gets 2 daily doses every 3 months before urology procedure. This is dose 2/2.    Patient seen by provider today: Yes: after appt for urology procedure   present during visit today: Not Applicable.    Note: N/A.    Intravenous Access:  Peripheral IV placed by vascular access      Post Infusion Assessment:  Patient tolerated infusion without incident.     Discharge Plan:   Patient and/or family verbalized understanding of discharge instructions and all questions answered.      Genesis Koehler RN    Administrations This Visit     ertapenem (INVanz) 1 g in 10 mL SWFI for IVP     Admin Date  03/08/2023 Action  $Given Dose  1 g Route  Intravenous Administered By  Genesis Koehler RN

## 2023-03-08 NOTE — LETTER
3/8/2023         RE: Kinza Teresa  [address]  [facility] [address]        Dear Colleague,    Thank you for referring your patient, Kinza Teresa, to the [facility]. Please see a copy of my visit note below.    Infusion Nursing Note:  Kinza Teresa presents today for an ertapenum infusion over 5 min.    Pt gets 2 daily doses every 3 months before urology procedure. This is dose 2/2.    Patient seen by provider today: Yes: after appt for urology procedure   present during visit today: Not Applicable.    Note: N/A.    Intravenous Access:  Peripheral IV placed by vascular access      Post Infusion Assessment:  Patient tolerated infusion without incident.     Discharge Plan:   Patient and/or family verbalized understanding of discharge instructions and all questions answered.      Genesis Koehler RN    Administrations This Visit     ertapenem (INVanz) 1 g in 10 mL SWFI for IVP     Admin Date  03/08/2023 Action  $Given Dose  1 g Route  Intravenous Administered By  Genesis Koehler RN                                        Again, thank you for allowing me to participate in the care of your patient.        Sincerely,        [facility] Nurse

## 2023-03-08 NOTE — PATIENT INSTRUCTIONS
"Please follow up in three months for cystoscopy with botox.     It was a pleasure meeting with you today.  Thank you for allowing me and my team the privilege of caring for you today.  YOU are the reason we are here, and I truly hope we provided you with the excellent service you deserve.  Please let us know if there is anything else we can do for you so that we can be sure you are leaving completely satisfied with your care experience.        AFTER YOUR CYSTOSCOPY        You have just completed a cystoscopy, or \"cysto\", which allowed your physician to learn more about your bladder (or to remove a stent placed after surgery). We suggest that you continue to avoid caffeine, fruit juice, and alcohol for the next 24 hours, however, you are encouraged to return to your normal activities.         A few things that are considered normal after your cystoscopy:     * Small amount of bleeding (or spotting) that clears within the next 24 hours     * Slight burning sensation with urination     * Sensation to of needing to avoid more frequently     * The feeling of \"air\" in your urine     * Mild discomfort that is relieved with Tylenol        Please contact our office promptly if you:     * Develop a fever above 101 degrees     * Are unable to urinate     * Develop bright red blood that does not stop     * Severe pain or swelling         Please contact our office with any concerns or questions @[email or web address].  "

## 2023-03-08 NOTE — LETTER
3/8/2023       RE: Kinza Teresa  [address] [facility] [address]     Dear Colleague,    Thank you for referring your patient, Kinza Teresa, to the [facility] [place] at [facility]. Please see a copy of my visit note below.    PRE-OPERATIVE DIAGNOSIS:   1.  Neurogenic bladder     POST-OPERATIVE DIAGNOSIS:  1.  Neurogenic bladder     PROCEDURE:    1. Cystourethroscopy With injection of botulinum toxin A 200units in 10cc sterile saline     Surgeon: Alejandro Tong MD     OPERATIVE INDICATIONS:  Kinza Teresa is a 47 year old male with neurogenic bladder due to spinal cord injury. He does not have an augment. He performs CIC per urethra. He takes oxybutynin. UDS demonstrates persistent neurogenic DO.  He experiences recurrent UTIs. He does irrigations. He previously had an IPP which works well for him though has some glans hypermobility.  His last botox was July 2022. He gets Ertapenem x 2 doses for this procedure - one yesterday and one today. He is not interested in bladder augmentation.     OPERATIVE DETAILS:  The patient was taken to the procedure room. He was left in his chair. Penis was prepped.      A Flexible cystoscope was placed into the bladder and a flexible needle  Was preloaded.  The bladder mucosa appeared to be normal without stones, tumors or diverticulum on 360 degree inspection. The ureteral orifices were in the normal orthotopic position bilaterally.  We mixed 2 vials of Botox for 200u total.  We performed a template injection procedure with 10 injections. All injections were placed deep to the mucosa into the detrusor muscle.  We then emptied the bladder to re-inspect our injection sites and found that there was a minimal amount of blood.     Scope was removed.        PLAN:   Repeat Botox injection in ~3 months  Ertapenem 1g IV day before and day of.    Additionally, patient discussed that his IPP has not been  functioning well. Thus, after injection today, I cycled device in exam today, the IPP was completely functional. Thus, we'll continue to monitor.    I, Alejandro Tong MD, personally performed procedure with assistance from resident Ed Bergeron MD.    Alejandro Tong MD
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
## 2023-03-08 NOTE — LETTER
Date:March 13, 2023      Provider requested that no letter be sent. Do not send.       [facility]

## 2023-03-08 NOTE — PROGRESS NOTES
PRE-OPERATIVE DIAGNOSIS:   1.  Neurogenic bladder     POST-OPERATIVE DIAGNOSIS:  1.  Neurogenic bladder     PROCEDURE:    1. Cystourethroscopy With injection of botulinum toxin A 200units in 10cc sterile saline     Surgeon: Alejandro Tong MD     OPERATIVE INDICATIONS:  Kinza Teresa is a 47 year old male with neurogenic bladder due to spinal cord injury. He does not have an augment. He performs CIC per urethra. He takes oxybutynin. UDS demonstrates persistent neurogenic DO.  He experiences recurrent UTIs. He does irrigations. He previously had an IPP which works well for him though has some glans hypermobility.  His last botox was July 2022. He gets Ertapenem x 2 doses for this procedure - one yesterday and one today. He is not interested in bladder augmentation.     OPERATIVE DETAILS:  The patient was taken to the procedure room. He was left in his chair. Penis was prepped.      A Flexible cystoscope was placed into the bladder and a flexible needle  Was preloaded.  The bladder mucosa appeared to be normal without stones, tumors or diverticulum on 360 degree inspection. The ureteral orifices were in the normal orthotopic position bilaterally.  We mixed 2 vials of Botox for 200u total.  We performed a template injection procedure with 10 injections. All injections were placed deep to the mucosa into the detrusor muscle.  We then emptied the bladder to re-inspect our injection sites and found that there was a minimal amount of blood.     Scope was removed.        PLAN:   Repeat Botox injection in ~3 months  Ertapenem 1g IV day before and day of.    Additionally, patient discussed that his IPP has not been functioning well. Thus, after injection today, I cycled device in exam today, the IPP was completely functional. Thus, we'll continue to monitor.    I, Alejandro Tong MD, personally performed procedure with assistance from resident Ed Bergeron MD.    Alejandro Tong MD

## 2023-04-07 ENCOUNTER — PRE VISIT (OUTPATIENT)
Dept: UROLOGY | Facility: CLINIC | Age: 48
End: 2023-04-07
Payer: COMMERCIAL

## 2023-04-13 ENCOUNTER — LAB (OUTPATIENT)
Dept: LAB | Facility: CLINIC | Age: 48
End: 2023-04-13
Attending: PHYSICAL MEDICINE & REHABILITATION
Payer: COMMERCIAL

## 2023-04-13 ENCOUNTER — HOSPITAL ENCOUNTER (OUTPATIENT)
Dept: RESEARCH | Facility: CLINIC | Age: 48
Discharge: HOME | End: 2023-04-13
Attending: PHYSICAL MEDICINE & REHABILITATION
Payer: COMMERCIAL

## 2023-04-13 ENCOUNTER — LAB REQUISITION (OUTPATIENT)
Dept: LAB | Facility: CLINIC | Age: 48
End: 2023-04-13

## 2023-04-13 ENCOUNTER — ANCILLARY PROCEDURE (OUTPATIENT)
Dept: BONE DENSITY | Facility: CLINIC | Age: 48
End: 2023-04-13
Attending: PHYSICAL MEDICINE & REHABILITATION
Payer: COMMERCIAL

## 2023-04-13 ENCOUNTER — OFFICE VISIT (OUTPATIENT)
Dept: UROLOGY | Facility: CLINIC | Age: 48
End: 2023-04-13
Payer: COMMERCIAL

## 2023-04-13 VITALS
HEIGHT: 66 IN | DIASTOLIC BLOOD PRESSURE: 84 MMHG | BODY MASS INDEX: 22.82 KG/M2 | HEART RATE: 60 BPM | SYSTOLIC BLOOD PRESSURE: 133 MMHG | WEIGHT: 142 LBS

## 2023-04-13 DIAGNOSIS — S24.109S INJURY OF THORACIC SPINAL CORD, SEQUELA (H): ICD-10-CM

## 2023-04-13 DIAGNOSIS — Z00.6 EXAMINATION OF PARTICIPANT OR CONTROL IN CLINICAL RESEARCH: ICD-10-CM

## 2023-04-13 DIAGNOSIS — N53.19 ANEJACULATION: ICD-10-CM

## 2023-04-13 DIAGNOSIS — Z31.41 FERTILITY TESTING: Primary | ICD-10-CM

## 2023-04-13 DIAGNOSIS — Z31.41 FERTILITY TESTING: ICD-10-CM

## 2023-04-13 LAB
GLUCOSE SERPL-MCNC: 104 MG/DL (ref 70–99)
HBA1C MFR BLD: 5.9 %
HOLD SPECIMEN: NORMAL
INSULIN SERPL-ACNC: 10.2 UU/ML (ref 2.6–24.9)

## 2023-04-13 PROCEDURE — 300N000004 HC RESEARCH SPECIMEN PROCESSING, MODERATE

## 2023-04-13 PROCEDURE — 510N000017 HC CRU PATIENT CARE, PER 15 MIN

## 2023-04-13 PROCEDURE — 83525 ASSAY OF INSULIN: CPT | Performed by: PHYSICAL MEDICINE & REHABILITATION

## 2023-04-13 PROCEDURE — 83036 HEMOGLOBIN GLYCOSYLATED A1C: CPT | Performed by: PHYSICAL MEDICINE & REHABILITATION

## 2023-04-13 PROCEDURE — 82947 ASSAY GLUCOSE BLOOD QUANT: CPT | Performed by: PHYSICAL MEDICINE & REHABILITATION

## 2023-04-13 PROCEDURE — 55899 UNLISTED PX MALE GENITAL SYS: CPT | Performed by: UROLOGY

## 2023-04-13 PROCEDURE — 89310 SEMEN ANALYSIS W/COUNT: CPT

## 2023-04-13 PROCEDURE — 77080 DXA BONE DENSITY AXIAL: CPT | Performed by: INTERNAL MEDICINE

## 2023-04-13 PROCEDURE — 89331 RETROGRADE EJACULATION ANAL: CPT

## 2023-04-13 PROCEDURE — 510N000009 HC RESEARCH FACILITY, PER 15 MIN

## 2023-04-13 PROCEDURE — 55870 ELECTROEJACULATION: CPT | Performed by: UROLOGY

## 2023-04-13 ASSESSMENT — PAIN SCALES - GENERAL: PAINLEVEL: NO PAIN (0)

## 2023-04-13 NOTE — LETTER
4/13/2023       RE: Kinza Teresa  [address] [facility] [id number]     Dear Colleague,    Thank you for referring your patient, Kinza Teresa, to the [facility] [place] at [facility]. Please see a copy of my visit note below.    Penile Vibe Stim Procedure Note    Pt placed supine on procedure table.  Spinal cord injury is at the level T11, so he is not at risk for autonomic dysreflexia.  Prevention for autonomic dysreflexia: N/A     14F straight cath used to drain bladder.   20cc Magdy's media instilled into bladder and cath removed.    PVS was then begun with Viberect device.  Several trials stim x 60 seconds on, 20 seconds rest.    Results with vibe stim: no ejaculation.    Pt tolerated the procedure very well.  He was then repositioned into left lateral decubitus position for electroejaculation- see separate note.      I was present for the entire procedure.    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   Findings: Ejaculated after about 10 stimulations.      Indications:  Kinza Teresa is a 47 year old male  with a T11 spinal cord injury.  He is here for electroejaculation procedure for sperm acquisition.  He does not have a history of autonomic dysreflexia.  He catheterizes via urethra for bladder emptying.  He failed trial of penile vibratory stimulation today.    Procedure:  Kinza was positioned in left lateral decubitus position position.    Rectal exam shows: small prostate, no rectal mass, tight sphincter tone.  Rectum dilated with 2 fingers.  The EEJ probe was inserted easily.    Electroejaculation was then performed with the Anju  Model 14 simulator.  Current was brought up to 11-12 volts, maintained for a second, and returned to zero.  Small antegrade ejaculation was noted after about 10 stimulations.  A total of 20 stimulations were done, to ensure complete ejaculation. Urethra milked to empty it.  Pt had some spasticity of bilateral lower extremities with the stimulation, but minimal pain and no ill effects from the stimulations.    He was repositioned supine and the bladder catheterized to obtain a retrograde sample.  Specimens were labeled and sent to the Diagnostic Andrology Lab.    I was present for the entire procedure.  Elder Kruse MD  Urology Staff

## 2023-04-13 NOTE — ADDENDUM NOTE
Encounter addended by: Tawnya Bagley on: 4/13/2023 4:34 PM   Actions taken: Charge Capture section accepted

## 2023-04-13 NOTE — PROGRESS NOTES
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   Findings: Ejaculated after about 10 stimulations.      Indications:  Kinza Teresa is a 47 year old male  with a T11 spinal cord injury.  He is here for electroejaculation procedure for sperm acquisition.  He does not have a history of autonomic dysreflexia.  He catheterizes via urethra for bladder emptying.  He failed trial of penile vibratory stimulation today.    Procedure:  Kinza was positioned in left lateral decubitus position position.    Rectal exam shows: small prostate, no rectal mass, tight sphincter tone.  Rectum dilated with 2 fingers.  The EEJ probe was inserted easily.    Electroejaculation was then performed with the BlueCava Model 14 simulator.  Current was brought up to 11-12 volts, maintained for a second, and returned to zero.  Small antegrade ejaculation was noted after about 10 stimulations.  A total of 20 stimulations were done, to ensure complete ejaculation. Urethra milked to empty it.  Pt had some spasticity of bilateral lower extremities with the stimulation, but minimal pain and no ill effects from the stimulations.    He was repositioned supine and the bladder catheterized to obtain a retrograde sample.  Specimens were labeled and sent to the Diagnostic Andrology Lab.    I was present for the entire procedure.  Elder Kruse MD  Urology Staff
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
## 2023-04-13 NOTE — PROGRESS NOTES
Penile Vibe Stim Procedure Note    Pt placed supine on procedure table.  Spinal cord injury is at the level T11, so he is not at risk for autonomic dysreflexia.  Prevention for autonomic dysreflexia: N/A     14F straight cath used to drain bladder.   20cc Magdy's media instilled into bladder and cath removed.    PVS was then begun with Viberect device.  Several trials stim x 60 seconds on, 20 seconds rest.    Results with vibe stim: no ejaculation.    Pt tolerated the procedure very well.  He was then repositioned into left lateral decubitus position for electroejaculation- see separate note.      I was present for the entire procedure.    Elder Kruse MD  Urology Staff

## 2023-04-14 LAB
ABSTINENCE DAYS: ABNORMAL
ABSTINENCE DAYS: ABNORMAL
AGGLUTINATION: ABNORMAL
AGGLUTINATION: NO
ANALYSIS TEMP - CENTIGRADE: 22 CENTIGRADE
ANALYSIS TEMP - CENTIGRADE: 22 CENTIGRADE
COLLECTION METHOD: ABNORMAL
COLLECTION METHOD: ABNORMAL
COLLECTION SITE: ABNORMAL
COLLECTION SITE: ABNORMAL
CONSENT TO RELEASE TO PARTNER: NO
CONSENT TO RELEASE TO PARTNER: NO
DAL- RECEIVED TIME: ABNORMAL
DAL- RECEIVED TIME: ABNORMAL
IMMOTILE: 0 %
IMMOTILE: 0 %
LIQUEFIED: YES
Lab: NO
NON-PROGRESSIVE MOTILITY: 0 %
NON-PROGRESSIVE MOTILITY: 0 %
PROGRESSIVE MOTILITY: 0 %
PROGRESSIVE MOTILITY: 0 %
ROUND CELLS: 0.4 MILLION/ML
ROUND CELLS: 5 MILLION/ML
SPECIMEN PH: 6 PH
SPECIMEN PH: 6.8 PH
SPECIMEN VOLUME: 0.3 ML
SPECIMEN VOLUME: 1 ML
SPERM CONCENTRATION: 0 MILLION/ML
SPERM CONCENTRATION: 0 MILLION/ML
TIME OF ANALYSIS: ABNORMAL
TIME OF ANALYSIS: ABNORMAL
TOTAL PROGRESSIVE MOTILE NUMBER: 0 MILLION
TOTAL PROGRESSIVE MOTILE NUMBER: 0 MILLION
TOTAL SPERM NUMBER: 0 MILLION
TOTAL SPERM NUMBER: 0 MILLION
VISCOUS: NO
VITALITY: ABNORMAL
WBC SPECIMEN: 100 %

## 2023-04-14 NOTE — RESULT ENCOUNTER NOTE
Dear SHANELLE,     Here are your recent results.   Just a scant number of sperm were seen in the semen.    Please let us know if you have any questions, Thank You     Em SAHA

## 2023-04-23 ENCOUNTER — HOSPITAL ENCOUNTER (OUTPATIENT)
Facility: HOSPITAL | Age: 48
Setting detail: OBSERVATION
Discharge: HOME | End: 2023-04-26
Attending: EMERGENCY MEDICINE | Admitting: EMERGENCY MEDICINE
Payer: COMMERCIAL

## 2023-04-23 DIAGNOSIS — R79.89 ELEVATED TROPONIN: ICD-10-CM

## 2023-04-23 DIAGNOSIS — R55 SYNCOPE, UNSPECIFIED SYNCOPE TYPE: ICD-10-CM

## 2023-04-23 DIAGNOSIS — N30.01 ACUTE CYSTITIS WITH HEMATURIA: ICD-10-CM

## 2023-04-23 PROCEDURE — 96361 HYDRATE IV INFUSION ADD-ON: CPT

## 2023-04-23 PROCEDURE — 96360 HYDRATION IV INFUSION INIT: CPT

## 2023-04-23 PROCEDURE — 99285 EMERGENCY DEPT VISIT HI MDM: CPT | Mod: 25

## 2023-04-23 PROCEDURE — C9803 HOPD COVID-19 SPEC COLLECT: HCPCS

## 2023-04-24 ENCOUNTER — APPOINTMENT (OUTPATIENT)
Dept: ULTRASOUND IMAGING | Facility: HOSPITAL | Age: 48
End: 2023-04-24
Attending: INTERNAL MEDICINE
Payer: COMMERCIAL

## 2023-04-24 ENCOUNTER — APPOINTMENT (OUTPATIENT)
Dept: CT IMAGING | Facility: HOSPITAL | Age: 48
End: 2023-04-24
Attending: EMERGENCY MEDICINE
Payer: COMMERCIAL

## 2023-04-24 ENCOUNTER — APPOINTMENT (OUTPATIENT)
Dept: CARDIOLOGY | Facility: HOSPITAL | Age: 48
End: 2023-04-24
Attending: HOSPITALIST
Payer: COMMERCIAL

## 2023-04-24 PROBLEM — N30.01 ACUTE CYSTITIS WITH HEMATURIA: Status: ACTIVE | Noted: 2023-04-24

## 2023-04-24 PROBLEM — R79.89 ELEVATED TROPONIN: Status: ACTIVE | Noted: 2023-04-24

## 2023-04-24 PROBLEM — R55 SYNCOPE, UNSPECIFIED SYNCOPE TYPE: Status: ACTIVE | Noted: 2023-04-24

## 2023-04-24 LAB
ALBUMIN UR-MCNC: 300 MG/DL
ANION GAP SERPL CALCULATED.3IONS-SCNC: 11 MMOL/L (ref 7–15)
APPEARANCE UR: ABNORMAL
ATRIAL RATE - MUSE: 67 BPM
BACTERIA #/AREA URNS HPF: ABNORMAL /HPF
BILIRUB UR QL STRIP: NEGATIVE
BUN SERPL-MCNC: 10.9 MG/DL (ref 6–20)
CALCIUM SERPL-MCNC: 8.7 MG/DL (ref 8.6–10)
CHLORIDE SERPL-SCNC: 102 MMOL/L (ref 98–107)
COLOR UR AUTO: ABNORMAL
CREAT SERPL-MCNC: 1.18 MG/DL (ref 0.67–1.17)
D DIMER PPP FEU-MCNC: 1.5 UG/ML FEU (ref 0–0.5)
DEPRECATED HCO3 PLAS-SCNC: 25 MMOL/L (ref 22–29)
DIASTOLIC BLOOD PRESSURE - MUSE: 69 MMHG
ERYTHROCYTE [DISTWIDTH] IN BLOOD BY AUTOMATED COUNT: 12.9 % (ref 10–15)
FLUAV RNA SPEC QL NAA+PROBE: NEGATIVE
FLUBV RNA RESP QL NAA+PROBE: NEGATIVE
GFR SERPL CREATININE-BSD FRML MDRD: 75 ML/MIN/1.73M2
GLUCOSE SERPL-MCNC: 117 MG/DL (ref 70–99)
GLUCOSE UR STRIP-MCNC: NEGATIVE MG/DL
GROUP A STREP BY PCR: NOT DETECTED
HCT VFR BLD AUTO: 39 % (ref 40–53)
HGB BLD-MCNC: 12.4 G/DL (ref 13.3–17.7)
HGB UR QL STRIP: ABNORMAL
HOLD SPECIMEN: NORMAL
INTERPRETATION ECG - MUSE: NORMAL
KETONES UR STRIP-MCNC: NEGATIVE MG/DL
LACTATE SERPL-SCNC: 1.1 MMOL/L (ref 0.7–2)
LEUKOCYTE ESTERASE UR QL STRIP: ABNORMAL
LVEF ECHO: NORMAL
MCH RBC QN AUTO: 27 PG (ref 26.5–33)
MCHC RBC AUTO-ENTMCNC: 31.8 G/DL (ref 31.5–36.5)
MCV RBC AUTO: 85 FL (ref 78–100)
MUCOUS THREADS #/AREA URNS LPF: PRESENT /LPF
NITRATE UR QL: POSITIVE
P AXIS - MUSE: 54 DEGREES
PH UR STRIP: 5.5 [PH] (ref 5–7)
PLATELET # BLD AUTO: 247 10E3/UL (ref 150–450)
POTASSIUM SERPL-SCNC: 3.6 MMOL/L (ref 3.4–5.3)
PR INTERVAL - MUSE: 170 MS
QRS DURATION - MUSE: 84 MS
QT - MUSE: 374 MS
QTC - MUSE: 395 MS
R AXIS - MUSE: 48 DEGREES
RBC # BLD AUTO: 4.6 10E6/UL (ref 4.4–5.9)
RBC URINE: 45 /HPF
RSV RNA SPEC NAA+PROBE: NEGATIVE
SARS-COV-2 RNA RESP QL NAA+PROBE: NEGATIVE
SODIUM SERPL-SCNC: 138 MMOL/L (ref 136–145)
SP GR UR STRIP: 1.02 (ref 1–1.03)
SYSTOLIC BLOOD PRESSURE - MUSE: 117 MMHG
T AXIS - MUSE: 23 DEGREES
TROPONIN T SERPL HS-MCNC: 39 NG/L
TROPONIN T SERPL HS-MCNC: 41 NG/L
TROPONIN T SERPL HS-MCNC: 47 NG/L
UROBILINOGEN UR STRIP-MCNC: <2 MG/DL
VENTRICULAR RATE- MUSE: 67 BPM
WBC # BLD AUTO: 5.5 10E3/UL (ref 4–11)
WBC CLUMPS #/AREA URNS HPF: ABNORMAL /HPF
WBC URINE: >182 /HPF

## 2023-04-24 PROCEDURE — 250N000013 HC RX MED GY IP 250 OP 250 PS 637: Performed by: HOSPITALIST

## 2023-04-24 PROCEDURE — G0378 HOSPITAL OBSERVATION PER HR: HCPCS

## 2023-04-24 PROCEDURE — 93306 TTE W/DOPPLER COMPLETE: CPT | Mod: 26 | Performed by: INTERNAL MEDICINE

## 2023-04-24 PROCEDURE — 99223 1ST HOSP IP/OBS HIGH 75: CPT | Performed by: HOSPITALIST

## 2023-04-24 PROCEDURE — 87637 SARSCOV2&INF A&B&RSV AMP PRB: CPT | Performed by: EMERGENCY MEDICINE

## 2023-04-24 PROCEDURE — 74177 CT ABD & PELVIS W/CONTRAST: CPT

## 2023-04-24 PROCEDURE — 85379 FIBRIN DEGRADATION QUANT: CPT | Performed by: EMERGENCY MEDICINE

## 2023-04-24 PROCEDURE — 93005 ELECTROCARDIOGRAM TRACING: CPT | Performed by: EMERGENCY MEDICINE

## 2023-04-24 PROCEDURE — 36415 COLL VENOUS BLD VENIPUNCTURE: CPT | Performed by: INTERNAL MEDICINE

## 2023-04-24 PROCEDURE — 81001 URINALYSIS AUTO W/SCOPE: CPT | Performed by: EMERGENCY MEDICINE

## 2023-04-24 PROCEDURE — 258N000003 HC RX IP 258 OP 636: Performed by: HOSPITALIST

## 2023-04-24 PROCEDURE — 250N000011 HC RX IP 250 OP 636: Performed by: HOSPITALIST

## 2023-04-24 PROCEDURE — 250N000011 HC RX IP 250 OP 636: Performed by: EMERGENCY MEDICINE

## 2023-04-24 PROCEDURE — 80048 BASIC METABOLIC PNL TOTAL CA: CPT | Performed by: EMERGENCY MEDICINE

## 2023-04-24 PROCEDURE — 87651 STREP A DNA AMP PROBE: CPT | Performed by: EMERGENCY MEDICINE

## 2023-04-24 PROCEDURE — 36415 COLL VENOUS BLD VENIPUNCTURE: CPT | Performed by: EMERGENCY MEDICINE

## 2023-04-24 PROCEDURE — G0378 HOSPITAL OBSERVATION PER HR: HCPCS | Mod: CS

## 2023-04-24 PROCEDURE — 93306 TTE W/DOPPLER COMPLETE: CPT

## 2023-04-24 PROCEDURE — 96365 THER/PROPH/DIAG IV INF INIT: CPT

## 2023-04-24 PROCEDURE — 93880 EXTRACRANIAL BILAT STUDY: CPT

## 2023-04-24 PROCEDURE — 87088 URINE BACTERIA CULTURE: CPT | Performed by: EMERGENCY MEDICINE

## 2023-04-24 PROCEDURE — 99207 PR NO CHARGE LOS: CPT | Performed by: INTERNAL MEDICINE

## 2023-04-24 PROCEDURE — 96376 TX/PRO/DX INJ SAME DRUG ADON: CPT | Mod: 59

## 2023-04-24 PROCEDURE — 71275 CT ANGIOGRAPHY CHEST: CPT

## 2023-04-24 PROCEDURE — 84484 ASSAY OF TROPONIN QUANT: CPT | Performed by: EMERGENCY MEDICINE

## 2023-04-24 PROCEDURE — 85027 COMPLETE CBC AUTOMATED: CPT | Performed by: EMERGENCY MEDICINE

## 2023-04-24 PROCEDURE — 250N000013 HC RX MED GY IP 250 OP 250 PS 637: Performed by: INTERNAL MEDICINE

## 2023-04-24 PROCEDURE — 258N000003 HC RX IP 258 OP 636: Performed by: EMERGENCY MEDICINE

## 2023-04-24 PROCEDURE — 96361 HYDRATE IV INFUSION ADD-ON: CPT

## 2023-04-24 PROCEDURE — 84484 ASSAY OF TROPONIN QUANT: CPT | Performed by: INTERNAL MEDICINE

## 2023-04-24 PROCEDURE — 83605 ASSAY OF LACTIC ACID: CPT | Performed by: EMERGENCY MEDICINE

## 2023-04-24 RX ORDER — IBUPROFEN 200 MG
600 TABLET ORAL EVERY 6 HOURS PRN
Status: DISCONTINUED | OUTPATIENT
Start: 2023-04-24 | End: 2023-04-26 | Stop reason: HOSPADM

## 2023-04-24 RX ORDER — BISACODYL 10 MG
10 SUPPOSITORY, RECTAL RECTAL DAILY PRN
Status: DISCONTINUED | OUTPATIENT
Start: 2023-04-24 | End: 2023-04-26 | Stop reason: HOSPADM

## 2023-04-24 RX ORDER — PANTOPRAZOLE SODIUM 40 MG/1
40 TABLET, DELAYED RELEASE ORAL DAILY
Status: DISCONTINUED | OUTPATIENT
Start: 2023-04-24 | End: 2023-04-26 | Stop reason: HOSPADM

## 2023-04-24 RX ORDER — ONDANSETRON 2 MG/ML
4 INJECTION INTRAMUSCULAR; INTRAVENOUS EVERY 6 HOURS PRN
Status: DISCONTINUED | OUTPATIENT
Start: 2023-04-24 | End: 2023-04-26 | Stop reason: HOSPADM

## 2023-04-24 RX ORDER — IOPAMIDOL 755 MG/ML
100 INJECTION, SOLUTION INTRAVASCULAR ONCE
Status: COMPLETED | OUTPATIENT
Start: 2023-04-24 | End: 2023-04-24

## 2023-04-24 RX ORDER — TRAMADOL HYDROCHLORIDE 50 MG/1
50 TABLET ORAL 2 TIMES DAILY PRN
Status: DISCONTINUED | OUTPATIENT
Start: 2023-04-24 | End: 2023-04-26 | Stop reason: HOSPADM

## 2023-04-24 RX ORDER — CEFTRIAXONE 2 G/1
2 INJECTION, POWDER, FOR SOLUTION INTRAMUSCULAR; INTRAVENOUS EVERY 24 HOURS
Status: DISCONTINUED | OUTPATIENT
Start: 2023-04-24 | End: 2023-04-26

## 2023-04-24 RX ORDER — ONDANSETRON 4 MG/1
4 TABLET, ORALLY DISINTEGRATING ORAL EVERY 6 HOURS PRN
Status: DISCONTINUED | OUTPATIENT
Start: 2023-04-24 | End: 2023-04-26 | Stop reason: HOSPADM

## 2023-04-24 RX ORDER — ACETAMINOPHEN 650 MG/1
650 SUPPOSITORY RECTAL EVERY 6 HOURS PRN
Status: DISCONTINUED | OUTPATIENT
Start: 2023-04-24 | End: 2023-04-26 | Stop reason: HOSPADM

## 2023-04-24 RX ORDER — BISACODYL 10 MG
20-30 SUPPOSITORY, RECTAL RECTAL DAILY PRN
COMMUNITY
End: 2023-05-28

## 2023-04-24 RX ORDER — PREGABALIN 25 MG/1
50 CAPSULE ORAL DAILY
Status: DISCONTINUED | OUTPATIENT
Start: 2023-04-24 | End: 2023-04-26 | Stop reason: HOSPADM

## 2023-04-24 RX ORDER — ACETAMINOPHEN 325 MG/1
650 TABLET ORAL EVERY 6 HOURS PRN
Status: DISCONTINUED | OUTPATIENT
Start: 2023-04-24 | End: 2023-04-26 | Stop reason: HOSPADM

## 2023-04-24 RX ORDER — SODIUM CHLORIDE 9 MG/ML
INJECTION, SOLUTION INTRAVENOUS CONTINUOUS
Status: ACTIVE | OUTPATIENT
Start: 2023-04-24 | End: 2023-04-24

## 2023-04-24 RX ORDER — CARVEDILOL 3.12 MG/1
6.25 TABLET ORAL DAILY
Status: DISCONTINUED | OUTPATIENT
Start: 2023-04-24 | End: 2023-04-26 | Stop reason: HOSPADM

## 2023-04-24 RX ORDER — CALCIUM CARBONATE/VITAMIN D3 600 MG-10
1 TABLET ORAL DAILY
COMMUNITY

## 2023-04-24 RX ORDER — CEFTRIAXONE 2 G/1
2 INJECTION, POWDER, FOR SOLUTION INTRAMUSCULAR; INTRAVENOUS ONCE
Status: COMPLETED | OUTPATIENT
Start: 2023-04-24 | End: 2023-04-24

## 2023-04-24 RX ADMIN — PREGABALIN 50 MG: 25 CAPSULE ORAL at 11:15

## 2023-04-24 RX ADMIN — SODIUM CHLORIDE: 9 INJECTION, SOLUTION INTRAVENOUS at 04:13

## 2023-04-24 RX ADMIN — IOPAMIDOL 100 ML: 755 INJECTION, SOLUTION INTRAVENOUS at 01:51

## 2023-04-24 RX ADMIN — CEFTRIAXONE SODIUM 2 G: 2 INJECTION, POWDER, FOR SOLUTION INTRAMUSCULAR; INTRAVENOUS at 21:07

## 2023-04-24 RX ADMIN — CARVEDILOL 6.25 MG: 3.12 TABLET, FILM COATED ORAL at 11:15

## 2023-04-24 RX ADMIN — PANTOPRAZOLE SODIUM 40 MG: 40 TABLET, DELAYED RELEASE ORAL at 09:28

## 2023-04-24 RX ADMIN — SODIUM CHLORIDE 1000 ML: 9 INJECTION, SOLUTION INTRAVENOUS at 00:11

## 2023-04-24 RX ADMIN — CEFTRIAXONE SODIUM 2 G: 2 INJECTION, POWDER, FOR SOLUTION INTRAMUSCULAR; INTRAVENOUS at 03:51

## 2023-04-24 RX ADMIN — BISACODYL 10 MG: 10 SUPPOSITORY RECTAL at 06:34

## 2023-04-24 ASSESSMENT — ENCOUNTER SYMPTOMS
FEVER: 1
VOMITING: 0
COUGH: 0
NAUSEA: 0
LIGHT-HEADEDNESS: 1
SORE THROAT: 1

## 2023-04-24 ASSESSMENT — ACTIVITIES OF DAILY LIVING (ADL)
ADLS_ACUITY_SCORE: 35
ADLS_ACUITY_SCORE: 37
ADLS_ACUITY_SCORE: 35
ADLS_ACUITY_SCORE: 37
DEPENDENT_IADLS:: CLEANING;COOKING;LAUNDRY;SHOPPING;MEAL PREPARATION;TRANSPORTATION
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
## 2023-04-24 NOTE — PLAN OF CARE
Goal Outcome Evaluation:      Plan of Care Reviewed With: patient, spouse          Outcome Evaluation: admitted post syncopal event with bowel management. generalized weakness greater than usual baseline. pt alert & oriented, pleasant & cooperative. huff catheter just placed 16fr. pt updated regarding labs pending to draw etc.
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
## 2023-04-24 NOTE — ED NOTES
Urine bloody and thick abnormal for pt, although pt does sometimes have a tiny bit of blood in urine due to straight cath and does get chronic urine infections due to this.     Wife @ bedside more concerned of throat

## 2023-04-24 NOTE — PROGRESS NOTES
Received pt at 3:20pm. Vitals stable and no pain. Gave report to nurse transferring up to p3.     Brittaney Swan RN

## 2023-04-24 NOTE — ED NOTES
Bed: JNED-04  Expected date: 4/23/23  Expected time: 11:44 PM  Means of arrival: Ambulance  Comments:  [facility]  53 M--fever syncope, paraplegic

## 2023-04-24 NOTE — PROGRESS NOTES
Pt transferred to ed 30. Has no c/o discomforts now, reported he did his usual straight cath ~0930. Still feels slightly weak but less than earlier what brought him here.

## 2023-04-24 NOTE — ED PROVIDER NOTES
"EMERGENCY DEPARTMENT ENCOUNTER      NAME: Kinza Teresa  AGE: 48 year old male  YOB: 1975  MRN: [id number]  EVALUATION DATE & TIME: 4/23/2023 11:55 PM    PCP: Vinny Godinez    ED PROVIDER: Amari Cruz M.D.      Chief Complaint   Patient presents with     Syncope     Pharyngitis         FINAL IMPRESSION:  1. Syncope, unspecified syncope type    2. Acute cystitis with hematuria    3. Elevated troponin          ED COURSE & MEDICAL DECISION MAKING:    Pertinent Labs & Imaging studies reviewed below.  All EKGs below represent my independent interpretation.   ED Course as of 04/24/23 0333   Mon Apr 24, 2023   0002 Patient is a 56-year-old gentleman with a history of T11 spinal cord injury presents with syncopal episode earlier this evening in the context of having febrile illness today with a temperature up to 102 Fahrenheit.  Was brought in by medics after getting half liter of IV fluids, blood pressure was in the low 100s, but afebrile.  No history of cardiac arrhythmia or syncope in the past.  Here in the emergency department he has blood pressure of 117/69 with normal temperature and heart rate.  Aside from fever at home, mild sore throat.  He self caths, but no recent UTIs.  Plan to screen for sepsis/septic shock, cardiac arrhythmia.  Viral swabs ordered.  Strep pharyngitis swab ordered.  IV fluids ordered.   0008 EKG shows sinus rhythm with a rate of 67.  No acute ischemic ST or T wave morphology.  Normal axis, normal intervals.  When compared to prior EKG on July 27, 2019, there is no significant change.  Pression: Normal sinus rhythm, normal EKG.   0026 Lactic Acid: 1.1  WNL   0033 WBC: 5.5   0033 Hemoglobin(!): 12.4   0045 Creatinine(!): 1.18  Minimally elevated   0047 Troponin T, High Sensitivity(!): 41   0048 2 hr serial troponin ordered   0102 D-Dimer Quantitative(!): 1.50  CT chest PE study ordered.   0102 Pt's urine sample is murky and red, very atypical for him (\"never\"). Plan to " extend CT to screen for kidney stones   0108 Strep Group A PCR: Not Detected   0110 Nitrite Urine(!): Positive   0110 WBC Urine(!): >182   0121 Symptomatic Influenza A/B, RSV, & SARS-CoV2 PCR (COVID-19) Nasopharyngeal  Negative panel   0211 Based on my interpretation of the CT scan of the chest there is no large saddle or segmental pulmonary embolus.   0212 Based my interpretation of the CT of the abdomen pelvis there is no hydronephrosis, bladder is distended with thickened bladder wall   0230 CT Chest Pulmonary Embolism w Contrast  1.  Negative for pulmonary embolism. No significant acute findings in the chest.   0230 CT Abdomen Pelvis w Contrast  1.  Diffuse bladder wall thickening concerning for cystitis. Clinical correlation.      2.  No other acute findings in the abdomen or pelvis.     3.  Small amount of stone or sludge material in the gallbladder. Common bile duct is mildly dilated, however, this is unchanged from a prior CT scan in 2019.     4.  No acute bowel findings.   0250 Troponin T, High Sensitivity(!): 47  No significant change   0251 I ordered 2 g of Rocephin for patient's suspected UTI.   0252 Looking at prior sensitivities in our system, last 2 bladder fractions have not been resistant to Rocephin.     I would like to keep the patient for observation given his mild troponin elevation in the context of syncope.    Reached out to outside hospitals to see if a MedSurg bed available, he does not.  Plan to board the patient here in the emergency department while a hospital bed opens up.  I spoke to Dr. Hairston, who agrees to admit the patient to cardiac telemetry observation bed.    Additional ED Course Timestamps:  11:52 PM I met with the patient, obtained history, performed an initial exam, and discussed options and plan for diagnostics and treatment here in the ED. PPE worn including surgical mask, surgical gloves.  2:54 AM I reevaluated the patient and updated him and his wife who is now at the  patient's bedside.  3:31 AM I spoke with the hospitalist Dr. Hairston who accepts the patient.    Medical Decision Making    History:    Supplemental history from: Family Member/Significant Other and EMS    External Record(s) reviewed: Documented in chart, if applicable.    Work Up:    Chart documentation includes differential considered and any EKGs or imaging independently interpreted by provider, where specified.    In additional to work up documented, I considered the following work up: Documented in chart, if applicable.    External consultation:    Discussion of management with another provider: Documented in chart, if applicable    Complicating factors:    Care impacted by chronic illness: N/A    Care affected by social determinants of health: N/A    Disposition considerations: Admit.    At the conclusion of the encounter I discussed the results of all of the tests and the disposition. The questions were answered. The patient or family acknowledged understanding and was agreeable with the care plan.       MEDICATIONS GIVEN IN THE EMERGENCY:  Medications   cefTRIAXone (ROCEPHIN) 2 g vial to attach to  ml bag for ADULTS or NS 50 ml bag for PEDS (has no administration in time range)   0.9% sodium chloride BOLUS (0 mLs Intravenous Stopped 4/24/23 0057)   iopamidol (ISOVUE-370) solution 100 mL (100 mLs Intravenous $Given 4/24/23 0151)         NEW PRESCRIPTIONS STARTED AT TODAY'S ER VISIT  New Prescriptions    No medications on file          =================================================================    HPI    Patient information was obtained from: EMS, patient    Use of : N/A        Kinza Teresa is a 48 year old male with a pertinent history of paraplegia, TBI, neurogenic bladder who presents to this ED for evaluation of syncope, fever.    Per EMS, the patient has been complaining for a sore throat for the past couple of weeks. Today, the patient spiked a fever of 102F and was going to bring  "him in by private vehicle. However, the patient then went to the bathroom and became unresponsive while on the toilet. His wife then picked him up and placed him on the bed, so he did not sustain any trauma. On arrival, he had a blood pressure of 95/60 and was complaining of lightheadedness. Pressures improved with fluids en route.    Per the patient, he endorses allergies recently and has had a sore throat, though he denies any significant cough, nausea, vomiting. Today, he started to feel feverish and laid in bed for the majority of the day. When he got up to go to the bathroom, he started feel like he was going to pass out and then lost consciousness. He denies any prodromal chest pain. He denies a history of prior similar passing out episodes.    Patient reports he is paraplegic from a spinal cord injury at T11 7 years ago. At baseline, he reports being without sensation or strength. Patient normally self catheterizes. He has not had a UTI in \"a long time\". He receives Botox injections for his bladder.             REVIEW OF SYSTEMS   Review of Systems   Constitutional: Positive for fever (102F per EMS).   HENT: Positive for sore throat.    Respiratory: Negative for cough.    Gastrointestinal: Negative for nausea and vomiting.   Neurological: Positive for syncope and light-headedness.   All other systems reviewed and are negative.       VITALS:  /74   Pulse 61   Temp 98.2  F (36.8  C) (Oral)   Resp 13   SpO2 99%     PHYSICAL EXAM    Constitutional: Well developed, well nourished. Unomfortable appearing, soft spoken.  HENT: Normocephalic, atraumatic, mucous membranes moist, nose normal. Normal phonation. Neck- Supple, gross ROM intact.   Eyes: Pupils mid-range, conjunctiva without injection, no discharge.   Respiratory: Clear to auscultation bilaterally, no respiratory distress, no wheezing, speaks full sentences easily. No cough.  Cardiovascular: Normal heart rate, regular rhythm, no murmurs.   GI: " Soft, no tenderness to deep palpation in all quadrants, no masses.  Musculoskeletal: No obvious deformity.  Skin: Warm, dry, no rash.  Neurologic: Alert & oriented x 3, cranial nerves grossly intact. Bilateral lower extremity paralysis. Insensate from the umbilicus distally.  Psychiatric: Affect normal, cooperative.      PROCEDURES:   None      I, Gabino Thorne am serving as a scribe to document services personally performed by Dr. Amari Cruz based on my observation and the provider's statements to me. I, Amari Cruz MD attest that Gabino Thorne is acting in a scribe capacity, has observed my performance of the services and has documented them in accordance with my direction.    Amari Cruz M.D.  Emergency Medicine  [facility] EMERGENCY DEPARTMENT  [facility] [facility] [address]  [phone]  Dept: [phone]     Amari Cruz MD  04/24/23 0334
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
## 2023-04-24 NOTE — PHARMACY-ADMISSION MEDICATION HISTORY
Pharmacist Admission Medication History    Admission medication history is complete. The information provided in this note is only as accurate as the sources available at the time of the update.    Medication reconciliation/reorder completed by provider prior to medication history? No    Information Source(s): Patient, Family member and CareEverywhere/SureScripts via in-person    Pertinent Information:     Changes made to PTA medication list:    Added: bisacodyl suppositories    Deleted: miralax, metamucil, vitamin D, cipro, bactrim    Changed: carvedilol frequency, lyrica frequency, omeprazole frequency     Medication Affordability:  Not including over the counter (OTC) medications, was there a time in the past 12 months when you did not take your medications as prescribed because of cost?: Unable to Assess    Allergies reviewed with patient and updates made in EHR: yes    Medication History Completed By: Kortney Medina [facility] 4/24/2023 8:00 AM    Prior to Admission medications    Medication Sig Last Dose Taking? Auth Provider Long Term End Date   bisacodyl (DULCOLAX) 10 MG suppository Place 20-30 mg rectally daily as needed for constipation Past Week Yes Unknown, Entered By History     bisacodyl (DULCOLAX) 5 MG EC tablet Take 5-10 mg by mouth daily as needed for constipation 5-10 mg daily prn Past Week at PM Yes Reported, Patient     calcium carbonate-vitamin D (CALCIUM 600+D) 600-10 MG-MCG per tablet Take 1 tablet by mouth 2 times daily 4/23/2023 at PM Yes Unknown, Entered By History     carvedilol (COREG) 6.25 MG tablet Take 6.25 mg by mouth daily 4/23/2023 at AM Yes Reported, Patient Yes    ibuprofen (ADVIL/MOTRIN) 600 MG tablet Take 1 tablet (600 mg) by mouth every 6 hours as needed for moderate pain 4/23/2023 at PM Yes Margie Ambrosio PA     omeprazole (PRILOSEC) 20 MG DR capsule Take 20 mg by mouth daily 4/23/2023 at AM Yes Reported, Patient     pregabalin (LYRICA) 50 MG capsule Take 50 mg by mouth daily  4/23/2023 at AM Yes Reported, Patient Yes    traMADol (ULTRAM) 50 MG tablet Take 50 mg by mouth 2 times daily as needed 4/23/2023 at PM Yes Reported, Patient No    Misc. Devices (WHEEL CHAIR K1 BASIC DESK ARM) MISC Wheelchair:  Tilt and space  with leg rests  Length of need: 6 months   Reported, Patient

## 2023-04-24 NOTE — PLAN OF CARE
Problem: Plan of Care - These are the overarching goals to be used throughout the patient stay.    Goal: Optimal Comfort and Wellbeing  4/24/2023 1748 by Ray Levi, RN  Outcome: Progressing  4/24/2023 1748 by Ray Levi, RN  Outcome: Progressing     A&Ox4. Long catheter was accidentally yanked out during transfer to floor. No trauma when tube was yanked out (distal end of tubing detached). Pt prefers self-catheterizing, as this is what he is accustomed to at home. Per hospitalist order, this is okay to do during hospital stay. Urine has been clear. No complaints of pain. VSS. NSR on tele.    PRIMARY DIAGNOSIS: Syncope  OUTPATIENT/OBSERVATION GOALS TO BE MET BEFORE DISCHARGE:  ADLs back to baseline: Yes    Activity and level of assistance: Uses slide board for transfers due to paraplegia.    Pain status: Pain free.    Return to near baseline physical activity: Yes     Discharge Planner Nurse   Safe discharge environment identified: Yes  Barriers to discharge: Yes - IV antibiotics, awaiting UC.       Entered by: Ray Levi RN 04/24/2023 5:55 PM     Please review provider order for any additional goals.   Nurse to notify provider when observation goals have been met and patient is ready for discharge.

## 2023-04-24 NOTE — ED TRIAGE NOTES
patient has been having a sore throat for several days and fever, today he laid in bed most of the day but tried to use the bathroom and had a syncopal episode.

## 2023-04-25 LAB
ANION GAP SERPL CALCULATED.3IONS-SCNC: 10 MMOL/L (ref 7–15)
BUN SERPL-MCNC: 9 MG/DL (ref 6–20)
CALCIUM SERPL-MCNC: 8.4 MG/DL (ref 8.6–10)
CHLORIDE SERPL-SCNC: 109 MMOL/L (ref 98–107)
CREAT SERPL-MCNC: 0.83 MG/DL (ref 0.67–1.17)
DEPRECATED HCO3 PLAS-SCNC: 24 MMOL/L (ref 22–29)
ERYTHROCYTE [DISTWIDTH] IN BLOOD BY AUTOMATED COUNT: 13.2 % (ref 10–15)
GFR SERPL CREATININE-BSD FRML MDRD: >90 ML/MIN/1.73M2
GLUCOSE SERPL-MCNC: 107 MG/DL (ref 70–99)
HCT VFR BLD AUTO: 35.3 % (ref 40–53)
HGB BLD-MCNC: 11.3 G/DL (ref 13.3–17.7)
MCH RBC QN AUTO: 26.9 PG (ref 26.5–33)
MCHC RBC AUTO-ENTMCNC: 32 G/DL (ref 31.5–36.5)
MCV RBC AUTO: 84 FL (ref 78–100)
PLATELET # BLD AUTO: 212 10E3/UL (ref 150–450)
POTASSIUM SERPL-SCNC: 3.7 MMOL/L (ref 3.4–5.3)
RBC # BLD AUTO: 4.2 10E6/UL (ref 4.4–5.9)
SODIUM SERPL-SCNC: 143 MMOL/L (ref 136–145)
TROPONIN T SERPL HS-MCNC: 28 NG/L
TSH SERPL DL<=0.005 MIU/L-ACNC: 1.7 UIU/ML (ref 0.3–4.2)
WBC # BLD AUTO: 4.5 10E3/UL (ref 4–11)

## 2023-04-25 PROCEDURE — 84484 ASSAY OF TROPONIN QUANT: CPT | Performed by: INTERNAL MEDICINE

## 2023-04-25 PROCEDURE — 85027 COMPLETE CBC AUTOMATED: CPT | Performed by: INTERNAL MEDICINE

## 2023-04-25 PROCEDURE — G0378 HOSPITAL OBSERVATION PER HR: HCPCS

## 2023-04-25 PROCEDURE — 96376 TX/PRO/DX INJ SAME DRUG ADON: CPT

## 2023-04-25 PROCEDURE — 84443 ASSAY THYROID STIM HORMONE: CPT | Performed by: INTERNAL MEDICINE

## 2023-04-25 PROCEDURE — 250N000013 HC RX MED GY IP 250 OP 250 PS 637: Performed by: INTERNAL MEDICINE

## 2023-04-25 PROCEDURE — 36415 COLL VENOUS BLD VENIPUNCTURE: CPT | Performed by: INTERNAL MEDICINE

## 2023-04-25 PROCEDURE — 99233 SBSQ HOSP IP/OBS HIGH 50: CPT | Performed by: INTERNAL MEDICINE

## 2023-04-25 PROCEDURE — 80048 BASIC METABOLIC PNL TOTAL CA: CPT | Performed by: INTERNAL MEDICINE

## 2023-04-25 PROCEDURE — 250N000011 HC RX IP 250 OP 636: Performed by: HOSPITALIST

## 2023-04-25 RX ORDER — AMOXICILLIN 250 MG
1 CAPSULE ORAL 2 TIMES DAILY
Status: DISCONTINUED | OUTPATIENT
Start: 2023-04-25 | End: 2023-04-26 | Stop reason: HOSPADM

## 2023-04-25 RX ADMIN — PANTOPRAZOLE SODIUM 40 MG: 40 TABLET, DELAYED RELEASE ORAL at 08:03

## 2023-04-25 RX ADMIN — CARVEDILOL 6.25 MG: 3.12 TABLET, FILM COATED ORAL at 08:03

## 2023-04-25 RX ADMIN — CEFTRIAXONE SODIUM 2 G: 2 INJECTION, POWDER, FOR SOLUTION INTRAMUSCULAR; INTRAVENOUS at 21:25

## 2023-04-25 RX ADMIN — PREGABALIN 50 MG: 25 CAPSULE ORAL at 08:02

## 2023-04-25 ASSESSMENT — ACTIVITIES OF DAILY LIVING (ADL)
ADLS_ACUITY_SCORE: 35

## 2023-04-25 NOTE — PLAN OF CARE
PRIMARY DIAGNOSIS: UTI  OUTPATIENT/OBSERVATION GOALS TO BE MET BEFORE DISCHARGE:  ADLs back to baseline: Yes    Activity and level of assistance: unchanged from baseline    Pain status: Pain free.    Return to near baseline physical activity: Yes     Discharge Planner Nurse   Safe discharge environment identified: Yes  Barriers to discharge: Yes       Entered by: Alejandra Carrera RN 04/25/2023 1:57 PM     Please review provider order for any additional goals.   Nurse to notify provider when observation goals have been met and patient is ready for discharge.Goal Outcome Evaluation:      Still hopeful that he will be discharged later today. (waiting on labs to be completed)  no fevers.  Urine is pink tinged with small amount of blood noted towards the end of bladder emptying during straight cath.  Eating and drinking about as much as he does at home. Denies pain. All personal items and call light within reach. Alejandra Carrera RN    Problem: UTI (Urinary Tract Infection)  Goal: Improved Infection Symptoms    Problem: Syncope  Goal: Absence of Syncopal Symptoms  Outcome: Progressing

## 2023-04-25 NOTE — PLAN OF CARE
Problem: Plan of Care - These are the overarching goals to be used throughout the patient stay.    Goal: Absence of Hospital-Acquired Illness or Injury  Outcome: Progressing  Intervention: Identify and Manage Fall Risk  Recent Flowsheet Documentation  Taken 4/25/2023 0405 by Queenie Gallegos RN  Safety Promotion/Fall Prevention:   clutter free environment maintained   nonskid shoes/slippers when out of bed  I Goal Outcome Evaluation:  Pt restful over noc. Vitals stable. Sinus john on tele with rates around 50, intermittently down to mid 40's. Pt asymptomatic. Straight cath this am per pt request. Falls precautions in place.
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
## 2023-04-25 NOTE — PLAN OF CARE
PRIMARY DIAGNOSIS: SYNCOPE/TIA  OUTPATIENT/OBSERVATION GOALS TO BE MET BEFORE DISCHARGE:  1. Orthostatic performed: Yes:      2. Diagnostic testing complete & at baseline neurologic testing: Yes.    3. Cleared by consultants (if involved): Yes    4. Interpretation of cardiac rhythm per telemetry tech: Sinus Darinel    5. Tolerating adequate PO diet and medications: Yes    6. Return to near baseline physical activity or neurologic status: Yes    Discharge Planner Nurse   Safe discharge environment identified: Yes  Barriers to discharge: No       Entered by: Alejandra Carrera RN 04/25/2023 8:18 AM     Please review provider order for any additional goals.   Nurse to notify provider when observation goals have been met and patient is ready for discharge.Goal Outcome Evaluation:     Wife at bedside- supportive. Kinza is hopeful he will be discharged this am.  Denies any discharge planning needs. Drinking well (declined breakfast as is his normal home routine)  Alejandra Carrera RN

## 2023-04-25 NOTE — PLAN OF CARE
"  Problem: Plan of Care - These are the overarching goals to be used throughout the patient stay.    Goal: Plan of Care Review  Description: The Plan of Care Review/Shift note should be completed every shift.  The Outcome Evaluation is a brief statement about your assessment that the patient is improving, declining, or no change.  This information will be displayed automatically on your shift note.  Outcome: Progressing  Flowsheets (Taken 4/24/2023 2056)  Plan of Care Reviewed With: patient  Goal: Patient-Specific Goal (Individualized)  Description: You can add care plan individualizations to a care plan. Examples of Individualization might be:  \"Parent requests to be called daily at 9am for status\", \"I have a hard time hearing out of my right ear\", or \"Do not touch me to wake me up as it startles me\".  Outcome: Progressing  Goal: Absence of Hospital-Acquired Illness or Injury  Outcome: Progressing  Intervention: Identify and Manage Fall Risk  Recent Flowsheet Documentation  Taken 4/24/2023 2022 by Navya Kennedy RN  Safety Promotion/Fall Prevention:   clutter free environment maintained   nonskid shoes/slippers when out of bed  Intervention: Prevent Skin Injury  Recent Flowsheet Documentation  Taken 4/24/2023 2022 by Navya Kennedy RN  Body Position: supine  Goal: Optimal Comfort and Wellbeing  Outcome: Progressing  Goal: Readiness for Transition of Care  Outcome: Progressing   Goal Outcome Evaluation:      Plan of Care Reviewed With: patient        Vitals:    04/24/23 0844 04/24/23 1535 04/24/23 1623 04/24/23 2022   BP: (!) 146/93 111/55 (!) 148/70 119/65   BP Location:  Right arm Right arm    Pulse: 82 74 64 78   Resp:  16 18 20   Temp:  98.2  F (36.8  C) 98.4  F (36.9  C) 99.4  F (37.4  C)   TempSrc:  Oral Oral Oral   SpO2: 100% 99% 99% 99%    Denies pain. IV antibiotics. Paraplegic.  St caths a few times a day lungs clear. Hopes to discharge in am.  Navya Kenneyd RN          "
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
## 2023-04-26 VITALS
WEIGHT: 120.37 LBS | OXYGEN SATURATION: 100 % | SYSTOLIC BLOOD PRESSURE: 154 MMHG | DIASTOLIC BLOOD PRESSURE: 86 MMHG | HEART RATE: 68 BPM | RESPIRATION RATE: 20 BRPM | TEMPERATURE: 98 F | BODY MASS INDEX: 19.43 KG/M2

## 2023-04-26 LAB
BACTERIA UR CULT: ABNORMAL
BACTERIA UR CULT: ABNORMAL

## 2023-04-26 PROCEDURE — 250N000013 HC RX MED GY IP 250 OP 250 PS 637: Performed by: INTERNAL MEDICINE

## 2023-04-26 PROCEDURE — 99238 HOSP IP/OBS DSCHRG MGMT 30/<: CPT | Performed by: INTERNAL MEDICINE

## 2023-04-26 PROCEDURE — G0378 HOSPITAL OBSERVATION PER HR: HCPCS | Mod: CS

## 2023-04-26 RX ORDER — CEFDINIR 300 MG/1
300 CAPSULE ORAL EVERY 12 HOURS SCHEDULED
Status: DISCONTINUED | OUTPATIENT
Start: 2023-04-26 | End: 2023-04-26 | Stop reason: HOSPADM

## 2023-04-26 RX ORDER — CEFDINIR 300 MG/1
300 CAPSULE ORAL EVERY 12 HOURS
Qty: 10 CAPSULE | Refills: 0 | Status: SHIPPED | OUTPATIENT
Start: 2023-04-26 | End: 2023-05-01

## 2023-04-26 RX ADMIN — PREGABALIN 50 MG: 25 CAPSULE ORAL at 09:59

## 2023-04-26 RX ADMIN — CARVEDILOL 6.25 MG: 3.12 TABLET, FILM COATED ORAL at 09:59

## 2023-04-26 RX ADMIN — PANTOPRAZOLE SODIUM 40 MG: 40 TABLET, DELAYED RELEASE ORAL at 10:00

## 2023-04-26 RX ADMIN — TRAMADOL HYDROCHLORIDE 50 MG: 50 TABLET ORAL at 09:59

## 2023-04-26 ASSESSMENT — ACTIVITIES OF DAILY LIVING (ADL)
ADLS_ACUITY_SCORE: 35

## 2023-04-26 NOTE — PLAN OF CARE
Problem: Plan of Care - These are the overarching goals to be used throughout the patient stay.    Goal: Plan of Care Review  Description: The Plan of Care Review/Shift note should be completed every shift.  The Outcome Evaluation is a brief statement about your assessment that the patient is improving, declining, or no change.  This information will be displayed automatically on your shift note.  Outcome: Progressing     Problem: UTI (Urinary Tract Infection)  Goal: Improved Infection Symptoms  Outcome: Progressing   Goal Outcome Evaluation:  VSS. Pt denies pain, clustered cares to promote sleep. Pt straight cath self. Wife her early to assist with patient bowel regimen. Awaiting sensitivity of urine culture.

## 2023-04-26 NOTE — PLAN OF CARE
Problem: Plan of Care - These are the overarching goals to be used throughout the patient stay.    Goal: Plan of Care Review  Description: The Plan of Care Review/Shift note should be completed every shift.  The Outcome Evaluation is a brief statement about your assessment that the patient is improving, declining, or no change.  This information will be displayed automatically on your shift note.  Outcome: Progressing     Problem: Plan of Care - These are the overarching goals to be used throughout the patient stay.    Goal: Readiness for Transition of Care  Outcome: Progressing     Problem: UTI (Urinary Tract Infection)  Goal: Improved Infection Symptoms  Outcome: Progressing     Problem: Syncope  Goal: Absence of Syncopal Symptoms  Outcome: Progressing   Goal Outcome Evaluation:               Alert. Oriented.    Denied pain.    Patient self straight cathed x2 this shift from neurogenic bladder. Urine is bloody, no blood clots noted. 200 ml and 100 ml. No resistance reported by patient when asked when catheter was inserted.     Waiting for urine culture result.    Paraplegic. Standby to assist of 1 as needed. Wheelchair bound. Fall precaution and interventions maintained.
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
## 2023-04-26 NOTE — PROGRESS NOTES
Care Management Discharge Note    Discharge Date: 04/26/2023       Discharge Disposition: Home    Discharge Services: None    Discharge DME:  (no new DME)    Discharge Transportation:  family      Education Provided on the Discharge Plan:  Per care team    Persons Notified of Discharge Plans: yes      Patient/Family in Agreement with the Plan: yes    Handoff Referral Completed:  yes    Additional Information:    Patient to return home with family support. No discharge needs.         Tina Wiley RN

## 2023-05-17 RX ORDER — ALBUTEROL SULFATE 0.83 MG/ML
2.5 SOLUTION RESPIRATORY (INHALATION)
Status: CANCELLED | OUTPATIENT
Start: 2024-05-03

## 2023-05-17 RX ORDER — NALOXONE HYDROCHLORIDE 0.4 MG/ML
0.2 INJECTION, SOLUTION INTRAMUSCULAR; INTRAVENOUS; SUBCUTANEOUS
Status: CANCELLED | OUTPATIENT
Start: 2024-05-03

## 2023-05-17 RX ORDER — ALBUTEROL SULFATE 0.83 MG/ML
2.5 SOLUTION RESPIRATORY (INHALATION)
Status: CANCELLED | OUTPATIENT
Start: 2023-06-13

## 2023-05-17 RX ORDER — MEPERIDINE HYDROCHLORIDE 25 MG/ML
25 INJECTION INTRAMUSCULAR; INTRAVENOUS; SUBCUTANEOUS EVERY 30 MIN PRN
Status: CANCELLED | OUTPATIENT
Start: 2024-05-03

## 2023-05-17 RX ORDER — HEPARIN SODIUM,PORCINE 10 UNIT/ML
5 VIAL (ML) INTRAVENOUS
Status: CANCELLED | OUTPATIENT
Start: 2023-06-13

## 2023-05-17 RX ORDER — HEPARIN SODIUM,PORCINE 10 UNIT/ML
5 VIAL (ML) INTRAVENOUS
Status: CANCELLED | OUTPATIENT
Start: 2024-05-03

## 2023-05-17 RX ORDER — DIPHENHYDRAMINE HYDROCHLORIDE 50 MG/ML
50 INJECTION INTRAMUSCULAR; INTRAVENOUS
Status: CANCELLED
Start: 2023-06-13

## 2023-05-17 RX ORDER — EPINEPHRINE 1 MG/ML
0.3 INJECTION, SOLUTION, CONCENTRATE INTRAVENOUS EVERY 5 MIN PRN
Status: CANCELLED | OUTPATIENT
Start: 2024-05-03

## 2023-05-17 RX ORDER — MEPERIDINE HYDROCHLORIDE 25 MG/ML
25 INJECTION INTRAMUSCULAR; INTRAVENOUS; SUBCUTANEOUS EVERY 30 MIN PRN
Status: CANCELLED | OUTPATIENT
Start: 2023-06-13

## 2023-05-17 RX ORDER — HEPARIN SODIUM (PORCINE) LOCK FLUSH IV SOLN 100 UNIT/ML 100 UNIT/ML
5 SOLUTION INTRAVENOUS
Status: CANCELLED | OUTPATIENT
Start: 2023-06-13

## 2023-05-17 RX ORDER — METHYLPREDNISOLONE SODIUM SUCCINATE 125 MG/2ML
125 INJECTION, POWDER, LYOPHILIZED, FOR SOLUTION INTRAMUSCULAR; INTRAVENOUS
Status: CANCELLED
Start: 2024-05-03

## 2023-05-17 RX ORDER — ALBUTEROL SULFATE 90 UG/1
1-2 AEROSOL, METERED RESPIRATORY (INHALATION)
Status: CANCELLED
Start: 2023-06-13

## 2023-05-17 RX ORDER — DIPHENHYDRAMINE HYDROCHLORIDE 50 MG/ML
50 INJECTION INTRAMUSCULAR; INTRAVENOUS
Status: CANCELLED
Start: 2024-05-03

## 2023-05-17 RX ORDER — HEPARIN SODIUM (PORCINE) LOCK FLUSH IV SOLN 100 UNIT/ML 100 UNIT/ML
5 SOLUTION INTRAVENOUS
Status: CANCELLED | OUTPATIENT
Start: 2024-05-03

## 2023-05-17 RX ORDER — EPINEPHRINE 1 MG/ML
0.3 INJECTION, SOLUTION, CONCENTRATE INTRAVENOUS EVERY 5 MIN PRN
Status: CANCELLED | OUTPATIENT
Start: 2023-06-13

## 2023-05-17 RX ORDER — METHYLPREDNISOLONE SODIUM SUCCINATE 125 MG/2ML
125 INJECTION, POWDER, LYOPHILIZED, FOR SOLUTION INTRAMUSCULAR; INTRAVENOUS
Status: CANCELLED
Start: 2023-06-13

## 2023-05-17 RX ORDER — NALOXONE HYDROCHLORIDE 0.4 MG/ML
0.2 INJECTION, SOLUTION INTRAMUSCULAR; INTRAVENOUS; SUBCUTANEOUS
Status: CANCELLED | OUTPATIENT
Start: 2023-06-13

## 2023-05-17 RX ORDER — ALBUTEROL SULFATE 90 UG/1
1-2 AEROSOL, METERED RESPIRATORY (INHALATION)
Status: CANCELLED
Start: 2024-05-03

## 2023-05-22 ENCOUNTER — MYC MEDICAL ADVICE (OUTPATIENT)
Dept: UROLOGY | Facility: CLINIC | Age: 48
End: 2023-05-22
Payer: COMMERCIAL

## 2023-05-22 ENCOUNTER — TELEPHONE (OUTPATIENT)
Dept: UROLOGY | Facility: CLINIC | Age: 48
End: 2023-05-22
Payer: COMMERCIAL

## 2023-05-22 NOTE — TELEPHONE ENCOUNTER
Kinza Teresa, 48 year old contacted with his two appointments for IV abx prior to his upcoming Botox injections with Dr. Tong.  Pt did not answer so message was left by writer today.  Writer also sent a my chart message today.      Pam Alvarez RN  05/22/23  2:51 PM

## 2023-05-28 ENCOUNTER — APPOINTMENT (OUTPATIENT)
Dept: CT IMAGING | Facility: HOSPITAL | Age: 48
DRG: 481 | End: 2023-05-28
Attending: ORTHOPAEDIC SURGERY
Payer: COMMERCIAL

## 2023-05-28 ENCOUNTER — TRANSFERRED RECORDS (OUTPATIENT)
Dept: HEALTH INFORMATION MANAGEMENT | Facility: CLINIC | Age: 48
End: 2023-05-28

## 2023-05-28 ENCOUNTER — APPOINTMENT (OUTPATIENT)
Dept: RADIOLOGY | Facility: HOSPITAL | Age: 48
DRG: 481 | End: 2023-05-28
Attending: EMERGENCY MEDICINE
Payer: COMMERCIAL

## 2023-05-28 ENCOUNTER — HOSPITAL ENCOUNTER (INPATIENT)
Facility: HOSPITAL | Age: 48
LOS: 4 days | Discharge: HOME | DRG: 481 | End: 2023-06-01
Attending: EMERGENCY MEDICINE | Admitting: HOSPITALIST
Payer: COMMERCIAL

## 2023-05-28 DIAGNOSIS — S72.451A CLOSED DISPLACED SUPRACONDYLAR FRACTURE OF DISTAL END OF RIGHT FEMUR WITHOUT INTRACONDYLAR EXTENSION, INITIAL ENCOUNTER (H): Primary | ICD-10-CM

## 2023-05-28 DIAGNOSIS — N39.0 URINARY TRACT INFECTION ASSOCIATED WITH CATHETERIZATION OF URINARY TRACT, UNSPECIFIED INDWELLING URINARY CATHETER TYPE, INITIAL ENCOUNTER (H): ICD-10-CM

## 2023-05-28 DIAGNOSIS — S72.91XA CLOSED FRACTURE OF RIGHT FEMUR, UNSPECIFIED FRACTURE MORPHOLOGY, UNSPECIFIED PORTION OF FEMUR, INITIAL ENCOUNTER (H): ICD-10-CM

## 2023-05-28 DIAGNOSIS — R55 SYNCOPE, UNSPECIFIED SYNCOPE TYPE: ICD-10-CM

## 2023-05-28 DIAGNOSIS — N30.01 ACUTE CYSTITIS WITH HEMATURIA: ICD-10-CM

## 2023-05-28 DIAGNOSIS — T83.511A URINARY TRACT INFECTION ASSOCIATED WITH CATHETERIZATION OF URINARY TRACT, UNSPECIFIED INDWELLING URINARY CATHETER TYPE, INITIAL ENCOUNTER (H): ICD-10-CM

## 2023-05-28 LAB
ABO/RH(D): NORMAL
ALBUMIN UR-MCNC: 50 MG/DL
ANTIBODY SCREEN: NEGATIVE
APPEARANCE UR: ABNORMAL
APTT PPP: 26 SECONDS (ref 22–38)
BACTERIA #/AREA URNS HPF: ABNORMAL /HPF
BASOPHILS # BLD AUTO: 0.1 10E3/UL (ref 0–0.2)
BASOPHILS NFR BLD AUTO: 0 %
BILIRUB UR QL STRIP: NEGATIVE
COLOR UR AUTO: ABNORMAL
EOSINOPHIL # BLD AUTO: 0.2 10E3/UL (ref 0–0.7)
EOSINOPHIL NFR BLD AUTO: 1 %
ERYTHROCYTE [DISTWIDTH] IN BLOOD BY AUTOMATED COUNT: 13.2 % (ref 10–15)
GLUCOSE UR STRIP-MCNC: NEGATIVE MG/DL
HCT VFR BLD AUTO: 33.7 % (ref 40–53)
HGB BLD-MCNC: 10.6 G/DL (ref 13.3–17.7)
HGB UR QL STRIP: ABNORMAL
HOLD SPECIMEN: NORMAL
IMM GRANULOCYTES # BLD: 0 10E3/UL
IMM GRANULOCYTES NFR BLD: 0 %
INR PPP: 1.04 (ref 0.85–1.15)
KETONES UR STRIP-MCNC: NEGATIVE MG/DL
LEUKOCYTE ESTERASE UR QL STRIP: ABNORMAL
LYMPHOCYTES # BLD AUTO: 1.8 10E3/UL (ref 0.8–5.3)
LYMPHOCYTES NFR BLD AUTO: 15 %
MCH RBC QN AUTO: 26.6 PG (ref 26.5–33)
MCHC RBC AUTO-ENTMCNC: 31.5 G/DL (ref 31.5–36.5)
MCV RBC AUTO: 85 FL (ref 78–100)
MONOCYTES # BLD AUTO: 0.7 10E3/UL (ref 0–1.3)
MONOCYTES NFR BLD AUTO: 6 %
MUCOUS THREADS #/AREA URNS LPF: PRESENT /LPF
NEUTROPHILS # BLD AUTO: 9 10E3/UL (ref 1.6–8.3)
NEUTROPHILS NFR BLD AUTO: 78 %
NITRATE UR QL: NEGATIVE
NRBC # BLD AUTO: 0 10E3/UL
NRBC BLD AUTO-RTO: 0 /100
PH UR STRIP: 5.5 [PH] (ref 5–7)
PLATELET # BLD AUTO: 297 10E3/UL (ref 150–450)
RBC # BLD AUTO: 3.99 10E6/UL (ref 4.4–5.9)
RBC URINE: 51 /HPF
RENAL TUB EPI: <1 /HPF
SP GR UR STRIP: 1.02 (ref 1–1.03)
SPECIMEN EXPIRATION DATE: NORMAL
SQUAMOUS EPITHELIAL: 4 /HPF
TRANSITIONAL EPI: <1 /HPF
UROBILINOGEN UR STRIP-MCNC: <2 MG/DL
WBC # BLD AUTO: 11.8 10E3/UL (ref 4–11)
WBC URINE: 93 /HPF

## 2023-05-28 PROCEDURE — 73590 X-RAY EXAM OF LOWER LEG: CPT | Mod: RT

## 2023-05-28 PROCEDURE — 82728 ASSAY OF FERRITIN: CPT | Performed by: HOSPITALIST

## 2023-05-28 PROCEDURE — 86850 RBC ANTIBODY SCREEN: CPT | Performed by: ORTHOPAEDIC SURGERY

## 2023-05-28 PROCEDURE — 73552 X-RAY EXAM OF FEMUR 2/>: CPT | Mod: RT

## 2023-05-28 PROCEDURE — 73700 CT LOWER EXTREMITY W/O DYE: CPT | Mod: RT

## 2023-05-28 PROCEDURE — 81001 URINALYSIS AUTO W/SCOPE: CPT | Performed by: EMERGENCY MEDICINE

## 2023-05-28 PROCEDURE — 80053 COMPREHEN METABOLIC PANEL: CPT | Performed by: EMERGENCY MEDICINE

## 2023-05-28 PROCEDURE — 85025 COMPLETE CBC W/AUTO DIFF WBC: CPT | Performed by: EMERGENCY MEDICINE

## 2023-05-28 PROCEDURE — 86923 COMPATIBILITY TEST ELECTRIC: CPT

## 2023-05-28 PROCEDURE — 120N000001 HC R&B MED SURG/OB

## 2023-05-28 PROCEDURE — 250N000011 HC RX IP 250 OP 636: Performed by: EMERGENCY MEDICINE

## 2023-05-28 PROCEDURE — 85610 PROTHROMBIN TIME: CPT | Performed by: ORTHOPAEDIC SURGERY

## 2023-05-28 PROCEDURE — C9803 HOPD COVID-19 SPEC COLLECT: HCPCS

## 2023-05-28 PROCEDURE — 83550 IRON BINDING TEST: CPT | Performed by: HOSPITALIST

## 2023-05-28 PROCEDURE — 258N000003 HC RX IP 258 OP 636: Performed by: EMERGENCY MEDICINE

## 2023-05-28 PROCEDURE — 87088 URINE BACTERIA CULTURE: CPT | Performed by: EMERGENCY MEDICINE

## 2023-05-28 PROCEDURE — 85730 THROMBOPLASTIN TIME PARTIAL: CPT | Performed by: ORTHOPAEDIC SURGERY

## 2023-05-28 PROCEDURE — 82607 VITAMIN B-12: CPT | Performed by: HOSPITALIST

## 2023-05-28 PROCEDURE — 93005 ELECTROCARDIOGRAM TRACING: CPT | Performed by: EMERGENCY MEDICINE

## 2023-05-28 PROCEDURE — 84484 ASSAY OF TROPONIN QUANT: CPT | Performed by: HOSPITALIST

## 2023-05-28 PROCEDURE — 36415 COLL VENOUS BLD VENIPUNCTURE: CPT | Performed by: EMERGENCY MEDICINE

## 2023-05-28 PROCEDURE — 99285 EMERGENCY DEPT VISIT HI MDM: CPT | Mod: 25

## 2023-05-28 RX ORDER — BISACODYL 10 MG
10-30 SUPPOSITORY, RECTAL RECTAL EVERY OTHER DAY
COMMUNITY

## 2023-05-28 RX ORDER — SODIUM CHLORIDE 9 MG/ML
INJECTION, SOLUTION INTRAVENOUS CONTINUOUS
Status: DISCONTINUED | OUTPATIENT
Start: 2023-05-28 | End: 2023-06-01

## 2023-05-28 RX ORDER — TRAMADOL HYDROCHLORIDE 50 MG/1
50 TABLET ORAL EVERY MORNING
Status: ON HOLD | COMMUNITY
End: 2023-05-30

## 2023-05-28 RX ORDER — CEFTRIAXONE 1 G/1
1 INJECTION, POWDER, FOR SOLUTION INTRAMUSCULAR; INTRAVENOUS ONCE
Status: COMPLETED | OUTPATIENT
Start: 2023-05-28 | End: 2023-05-28

## 2023-05-28 RX ADMIN — CEFTRIAXONE SODIUM 1 G: 1 INJECTION, POWDER, FOR SOLUTION INTRAMUSCULAR; INTRAVENOUS at 23:40

## 2023-05-28 RX ADMIN — SODIUM CHLORIDE: 9 INJECTION, SOLUTION INTRAVENOUS at 23:40

## 2023-05-28 ASSESSMENT — ACTIVITIES OF DAILY LIVING (ADL)
ADLS_ACUITY_SCORE: 35

## 2023-05-28 NOTE — ED NOTES
Bed: JNED-20  Expected date: 5/28/23  Expected time: 6:14 PM  Means of arrival: Ambulance  Comments:  WBL 48M  Femur Fracture
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
## 2023-05-29 ENCOUNTER — ANESTHESIA EVENT (OUTPATIENT)
Dept: SURGERY | Facility: HOSPITAL | Age: 48
DRG: 481 | End: 2023-05-29
Payer: COMMERCIAL

## 2023-05-29 ENCOUNTER — APPOINTMENT (OUTPATIENT)
Dept: RADIOLOGY | Facility: HOSPITAL | Age: 48
DRG: 481 | End: 2023-05-29
Attending: FAMILY MEDICINE
Payer: COMMERCIAL

## 2023-05-29 ENCOUNTER — ANESTHESIA (OUTPATIENT)
Dept: SURGERY | Facility: HOSPITAL | Age: 48
DRG: 481 | End: 2023-05-29
Payer: COMMERCIAL

## 2023-05-29 ENCOUNTER — APPOINTMENT (OUTPATIENT)
Dept: RADIOLOGY | Facility: HOSPITAL | Age: 48
DRG: 481 | End: 2023-05-29
Attending: STUDENT IN AN ORGANIZED HEALTH CARE EDUCATION/TRAINING PROGRAM
Payer: COMMERCIAL

## 2023-05-29 ENCOUNTER — APPOINTMENT (OUTPATIENT)
Dept: RADIOLOGY | Facility: HOSPITAL | Age: 48
DRG: 481 | End: 2023-05-29
Attending: HOSPITALIST
Payer: COMMERCIAL

## 2023-05-29 LAB
ALBUMIN SERPL BCG-MCNC: 3.4 G/DL (ref 3.5–5.2)
ALBUMIN SERPL BCG-MCNC: 4.4 G/DL (ref 3.5–5.2)
ALP SERPL-CCNC: 53 U/L (ref 40–129)
ALP SERPL-CCNC: 71 U/L (ref 40–129)
ALT SERPL W P-5'-P-CCNC: 13 U/L (ref 10–50)
ALT SERPL W P-5'-P-CCNC: 9 U/L (ref 10–50)
ANION GAP SERPL CALCULATED.3IONS-SCNC: 11 MMOL/L (ref 7–15)
ANION GAP SERPL CALCULATED.3IONS-SCNC: 8 MMOL/L (ref 7–15)
AST SERPL W P-5'-P-CCNC: 14 U/L (ref 10–50)
AST SERPL W P-5'-P-CCNC: 20 U/L (ref 10–50)
BILIRUB SERPL-MCNC: 0.2 MG/DL
BILIRUB SERPL-MCNC: 0.3 MG/DL
BUN SERPL-MCNC: 13.6 MG/DL (ref 6–20)
BUN SERPL-MCNC: 9.6 MG/DL (ref 6–20)
CALCIUM SERPL-MCNC: 8.2 MG/DL (ref 8.6–10)
CALCIUM SERPL-MCNC: 9.2 MG/DL (ref 8.6–10)
CHLORIDE SERPL-SCNC: 102 MMOL/L (ref 98–107)
CHLORIDE SERPL-SCNC: 109 MMOL/L (ref 98–107)
CREAT SERPL-MCNC: 0.84 MG/DL (ref 0.67–1.17)
CREAT SERPL-MCNC: 0.85 MG/DL (ref 0.67–1.17)
DEPRECATED HCO3 PLAS-SCNC: 25 MMOL/L (ref 22–29)
DEPRECATED HCO3 PLAS-SCNC: 25 MMOL/L (ref 22–29)
ERYTHROCYTE [DISTWIDTH] IN BLOOD BY AUTOMATED COUNT: 13.6 % (ref 10–15)
FERRITIN SERPL-MCNC: 15 NG/ML (ref 31–409)
GFR SERPL CREATININE-BSD FRML MDRD: >90 ML/MIN/1.73M2
GFR SERPL CREATININE-BSD FRML MDRD: >90 ML/MIN/1.73M2
GLUCOSE SERPL-MCNC: 113 MG/DL (ref 70–99)
GLUCOSE SERPL-MCNC: 119 MG/DL (ref 70–99)
HCT VFR BLD AUTO: 26.5 % (ref 40–53)
HGB BLD-MCNC: 8.3 G/DL (ref 13.3–17.7)
IRON BINDING CAPACITY (ROCHE): 402 UG/DL (ref 240–430)
IRON SATN MFR SERPL: 6 % (ref 15–46)
IRON SERPL-MCNC: 23 UG/DL (ref 61–157)
MCH RBC QN AUTO: 26.6 PG (ref 26.5–33)
MCHC RBC AUTO-ENTMCNC: 31.3 G/DL (ref 31.5–36.5)
MCV RBC AUTO: 85 FL (ref 78–100)
PLATELET # BLD AUTO: 210 10E3/UL (ref 150–450)
POTASSIUM SERPL-SCNC: 3.8 MMOL/L (ref 3.4–5.3)
POTASSIUM SERPL-SCNC: 4 MMOL/L (ref 3.4–5.3)
PROT SERPL-MCNC: 5.6 G/DL (ref 6.4–8.3)
PROT SERPL-MCNC: 6.9 G/DL (ref 6.4–8.3)
RBC # BLD AUTO: 3.12 10E6/UL (ref 4.4–5.9)
SARS-COV-2 RNA RESP QL NAA+PROBE: NEGATIVE
SODIUM SERPL-SCNC: 138 MMOL/L (ref 136–145)
SODIUM SERPL-SCNC: 142 MMOL/L (ref 136–145)
TROPONIN T SERPL HS-MCNC: 29 NG/L
VIT B12 SERPL-MCNC: 304 PG/ML (ref 232–1245)
WBC # BLD AUTO: 4.9 10E3/UL (ref 4–11)

## 2023-05-29 PROCEDURE — 272N000001 HC OR GENERAL SUPPLY STERILE: Performed by: STUDENT IN AN ORGANIZED HEALTH CARE EDUCATION/TRAINING PROGRAM

## 2023-05-29 PROCEDURE — 250N000011 HC RX IP 250 OP 636: Performed by: STUDENT IN AN ORGANIZED HEALTH CARE EDUCATION/TRAINING PROGRAM

## 2023-05-29 PROCEDURE — 258N000003 HC RX IP 258 OP 636: Performed by: NURSE ANESTHETIST, CERTIFIED REGISTERED

## 2023-05-29 PROCEDURE — 250N000011 HC RX IP 250 OP 636: Performed by: NURSE ANESTHETIST, CERTIFIED REGISTERED

## 2023-05-29 PROCEDURE — 250N000013 HC RX MED GY IP 250 OP 250 PS 637: Performed by: STUDENT IN AN ORGANIZED HEALTH CARE EDUCATION/TRAINING PROGRAM

## 2023-05-29 PROCEDURE — 0QSB06Z REPOSITION RIGHT LOWER FEMUR WITH INTRAMEDULLARY INTERNAL FIXATION DEVICE, OPEN APPROACH: ICD-10-PCS | Performed by: STUDENT IN AN ORGANIZED HEALTH CARE EDUCATION/TRAINING PROGRAM

## 2023-05-29 PROCEDURE — 278N000051 HC OR IMPLANT GENERAL: Performed by: STUDENT IN AN ORGANIZED HEALTH CARE EDUCATION/TRAINING PROGRAM

## 2023-05-29 PROCEDURE — 120N000001 HC R&B MED SURG/OB

## 2023-05-29 PROCEDURE — 258N000003 HC RX IP 258 OP 636: Performed by: EMERGENCY MEDICINE

## 2023-05-29 PROCEDURE — 250N000011 HC RX IP 250 OP 636: Performed by: ORTHOPAEDIC SURGERY

## 2023-05-29 PROCEDURE — 87635 SARS-COV-2 COVID-19 AMP PRB: CPT | Performed by: HOSPITALIST

## 2023-05-29 PROCEDURE — 36415 COLL VENOUS BLD VENIPUNCTURE: CPT | Performed by: HOSPITALIST

## 2023-05-29 PROCEDURE — 250N000011 HC RX IP 250 OP 636: Performed by: HOSPITALIST

## 2023-05-29 PROCEDURE — 99207 PR APP CREDIT; MD BILLING SHARED VISIT: CPT | Performed by: FAMILY MEDICINE

## 2023-05-29 PROCEDURE — 73620 X-RAY EXAM OF FOOT: CPT | Mod: RT

## 2023-05-29 PROCEDURE — 71045 X-RAY EXAM CHEST 1 VIEW: CPT

## 2023-05-29 PROCEDURE — 999N000141 HC STATISTIC PRE-PROCEDURE NURSING ASSESSMENT: Performed by: STUDENT IN AN ORGANIZED HEALTH CARE EDUCATION/TRAINING PROGRAM

## 2023-05-29 PROCEDURE — C1769 GUIDE WIRE: HCPCS | Performed by: STUDENT IN AN ORGANIZED HEALTH CARE EDUCATION/TRAINING PROGRAM

## 2023-05-29 PROCEDURE — 250N000011 HC RX IP 250 OP 636: Performed by: ANESTHESIOLOGY

## 2023-05-29 PROCEDURE — 80053 COMPREHEN METABOLIC PANEL: CPT | Performed by: HOSPITALIST

## 2023-05-29 PROCEDURE — 250N000013 HC RX MED GY IP 250 OP 250 PS 637: Performed by: HOSPITALIST

## 2023-05-29 PROCEDURE — 250N000013 HC RX MED GY IP 250 OP 250 PS 637: Performed by: ORTHOPAEDIC SURGERY

## 2023-05-29 PROCEDURE — 999N000157 HC STATISTIC RCP TIME EA 10 MIN

## 2023-05-29 PROCEDURE — C1713 ANCHOR/SCREW BN/BN,TIS/BN: HCPCS | Performed by: STUDENT IN AN ORGANIZED HEALTH CARE EDUCATION/TRAINING PROGRAM

## 2023-05-29 PROCEDURE — 258N000003 HC RX IP 258 OP 636: Performed by: ANESTHESIOLOGY

## 2023-05-29 PROCEDURE — 710N000009 HC RECOVERY PHASE 1, LEVEL 1, PER MIN: Performed by: STUDENT IN AN ORGANIZED HEALTH CARE EDUCATION/TRAINING PROGRAM

## 2023-05-29 PROCEDURE — 250N000025 HC SEVOFLURANE, PER MIN: Performed by: STUDENT IN AN ORGANIZED HEALTH CARE EDUCATION/TRAINING PROGRAM

## 2023-05-29 PROCEDURE — 360N000084 HC SURGERY LEVEL 4 W/ FLUORO, PER MIN: Performed by: STUDENT IN AN ORGANIZED HEALTH CARE EDUCATION/TRAINING PROGRAM

## 2023-05-29 PROCEDURE — 85027 COMPLETE CBC AUTOMATED: CPT | Performed by: HOSPITALIST

## 2023-05-29 PROCEDURE — 370N000017 HC ANESTHESIA TECHNICAL FEE, PER MIN: Performed by: STUDENT IN AN ORGANIZED HEALTH CARE EDUCATION/TRAINING PROGRAM

## 2023-05-29 PROCEDURE — 250N000009 HC RX 250: Performed by: NURSE ANESTHETIST, CERTIFIED REGISTERED

## 2023-05-29 PROCEDURE — 999N000180 XR SURGERY CARM FLUORO LESS THAN 5 MIN

## 2023-05-29 PROCEDURE — 73552 X-RAY EXAM OF FEMUR 2/>: CPT | Mod: RT

## 2023-05-29 PROCEDURE — 99223 1ST HOSP IP/OBS HIGH 75: CPT | Mod: AI | Performed by: HOSPITALIST

## 2023-05-29 DEVICE — IMP SCR SYN OPTILINK VA ST T25 5.0X85MM 42.231.285: Type: IMPLANTABLE DEVICE | Site: LEG | Status: FUNCTIONAL

## 2023-05-29 DEVICE — LOW PROF LCKNG SCREW F/IM NAIL Ø 5.0MM / L 36MM / XL25/ STER: Type: IMPLANTABLE DEVICE | Site: LEG | Status: FUNCTIONAL

## 2023-05-29 DEVICE — IMP SCR SYN OPTILINK VA ST T25 5.0X75MM 42.231.275: Type: IMPLANTABLE DEVICE | Site: LEG | Status: FUNCTIONAL

## 2023-05-29 DEVICE — IMP SCR SYN ANG LOCKING 3.5X70MM 02.127.170: Type: IMPLANTABLE DEVICE | Site: LEG | Status: FUNCTIONAL

## 2023-05-29 RX ORDER — HYDROMORPHONE HYDROCHLORIDE 1 MG/ML
0.4 INJECTION, SOLUTION INTRAMUSCULAR; INTRAVENOUS; SUBCUTANEOUS EVERY 5 MIN PRN
Status: DISCONTINUED | OUTPATIENT
Start: 2023-05-29 | End: 2023-05-29 | Stop reason: HOSPADM

## 2023-05-29 RX ORDER — FENTANYL CITRATE 50 UG/ML
25 INJECTION, SOLUTION INTRAMUSCULAR; INTRAVENOUS EVERY 5 MIN PRN
Status: DISCONTINUED | OUTPATIENT
Start: 2023-05-29 | End: 2023-05-29 | Stop reason: HOSPADM

## 2023-05-29 RX ORDER — TRANEXAMIC ACID 100 MG/ML
INJECTION, SOLUTION INTRAVENOUS
Status: DISCONTINUED
Start: 2023-05-29 | End: 2023-05-29 | Stop reason: HOSPADM

## 2023-05-29 RX ORDER — PANTOPRAZOLE SODIUM 40 MG/1
40 TABLET, DELAYED RELEASE ORAL
Status: DISCONTINUED | OUTPATIENT
Start: 2023-05-29 | End: 2023-06-01 | Stop reason: HOSPADM

## 2023-05-29 RX ORDER — ONDANSETRON 4 MG/1
4 TABLET, ORALLY DISINTEGRATING ORAL EVERY 6 HOURS PRN
Status: DISCONTINUED | OUTPATIENT
Start: 2023-05-29 | End: 2023-05-29

## 2023-05-29 RX ORDER — TRAMADOL HYDROCHLORIDE 50 MG/1
50 TABLET ORAL EVERY MORNING
Status: DISCONTINUED | OUTPATIENT
Start: 2023-05-29 | End: 2023-05-30

## 2023-05-29 RX ORDER — ACETAMINOPHEN 325 MG/1
650 TABLET ORAL EVERY 4 HOURS PRN
Status: DISCONTINUED | OUTPATIENT
Start: 2023-06-01 | End: 2023-06-01 | Stop reason: HOSPADM

## 2023-05-29 RX ORDER — ACETAMINOPHEN 325 MG/1
975 TABLET ORAL EVERY 8 HOURS SCHEDULED
Status: COMPLETED | OUTPATIENT
Start: 2023-05-29 | End: 2023-06-01

## 2023-05-29 RX ORDER — SODIUM CHLORIDE, SODIUM LACTATE, POTASSIUM CHLORIDE, CALCIUM CHLORIDE 600; 310; 30; 20 MG/100ML; MG/100ML; MG/100ML; MG/100ML
INJECTION, SOLUTION INTRAVENOUS CONTINUOUS
Status: DISCONTINUED | OUTPATIENT
Start: 2023-05-29 | End: 2023-05-29 | Stop reason: HOSPADM

## 2023-05-29 RX ORDER — NALOXONE HYDROCHLORIDE 0.4 MG/ML
0.2 INJECTION, SOLUTION INTRAMUSCULAR; INTRAVENOUS; SUBCUTANEOUS
Status: DISCONTINUED | OUTPATIENT
Start: 2023-05-29 | End: 2023-06-01 | Stop reason: HOSPADM

## 2023-05-29 RX ORDER — POLYETHYLENE GLYCOL 3350 17 G/17G
17 POWDER, FOR SOLUTION ORAL DAILY
Status: DISCONTINUED | OUTPATIENT
Start: 2023-05-29 | End: 2023-05-29

## 2023-05-29 RX ORDER — CEFAZOLIN SODIUM 1 G/3ML
1 INJECTION, POWDER, FOR SOLUTION INTRAMUSCULAR; INTRAVENOUS EVERY 8 HOURS
Status: COMPLETED | OUTPATIENT
Start: 2023-05-29 | End: 2023-05-30

## 2023-05-29 RX ORDER — CEFAZOLIN SODIUM/WATER 2 G/20 ML
2 SYRINGE (ML) INTRAVENOUS SEE ADMIN INSTRUCTIONS
Status: DISCONTINUED | OUTPATIENT
Start: 2023-05-29 | End: 2023-05-29 | Stop reason: HOSPADM

## 2023-05-29 RX ORDER — PROCHLORPERAZINE MALEATE 10 MG
10 TABLET ORAL EVERY 6 HOURS PRN
Status: DISCONTINUED | OUTPATIENT
Start: 2023-05-29 | End: 2023-06-01 | Stop reason: HOSPADM

## 2023-05-29 RX ORDER — CEFTRIAXONE 1 G/1
1 INJECTION, POWDER, FOR SOLUTION INTRAMUSCULAR; INTRAVENOUS EVERY 24 HOURS
Status: DISCONTINUED | OUTPATIENT
Start: 2023-05-29 | End: 2023-05-31 | Stop reason: ALTCHOICE

## 2023-05-29 RX ORDER — ACETAMINOPHEN 325 MG/1
650 TABLET ORAL EVERY 6 HOURS PRN
Status: DISCONTINUED | OUTPATIENT
Start: 2023-05-29 | End: 2023-05-29

## 2023-05-29 RX ORDER — HYDROXYZINE HYDROCHLORIDE 25 MG/1
25 TABLET, FILM COATED ORAL EVERY 6 HOURS PRN
Status: DISCONTINUED | OUTPATIENT
Start: 2023-05-29 | End: 2023-06-01 | Stop reason: HOSPADM

## 2023-05-29 RX ORDER — ONDANSETRON 2 MG/ML
INJECTION INTRAMUSCULAR; INTRAVENOUS PRN
Status: DISCONTINUED | OUTPATIENT
Start: 2023-05-29 | End: 2023-05-29

## 2023-05-29 RX ORDER — PROPOFOL 10 MG/ML
INJECTION, EMULSION INTRAVENOUS CONTINUOUS PRN
Status: DISCONTINUED | OUTPATIENT
Start: 2023-05-29 | End: 2023-05-29

## 2023-05-29 RX ORDER — BISACODYL 10 MG
10 SUPPOSITORY, RECTAL RECTAL DAILY PRN
Status: DISCONTINUED | OUTPATIENT
Start: 2023-05-29 | End: 2023-06-01 | Stop reason: HOSPADM

## 2023-05-29 RX ORDER — PREGABALIN 25 MG/1
50 CAPSULE ORAL DAILY
Status: DISCONTINUED | OUTPATIENT
Start: 2023-05-29 | End: 2023-06-01 | Stop reason: HOSPADM

## 2023-05-29 RX ORDER — CEFAZOLIN SODIUM/WATER 2 G/20 ML
2 SYRINGE (ML) INTRAVENOUS
Status: COMPLETED | OUTPATIENT
Start: 2023-05-29 | End: 2023-05-29

## 2023-05-29 RX ORDER — PROPOFOL 10 MG/ML
INJECTION, EMULSION INTRAVENOUS PRN
Status: DISCONTINUED | OUTPATIENT
Start: 2023-05-29 | End: 2023-05-29

## 2023-05-29 RX ORDER — CEPHALEXIN 500 MG/1
500 CAPSULE ORAL EVERY 6 HOURS SCHEDULED
Status: DISCONTINUED | OUTPATIENT
Start: 2023-05-30 | End: 2023-06-01 | Stop reason: HOSPADM

## 2023-05-29 RX ORDER — HYDROMORPHONE HCL IN WATER/PF 6 MG/30 ML
0.2 PATIENT CONTROLLED ANALGESIA SYRINGE INTRAVENOUS
Status: DISCONTINUED | OUTPATIENT
Start: 2023-05-29 | End: 2023-06-01 | Stop reason: HOSPADM

## 2023-05-29 RX ORDER — HALOPERIDOL 5 MG/ML
1 INJECTION INTRAMUSCULAR
Status: DISCONTINUED | OUTPATIENT
Start: 2023-05-29 | End: 2023-05-29 | Stop reason: HOSPADM

## 2023-05-29 RX ORDER — ONDANSETRON 4 MG/1
4 TABLET, ORALLY DISINTEGRATING ORAL EVERY 6 HOURS PRN
Status: DISCONTINUED | OUTPATIENT
Start: 2023-05-29 | End: 2023-06-01 | Stop reason: HOSPADM

## 2023-05-29 RX ORDER — HYDROMORPHONE HCL IN WATER/PF 6 MG/30 ML
0.4 PATIENT CONTROLLED ANALGESIA SYRINGE INTRAVENOUS
Status: DISCONTINUED | OUTPATIENT
Start: 2023-05-29 | End: 2023-05-29

## 2023-05-29 RX ORDER — NALOXONE HYDROCHLORIDE 0.4 MG/ML
0.4 INJECTION, SOLUTION INTRAMUSCULAR; INTRAVENOUS; SUBCUTANEOUS
Status: DISCONTINUED | OUTPATIENT
Start: 2023-05-29 | End: 2023-06-01 | Stop reason: HOSPADM

## 2023-05-29 RX ORDER — VANCOMYCIN HYDROCHLORIDE 1 G/20ML
2 INJECTION, POWDER, LYOPHILIZED, FOR SOLUTION INTRAVENOUS ONCE
Status: COMPLETED | OUTPATIENT
Start: 2023-05-29 | End: 2023-05-29

## 2023-05-29 RX ORDER — POLYETHYLENE GLYCOL 3350 17 G/17G
17 POWDER, FOR SOLUTION ORAL DAILY
Status: DISCONTINUED | OUTPATIENT
Start: 2023-05-30 | End: 2023-06-01 | Stop reason: HOSPADM

## 2023-05-29 RX ORDER — SODIUM CHLORIDE, SODIUM LACTATE, POTASSIUM CHLORIDE, CALCIUM CHLORIDE 600; 310; 30; 20 MG/100ML; MG/100ML; MG/100ML; MG/100ML
INJECTION, SOLUTION INTRAVENOUS CONTINUOUS
Status: DISCONTINUED | OUTPATIENT
Start: 2023-05-29 | End: 2023-06-01

## 2023-05-29 RX ORDER — LIDOCAINE 40 MG/G
CREAM TOPICAL
Status: DISCONTINUED | OUTPATIENT
Start: 2023-05-29 | End: 2023-05-30

## 2023-05-29 RX ORDER — ONDANSETRON 2 MG/ML
4 INJECTION INTRAMUSCULAR; INTRAVENOUS EVERY 6 HOURS PRN
Status: DISCONTINUED | OUTPATIENT
Start: 2023-05-29 | End: 2023-05-29

## 2023-05-29 RX ORDER — OXYCODONE HYDROCHLORIDE 5 MG/1
5 TABLET ORAL EVERY 4 HOURS PRN
Status: DISCONTINUED | OUTPATIENT
Start: 2023-05-29 | End: 2023-05-30

## 2023-05-29 RX ORDER — ONDANSETRON 4 MG/1
4 TABLET, ORALLY DISINTEGRATING ORAL EVERY 30 MIN PRN
Status: DISCONTINUED | OUTPATIENT
Start: 2023-05-29 | End: 2023-05-29 | Stop reason: HOSPADM

## 2023-05-29 RX ORDER — KETOROLAC TROMETHAMINE 30 MG/ML
15 INJECTION, SOLUTION INTRAMUSCULAR; INTRAVENOUS ONCE
Status: COMPLETED | OUTPATIENT
Start: 2023-05-29 | End: 2023-05-29

## 2023-05-29 RX ORDER — ONDANSETRON 2 MG/ML
4 INJECTION INTRAMUSCULAR; INTRAVENOUS EVERY 30 MIN PRN
Status: DISCONTINUED | OUTPATIENT
Start: 2023-05-29 | End: 2023-05-29 | Stop reason: HOSPADM

## 2023-05-29 RX ORDER — ACETAMINOPHEN 650 MG/1
650 SUPPOSITORY RECTAL EVERY 6 HOURS PRN
Status: DISCONTINUED | OUTPATIENT
Start: 2023-05-29 | End: 2023-06-01 | Stop reason: HOSPADM

## 2023-05-29 RX ORDER — ONDANSETRON 2 MG/ML
4 INJECTION INTRAMUSCULAR; INTRAVENOUS EVERY 6 HOURS PRN
Status: DISCONTINUED | OUTPATIENT
Start: 2023-05-29 | End: 2023-06-01 | Stop reason: HOSPADM

## 2023-05-29 RX ORDER — FENTANYL CITRATE 50 UG/ML
50 INJECTION, SOLUTION INTRAMUSCULAR; INTRAVENOUS EVERY 5 MIN PRN
Status: DISCONTINUED | OUTPATIENT
Start: 2023-05-29 | End: 2023-05-29 | Stop reason: HOSPADM

## 2023-05-29 RX ORDER — LIDOCAINE 40 MG/G
CREAM TOPICAL
Status: DISCONTINUED | OUTPATIENT
Start: 2023-05-29 | End: 2023-06-01 | Stop reason: HOSPADM

## 2023-05-29 RX ORDER — HYDROMORPHONE HCL IN WATER/PF 6 MG/30 ML
0.4 PATIENT CONTROLLED ANALGESIA SYRINGE INTRAVENOUS
Status: DISCONTINUED | OUTPATIENT
Start: 2023-05-29 | End: 2023-06-01 | Stop reason: HOSPADM

## 2023-05-29 RX ORDER — OXYCODONE HYDROCHLORIDE 5 MG/1
10 TABLET ORAL EVERY 4 HOURS PRN
Status: DISCONTINUED | OUTPATIENT
Start: 2023-05-29 | End: 2023-05-30

## 2023-05-29 RX ORDER — HYDROMORPHONE HYDROCHLORIDE 1 MG/ML
0.2 INJECTION, SOLUTION INTRAMUSCULAR; INTRAVENOUS; SUBCUTANEOUS EVERY 5 MIN PRN
Status: DISCONTINUED | OUTPATIENT
Start: 2023-05-29 | End: 2023-05-29 | Stop reason: HOSPADM

## 2023-05-29 RX ORDER — TRANEXAMIC ACID 650 MG/1
1950 TABLET ORAL ONCE
Status: COMPLETED | OUTPATIENT
Start: 2023-05-29 | End: 2023-05-29

## 2023-05-29 RX ORDER — BISACODYL 5 MG
10 TABLET, DELAYED RELEASE (ENTERIC COATED) ORAL EVERY OTHER DAY
Status: DISCONTINUED | OUTPATIENT
Start: 2023-05-29 | End: 2023-05-30

## 2023-05-29 RX ORDER — CARVEDILOL 3.12 MG/1
6.25 TABLET ORAL DAILY
Status: DISCONTINUED | OUTPATIENT
Start: 2023-05-29 | End: 2023-06-01 | Stop reason: HOSPADM

## 2023-05-29 RX ORDER — AMOXICILLIN 250 MG
1 CAPSULE ORAL 2 TIMES DAILY
Status: DISCONTINUED | OUTPATIENT
Start: 2023-05-29 | End: 2023-06-01 | Stop reason: HOSPADM

## 2023-05-29 RX ORDER — HYDROMORPHONE HCL IN WATER/PF 6 MG/30 ML
0.2 PATIENT CONTROLLED ANALGESIA SYRINGE INTRAVENOUS
Status: DISCONTINUED | OUTPATIENT
Start: 2023-05-29 | End: 2023-05-29

## 2023-05-29 RX ADMIN — SODIUM CHLORIDE, POTASSIUM CHLORIDE, SODIUM LACTATE AND CALCIUM CHLORIDE: 600; 310; 30; 20 INJECTION, SOLUTION INTRAVENOUS at 16:12

## 2023-05-29 RX ADMIN — PROPOFOL 20 MG: 10 INJECTION, EMULSION INTRAVENOUS at 14:49

## 2023-05-29 RX ADMIN — SUGAMMADEX 300 MG: 100 INJECTION, SOLUTION INTRAVENOUS at 17:41

## 2023-05-29 RX ADMIN — KETOROLAC TROMETHAMINE 15 MG: 30 INJECTION, SOLUTION INTRAMUSCULAR; INTRAVENOUS at 18:41

## 2023-05-29 RX ADMIN — ACETAMINOPHEN 975 MG: 325 TABLET ORAL at 21:32

## 2023-05-29 RX ADMIN — PROPOFOL 70 MG: 10 INJECTION, EMULSION INTRAVENOUS at 14:53

## 2023-05-29 RX ADMIN — HYDROMORPHONE HYDROCHLORIDE 0.4 MG: 1 INJECTION, SOLUTION INTRAMUSCULAR; INTRAVENOUS; SUBCUTANEOUS at 18:32

## 2023-05-29 RX ADMIN — OXYCODONE HYDROCHLORIDE 5 MG: 5 TABLET ORAL at 20:31

## 2023-05-29 RX ADMIN — PROPOFOL 100 MCG/KG/MIN: 10 INJECTION, EMULSION INTRAVENOUS at 14:49

## 2023-05-29 RX ADMIN — TRANEXAMIC ACID 1950 MG: 650 TABLET ORAL at 13:56

## 2023-05-29 RX ADMIN — ONDANSETRON 4 MG: 2 INJECTION INTRAMUSCULAR; INTRAVENOUS at 14:42

## 2023-05-29 RX ADMIN — FENTANYL CITRATE 50 MCG: 50 INJECTION, SOLUTION INTRAMUSCULAR; INTRAVENOUS at 18:20

## 2023-05-29 RX ADMIN — SODIUM CHLORIDE, POTASSIUM CHLORIDE, SODIUM LACTATE AND CALCIUM CHLORIDE: 600; 310; 30; 20 INJECTION, SOLUTION INTRAVENOUS at 14:42

## 2023-05-29 RX ADMIN — PHENYLEPHRINE HYDROCHLORIDE 150 MCG: 10 INJECTION INTRAVENOUS at 15:10

## 2023-05-29 RX ADMIN — SODIUM CHLORIDE: 9 INJECTION, SOLUTION INTRAVENOUS at 08:49

## 2023-05-29 RX ADMIN — FENTANYL CITRATE 50 MCG: 50 INJECTION, SOLUTION INTRAMUSCULAR; INTRAVENOUS at 18:25

## 2023-05-29 RX ADMIN — ROCURONIUM BROMIDE 20 MG: 50 INJECTION, SOLUTION INTRAVENOUS at 15:35

## 2023-05-29 RX ADMIN — SODIUM CHLORIDE, POTASSIUM CHLORIDE, SODIUM LACTATE AND CALCIUM CHLORIDE: 600; 310; 30; 20 INJECTION, SOLUTION INTRAVENOUS at 15:20

## 2023-05-29 RX ADMIN — PREGABALIN 50 MG: 25 CAPSULE ORAL at 08:50

## 2023-05-29 RX ADMIN — ROCURONIUM BROMIDE 30 MG: 50 INJECTION, SOLUTION INTRAVENOUS at 14:53

## 2023-05-29 RX ADMIN — Medication 1 TABLET: at 08:49

## 2023-05-29 RX ADMIN — PANTOPRAZOLE SODIUM 40 MG: 40 TABLET, DELAYED RELEASE ORAL at 08:50

## 2023-05-29 RX ADMIN — PHENYLEPHRINE HYDROCHLORIDE 0.3 MCG/KG/MIN: 10 INJECTION INTRAVENOUS at 15:23

## 2023-05-29 RX ADMIN — APIXABAN 2.5 MG: 2.5 TABLET, FILM COATED ORAL at 20:23

## 2023-05-29 RX ADMIN — BISACODYL 10 MG: 5 TABLET, COATED ORAL at 20:23

## 2023-05-29 RX ADMIN — Medication 2 G: at 14:57

## 2023-05-29 RX ADMIN — CEFAZOLIN 1 G: 1 INJECTION, POWDER, FOR SOLUTION INTRAMUSCULAR; INTRAVENOUS at 22:32

## 2023-05-29 RX ADMIN — CARVEDILOL 6.25 MG: 3.12 TABLET, FILM COATED ORAL at 08:50

## 2023-05-29 RX ADMIN — PHENYLEPHRINE HYDROCHLORIDE 150 MCG: 10 INJECTION INTRAVENOUS at 15:03

## 2023-05-29 RX ADMIN — MIDAZOLAM 2 MG: 1 INJECTION INTRAMUSCULAR; INTRAVENOUS at 14:42

## 2023-05-29 RX ADMIN — CEFTRIAXONE SODIUM 1 G: 1 INJECTION, POWDER, FOR SOLUTION INTRAMUSCULAR; INTRAVENOUS at 21:23

## 2023-05-29 ASSESSMENT — ACTIVITIES OF DAILY LIVING (ADL)
ADLS_ACUITY_SCORE: 20
ADLS_ACUITY_SCORE: 35
ADLS_ACUITY_SCORE: 18
ADLS_ACUITY_SCORE: 35
ADLS_ACUITY_SCORE: 35
ADLS_ACUITY_SCORE: 18
ADLS_ACUITY_SCORE: 35
ADLS_ACUITY_SCORE: 31
ADLS_ACUITY_SCORE: 35
ADLS_ACUITY_SCORE: 20
ADLS_ACUITY_SCORE: 35
ADLS_ACUITY_SCORE: 18

## 2023-05-29 NOTE — PROGRESS NOTES
[facility] called, updated status/report given. Pt transferred in current bed due to R hip fx down to [facility] accompanied by [facility] staff & aide.

## 2023-05-29 NOTE — PROGRESS NOTES
Spoke with patient about CPAP. Patient stated he would have his wife bring his home unit in to use and declined hospital unit.     Marcell Avery, RT

## 2023-05-29 NOTE — ANESTHESIA POSTPROCEDURE EVALUATION
Patient: Kinza Teresa    Procedure: Procedure(s):  OPEN REDUCTION INTERNAL FIXATION, FRACTURE, FEMUR       Anesthesia Type:  MAC    Note:  Disposition: Inpatient   Postop Pain Control: Uneventful            Sign Out: Well controlled pain   PONV: No   Neuro/Psych: Uneventful            Sign Out: Acceptable/Baseline neuro status   Airway/Respiratory: Uneventful            Sign Out: Acceptable/Baseline resp. status   CV/Hemodynamics: Uneventful            Sign Out: Acceptable CV status; No obvious hypovolemia; No obvious fluid overload   Other NRE: NONE   DID A NON-ROUTINE EVENT OCCUR? No           Last vitals:  Vitals Value Taken Time   /87 05/29/23 1804   Temp 36.9  C (98.5  F) 05/29/23 1753   Pulse 92 05/29/23 1810   Resp 21 05/29/23 1810   SpO2 100 % 05/29/23 1810   Vitals shown include unvalidated device data.    Electronically Signed By: Miguel Hernandez MD  May 29, 2023  6:11 PM

## 2023-05-29 NOTE — ANESTHESIA PROCEDURE NOTES
Airway       Patient location during procedure: OR       Procedure Start/Stop Times: 5/29/2023 2:56 PM  Staff -        CRNA: Mee Contreras APRN CRNA       Performed By: CRNA  Consent for Airway        Urgency: elective  Indications and Patient Condition       Indications for airway management: matilda-procedural       Induction type:intravenous       Mask difficulty assessment: 0 - not attempted    Final Airway Details       Final airway type: supraglottic airway    Supraglottic Airway Details        Type: LMA       Brand: Ambu AuraGain       LMA size: 4    Post intubation assessment        Placement verified by: capnometry, equal breath sounds and chest rise        Number of attempts at approach: 1       Number of other approaches attempted: 0       Ease of procedure: easy       Dentition: Intact and Unchanged    Medication(s) Administered   Medication Administration Time: 5/29/2023 2:56 PM

## 2023-05-29 NOTE — OP NOTE
PATIENT: Kinza Teresa  MR# :   [id number]  DATE OF OPERATION: 05/29/23    SURGEON:  Wilfredo Sweeney MD     ASSISTANT:  Philipp Holt PA-C     A skilled assistant was required due to the nature of the case for patient position, retraction, exposure, implant placement, closure and dressing application.      Preoperative diagnosis: Right supracondylar distal femur fracture, primarily extra-articular with small nondisplaced intra-articular trochlear extension  Disuse osteopenia  CHERYLE A T11 SCI paraplegia  Neurogenic bladder  Previous ESBL    Postoperative diagnosis: Right supracondylar distal femur fracture, primarily extra-articular with small nondisplaced intra-articular trochlear extension  Disuse osteopenia  CHERYLE A T11 SCI paraplegia  Neurogenic bladder  Previous ESBL    Surgical procedure: Closed reduction internal fixation right distal femur fracture with retrograde intramedullary femoral yael    Anesthesia:  LMA    Drains: None    Estimated blood loss: 100 cc    IV fluids: Please see Anesthesia Report    Complications: None identified intraoperatively     Blood products: none given     Specimens: * No specimens in log *    Findings: Right supracondylar distal femur fracture, primarily extra-articular, comminuted in the metadiaphyseal region with intact articular bone block. Significant disuse osteopenia/bone demineralization secondary to SCI paraplegia and NWB status at baseline.    COMPONENTS IMPLANTED:  Implant Name Type Inv. Item Serial No.  Lot No. LRB No. Used Action   RFNA/ 12MM / 360MM 5 Degree BEND Metallic Hardware/[place]   Depuy [id number] Right 1 Implanted   Locking Attachment Washer  F/RFNA/5DEG/RIGHT/STER Metallic Hardware/[place]   Depuy [id number] Right 1 Implanted   LOW PROF LCKNG SCREW F/IM NAIL 0 5.0MM/L 86MM/XL25/STER    Depuy [id number] Right 1 Implanted   LOW PROF LCKNG SCREW F/IM NAIL  5.0MM/L 86MM/XL25/STER Metallic Hardware/[place]   Depuy [id number] Right 1 Implanted   LOW PROF LCKNG  SCREW F/IM NAIL 5.0MM/ L 36MM/ XL25/STER Metallic Hardware/[place]   Depuy [id number] Right 1 Implanted   LOW PROF LCKNG SCREW F/IM NAIL 5.0MM/L 36MM /XL25/STER    Depuy [id number] Right 1 Implanted   END CAP FOR RFNA /0MM Metallic Hardware/[place]   Depuy [id number] Right 1 Implanted         INDICATIONS:    Kinza Teresa is a 48 year old male admitted for the above injury after a ground-level fall while attempting to transfer from his wheelchair to the toilet yesterday.  Noted immediate deformity and sound of right lower extremity injury.  Has complete T11 paraplegia, therefore does not have pain, but since injury has noted increased tone/spasticity in limb.    Preoperatively, the nature of the procedure, risks and benefits, as well as alternatives including nonsurgical management were discussed in detail with the patient. I reviewed and discussed the patient's condition and relevant images with the patient. We discussed options for further evaluation and treatment, including conservative non-operative management versus surgical intervention.       From a conservative standpoint, the patient can pursue non-operative management, which would include protected weight bearing to the lower extremity and use of a hinged knee brace versus serial casting.  This option carries a high risk of morbidity and mortality due to prolonged and diminished mobilization and it's associated complications, which include but are not limited to blood clots (DVT/PE), skin ulcerations and pressure sores, and pneumonia.  Additionally, with his dense T11 paraplegia and lack of sensation below the level of the umbilicus, he is at high risk for developing pressure wounds/sores secondary to brace or cast use, in addition to the associated risks such as infections, cellulitis, full-thickness ulcerations, need for surgery.  Additionally, the use of a cast would also likely placement increased risk for adjacent fracture along the proximal or distal aspect of  the cast.  In the long term, there is also the risk of chronic pain, fracture nonunion, fracture malunion.     From a surgical standpoint, we discussed closed reduction vs open reduction and internal fixation. Given, the patient's fracture morphology and degree of displacement, internal fixation would also carry the risks of chronic pain, fracture nonunion, fracture malunion.  He understands that even with closed versus open reduction and internal fixation, there remains the possibility for nonunion/malunion, especially in the setting of his decreased mineralization and bone density secondary to disuse osteopenia as result of his paraplegia.  We discussed the 2 options for fixation, including open reduction internal fixation with plate and screw construct, versus retrograde intramedullary femoral nail.  He understands that the retrograde intramedullary femoral nail with likely allow for a shorter surgical time, less anesthesia, less blood loss, smaller incisions potentially limiting the risk of wound healing complications, need for further surgery, drainage, infections.  Open reduction internal fixation with a plate and screw construct would allow for direct visualization and reduction of articular surface fracture fragments if intra-articular fracture present, but carries the additional risk for larger or more numerous incisions, potential wound healing issues, infections, need for further surgery.  Following this discussion and shared decision-making, the patient elects to proceed with closed versus open reduction internal fixation with retrograde intramedullary femoral nail.     We also discussed at length the risks and benefits of surgery. Our discussion included but was not limited to the risk of pain, bleeding, infection, blood clots (DVT, PE), wound issues, post-operative joint stiffness (knee), difficult arc of motion, difficulty with return to independence/independent transfers, iatrogenic fracture, damage  to nearby neurovascular structures, loosening and/or failure requiring revision. The possibility of intra-operative and/or post-operative medical complications such as anesthesia complications or reactions, respiratory and cardiovascular events, stroke, heart attack and/or death were also discussed. In the case of infection of the joint, the patient understands that this will require prolonged IV antibiotic therapy and possible multiple operative procedures in the future.      The patient demonstrated an understanding of these risks as well as the potential benefits of surgery which would include possible quicker return to independence with transfers, avoidance of prolonged immobilization with a brace/cast, increase likelihood of fracture alignment maintenance and healing, while potentially avoiding the long term concerns of fracture nonunion/malunion that would be associated with non-operative management. The patient demonstrated an understanding of the indications of surgery and all possible complications, and together we have decided to proceed with surgery. Specific details of the surgical procedure, hospitalization, recovery, rehabilitation, and long-term precautions were also presented. The final choices will be made at the time of procedure to match the anatomy and condition of the bone, ligaments, tendons, and muscles. All of patients questions were answered preoperatively at which time informed consent was taken.      PROCEDURE:    After proper identification of the patient including verification and marking of the surgical site, the patient was brought to the operating room.  MAC anesthesia with LMA was given without complications and prophylactic IV Ancef was confirmed to have been administered within the appropriate timeframe(s). The patient was in the supine position on the [place] flat top table, and secured to the bed with a chest strap. All bony prominences were well padded. IV tranexamic acid was  also given.    The surgical site which was properly marked, was then prepped and draped in the usual sterile fashion. A timeout was done utilizing protocol to again identify the correct patient and operative site, which was marked appropriately.    Prior to making a surgical incision, we used closed reduction technique with a translucent triangle limb positioner.  The distal femoral fragment sat in a extended position.  Therefore, with use of traction, knee flexion to 45 degrees and a towel bump in the popliteal fossa at the distal femur, we are able to improve the alignment under orthogonal fluoroscopic views.  We then began by making a 3 cm incision in line with the patellar tendon sharply through the skin and subcutaneous tissue down to the peritenon.  His patellar tendon was noted to be quite thickened medial to lateral, therefore we elected to incise intratendinous in line with our skin incision in order to place the guidepin in appropriate position. Once we were intra-articular, we suctioned the intra-articular lipohemarthrosis.  We then placed the guidepin centrally on the AP view, and just anterior to the posterior Blumensaat line on the lateral view.  This was placed intramedullary up to the level of the comminuted fracture segment.  We then confirmed appropriate placement of the guidepin, and with a sleeve to protect the patient's patellar tendon and intra-articular cruciate ligaments, we used the opening reamer to open the distal femoral canal.  The guidepin was removed.  We then placed the ball-tipped guidewire by hand intramedullary up to the level of the lesser trochanter, again confirmed with fluoroscopic images in orthogonal views.  We measured our planned retrograde femoral nail to be approximately 360 mm.  We then began sequentially reaming the intramedullary canal, starting with 9 mm diameter from cutting reamer, sequentially increasing to a 13.5 mm diameter reamer.  The largest diameter  retrograde femoral nail in the hospital was 12 mm, therefore we finished with the 13.5 mm diameter reamer with moderate diaphyseal cortical chatter.  The 12 x 360 mm retrograde femoral nail was then placed by hand to the appropriate depth, confirming that distally the nail was intraosseous and not proud within the joint.  We also confirmed the appropriate placement at the level of the lesser trochanter proximally.  With use of the distal aiming arm, we planned to place the lateral oblique 5 mm low-profile locking screw.  The skin and subcutaneous tissue were incised, and drill placed from lateral and cephalad to caudal and medial.  We left the drill bit in place, thereby holding our construct in position.      The patient's distal femoral metadiaphyseal bone quality/density was quite poor, therefore in order to maximize fixation of the distal articular block, we elected to place the lateral locking attachment washer, which would allow for up to 4 additional 3.5 mm variable angle locking screws to supplement our construct.  We made an anterolateral parapatellar skin incision starting just proximal to palpable Lorene's tubercle, and ending proximally along the midportion of the distal lateral femur.  This was made sharply through the skin, subcutaneous tissue, IT band in line with the incision, exposing the lateral femoral capsule.  The capsule was left intact.  We then placed at the lateral washer attachment by hand in position, again confirming appropriate positioning proximal to distal and anterior to posterior with fluoroscopic images.  With the lateral aiming arm, we then drilled and placed the two 5 mm interlocking screws through both the lateral locking washer and from lateral to medial interlocking the distal nail.  Once this was secured, we turned our attention back to maintenance of the fracture and securing the nail proximally.    We took fluoroscopic views of the fracture site with both AP and lateral,  again confirming appropriate reduction and maintenance of our alignment.  We then used perfect Saxman technique to place 2 anterior to posterior interlocking screws within the proximal femoral nail.  Skin was incised sharply with 10 blade, and retractors used to safely dissect down to the anterior femur while protecting from nearby neurovascular structures.  The drill was used with perfect Saxman technique, and depth measured prior to placement of the 2 interlocking screws with excellent bite.  Final placement was again confirmed with AP and lateral views.    Lastly, we returned to the distal femur.  We used the aiming arm to place the medial and lateral oblique interlocking 5 mm screws.  We then placed 2 anterior variable angle 3.5 mm locking screws through the anterior aspect of the washer, as well as 1 posterior 3.5 mm interlocking screw after the posterior eyelets were in situ bent for lateral femur congruity.    Following placement of the 7 distal femoral locking or interlocking screws, and 2 proximal interlocking screws, we took final fluoroscopic images of the entirety of the construct to confirm appropriate positioning, that hardware was within bone, extra-articular and the fracture alignment had been maintained.  The distal femoral aiming arm was removed, and we placed a end cap.    The wounds were thoroughly irrigated with sterile saline, including 200 cc of sterile saline within the knee capsule followed by suctioning to remove any bony debris from reaming.  The patella tendon and lateral IT band were closed with interrupted #2 Ethibond suture, followed by a deep subcutaneous closure with #1 Vicryl suture, superficial subcutaneous closure with interrupted 2-0 Vicryl, and skin closed with staples.  The incisions were covered with Xeroform dressing, as well as sterile 4 x 4 and cast padding.  We then elected to place sterile cotton batting along the patient's limb from groin to ankle, with a loose Coban  overwrap to provide support of his lower extremity with a soft cast.  The dressings were removed and the patient transitioned to his hospital bed in stable condition.  His LMA was then removed, and he was transferred to the PACU for ongoing postoperative hemodynamic monitoring.    Sponge, needle, and instrument counts were correct at the conclusion of the procedure. The patient has palpable distal pulses in both lower extremity.     Postoperative Plan:  Pain Control: Continue per pain protocol.  Dressings: soft RLE cotton badding immobilizer in place x7-10 days to keep surgical dressings c/d/i, and provide support for limb  Weight Bearing: Okay to weight-bear with transfers  DVT Prophylaxis: Eliquis 2.5mg BID x4 weeks postop starting 24 hours following surgery  Antibiotics: 24 hours of perioperative antibiotics + Keflex 500mg QID PO while inpatient, and discharge with Duricef 500mg BID PO (total of 7 days postop)  GI: Plan for aggressive bowel regimen to prevent constipation from narcotic medications  Lines: HLIV once tolerating PO  Labs:  Preoperative Hgb 8.3 (down from 10.6 day prior). Hemodynamically stable throughout procedure, but would consider STAT repeat Hgb if shows signs of hemodynamic instability. Repeat Hgb morning of POD1. Possible need for transfusion POD1.  PT/OT: Eval and treatment. Will follow up on recommendations.  Follow up:  in 2 weeks in clinic for wound check  Discharge plan: to home pending PT and social work evaluations    We recommend that the patient follow up with primary care physician upon discharge to discuss bone health and potential medical management in the setting of low energy femur fracture.  We will place orders for supplemental calcium and vitamin D in the meantime.     Dispo: stable to pacu    Wilfredo Sweeney MD  Orthopedic Surgery  [facility]

## 2023-05-29 NOTE — ANESTHESIA PREPROCEDURE EVALUATION
Anesthesia Pre-Procedure Evaluation    Patient: Kinza Teresa   MRN: [id number] : 1975        Procedure : Procedure(s):  OPEN REDUCTION INTERNAL FIXATION, FRACTURE, FEMUR          Past Medical History:   Diagnosis Date     Constipation      Sleep apnea      Spinal cord injury at T7-T12 level with spinal cord lesion (H) 2017     Syncope, unspecified syncope type 2023      Past Surgical History:   Procedure Laterality Date     CYSTOSCOPY, BIOPSY BLADDER, COMBINED N/A 3/30/2021    Procedure: CYSTOSCOPY, WITH BLADDER BIOPSY;  Surgeon: Alejandro Tong MD;  Location: [facility] OR     IMPLANT PROSTHESIS PENIS INFLATABLE N/A 2019    Procedure: Insertion of Inflatable Penile Prosthesis;  Surgeon: Alejandro Tong MD;  Location: [facility] OR     ORTHOPEDIC SURGERY      spinal fx     ORTHOPEDIC SURGERY      pelvic fracture      No Known Allergies   Social History     Tobacco Use     Smoking status: Never     Smokeless tobacco: Never   Vaping Use     Vaping status: Not on file   Substance Use Topics     Alcohol use: No      Wt Readings from Last 1 Encounters:   23 63.5 kg (140 lb)        Anesthesia Evaluation   Pt has had prior anesthetic.         ROS/MED HX  ENT/Pulmonary:  - neg pulmonary ROS   (+) sleep apnea,     Neurologic:  - neg neurologic ROS   (+) peripheral neuropathy, Spinal cord injury, without autonomic hyperflexia symptoms,     Cardiovascular:  - neg cardiovascular ROS     METS/Exercise Tolerance: >4 METS    Hematologic:  - neg hematologic  ROS     Musculoskeletal:       GI/Hepatic:     (+) GERD,     Renal/Genitourinary:  - neg Renal ROS     Endo:  - neg endo ROS     Psychiatric/Substance Use:  - neg psychiatric ROS     Infectious Disease:  - neg infectious disease ROS     Malignancy:  - neg malignancy ROS     Other:  - neg other ROS    (+) , H/O Chronic Pain,        Physical Exam    Airway  airway exam normal      Mallampati: I   TM distance: > 3 FB   Neck ROM: full   Mouth opening: > 3  cm    Respiratory Devices and Support         Dental           Cardiovascular   cardiovascular exam normal       Rhythm and rate: regular     Pulmonary   pulmonary exam normal        breath sounds clear to auscultation           OUTSIDE LABS:  CBC:   Lab Results   Component Value Date    WBC 4.9 05/29/2023    WBC 11.8 (H) 05/28/2023    HGB 8.3 (L) 05/29/2023    HGB 10.6 (L) 05/28/2023    HCT 26.5 (L) 05/29/2023    HCT 33.7 (L) 05/28/2023     05/29/2023     05/28/2023     BMP:   Lab Results   Component Value Date     05/29/2023     05/28/2023    POTASSIUM 3.8 05/29/2023    POTASSIUM 4.0 05/28/2023    CHLORIDE 109 (H) 05/29/2023    CHLORIDE 102 05/28/2023    CO2 25 05/29/2023    CO2 25 05/28/2023    BUN 9.6 05/29/2023    BUN 13.6 05/28/2023    CR 0.85 05/29/2023    CR 0.84 05/28/2023     (H) 05/29/2023     (H) 05/28/2023     COAGS:   Lab Results   Component Value Date    PTT 26 05/28/2023    INR 1.04 05/28/2023     POC:   Lab Results   Component Value Date     (H) 11/14/2019     HEPATIC:   Lab Results   Component Value Date    ALBUMIN 3.4 (L) 05/29/2023    PROTTOTAL 5.6 (L) 05/29/2023    ALT 9 (L) 05/29/2023    AST 14 05/29/2023    ALKPHOS 53 05/29/2023    BILITOTAL 0.3 05/29/2023     OTHER:   Lab Results   Component Value Date    LACT 1.1 04/24/2023    A1C 5.9 (H) 04/13/2023    LAYLA 8.2 (L) 05/29/2023    MAG 1.9 07/22/2019    LIPASE <9 07/22/2019    TSH 1.70 04/25/2023       Anesthesia Plan    ASA Status:  3   NPO Status:  NPO Appropriate    Anesthesia Type: MAC.     - Reason for MAC: straight local not clinically adequate   Induction: Intravenous, Propofol.           Consents    Anesthesia Plan(s) and associated risks, benefits, and realistic alternatives discussed. Questions answered and patient/representative(s) expressed understanding.    - Discussed:     - Discussed with:  Patient      - Extended Intubation/Ventilatory Support Discussed: No.      - Patient is DNR/DNI  Status: No    Use of blood products discussed: No .     Postoperative Care       PONV prophylaxis: Ondansetron (or other 5HT-3), Background Propofol Infusion, Dexamethasone or Solumedrol     Comments:    Other Comments: GA if needed for exposure            Miguel Hernandez MD
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
## 2023-05-29 NOTE — OR NURSING
Dr. Mccoy notified of patient c/o of right shoulder pain, 7/10, Aching, throbbing.  Medicated with fentanyl 50mcg x 2.

## 2023-05-29 NOTE — PLAN OF CARE
Patient transferred from ED 35 to 405.  Report called to MARLINE Mckeon.  Spouse at bedside and aware of plan.  Belongings sent with patient.

## 2023-05-29 NOTE — PHARMACY-ADMISSION MEDICATION HISTORY
Pharmacist Admission Medication History    Admission medication history is complete. The information provided in this note is only as accurate as the sources available at the time of the update.    Medication reconciliation/reorder completed by provider prior to medication history? No    Information Source(s): Patient, Family member, Caregiver, Clinic records and CareEverywhere/SureScripts via in-person    Pertinent Information: none     Changes made to PTA medication list:    Added: None    Deleted: prn ibuprofen from 2019    Changed: decreased calcium+d to once daily, scheduled tramadol every am, bisacodyl suppository formulation, bisacodyl PO frequency     Medication Affordability:  Not including over the counter (OTC) medications, was there a time in the past 3 months when you did not take your medications as prescribed because of cost?: No    Allergies reviewed with patient and updates made in EHR: yes    Medication History Completed By: Mik Qiu [facility] 5/28/2023 10:34 PM    Prior to Admission medications    Medication Sig Last Dose Taking? Auth Provider Long Term End Date   bisacodyl (DULCOLAX) 5 MG EC tablet Take 10 mg by mouth every other day Takes the night before suppositories 5/27/2023 at HS Yes Reported, Patient     bisacodyl (MAGIC BULLETS) 10 MG suppository Place 10-30 mg rectally every other day 5/28/2023 at am Yes Unknown, Entered By History     calcium carbonate-vitamin D (CALCIUM 600+D) 600-10 MG-MCG per tablet Take 1 tablet by mouth daily 5/28/2023 at am Yes Unknown, Entered By History     carvedilol (COREG) 6.25 MG tablet Take 6.25 mg by mouth daily 5/28/2023 at am Yes Reported, Patient Yes    omeprazole (PRILOSEC) 20 MG DR capsule Take 20 mg by mouth daily 5/28/2023 at am Yes Reported, Patient     pregabalin (LYRICA) 50 MG capsule Take 50 mg by mouth daily 5/28/2023 at am Yes Reported, Patient Yes    traMADol (ULTRAM) 50 MG tablet Take 50 mg by mouth every morning 50 mg every morning and  once daily as needed 5/28/2023 at am Yes Unknown, Entered By History No    traMADol (ULTRAM) 50 MG tablet Take 50 mg by mouth daily as needed for pain 5/27/2023 Yes Reported, Patient No
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
## 2023-05-29 NOTE — PLAN OF CARE
Problem: Plan of Care - These are the overarching goals to be used throughout the patient stay.    Goal: Plan of Care Review  Description: The Plan of Care Review/Shift note should be completed every shift.  The Outcome Evaluation is a brief statement about your assessment that the patient is improving, declining, or no change.  This information will be displayed automatically on your shift note.  Outcome: Progressing   Goal Outcome Evaluation:       Educated pt & pts spouse at bedside on treatment plan. Both parties voiced understanding.      Problem: Hip Fracture Medical Management  Goal: Effective Urinary Elimination  Outcome: Progressing     Pt hx paraplegia. Per baseline pt straight caths q6 hrs. Pts spouse assisted pt to self cath in ED prior to tx to P4 at approx. 1000. Per pt/pts spouse okay to increase to q4hs due to IVF/surgery etc. Personal cath supplies in room.

## 2023-05-29 NOTE — PLAN OF CARE
Problem: Hip Fracture Medical Management  Goal: Optimal Functional Performance  Outcome: Progressing  Intervention: Promote Optimal Functional Status  Recent Flowsheet Documentation  Taken 5/29/2023 0814 by Zollinger, Nancy K, RN  Activity Management: bedrest   Goal Outcome Evaluation:  Denies pain.  NSR on tele.  Plan for surgery today.  Remains NPO.  Straight cathed by wife for 225cc.
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
## 2023-05-29 NOTE — ANESTHESIA CARE TRANSFER NOTE
Patient: Kinza Teresa    Procedure: Procedure(s):  OPEN REDUCTION INTERNAL FIXATION, FRACTURE, FEMUR       Diagnosis: Closed displaced supracondylar fracture of distal end of right femur without intracondylar extension, initial encounter (H) [S72.298A]  Diagnosis Additional Information: No value filed.    Anesthesia Type:   MAC     Note:    Oropharynx: oropharynx clear of all foreign objects  Level of Consciousness: awake  Oxygen Supplementation: room air    Independent Airway: airway patency satisfactory and stable  Dentition: dentition unchanged  Vital Signs Stable: post-procedure vital signs reviewed and stable  Report to RN Given: handoff report given  Patient transferred to: PACU    Handoff Report: Identifed the Patient, Identified the Reponsible Provider, Reviewed the pertinent medical history, Discussed the surgical course, Reviewed Intra-OP anesthesia mangement and issues during anesthesia, Set expectations for post-procedure period and Allowed opportunity for questions and acknowledgement of understanding      Vitals:  Vitals Value Taken Time   /74 05/29/23 1756   Temp     Pulse 103 05/29/23 1756   Resp 15 05/29/23 1756   SpO2 100 % 05/29/23 1756   Vitals shown include unvalidated device data.    Electronically Signed By: JOSE Sommers CRNA  May 29, 2023  5:58 PM
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
## 2023-05-30 ENCOUNTER — APPOINTMENT (OUTPATIENT)
Dept: PHYSICAL THERAPY | Facility: HOSPITAL | Age: 48
DRG: 481 | End: 2023-05-30
Attending: STUDENT IN AN ORGANIZED HEALTH CARE EDUCATION/TRAINING PROGRAM
Payer: COMMERCIAL

## 2023-05-30 ENCOUNTER — APPOINTMENT (OUTPATIENT)
Dept: RADIOLOGY | Facility: HOSPITAL | Age: 48
DRG: 481 | End: 2023-05-30
Payer: COMMERCIAL

## 2023-05-30 LAB
ALBUMIN UR-MCNC: NEGATIVE MG/DL
APPEARANCE UR: CLEAR
ATRIAL RATE - MUSE: 72 BPM
BILIRUB UR QL STRIP: NEGATIVE
BLD PROD TYP BPU: NORMAL
BLOOD COMPONENT TYPE: NORMAL
CODING SYSTEM: NORMAL
COLOR UR AUTO: COLORLESS
CROSSMATCH: NORMAL
DIASTOLIC BLOOD PRESSURE - MUSE: NORMAL MMHG
ERYTHROCYTE [DISTWIDTH] IN BLOOD BY AUTOMATED COUNT: 14.2 % (ref 10–15)
GLUCOSE UR STRIP-MCNC: NEGATIVE MG/DL
HCT VFR BLD AUTO: 25.8 % (ref 40–53)
HGB BLD-MCNC: 6.6 G/DL (ref 13.3–17.7)
HGB BLD-MCNC: 8 G/DL (ref 13.3–17.7)
HGB BLD-MCNC: 8.4 G/DL (ref 13.3–17.7)
HGB BLD-MCNC: 8.6 G/DL (ref 13.3–17.7)
HGB UR QL STRIP: ABNORMAL
HOLD SPECIMEN: NORMAL
INTERPRETATION ECG - MUSE: NORMAL
ISSUE DATE AND TIME: NORMAL
KETONES UR STRIP-MCNC: NEGATIVE MG/DL
LACTATE SERPL-SCNC: 0.9 MMOL/L (ref 0.7–2)
LEUKOCYTE ESTERASE UR QL STRIP: ABNORMAL
MCH RBC QN AUTO: 26.7 PG (ref 26.5–33)
MCHC RBC AUTO-ENTMCNC: 32.6 G/DL (ref 31.5–36.5)
MCV RBC AUTO: 82 FL (ref 78–100)
NITRATE UR QL: NEGATIVE
P AXIS - MUSE: 51 DEGREES
PH UR STRIP: 7 [PH] (ref 5–7)
PLATELET # BLD AUTO: 184 10E3/UL (ref 150–450)
PR INTERVAL - MUSE: 160 MS
QRS DURATION - MUSE: 72 MS
QT - MUSE: 338 MS
QTC - MUSE: 370 MS
R AXIS - MUSE: 43 DEGREES
RBC # BLD AUTO: 3.15 10E6/UL (ref 4.4–5.9)
RBC URINE: 4 /HPF
SP GR UR STRIP: 1.01 (ref 1–1.03)
SQUAMOUS EPITHELIAL: 1 /HPF
SYSTOLIC BLOOD PRESSURE - MUSE: NORMAL MMHG
T AXIS - MUSE: 53 DEGREES
UNIT ABO/RH: NORMAL
UNIT NUMBER: NORMAL
UNIT STATUS: NORMAL
UNIT TYPE ISBT: 5100
UROBILINOGEN UR STRIP-MCNC: <2 MG/DL
VENTRICULAR RATE- MUSE: 72 BPM
WBC # BLD AUTO: 7.5 10E3/UL (ref 4–11)
WBC URINE: 22 /HPF

## 2023-05-30 PROCEDURE — 99232 SBSQ HOSP IP/OBS MODERATE 35: CPT | Performed by: INTERNAL MEDICINE

## 2023-05-30 PROCEDURE — 258N000003 HC RX IP 258 OP 636: Performed by: STUDENT IN AN ORGANIZED HEALTH CARE EDUCATION/TRAINING PROGRAM

## 2023-05-30 PROCEDURE — 999N000157 HC STATISTIC RCP TIME EA 10 MIN

## 2023-05-30 PROCEDURE — 250N000011 HC RX IP 250 OP 636: Performed by: STUDENT IN AN ORGANIZED HEALTH CARE EDUCATION/TRAINING PROGRAM

## 2023-05-30 PROCEDURE — 85018 HEMOGLOBIN: CPT | Performed by: STUDENT IN AN ORGANIZED HEALTH CARE EDUCATION/TRAINING PROGRAM

## 2023-05-30 PROCEDURE — 250N000013 HC RX MED GY IP 250 OP 250 PS 637: Performed by: STUDENT IN AN ORGANIZED HEALTH CARE EDUCATION/TRAINING PROGRAM

## 2023-05-30 PROCEDURE — 85027 COMPLETE CBC AUTOMATED: CPT | Performed by: STUDENT IN AN ORGANIZED HEALTH CARE EDUCATION/TRAINING PROGRAM

## 2023-05-30 PROCEDURE — 85018 HEMOGLOBIN: CPT

## 2023-05-30 PROCEDURE — 120N000001 HC R&B MED SURG/OB

## 2023-05-30 PROCEDURE — 81003 URINALYSIS AUTO W/O SCOPE: CPT

## 2023-05-30 PROCEDURE — 85018 HEMOGLOBIN: CPT | Performed by: INTERNAL MEDICINE

## 2023-05-30 PROCEDURE — 250N000013 HC RX MED GY IP 250 OP 250 PS 637

## 2023-05-30 PROCEDURE — 97161 PT EVAL LOW COMPLEX 20 MIN: CPT | Mod: GP

## 2023-05-30 PROCEDURE — 36415 COLL VENOUS BLD VENIPUNCTURE: CPT

## 2023-05-30 PROCEDURE — 87086 URINE CULTURE/COLONY COUNT: CPT

## 2023-05-30 PROCEDURE — 87040 BLOOD CULTURE FOR BACTERIA: CPT

## 2023-05-30 PROCEDURE — 250N000013 HC RX MED GY IP 250 OP 250 PS 637: Performed by: FAMILY MEDICINE

## 2023-05-30 PROCEDURE — 250N000011 HC RX IP 250 OP 636: Performed by: HOSPITALIST

## 2023-05-30 PROCEDURE — 36415 COLL VENOUS BLD VENIPUNCTURE: CPT | Performed by: INTERNAL MEDICINE

## 2023-05-30 PROCEDURE — 83605 ASSAY OF LACTIC ACID: CPT | Performed by: INTERNAL MEDICINE

## 2023-05-30 PROCEDURE — 250N000013 HC RX MED GY IP 250 OP 250 PS 637: Performed by: HOSPITALIST

## 2023-05-30 PROCEDURE — P9016 RBC LEUKOCYTES REDUCED: HCPCS

## 2023-05-30 PROCEDURE — 71045 X-RAY EXAM CHEST 1 VIEW: CPT

## 2023-05-30 PROCEDURE — 36415 COLL VENOUS BLD VENIPUNCTURE: CPT | Performed by: STUDENT IN AN ORGANIZED HEALTH CARE EDUCATION/TRAINING PROGRAM

## 2023-05-30 RX ORDER — AMOXICILLIN 250 MG
1 CAPSULE ORAL 2 TIMES DAILY
Qty: 30 TABLET | Refills: 0 | Status: SHIPPED | OUTPATIENT
Start: 2023-05-30

## 2023-05-30 RX ORDER — TRAMADOL HYDROCHLORIDE 50 MG/1
50 TABLET ORAL 2 TIMES DAILY
Qty: 28 TABLET | Refills: 0 | Status: SHIPPED | OUTPATIENT
Start: 2023-05-30 | End: 2023-06-13

## 2023-05-30 RX ORDER — ACETAMINOPHEN 325 MG/1
650 TABLET ORAL EVERY 4 HOURS PRN
Qty: 100 TABLET | Refills: 0 | Status: SHIPPED | OUTPATIENT
Start: 2023-06-01

## 2023-05-30 RX ORDER — HYDROXYZINE HYDROCHLORIDE 25 MG/1
25 TABLET, FILM COATED ORAL EVERY 6 HOURS PRN
Qty: 30 TABLET | Refills: 0 | Status: SHIPPED | OUTPATIENT
Start: 2023-05-30

## 2023-05-30 RX ORDER — CEFADROXIL 500 MG/1
500 CAPSULE ORAL 2 TIMES DAILY
Qty: 14 CAPSULE | Refills: 0 | Status: SHIPPED | OUTPATIENT
Start: 2023-05-30 | End: 2023-06-01

## 2023-05-30 RX ORDER — BISACODYL 5 MG
10 TABLET, DELAYED RELEASE (ENTERIC COATED) ORAL DAILY
Status: DISCONTINUED | OUTPATIENT
Start: 2023-05-30 | End: 2023-06-01 | Stop reason: HOSPADM

## 2023-05-30 RX ORDER — TRAMADOL HYDROCHLORIDE 50 MG/1
50 TABLET ORAL 2 TIMES DAILY
Status: DISCONTINUED | OUTPATIENT
Start: 2023-05-30 | End: 2023-06-01 | Stop reason: HOSPADM

## 2023-05-30 RX ADMIN — CEPHALEXIN 500 MG: 500 CAPSULE ORAL at 17:26

## 2023-05-30 RX ADMIN — TRAMADOL HYDROCHLORIDE 50 MG: 50 TABLET ORAL at 07:56

## 2023-05-30 RX ADMIN — SODIUM CHLORIDE, POTASSIUM CHLORIDE, SODIUM LACTATE AND CALCIUM CHLORIDE: 600; 310; 30; 20 INJECTION, SOLUTION INTRAVENOUS at 00:27

## 2023-05-30 RX ADMIN — TRAMADOL HYDROCHLORIDE 50 MG: 50 TABLET ORAL at 20:43

## 2023-05-30 RX ADMIN — APIXABAN 2.5 MG: 2.5 TABLET, FILM COATED ORAL at 07:55

## 2023-05-30 RX ADMIN — BISACODYL 10 MG: 5 TABLET, COATED ORAL at 21:37

## 2023-05-30 RX ADMIN — PANTOPRAZOLE SODIUM 40 MG: 40 TABLET, DELAYED RELEASE ORAL at 07:32

## 2023-05-30 RX ADMIN — PREGABALIN 50 MG: 25 CAPSULE ORAL at 07:55

## 2023-05-30 RX ADMIN — CARVEDILOL 6.25 MG: 3.12 TABLET, FILM COATED ORAL at 07:55

## 2023-05-30 RX ADMIN — Medication 1 TABLET: at 07:55

## 2023-05-30 RX ADMIN — CEPHALEXIN 500 MG: 500 CAPSULE ORAL at 23:55

## 2023-05-30 RX ADMIN — CEFAZOLIN 1 G: 1 INJECTION, POWDER, FOR SOLUTION INTRAMUSCULAR; INTRAVENOUS at 06:10

## 2023-05-30 RX ADMIN — CEFTRIAXONE SODIUM 1 G: 1 INJECTION, POWDER, FOR SOLUTION INTRAMUSCULAR; INTRAVENOUS at 21:39

## 2023-05-30 RX ADMIN — HYDROMORPHONE HYDROCHLORIDE 0.4 MG: 0.2 INJECTION, SOLUTION INTRAMUSCULAR; INTRAVENOUS; SUBCUTANEOUS at 22:30

## 2023-05-30 RX ADMIN — ACETAMINOPHEN 975 MG: 325 TABLET ORAL at 06:10

## 2023-05-30 RX ADMIN — ACETAMINOPHEN 975 MG: 325 TABLET ORAL at 23:54

## 2023-05-30 RX ADMIN — HYDROXYZINE HYDROCHLORIDE 25 MG: 25 TABLET, FILM COATED ORAL at 12:33

## 2023-05-30 RX ADMIN — HYDROMORPHONE HYDROCHLORIDE 0.4 MG: 0.2 INJECTION, SOLUTION INTRAMUSCULAR; INTRAVENOUS; SUBCUTANEOUS at 12:33

## 2023-05-30 RX ADMIN — ACETAMINOPHEN 975 MG: 325 TABLET ORAL at 17:36

## 2023-05-30 ASSESSMENT — ACTIVITIES OF DAILY LIVING (ADL)
ADLS_ACUITY_SCORE: 20
DEPENDENT_IADLS:: CLEANING;COOKING;LAUNDRY;SHOPPING;MEAL PREPARATION
ADLS_ACUITY_SCORE: 20
ADLS_ACUITY_SCORE: 20

## 2023-05-30 NOTE — PROGRESS NOTES
"Orthopedic Progress Note      Assessment: 1 Day Post-Op  S/P Procedure(s):  OPEN REDUCTION INTERNAL FIXATION, FRACTURE, FEMUR     Plan:   - Continue PT/OT  - Weightbearing status: Weight-bearing with transfers, paraplegia from T11  - Dressing: Soft RLE cotton badding immobilizer to remain in place for 7-10 days for limb support and keep surgical dressings secure  - Hemoglobin: 6.6 (10.6 pre-op) pending transfusion per hospitalist service, appreciate recommendations and management  - Anticoagulation: Eliquis 2.5 mg BID x4 weeks, in addition to SCDs, eveline stockings and early ambulation.  - Antibiotics: Keflex 500 mg QID PO while inpatient, upon discharge to start Duricef 500 mg BID PO x7 days post-op  - Pain control: patient requesting we discontinue oxycodone, prefers just tramadol 50 mg BID prn. Orders placed.  - Discharge planning: pending stabilization of hemoglobin following transfusion, medical clearance, and progression with therapies      Subjective:  Pain: minimal  Chest pain, SOB: no  Nausea, Vomiting:  no  Lightheadedness, Dizziness:  no  Neuro:  Patient denies new onset numbness or paresthesias    Patient is doing well this morning. He is up to his wheelchair upon my exam, which is first transfer in post-operative period. He states it went well, although slightly more difficulty with soft cotton RLE immobilizer. Pain is well controlled. Known complete paraplegia from T11 down baseline NWB with wheelchair pivots.     Objective:  /61   Pulse 101   Temp 99  F (37.2  C)   Resp 16   Ht 1.676 m (5' 6\")   Wt 63.5 kg (140 lb)   SpO2 95%   BMI 22.60 kg/m    The patient is A&Ox3. Appears comfortable sitting up in wheelchair.   Complete paraplegia from T11 down, no sensation from umbilicus down.   Dorsalis pedis pulse intact, posterior tibial pulse intact.  Soft dressing intact from groin to ankle.   The incision is covered. Dressing C/D/I. No drainage noted to bandages.      Pertinent Labs   Lab Results: " personally reviewed.   Lab Results   Component Value Date    INR 1.04 05/28/2023    INR 0.93 10/25/2018     Lab Results   Component Value Date    WBC 4.9 05/29/2023    HGB 6.6 (LL) 05/30/2023    HCT 26.5 (L) 05/29/2023    MCV 85 05/29/2023     05/29/2023     Lab Results   Component Value Date     05/29/2023    CO2 25 05/29/2023         Report completed by:  Mariela Mccain PA-C/Dr. Sweeney  [facility]    Date: 5/30/2023  Time: 10:17 AM

## 2023-05-30 NOTE — CONSULTS
Care Management Initial Consult    General Information  Assessment completed with: Patient, Spouse or significant other, patient and wife  Type of CM/SW Visit: Initial Assessment    Primary Care Provider verified and updated as needed: Yes   Readmission within the last 30 days:        Reason for Consult: discharge planning  Advance Care Planning:            Communication Assessment  Patient's communication style: spoken language (English or Bilingual)    Hearing Difficulty or Deaf: no   Wear Glasses or Blind: no    Cognitive  Cognitive/Neuro/Behavioral: WDL  Level of Consciousness: alert     Orientation: oriented x 4             Living Environment:   People in home: spouse, child(odalys), dependent     Current living Arrangements: house      Able to return to prior arrangements: yes       Family/Social Support:  Care provided by: self, spouse/significant other  Provides care for: no one  Marital Status:   Wife  Keshia       Description of Support System: Supportive, Involved    Support Assessment: Adequate family and caregiver support, Adequate social supports    Current Resources:   Patient receiving home care services: No     Community Resources:    Equipment currently used at home: wheelchair, manual  Supplies currently used at home:      Employment/Financial:  Employment Status:          Financial Concerns:             Does the patient's insurance plan have a 3 day qualifying hospital stay waiver?  No    Lifestyle & Psychosocial Needs:  Social Determinants of Health     Tobacco Use: Low Risk  (5/29/2023)    Patient History      Smoking Tobacco Use: Never      Smokeless Tobacco Use: Never      Passive Exposure: Not on file   Alcohol Use: Not on file   Financial Resource Strain: Not on file   Food Insecurity: Not on file   Transportation Needs: Not on file   Physical Activity: Not on file   Stress: Not on file   Social Connections: Not on file   Intimate Partner Violence: Not on file   Depression: Not at risk  (1/26/2022)    PHQ-2      PHQ-2 Score: 0   Housing Stability: Not on file       Functional Status:  Prior to admission patient needed assistance:   Dependent ADLs:: Wheelchair-independent  Dependent IADLs:: Cleaning, Cooking, Laundry, Shopping, Meal Preparation  Assesssment of Functional Status: At functional baseline    Mental Health Status:          Chemical Dependency Status:                Values/Beliefs:  Spiritual, Cultural Beliefs, Evangelical Practices, Values that affect care:                 Additional Information:  Met with patient and wife. They live together with their children in a house. Patient is independent with transfers, wheelchair, and ADLs. Wife assists with household tasks. Patient plans to return home at discharge. Wife will transport. Denies CM needs.     Pura Schultz RN

## 2023-05-30 NOTE — PLAN OF CARE
Goal Outcome Evaluation:    Pt returned from surgery about 7pm. H/o parapalegia with no sensation below the waist. Pt is A&Ox4 and able to make needs known, VSS. LS clear, sats upper 90's on RA, denied SOB, wife brought pts cpap.  Pt alis LE are warm, with strong pulses. ACE wrap intact from rt ankle to rt hip. no swelling assessed. No signs of bleeding. Pt denied nausea, is tolerating clear liquids, some fruit from home.  Received bowel medications. Pt st caths indep every 6hrs. Reports pain in rt shoulder, received scheduled tylenol and prn oxycodone with some effect, also applied ice. Pt on tele in sinus tachycardia. Pt spiked temp at 1030p, received iv abx, will recheck temp in 1 hour      Problem: Plan of Care - These are the overarching goals to be used throughout the patient stay.    Goal: Optimal Comfort and Wellbeing  Outcome: Progressing  Intervention: Monitor Pain and Promote Comfort  Recent Flowsheet Documentation  Taken 5/29/2023 1930 by Batool Martinez RN  Pain Management Interventions:   distraction   emotional support   cold applied   pillow support provided     Problem: Hip Fracture Medical Management  Goal: Absence of Bleeding  Outcome: Progressing  Goal: Effective Bowel Elimination  Outcome: Progressing  Goal: Baseline Cognitive Function Maintained  Outcome: Progressing  Goal: Effective Urinary Elimination  Outcome: Progressing     Problem: Pain Chronic (Persistent) (Comorbidity Management)  Goal: Acceptable Pain Control and Functional Ability  Outcome: Progressing  Intervention: Develop Pain Management Plan  Recent Flowsheet Documentation  Taken 5/29/2023 1930 by Batool Martinez RN  Pain Management Interventions:   distraction   emotional support   cold applied   pillow support provided     Problem: Syncope  Goal: Absence of Syncopal Symptoms  Outcome: Progressing     Problem: UTI (Urinary Tract Infection)  Goal: Improved Infection Symptoms  Outcome: Progressing     Problem: Plan of Care - These  are the overarching goals to be used throughout the patient stay.    Goal: Absence of Hospital-Acquired Illness or Injury  Intervention: Identify and Manage Fall Risk  Recent Flowsheet Documentation  Taken 5/29/2023 2247 by Batool Martinez RN  Safety Promotion/Fall Prevention:   room door open   patient and family education  Taken 5/29/2023 1931 by Batool Martinez RN  Safety Promotion/Fall Prevention:   room door open   patient and family education     Problem: Hip Fracture Medical Management  Goal: Optimal Functional Performance  Intervention: Promote Optimal Functional Status  Recent Flowsheet Documentation  Taken 5/29/2023 2247 by Batool Martinez RN  Activity Management: activity adjusted per tolerance  Taken 5/29/2023 1931 by Batool Martinez RN  Activity Management: activity adjusted per tolerance  Goal: Optimal Pain Control and Function  Intervention: Manage Acute Orthopaedic-Related Pain  Recent Flowsheet Documentation  Taken 5/29/2023 1930 by Batool Martinez RN  Pain Management Interventions:   distraction   emotional support   cold applied   pillow support provided

## 2023-05-30 NOTE — PLAN OF CARE
Goal Outcome Evaluation:    Pt used CPAP and slept through the night. VSS, afebrile. Brodie LE warm, strong pulses, no swelling assessed. ACE wrap applied from rt ankle to upper thigh/groin area. Pt st caths every 6hrs, pt will st cath at 6am. Did not report pain overnight. Continues on IV fluids. On tele in NSR       Problem: Plan of Care - These are the overarching goals to be used throughout the patient stay.    Goal: Optimal Comfort and Wellbeing  5/30/2023 0446 by Batool Martinez RN  Outcome: Progressing  5/29/2023 2248 by Batool Martinez RN  Outcome: Progressing  Intervention: Monitor Pain and Promote Comfort  Recent Flowsheet Documentation  Taken 5/30/2023 0018 by Batool Martinez RN  Pain Management Interventions:   breathing exercises   distraction   emotional support   pillow support provided   repositioned  Taken 5/29/2023 1930 by Batool Martinez RN  Pain Management Interventions:   distraction   emotional support   cold applied   pillow support provided     Problem: Hip Fracture Medical Management  Goal: Absence of Bleeding  5/30/2023 0446 by Batool Martinez RN  Outcome: Progressing  5/29/2023 2248 by Batool Martinez RN  Outcome: Progressing  Goal: Effective Bowel Elimination  Outcome: Progressing  Goal: Baseline Cognitive Function Maintained  Outcome: Progressing  Goal: Optimal Pain Control and Function  Outcome: Progressing  Intervention: Manage Acute Orthopaedic-Related Pain  Recent Flowsheet Documentation  Taken 5/30/2023 0018 by Batool Martinez RN  Pain Management Interventions:   breathing exercises   distraction   emotional support   pillow support provided   repositioned  Taken 5/29/2023 1930 by Batool Martinez RN  Pain Management Interventions:   distraction   emotional support   cold applied   pillow support provided  Goal: Effective Urinary Elimination  5/30/2023 0446 by Batool Martinez RN  Outcome: Progressing  5/29/2023 2248 by Batool Martinez RN  Outcome: Progressing     Problem: Pain  Chronic (Persistent) (Comorbidity Management)  Goal: Acceptable Pain Control and Functional Ability  Outcome: Progressing  Intervention: Develop Pain Management Plan  Recent Flowsheet Documentation  Taken 5/30/2023 0018 by Batool Martinez RN  Pain Management Interventions:   breathing exercises   distraction   emotional support   pillow support provided   repositioned  Taken 5/29/2023 1930 by Batool Martinez RN  Pain Management Interventions:   distraction   emotional support   cold applied   pillow support provided     Problem: Syncope  Goal: Absence of Syncopal Symptoms  Outcome: Progressing     Problem: UTI (Urinary Tract Infection)  Goal: Improved Infection Symptoms  5/30/2023 0446 by Batool Martinez RN  Outcome: Progressing  5/29/2023 2248 by Batool Martinez RN  Outcome: Progressing     Problem: Plan of Care - These are the overarching goals to be used throughout the patient stay.    Goal: Absence of Hospital-Acquired Illness or Injury  Intervention: Identify and Manage Fall Risk  Recent Flowsheet Documentation  Taken 5/30/2023 0435 by Batool Martinez RN  Safety Promotion/Fall Prevention:   room door open   patient and family education  Taken 5/30/2023 0018 by Batool Martinez RN  Safety Promotion/Fall Prevention:   room door open   patient and family education  Taken 5/29/2023 2247 by Batool Martinez RN  Safety Promotion/Fall Prevention:   room door open   patient and family education  Taken 5/29/2023 1931 by Batool Martinez RN  Safety Promotion/Fall Prevention:   room door open   patient and family education     Problem: Hip Fracture Medical Management  Goal: Optimal Functional Performance  Intervention: Promote Optimal Functional Status  Recent Flowsheet Documentation  Taken 5/30/2023 0435 by Batool Martinez RN  Activity Management: activity adjusted per tolerance  Taken 5/30/2023 0018 by Batool Martinez RN  Activity Management: activity adjusted per tolerance  Taken 5/29/2023 2247 by Batool Martinez  SEAMUS, RN  Activity Management: activity adjusted per tolerance  Taken 5/29/2023 1931 by Batool Martinez, RN  Activity Management: activity adjusted per tolerance
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
## 2023-05-30 NOTE — PLAN OF CARE
Problem: Plan of Care - These are the overarching goals to be used throughout the patient stay.    Goal: Optimal Comfort and Wellbeing  Intervention: Monitor Pain and Promote Comfort  Recent Flowsheet Documentation  Taken 5/30/2023 1233 by Angelina Manzo RN  Pain Management Interventions: medication (see MAR)  Taken 5/30/2023 0756 by Angelina Manzo RN  Pain Management Interventions: medication (see MAR)    Problem: Hip Fracture Medical Management  Goal: Effective Urinary Elimination  Outcome: Progressing     Problem: Anemia  Goal: Anemia Symptom Improvement  Intervention: Monitor and Manage Anemia  Recent Flowsheet Documentation  Taken 5/30/2023 0800 by Angelina Manzo RN  Safety Promotion/Fall Prevention:   safety round/check completed   increased rounding and observation   increase visualization of patient   nonskid shoes/slippers when out of bed      Goal Outcome Evaluation:      Plan of Care Reviewed With: patient, spouse    Patient is alert and orientated, able to make needs known. Patient is paraplegic, no sensation from the waist down. Denies pain in operative leg, but does endorse considerable pain to the right shoulder (not new per patient). Administered scheduled Tramadol, PRN vistaril and 0.4 mg IVP hydromorphone, with desired effect. CMS to lower extremities are intact. Patient straight cathed once this shift for 350 ml. Wife is at bed side to assist. Worked with PT, able to get into personal wheelchair. HGB this AM 6.6. Administered 1 unit of PRBCs. No transfusion reaction noted. Repeat HGB is scheduled for 1400. Remains on isolation status for ESBL.          Outcome Evaluation: Hemoglobin increase and appropriate pain control

## 2023-05-31 LAB
BACTERIA UR CULT: ABNORMAL
HGB BLD-MCNC: 7.3 G/DL (ref 13.3–17.7)
HOLD SPECIMEN: NORMAL

## 2023-05-31 PROCEDURE — 85018 HEMOGLOBIN: CPT | Performed by: STUDENT IN AN ORGANIZED HEALTH CARE EDUCATION/TRAINING PROGRAM

## 2023-05-31 PROCEDURE — 250N000013 HC RX MED GY IP 250 OP 250 PS 637: Performed by: STUDENT IN AN ORGANIZED HEALTH CARE EDUCATION/TRAINING PROGRAM

## 2023-05-31 PROCEDURE — 120N000001 HC R&B MED SURG/OB

## 2023-05-31 PROCEDURE — 36415 COLL VENOUS BLD VENIPUNCTURE: CPT | Performed by: STUDENT IN AN ORGANIZED HEALTH CARE EDUCATION/TRAINING PROGRAM

## 2023-05-31 PROCEDURE — 250N000013 HC RX MED GY IP 250 OP 250 PS 637: Performed by: FAMILY MEDICINE

## 2023-05-31 PROCEDURE — 250N000011 HC RX IP 250 OP 636: Performed by: INTERNAL MEDICINE

## 2023-05-31 PROCEDURE — 99232 SBSQ HOSP IP/OBS MODERATE 35: CPT | Performed by: INTERNAL MEDICINE

## 2023-05-31 PROCEDURE — 250N000013 HC RX MED GY IP 250 OP 250 PS 637: Performed by: HOSPITALIST

## 2023-05-31 RX ORDER — LEVOFLOXACIN 5 MG/ML
500 INJECTION, SOLUTION INTRAVENOUS EVERY 24 HOURS
Status: DISCONTINUED | OUTPATIENT
Start: 2023-05-31 | End: 2023-06-01 | Stop reason: HOSPADM

## 2023-05-31 RX ADMIN — Medication 1 TABLET: at 08:49

## 2023-05-31 RX ADMIN — LEVOFLOXACIN 500 MG: 5 INJECTION, SOLUTION INTRAVENOUS at 16:19

## 2023-05-31 RX ADMIN — PANTOPRAZOLE SODIUM 40 MG: 40 TABLET, DELAYED RELEASE ORAL at 06:21

## 2023-05-31 RX ADMIN — ACETAMINOPHEN 975 MG: 325 TABLET ORAL at 06:21

## 2023-05-31 RX ADMIN — CEPHALEXIN 500 MG: 500 CAPSULE ORAL at 11:06

## 2023-05-31 RX ADMIN — PREGABALIN 50 MG: 25 CAPSULE ORAL at 08:49

## 2023-05-31 RX ADMIN — HYDROXYZINE HYDROCHLORIDE 25 MG: 25 TABLET, FILM COATED ORAL at 17:25

## 2023-05-31 RX ADMIN — CARVEDILOL 6.25 MG: 3.12 TABLET, FILM COATED ORAL at 08:50

## 2023-05-31 RX ADMIN — CEPHALEXIN 500 MG: 500 CAPSULE ORAL at 17:25

## 2023-05-31 RX ADMIN — TRAMADOL HYDROCHLORIDE 50 MG: 50 TABLET ORAL at 08:49

## 2023-05-31 RX ADMIN — TRAMADOL HYDROCHLORIDE 50 MG: 50 TABLET ORAL at 21:17

## 2023-05-31 RX ADMIN — CEPHALEXIN 500 MG: 500 CAPSULE ORAL at 06:21

## 2023-05-31 ASSESSMENT — ACTIVITIES OF DAILY LIVING (ADL)
ADLS_ACUITY_SCORE: 20

## 2023-05-31 NOTE — PROGRESS NOTES
Care Management Follow Up    Length of Stay (days): 3    Expected Discharge Date: 06/01/2023     Concerns to be Addressed:       Patient plan of care discussed at interdisciplinary rounds: Yes    Anticipated Discharge Disposition: Home     Anticipated Discharge Services:    Anticipated Discharge DME:      Patient/family educated on Medicare website which has current facility and service quality ratings:    Education Provided on the Discharge Plan:    Patient/Family in Agreement with the Plan:      Referrals Placed by CM/SW:    Private pay costs discussed: Not applicable    Additional Information:  CM following. Plan to return home with family support when medically ready       Pura Schultz RN

## 2023-05-31 NOTE — PLAN OF CARE
Problem: Plan of Care - These are the overarching goals to be used throughout the patient stay.    Goal: Absence of Hospital-Acquired Illness or Injury  Intervention: Prevent Skin Injury  Recent Flowsheet Documentation  Taken 5/31/2023 1200 by Renato Queen RN  Body Position: refuses positioning  Taken 5/31/2023 0800 by Renato Queen, RN  Body Position: refuses positioning   Goal Outcome Evaluation:     Major Shift Events:  Uneventful shift, Pt denies pain, and VSS.     Plan: Continues POC.     For vital signs and complete assessments, please see documentation flowsheets.

## 2023-05-31 NOTE — PLAN OF CARE
"  Problem: Plan of Care - These are the overarching goals to be used throughout the patient stay.    Goal: Plan of Care Review  Description: The Plan of Care Review/Shift note should be completed every shift.  The Outcome Evaluation is a brief statement about your assessment that the patient is improving, declining, or no change.  This information will be displayed automatically on your shift note.  Outcome: Progressing  Goal: Patient-Specific Goal (Individualized)  Description: You can add care plan individualizations to a care plan. Examples of Individualization might be:  \"Parent requests to be called daily at 9am for status\", \"I have a hard time hearing out of my right ear\", or \"Do not touch me to wake me up as it startles me\".  Outcome: Progressing  Goal: Absence of Hospital-Acquired Illness or Injury  Outcome: Progressing  Intervention: Identify and Manage Fall Risk  Recent Flowsheet Documentation  Taken 5/31/2023 0345 by Alethea Hoskins RN  Safety Promotion/Fall Prevention: toileting scheduled  Taken 5/30/2023 2355 by Alethea Hoskins RN  Safety Promotion/Fall Prevention: toileting scheduled  Intervention: Prevent Skin Injury  Recent Flowsheet Documentation  Taken 5/31/2023 0345 by Alethea Hoskins RN  Body Position: refuses positioning  Taken 5/30/2023 2355 by Alethea Hoskins RN  Body Position: (refused assist but did allow heels elevated.)   position changed independently   heels elevated   refuses positioning  Goal: Optimal Comfort and Wellbeing  Outcome: Progressing  Intervention: Monitor Pain and Promote Comfort  Recent Flowsheet Documentation  Taken 5/31/2023 0345 by Alethea Hoskins RN  Pain Management Interventions: emotional support  Taken 5/30/2023 2355 by Alethea Hoskins RN  Pain Management Interventions: emotional support  Goal: Readiness for Transition of Care  Outcome: Progressing   Goal Outcome Evaluation:       Pt a/o with VSS this shift. Pt refusing assist with turns and " straight caths self (last at 0345). Still need stool sample for occult. Will monitor.

## 2023-05-31 NOTE — TREATMENT PLAN
Patient with preoperative anemia yesterday (hgb 8.3 down from 10.6 day prior, the day of injury).  Repeat postoperative hgb this morning POD1 was 6.6. Patient asymptomatic and hemodynamically stable. Hospitalist service recommended transfusion 1u pRBC today, with repeat hgb 8.6 this afternoon.     This evening patient showing signs of hemodynamic instability (tachycardia to 120-30s) and febrile (102.5).  Possible post transfusion reaction vs. Anemia.     I would recommend repeat hgb this evening while holding Eliquis for DVt ppx. We will plan to restart postoperative Eliquis once hgb stable.  Repeat hgb tomorrow morning as well.    Hospitalist team aware and following. Formal recommendations per their service to follow.    Wilfredo Sweeney MD  [facility]

## 2023-05-31 NOTE — SIGNIFICANT EVENT
Significant Event Note  Time of event: 9:02 PM May 30, 2023    Description of event:  Notified by RN colleague regarding fever up to 103.3 and tachycardia up to 137 bpm following blood transfusion for post op anemia. Underwent ORIF of femur earlier in the day for which he is on the following antibiotic regimen: Keflex 500 mg QID PO while inpatient, upon discharge to start Duricef 500 mg BID PO x7 days post-op. Also noted to have UTI on admission for which he is on ceftriaxone. Lactic acid drawn for sepsis protocol and within normal limits. CBC does note reveal leukocytosis.     Patient reportedly comfortable at this time. Will continue tylenol PRN as needed for fever. Will further work up possible infectious source.     Plan:  - Repeat UA -- pending   - Blood cultures -- pending   - Chest XR -- clear   - CBC -- no leukocytosis   - Tylenol PRN for fever     Discussed with: bedside nurse    Pura West MD PGY-1   [facility]
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
## 2023-05-31 NOTE — PROGRESS NOTES
"Orthopedic Progress Note      Assessment: 2 Day Post-Op  S/P Procedure(s):  OPEN REDUCTION INTERNAL FIXATION, FRACTURE, FEMUR     Plan:   - Continue PT/OT  - Weightbearing status: Weight-bearing with transfers, paraplegia from T11  - Dressing: Soft RLE cotton badding immobilizer to remain in place for 7-10 days for limb support and keep surgical dressings secure  - Hemoglobin: 7.3 (8.6). Hold Eliquis until Hgb stable  - Anticoagulation: Eliquis 2.5 mg BID x4 weeks (hold currently), in addition to SCDs, eveline stockings and early ambulation.  - Antibiotics: Keflex 500 mg QID PO while inpatient, upon discharge to start Duricef 500 mg BID PO x7 days post-op  - Pain control: Pain well controlled. Continue regimen  - Discharge planning: pending stabilization of hemoglobin, medical clearance, and progression with therapies. Plan to discharge home      Subjective:  Pain: minimal  Chest pain, SOB: no  Nausea, Vomiting:  no  Lightheadedness, Dizziness:  no  Neuro:  Patient denies new onset numbness or paresthesias    Patient is doing well this morning. He does have right shoulder pain, history of partial thickness right shoulder RC tear, recent CSI 13 weeks ago.  Pain has worsened since surgery. Known complete paraplegia from T11 down baseline NWB with wheelchair pivots.     Objective:  BP 95/55 (BP Location: Right arm)   Pulse 77   Temp 98.3  F (36.8  C) (Oral)   Resp 18   Ht 1.676 m (5' 6\")   Wt 63.5 kg (140 lb)   SpO2 96%   BMI 22.60 kg/m    The patient is A&Ox3. Appears comfortable sitting up in wheelchair.   Complete paraplegia from T11 down, no sensation from umbilicus down.   Dorsalis pedis pulse intact, posterior tibial pulse intact.  Soft dressing intact from groin to ankle.   The incision is covered. Dressing C/D/I. No drainage noted to bandages.      Pertinent Labs   Lab Results: personally reviewed.   Lab Results   Component Value Date    INR 1.04 05/28/2023    INR 0.93 10/25/2018     Lab Results   Component " Value Date    WBC 7.5 05/30/2023    HGB 7.3 (L) 05/31/2023    HCT 25.8 (L) 05/30/2023    MCV 82 05/30/2023     05/30/2023     Lab Results   Component Value Date     05/29/2023    CO2 25 05/29/2023         Report completed by:  Eb Ramirez PA-C/[facility]    Date: 5/31/2023  Time: 10:53 AM

## 2023-05-31 NOTE — PLAN OF CARE
Goal Outcome Evaluation:      Pt is alert and oriented x4. Pt reports pain in his right shoulder. He was last given IV dilaudid at 2230. Peripheral pulses in the dorsalis pedis are WDL and 2+. Pt is running a fever. House officer was notified, and sepsis protocol run. Lactic acid was 0.9. Urine sample was collected, stool sample is still needed. Pt was given Dulcolax this evening to encourage a BM. Last Hgb was 8.4mg/dL. He has a CPAP to wear overnight. Pt was last straight cathed at 2100 and 200mL were drained.          Problem: Hip Fracture Medical Management  Goal: Optimal Pain Control and Function  Outcome: Progressing     Problem: Anemia  Goal: Anemia Symptom Improvement  Intervention: Monitor and Manage Anemia  Recent Flowsheet Documentation  Taken 5/30/2023 1700 by Nellie Maldonado, RN  Safety Promotion/Fall Prevention:   safety round/check completed   increased rounding and observation     Problem: Hip Fracture Medical Management  Goal: Effective Urinary Elimination  Outcome: Progressing

## 2023-06-01 VITALS
WEIGHT: 140 LBS | HEIGHT: 66 IN | BODY MASS INDEX: 22.5 KG/M2 | OXYGEN SATURATION: 98 % | HEART RATE: 87 BPM | SYSTOLIC BLOOD PRESSURE: 109 MMHG | DIASTOLIC BLOOD PRESSURE: 63 MMHG | TEMPERATURE: 98.2 F | RESPIRATION RATE: 18 BRPM

## 2023-06-01 LAB
HGB BLD-MCNC: 7.2 G/DL (ref 13.3–17.7)
HOLD SPECIMEN: NORMAL

## 2023-06-01 PROCEDURE — 99239 HOSP IP/OBS DSCHRG MGMT >30: CPT | Performed by: INTERNAL MEDICINE

## 2023-06-01 PROCEDURE — 250N000013 HC RX MED GY IP 250 OP 250 PS 637: Performed by: FAMILY MEDICINE

## 2023-06-01 PROCEDURE — 250N000013 HC RX MED GY IP 250 OP 250 PS 637: Performed by: STUDENT IN AN ORGANIZED HEALTH CARE EDUCATION/TRAINING PROGRAM

## 2023-06-01 PROCEDURE — 250N000011 HC RX IP 250 OP 636: Performed by: STUDENT IN AN ORGANIZED HEALTH CARE EDUCATION/TRAINING PROGRAM

## 2023-06-01 PROCEDURE — 85018 HEMOGLOBIN: CPT | Performed by: STUDENT IN AN ORGANIZED HEALTH CARE EDUCATION/TRAINING PROGRAM

## 2023-06-01 PROCEDURE — 36415 COLL VENOUS BLD VENIPUNCTURE: CPT | Performed by: STUDENT IN AN ORGANIZED HEALTH CARE EDUCATION/TRAINING PROGRAM

## 2023-06-01 PROCEDURE — 250N000013 HC RX MED GY IP 250 OP 250 PS 637: Performed by: HOSPITALIST

## 2023-06-01 RX ORDER — OXYCODONE HYDROCHLORIDE 5 MG/1
5-10 TABLET ORAL EVERY 4 HOURS PRN
Status: DISCONTINUED | OUTPATIENT
Start: 2023-06-01 | End: 2023-06-01 | Stop reason: HOSPADM

## 2023-06-01 RX ORDER — OXYCODONE HYDROCHLORIDE 5 MG/1
5-10 TABLET ORAL EVERY 4 HOURS PRN
Qty: 20 TABLET | Refills: 0 | Status: SHIPPED | OUTPATIENT
Start: 2023-06-01

## 2023-06-01 RX ADMIN — Medication 1 TABLET: at 08:23

## 2023-06-01 RX ADMIN — HYDROXYZINE HYDROCHLORIDE 25 MG: 25 TABLET, FILM COATED ORAL at 00:35

## 2023-06-01 RX ADMIN — PREGABALIN 50 MG: 25 CAPSULE ORAL at 08:22

## 2023-06-01 RX ADMIN — ACETAMINOPHEN 650 MG: 325 TABLET ORAL at 00:35

## 2023-06-01 RX ADMIN — HYDROMORPHONE HYDROCHLORIDE 0.2 MG: 0.2 INJECTION, SOLUTION INTRAMUSCULAR; INTRAVENOUS; SUBCUTANEOUS at 00:35

## 2023-06-01 RX ADMIN — CEPHALEXIN 500 MG: 500 CAPSULE ORAL at 12:29

## 2023-06-01 RX ADMIN — TRAMADOL HYDROCHLORIDE 50 MG: 50 TABLET ORAL at 08:22

## 2023-06-01 RX ADMIN — PANTOPRAZOLE SODIUM 40 MG: 40 TABLET, DELAYED RELEASE ORAL at 05:07

## 2023-06-01 RX ADMIN — CEPHALEXIN 500 MG: 500 CAPSULE ORAL at 00:35

## 2023-06-01 RX ADMIN — SENNOSIDES AND DOCUSATE SODIUM 1 TABLET: 50; 8.6 TABLET ORAL at 08:22

## 2023-06-01 RX ADMIN — CEPHALEXIN 500 MG: 500 CAPSULE ORAL at 05:08

## 2023-06-01 RX ADMIN — CARVEDILOL 6.25 MG: 3.12 TABLET, FILM COATED ORAL at 08:23

## 2023-06-01 ASSESSMENT — ACTIVITIES OF DAILY LIVING (ADL)
ADLS_ACUITY_SCORE: 20

## 2023-06-01 NOTE — PROGRESS NOTES
"Orthopedic Progress Note      Assessment: 3 Day Post-Op  S/P Procedure(s):  OPEN REDUCTION INTERNAL FIXATION, FRACTURE, FEMUR     Plan:   - Continue PT/OT  - Weightbearing status: Weight-bearing with transfers, paraplegia from T11  - Dressing: Soft RLE cotton badding immobilizer to remain in place for 7-10 days for limb support and keep surgical dressings secure  - Hemoglobin: 7.2 (7.3). Restart Eliquis todya  - Anticoagulation: Eliquis 2.5 mg BID x4 weeks, in addition to SCDs, eveline stockings and early ambulation.  - Antibiotics: Keflex 500 mg QID PO while inpatient, upon discharge to start Duricef 500 mg BID PO x7 days post-op  - Pain control: Pain well controlled. Continue regimen  - Discharge planning: pending stabilization of hemoglobin, medical clearance, and progression with therapies. Plan to discharge home      Subjective:  Pain: minimal  Chest pain, SOB: no  Nausea, Vomiting:  no  Lightheadedness, Dizziness:  no  Neuro:  Patient denies new onset numbness or paresthesias    Patient is doing well this morning. He does have right shoulder pain, history of partial thickness right shoulder RC tear, recent CSI 13 weeks ago.  Pain has worsened since surgery.  Had to take tramadol overnight for shoulder pain but still was not well controlled. Known complete paraplegia from T11 down baseline NWB with wheelchair pivots.     Objective:  /63 (BP Location: Left arm)   Pulse 87   Temp 98.2  F (36.8  C) (Oral)   Resp 18   Ht 1.676 m (5' 6\")   Wt 63.5 kg (140 lb)   SpO2 98%   BMI 22.60 kg/m    The patient is A&Ox3. Appears comfortable sitting up in wheelchair.   Complete paraplegia from T11 down, no sensation from umbilicus down.   Dorsalis pedis pulse intact, posterior tibial pulse intact.  Soft dressing intact from groin to ankle.   The incision is covered. Dressing C/D/I. No drainage noted to bandages.      Pertinent Labs   Lab Results: personally reviewed.   Lab Results   Component Value Date    INR 1.04 " 05/28/2023    INR 0.93 10/25/2018     Lab Results   Component Value Date    WBC 7.5 05/30/2023    HGB 7.2 (L) 06/01/2023    HCT 25.8 (L) 05/30/2023    MCV 82 05/30/2023     05/30/2023     Lab Results   Component Value Date     05/29/2023    CO2 25 05/29/2023         Report completed by:  Eb Ramirez PA-C/[facility]    Date: 6/1/2023  Time: 12:15 PM
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
## 2023-06-01 NOTE — PLAN OF CARE
Goal Outcome Evaluation:       Pt is alert and oriented x4. VSS this evening, afebrile. Pt did have some tachycardia this shift. Tolerated IV antibiotics well. Reports 3/10 pain in the right shoulder. Pt was given atarax, he refused tylenol. Dressing on the right leg is clean, dry, and intact. No visible drainage. He continues to straight cath himself as needed.      Problem: Plan of Care - These are the overarching goals to be used throughout the patient stay.    Goal: Absence of Hospital-Acquired Illness or Injury  Intervention: Identify and Manage Fall Risk  Recent Flowsheet Documentation  Taken 5/31/2023 1600 by Nellie Maldonado, RN  Safety Promotion/Fall Prevention: toileting scheduled  Intervention: Prevent Skin Injury  Recent Flowsheet Documentation  Taken 5/31/2023 1600 by Nellie Maldonado, RN  Body Position: refuses positioning

## 2023-06-01 NOTE — PLAN OF CARE
"  Problem: Plan of Care - These are the overarching goals to be used throughout the patient stay.    Goal: Plan of Care Review  Description: The Plan of Care Review/Shift note should be completed every shift.  The Outcome Evaluation is a brief statement about your assessment that the patient is improving, declining, or no change.  This information will be displayed automatically on your shift note.  Outcome: Adequate for Care Transition  Goal: Patient-Specific Goal (Individualized)  Description: You can add care plan individualizations to a care plan. Examples of Individualization might be:  \"Parent requests to be called daily at 9am for status\", \"I have a hard time hearing out of my right ear\", or \"Do not touch me to wake me up as it startles me\".  Outcome: Adequate for Care Transition  Goal: Absence of Hospital-Acquired Illness or Injury  Outcome: Adequate for Care Transition  Intervention: Identify and Manage Fall Risk  Recent Flowsheet Documentation  Taken 6/1/2023 0828 by Leo Sotelo RN  Safety Promotion/Fall Prevention: toileting scheduled  Intervention: Prevent Skin Injury  Recent Flowsheet Documentation  Taken 6/1/2023 0828 by Leo Sotelo RN  Body Position: position changed independently  Goal: Optimal Comfort and Wellbeing  Outcome: Adequate for Care Transition  Intervention: Monitor Pain and Promote Comfort  Recent Flowsheet Documentation  Taken 6/1/2023 0828 by Leo Sotelo RN  Pain Management Interventions: medication (see MAR)  Goal: Readiness for Transition of Care  Outcome: Adequate for Care Transition   Goal Outcome Evaluation:  Pt discharged to home at about 1300 accompanied by spouse. Alert/oriented a 4, denied foot pain and VSS. Discharged instructions, medications, follow up care reviewed and verbalized understanding.                 "

## 2023-06-01 NOTE — PLAN OF CARE
"  Problem: Plan of Care - These are the overarching goals to be used throughout the patient stay.    Goal: Plan of Care Review  Description: The Plan of Care Review/Shift note should be completed every shift.  The Outcome Evaluation is a brief statement about your assessment that the patient is improving, declining, or no change.  This information will be displayed automatically on your shift note.  Outcome: Progressing  Goal: Patient-Specific Goal (Individualized)  Description: You can add care plan individualizations to a care plan. Examples of Individualization might be:  \"Parent requests to be called daily at 9am for status\", \"I have a hard time hearing out of my right ear\", or \"Do not touch me to wake me up as it startles me\".  Outcome: Progressing  Goal: Absence of Hospital-Acquired Illness or Injury  Outcome: Progressing  Intervention: Identify and Manage Fall Risk  Recent Flowsheet Documentation  Taken 6/1/2023 0035 by Alethea Hoskins RN  Safety Promotion/Fall Prevention: toileting scheduled  Intervention: Prevent Skin Injury  Recent Flowsheet Documentation  Taken 6/1/2023 0500 by Alethea Hoskins RN  Body Position:   position changed independently   refuses positioning   heels elevated  Taken 6/1/2023 0035 by Alethea Hoskins RN  Body Position: (refused assist but did allow heels elevated.)   position changed independently   refuses positioning   heels elevated  Goal: Optimal Comfort and Wellbeing  Outcome: Progressing  Intervention: Monitor Pain and Promote Comfort  Recent Flowsheet Documentation  Taken 6/1/2023 0500 by Alethea Hoskins, RN  Pain Management Interventions: emotional support  Taken 6/1/2023 0035 by Alethea Hoskins RN  Pain Management Interventions: medication (see MAR)  Goal: Readiness for Transition of Care  Outcome: Progressing   Goal Outcome Evaluation:         Pt a/o with c/o increased pain tonight and not able to fall asleep. Pt given prn dilaudid 0.2 iv, tylenol, and atarax. " Note left for MD upon pt request to check if he can get prn oxycodone reordered.  Pt slept well. Will monitor.

## 2023-06-01 NOTE — PROGRESS NOTES
Care Management Discharge Note    Discharge Date: 06/01/2023       Discharge Disposition: Home with family support    Discharge Services:  none    Discharge DME:  none    Discharge Transportation: family or friend will provide    Private pay costs discussed: Not applicable    Does the patient's insurance plan have a 3 day qualifying hospital stay waiver?  No    PAS Confirmation Code:    Patient/family educated on Medicare website which has current facility and service quality ratings:      Education Provided on the Discharge Plan:    Persons Notified of Discharge Plans: yes  Patient/Family in Agreement with the Plan:      Handoff Referral Completed: Yes    Additional Information:  Pt adequate to discharge home with support from family. No CM needs.        Kim Payne RN

## 2023-06-02 LAB — BACTERIA UR CULT: NO GROWTH

## 2023-06-05 ENCOUNTER — MEDICAL CORRESPONDENCE (OUTPATIENT)
Dept: HEALTH INFORMATION MANAGEMENT | Facility: CLINIC | Age: 48
End: 2023-06-05
Payer: COMMERCIAL

## 2023-06-05 ENCOUNTER — TELEPHONE (OUTPATIENT)
Dept: UROLOGY | Facility: CLINIC | Age: 48
End: 2023-06-05
Payer: COMMERCIAL

## 2023-06-05 LAB
BACTERIA BLD CULT: NO GROWTH
BACTERIA BLD CULT: NO GROWTH

## 2023-06-05 NOTE — TELEPHONE ENCOUNTER
Health Call Center    Phone Message    May a detailed message be left on voicemail: yes     Reason for Call: Patient's spouse called stating she wanted Dr. Tong to be made aware that patient is on a new medication, Eliquis. She wanted to make he was okay to be on it for his cystoscopy on 6/14. Thank you.    Action Taken: Message routed to:  [facility] Urology    Travel Screening: Not Applicable

## 2023-06-06 NOTE — TELEPHONE ENCOUNTER
I: Kinza Teresa, 48 year old    S: Pt is now taking eliquis 2.5 mg BID since femur fx surgery on 5/29/23.    B: Pt is scheduled for Botox on 6/14/23 with Dr. Tong.  Pt also does IV abx the day before the day of his injections with the infusion center.     A: Pt is meeting with his ortho team today per Keshia.  Keshia advised to ask them what their recommendations would be in regards to holding medication for procedure.    R: Writer also sent a message to Dr. Tong today.    Pam Alvarez, MARLINE  06/06/23  1:03 PM

## 2023-06-07 NOTE — TELEPHONE ENCOUNTER
Alejandro Tong MD Neubauer, Brenda, RN  Can we delay one month?   Usually their prophy eliquis is for one month.       Noted.  Writer did leave a message for patient's spouse Keshia whom I spoke with yesterday.  I let her know that pt's appointments (Botox and IV abx) will have to be rescheduled/delayed a month out due to eliquis.  I let Keshia know that we would reach out to her to reschedule.  Once pt is rescheduled, we can reschedule him for IV abx in infusion center for day before and day of Botox.

## 2023-06-08 ENCOUNTER — TELEPHONE (OUTPATIENT)
Dept: UROLOGY | Facility: CLINIC | Age: 48
End: 2023-06-08
Payer: COMMERCIAL

## 2023-06-27 ENCOUNTER — MYC MEDICAL ADVICE (OUTPATIENT)
Dept: PHYSICAL MEDICINE AND REHAB | Facility: CLINIC | Age: 48
End: 2023-06-27
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
## 2023-07-11 NOTE — TELEPHONE ENCOUNTER
Pt was to see Dr Fitzgerald again on July 10  Recheck if she was able to enter orders for LAB needed for Dr Sierra appointment on August 2    If no lab was ordered / done, Dr Sierra will ask for these to be done after the consult on August 2.    Nellie Morrison RN on 7/11/2023 at 5:47 PM

## 2023-07-21 DIAGNOSIS — G82.20 PARAPLEGIA (H): Primary | ICD-10-CM

## 2023-07-21 DIAGNOSIS — S42.292A CLOSED FRACTURE OF HEAD OF LEFT HUMERUS: ICD-10-CM

## 2023-08-01 NOTE — TELEPHONE ENCOUNTER
Cannot find that lab work noted requested on 6/27 was obtained by [employer].    Appt is tomorrow so will f/u on any new orders then.    Nellie Morrison RN on 8/1/2023 at 4:02 PM

## 2023-08-02 ENCOUNTER — VIRTUAL VISIT (OUTPATIENT)
Dept: PHYSICAL MEDICINE AND REHAB | Facility: CLINIC | Age: 48
End: 2023-08-02
Payer: COMMERCIAL

## 2023-08-02 ENCOUNTER — MYC MEDICAL ADVICE (OUTPATIENT)
Dept: PHYSICAL MEDICINE AND REHAB | Facility: CLINIC | Age: 48
End: 2023-08-02

## 2023-08-02 DIAGNOSIS — M81.8 DISUSE OSTEOPOROSIS: Primary | ICD-10-CM

## 2023-08-02 PROCEDURE — 99204 OFFICE O/P NEW MOD 45 MIN: CPT | Mod: VID | Performed by: PHYSICAL MEDICINE & REHABILITATION

## 2023-08-02 RX ORDER — MEPERIDINE HYDROCHLORIDE 25 MG/ML
25 INJECTION INTRAMUSCULAR; INTRAVENOUS; SUBCUTANEOUS EVERY 30 MIN PRN
Status: CANCELLED | OUTPATIENT
Start: 2023-08-02

## 2023-08-02 RX ORDER — ACETAMINOPHEN 325 MG/1
650 TABLET ORAL
Status: CANCELLED | OUTPATIENT
Start: 2023-08-02

## 2023-08-02 RX ORDER — ZOLEDRONIC ACID 5 MG/100ML
5 INJECTION, SOLUTION INTRAVENOUS ONCE
Status: CANCELLED
Start: 2023-08-02

## 2023-08-02 RX ORDER — ALBUTEROL SULFATE 0.83 MG/ML
2.5 SOLUTION RESPIRATORY (INHALATION)
Status: CANCELLED | OUTPATIENT
Start: 2023-08-02

## 2023-08-02 RX ORDER — METHYLPREDNISOLONE SODIUM SUCCINATE 125 MG/2ML
125 INJECTION, POWDER, LYOPHILIZED, FOR SOLUTION INTRAMUSCULAR; INTRAVENOUS
Status: CANCELLED
Start: 2023-08-02

## 2023-08-02 RX ORDER — ALBUTEROL SULFATE 90 UG/1
1-2 AEROSOL, METERED RESPIRATORY (INHALATION)
Status: CANCELLED
Start: 2023-08-02

## 2023-08-02 RX ORDER — HEPARIN SODIUM (PORCINE) LOCK FLUSH IV SOLN 100 UNIT/ML 100 UNIT/ML
5 SOLUTION INTRAVENOUS
Status: CANCELLED | OUTPATIENT
Start: 2023-08-02

## 2023-08-02 RX ORDER — EPINEPHRINE 1 MG/ML
0.3 INJECTION, SOLUTION, CONCENTRATE INTRAVENOUS EVERY 5 MIN PRN
Status: CANCELLED | OUTPATIENT
Start: 2023-08-02

## 2023-08-02 RX ORDER — HEPARIN SODIUM,PORCINE 10 UNIT/ML
5-20 VIAL (ML) INTRAVENOUS DAILY PRN
Status: CANCELLED | OUTPATIENT
Start: 2023-08-02

## 2023-08-02 RX ORDER — DIPHENHYDRAMINE HYDROCHLORIDE 50 MG/ML
50 INJECTION INTRAMUSCULAR; INTRAVENOUS
Status: CANCELLED
Start: 2023-08-02
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
## 2023-08-02 NOTE — TELEPHONE ENCOUNTER
Pt appt today for 3:30 was not seen due to rooming staff said he did not have the lab work done.    Previous message to [facility] 6/27/23 requested lab work but has not been done from review of Care Everywhere.    Called to Latisha Galvez RN SCI coordinator to request PTH, CMP & vit D levels.  Phone # [phone]      Will rebook this consult to Dr Sierra after known labs are ready.    Nellie Morrison RN on 8/2/2023 at 3:59 PM
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
## 2023-08-10 NOTE — TELEPHONE ENCOUNTER
Pt was seen by Dr Sierra on 8/2, has new orders for reclast infusion     Needs to have vit D level lab done prior to setting up infusion.  Orders for PTH, vit D and CMP done at appointment 8/2 as well.    Sent info via [place]    Assist with setting up infusion at nearest infusion center once lab is reviewed by Dr Sierra    Pt lives in [place] - could go to [facility] [place] or [facility]    Nellie Morrison RN on 8/9/2023 at 8:53 PM

## 2023-08-15 NOTE — TELEPHONE ENCOUNTER
Pt is booked for infusion of  INVANZ at [facility] 2nd floor on Sept 26.    Has not yet read the MyChart message of 8/9 telling him to get lab tests done prior to going to the Reclast infusion.    Our clinic called to pt on 8/10 to ask him to see the message and that he needs the lab tests done prior, but unable to leave a voice mail due to his voice mailbox was full.    VMB still full on call today.  Home number is requested to be called first (same as wife's number)    *permission to discuss pt info with wife Melba and ok to leave vm on her phone // this is the same # as home # we have tried    Called to mobile # [phone] - able to leave a voice mail with instructions to get the lab (vit D, PTH, & CMP) done this month before he receives theIV in September - he can read the [employer] as well.    Nellie Morrison RN on 8/15/2023 at 10:39 AM

## 2023-08-17 NOTE — TELEPHONE ENCOUNTER
Pt is not booked for Reclast yet and has not answered messages to have his baseline lab / the appointment for infusion in Sept is for an antibiotic - Invanz    Reclast and Invanz should not be given on the same date.    Continue to attempt to get baseline lab scheduled, and INF dept will continue to call pt to book Reclast and we will coordinate so the baseline lab is accomplished.    Nellie Morrison RN on 8/17/2023 at 11:53 AM

## 2023-08-26 ENCOUNTER — LAB (OUTPATIENT)
Dept: LAB | Facility: CLINIC | Age: 48
End: 2023-08-26
Payer: COMMERCIAL

## 2023-08-26 DIAGNOSIS — G82.20 PARAPLEGIA (H): ICD-10-CM

## 2023-08-26 DIAGNOSIS — S42.292A CLOSED FRACTURE OF HEAD OF LEFT HUMERUS: ICD-10-CM

## 2023-08-26 LAB — PTH-INTACT SERPL-MCNC: 36 PG/ML (ref 15–65)

## 2023-08-26 PROCEDURE — 83970 ASSAY OF PARATHORMONE: CPT | Performed by: PATHOLOGY

## 2023-08-26 PROCEDURE — 36415 COLL VENOUS BLD VENIPUNCTURE: CPT | Performed by: PATHOLOGY

## 2023-08-26 PROCEDURE — 82306 VITAMIN D 25 HYDROXY: CPT | Performed by: STUDENT IN AN ORGANIZED HEALTH CARE EDUCATION/TRAINING PROGRAM

## 2023-08-26 PROCEDURE — 99000 SPECIMEN HANDLING OFFICE-LAB: CPT | Performed by: PATHOLOGY

## 2023-08-27 LAB — DEPRECATED CALCIDIOL+CALCIFEROL SERPL-MC: 37 UG/L (ref 20–75)

## 2023-09-06 ENCOUNTER — PRE VISIT (OUTPATIENT)
Dept: UROLOGY | Facility: CLINIC | Age: 48
End: 2023-09-06
Payer: COMMERCIAL

## 2023-09-06 NOTE — TELEPHONE ENCOUNTER
Reason for visit: cystoscopy + BOTOX     Dx/Hx/Sx: neurogenic bladder     Records/imaging/labs/orders: in epic    At Rooming: standard- have botox & flex needle --200units in 10cc's

## 2023-09-11 NOTE — TELEPHONE ENCOUNTER
Pt had lab visit on Saturday 8/26 - PTH & vit D levels wnl - so no contraindication for Reclast from these values.    Unknown why CMP was not drawn on the same date - blood would no longer be available to test.    Still needs to complete scheduling of his Reclast infusion.    Cannot have INVANZ infusion on the same date (INVANZ is booked on 9/26)    Perhaps when he is at the [facility] they will help him to set that up.    Last values for CMP from 5/29/23 - so he will need that drawn prior to Reclast infusion according to protocol.       Nellie Morrison RN on 9/11/2023 at 2:08 PM

## 2023-09-11 NOTE — TELEPHONE ENCOUNTER
Called and reached Kinza by phone - asked him to set up his Reclast infusion, he thought he already has this done, but could not offer details.    Writer explained it was ordered on August 2 by Dr Sierra, he has another infusion drug booked in September however theses 2 drugs cannot be given together so he needs another appointment.    He said he would call then.    Gave [facility] 2nd floor infusion scheduling phone # [phone].    Nellie Morrison RN on 9/11/2023 at 2:19 PM

## 2023-09-26 ENCOUNTER — INFUSION THERAPY VISIT (OUTPATIENT)
Dept: INFUSION THERAPY | Facility: CLINIC | Age: 48
End: 2023-09-26
Attending: UROLOGY
Payer: COMMERCIAL

## 2023-09-26 VITALS
RESPIRATION RATE: 16 BRPM | TEMPERATURE: 97.9 F | DIASTOLIC BLOOD PRESSURE: 103 MMHG | SYSTOLIC BLOOD PRESSURE: 160 MMHG | OXYGEN SATURATION: 100 % | HEART RATE: 58 BPM

## 2023-09-26 DIAGNOSIS — N39.0 URINARY TRACT INFECTION ASSOCIATED WITH CATHETERIZATION OF URINARY TRACT, UNSPECIFIED INDWELLING URINARY CATHETER TYPE, SEQUELA: Primary | ICD-10-CM

## 2023-09-26 DIAGNOSIS — Z29.89 NEED FOR PROPHYLAXIS AGAINST URINARY TRACT INFECTION: ICD-10-CM

## 2023-09-26 DIAGNOSIS — T83.511S URINARY TRACT INFECTION ASSOCIATED WITH CATHETERIZATION OF URINARY TRACT, UNSPECIFIED INDWELLING URINARY CATHETER TYPE, SEQUELA: Primary | ICD-10-CM

## 2023-09-26 PROCEDURE — 250N000011 HC RX IP 250 OP 636: Mod: JZ | Performed by: UROLOGY

## 2023-09-26 PROCEDURE — 250N000009 HC RX 250: Performed by: UROLOGY

## 2023-09-26 PROCEDURE — 96374 THER/PROPH/DIAG INJ IV PUSH: CPT

## 2023-09-26 RX ORDER — ALBUTEROL SULFATE 0.83 MG/ML
2.5 SOLUTION RESPIRATORY (INHALATION)
Status: CANCELLED | OUTPATIENT
Start: 2023-10-01

## 2023-09-26 RX ORDER — HEPARIN SODIUM (PORCINE) LOCK FLUSH IV SOLN 100 UNIT/ML 100 UNIT/ML
5 SOLUTION INTRAVENOUS
Status: CANCELLED | OUTPATIENT
Start: 2023-10-01

## 2023-09-26 RX ORDER — EPINEPHRINE 1 MG/ML
0.3 INJECTION, SOLUTION INTRAMUSCULAR; SUBCUTANEOUS EVERY 5 MIN PRN
Status: CANCELLED | OUTPATIENT
Start: 2023-10-01

## 2023-09-26 RX ORDER — MEPERIDINE HYDROCHLORIDE 25 MG/ML
25 INJECTION INTRAMUSCULAR; INTRAVENOUS; SUBCUTANEOUS EVERY 30 MIN PRN
Status: CANCELLED | OUTPATIENT
Start: 2023-10-01

## 2023-09-26 RX ORDER — METHYLPREDNISOLONE SODIUM SUCCINATE 125 MG/2ML
125 INJECTION, POWDER, LYOPHILIZED, FOR SOLUTION INTRAMUSCULAR; INTRAVENOUS
Status: CANCELLED
Start: 2023-10-01

## 2023-09-26 RX ORDER — ALBUTEROL SULFATE 90 UG/1
1-2 AEROSOL, METERED RESPIRATORY (INHALATION)
Status: CANCELLED
Start: 2023-10-01

## 2023-09-26 RX ORDER — DIPHENHYDRAMINE HYDROCHLORIDE 50 MG/ML
50 INJECTION INTRAMUSCULAR; INTRAVENOUS
Status: CANCELLED
Start: 2023-10-01

## 2023-09-26 RX ORDER — HEPARIN SODIUM,PORCINE 10 UNIT/ML
5 VIAL (ML) INTRAVENOUS
Status: CANCELLED | OUTPATIENT
Start: 2023-10-01

## 2023-09-26 RX ORDER — NALOXONE HYDROCHLORIDE 0.4 MG/ML
0.2 INJECTION, SOLUTION INTRAMUSCULAR; INTRAVENOUS; SUBCUTANEOUS
Status: CANCELLED | OUTPATIENT
Start: 2023-10-01

## 2023-09-26 RX ADMIN — ERTAPENEM SODIUM 1 G: 1 INJECTION, POWDER, LYOPHILIZED, FOR SOLUTION INTRAMUSCULAR; INTRAVENOUS at 14:19

## 2023-09-26 NOTE — PROGRESS NOTES
Infusion Nursing Note:  Kinza Teresa presents today for IVANZ.    Patient seen by provider today: No   present during visit today: Not Applicable.    Note: Patient identification verified by name and date of birth.  Assessment completed.  Vitals recorded in Doc Flowsheets.  Patient was provided with education regarding medication/procedure and possible side effects.  Patient verbalized understanding.    During today's [facility] appointment, orders from  Alejandro Alvarez completed.  Frequency: Quarterly x 2 days prior to urology procedure.. Pre meds none per orders/patient request.     Administrations This Visit       ertapenem (INVanz) 1 g in 10 mL SWFI for IVP       Admin Date  09/26/2023 Action  $Given Dose  1 g Route  Intravenous Administered By  Isha Page RN                   Intravenous Access:  Peripheral IV placed.    Treatment Conditions:  Not Applicable.    Post Infusion Assessment:  Patient tolerated infusion without incident.  Blood return noted pre and post infusion.  Site patent and intact, free from redness, edema or discomfort.  No evidence of extravasations.  Access discontinued per protocol.       Discharge Plan:   Discharge instructions reviewed with: Patient.  Patient and/or family verbalized understanding of discharge instructions and all questions answered.  AVS to patient via [employer].  Patient will return   Future Appointments   Date Time Provider Department Center   9/26/2023  2:00 PM  [facility] INFUSION NURSE [facility]   9/27/2023  9:00 AM  [facility] INFUSION NURSE [facility]   9/27/2023 10:30 AM [facility]   10/2/2023  9:00 AM  [facility]   10/2/2023 10:00 AM  [facility] INFUSION NURSE [facility]      for next appointment.       Isha Page RN

## 2023-09-26 NOTE — PATIENT INSTRUCTIONS
Dear Kinza Teresa    Thank you for choosing [facility] ([facility]) for your infusion.  The following information is a summary of our appointment as well as important reminders.      If you are a transplant patient and require transplant education, please click on this link: [email or web address].    We look forward in seeing you on your next appointment here at [facility] ([facility]).  Please don t hesitate to call us at [phone] to reschedule any of your appointments or to speak with one of the [facility] registered nurses.  It was a pleasure taking care of you today.    Sincerely,    [facility]  [facility]  [address] [place] [address]  [address]  Phone:  [phone]

## 2023-09-27 ENCOUNTER — OFFICE VISIT (OUTPATIENT)
Dept: UROLOGY | Facility: CLINIC | Age: 48
End: 2023-09-27
Payer: COMMERCIAL

## 2023-09-27 ENCOUNTER — INFUSION THERAPY VISIT (OUTPATIENT)
Dept: INFUSION THERAPY | Facility: CLINIC | Age: 48
End: 2023-09-27
Attending: UROLOGY
Payer: COMMERCIAL

## 2023-09-27 VITALS
OXYGEN SATURATION: 98 % | SYSTOLIC BLOOD PRESSURE: 129 MMHG | RESPIRATION RATE: 16 BRPM | HEART RATE: 66 BPM | TEMPERATURE: 98 F | DIASTOLIC BLOOD PRESSURE: 85 MMHG

## 2023-09-27 VITALS
BODY MASS INDEX: 23.3 KG/M2 | DIASTOLIC BLOOD PRESSURE: 88 MMHG | SYSTOLIC BLOOD PRESSURE: 145 MMHG | HEIGHT: 66 IN | WEIGHT: 145 LBS | HEART RATE: 75 BPM

## 2023-09-27 DIAGNOSIS — T83.511S URINARY TRACT INFECTION ASSOCIATED WITH CATHETERIZATION OF URINARY TRACT, UNSPECIFIED INDWELLING URINARY CATHETER TYPE, SEQUELA: Primary | ICD-10-CM

## 2023-09-27 DIAGNOSIS — N31.9 NEUROGENIC BLADDER: Primary | ICD-10-CM

## 2023-09-27 DIAGNOSIS — Z29.89 NEED FOR PROPHYLAXIS AGAINST URINARY TRACT INFECTION: ICD-10-CM

## 2023-09-27 DIAGNOSIS — N39.0 URINARY TRACT INFECTION ASSOCIATED WITH CATHETERIZATION OF URINARY TRACT, UNSPECIFIED INDWELLING URINARY CATHETER TYPE, SEQUELA: Primary | ICD-10-CM

## 2023-09-27 PROCEDURE — 250N000011 HC RX IP 250 OP 636: Mod: JZ | Performed by: UROLOGY

## 2023-09-27 PROCEDURE — 96374 THER/PROPH/DIAG INJ IV PUSH: CPT

## 2023-09-27 PROCEDURE — 52287 CYSTOSCOPY CHEMODENERVATION: CPT | Mod: GC | Performed by: UROLOGY

## 2023-09-27 PROCEDURE — 250N000009 HC RX 250: Performed by: UROLOGY

## 2023-09-27 RX ORDER — MEPERIDINE HYDROCHLORIDE 25 MG/ML
25 INJECTION INTRAMUSCULAR; INTRAVENOUS; SUBCUTANEOUS EVERY 30 MIN PRN
Status: CANCELLED | OUTPATIENT
Start: 2023-10-01

## 2023-09-27 RX ORDER — ALBUTEROL SULFATE 0.83 MG/ML
2.5 SOLUTION RESPIRATORY (INHALATION)
Status: CANCELLED | OUTPATIENT
Start: 2023-10-01

## 2023-09-27 RX ORDER — HEPARIN SODIUM (PORCINE) LOCK FLUSH IV SOLN 100 UNIT/ML 100 UNIT/ML
5 SOLUTION INTRAVENOUS
Status: CANCELLED | OUTPATIENT
Start: 2023-10-01

## 2023-09-27 RX ORDER — DIPHENHYDRAMINE HYDROCHLORIDE 50 MG/ML
50 INJECTION INTRAMUSCULAR; INTRAVENOUS
Status: CANCELLED
Start: 2023-10-01

## 2023-09-27 RX ORDER — NALOXONE HYDROCHLORIDE 0.4 MG/ML
0.2 INJECTION, SOLUTION INTRAMUSCULAR; INTRAVENOUS; SUBCUTANEOUS
Status: CANCELLED | OUTPATIENT
Start: 2023-10-01

## 2023-09-27 RX ORDER — METHYLPREDNISOLONE SODIUM SUCCINATE 125 MG/2ML
125 INJECTION, POWDER, LYOPHILIZED, FOR SOLUTION INTRAMUSCULAR; INTRAVENOUS
Status: CANCELLED
Start: 2023-10-01

## 2023-09-27 RX ORDER — HEPARIN SODIUM,PORCINE 10 UNIT/ML
5 VIAL (ML) INTRAVENOUS
Status: CANCELLED | OUTPATIENT
Start: 2023-10-01

## 2023-09-27 RX ORDER — LIDOCAINE HYDROCHLORIDE 20 MG/ML
JELLY TOPICAL ONCE
Status: COMPLETED | OUTPATIENT
Start: 2023-09-27 | End: 2023-09-27

## 2023-09-27 RX ORDER — EPINEPHRINE 1 MG/ML
0.3 INJECTION, SOLUTION INTRAMUSCULAR; SUBCUTANEOUS EVERY 5 MIN PRN
Status: CANCELLED | OUTPATIENT
Start: 2023-10-01

## 2023-09-27 RX ORDER — ALBUTEROL SULFATE 90 UG/1
1-2 AEROSOL, METERED RESPIRATORY (INHALATION)
Status: CANCELLED
Start: 2023-10-01

## 2023-09-27 RX ADMIN — LIDOCAINE HYDROCHLORIDE: 20 JELLY TOPICAL at 12:10

## 2023-09-27 RX ADMIN — ERTAPENEM SODIUM 1 G: 1 INJECTION, POWDER, LYOPHILIZED, FOR SOLUTION INTRAMUSCULAR; INTRAVENOUS at 09:42

## 2023-09-27 ASSESSMENT — PAIN SCALES - GENERAL: PAINLEVEL: NO PAIN (0)

## 2023-09-27 NOTE — PATIENT INSTRUCTIONS
Dear Kinza Teresa    Thank you for choosing [facility] ([facility]) for your infusion.  The following information is a summary of our appointment as well as important reminders.          If you are a transplant patient and require transplant education, please click on this link: [email or web address].    We look forward in seeing you on your next appointment here at [facility] ([facility]).  Please don t hesitate to call us at [phone] to reschedule any of your appointments or to speak with one of the [facility] registered nurses.  It was a pleasure taking care of you today.    Sincerely,    [facility]  [facility]  [address] [place] [address]  [address]  Phone:  [phone]
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
## 2023-09-27 NOTE — PROGRESS NOTES
Infusion Nursing Note:  Kinza Teresa presents today for Invanz.    Patient seen by provider today: No   present during visit today: Not Applicable.    Note: Invanz 1 g given slow IV push over 5 minutes. Flushed with saline after.    Administrations This Visit       ertapenem (INVanz) 1 g in 10 mL SWFI for IVP       Admin Date  09/27/2023 Action  $Given Dose  1 g Route  Intravenous Administered By  Latisha Christianson, MARLINE                     Intravenous Access:  Peripheral IV place by vascular.    Treatment Conditions:  None.      Post Infusion Assessment:  Patient tolerated infusion without incident.  Blood return noted pre and post infusion.  Site patent and intact, free from redness, edema or discomfort.  No evidence of extravasations.  Access discontinued per protocol.       Discharge Plan:   Discharge instructions reviewed with: Patient.  Patient and/or family verbalized understanding of discharge instructions and all questions answered.  AVS to patient via [employer].  Patient will return to his provider as needed for next appointment.   Patient discharged in stable condition accompanied by: self.  Departure Mode:cart.    /85 (BP Location: Left arm, Patient Position: Sitting, Cuff Size: Adult Regular)   Pulse 66   Temp 98  F (36.7  C) (Oral)   Resp 16   SpO2 98%      Latisha Christianson, MARLINE

## 2023-09-27 NOTE — LETTER
9/27/2023       RE: Kinza Teresa  [address] [facility] [address]     Dear Colleague,    Thank you for referring your patient, Kinza Teresa, to the [facility] [place] at [facility]. Please see a copy of my visit note below.    PRE-OPERATIVE DIAGNOSIS:   1.  Neurogenic bladder     POST-OPERATIVE DIAGNOSIS:  1.  Neurogenic bladder     PROCEDURE:    1. Cystourethroscopy With injection of botulinum toxin A 200units in 10cc sterile saline     Surgeon: Alejandro Tong MD     OPERATIVE INDICATIONS:  Kinza Teresa is a 48 year old male with neurogenic bladder due to spinal cord injury. He does not have an augment. He performs CIC per urethra. He takes oxybutynin. UDS demonstrates persistent neurogenic DO.  He experiences recurrent UTIs. He does irrigations. He previously had an IPP which works well for him though has some glans hypermobility.  His last botox was 03/2023. He gets Ertapenem x 2 doses for this procedure - one yesterday and one today. He is not interested in bladder augmentation.     OPERATIVE DETAILS:  The patient was taken to the procedure room. He was left in his chair. Penis was prepped.      A Flexible cystoscope was placed into the bladder and a flexible needle  Was preloaded.  The bladder mucosa appeared to be normal without stones, tumors or diverticulum on 360 degree inspection. The ureteral orifices were in the normal orthotopic position bilaterally.  We mixed 2 vials of Botox for 200u total.  We performed a template injection procedure with 10 injections. All injections were placed deep to the mucosa into the detrusor muscle.  We then emptied the bladder to re-inspect our injection sites and found that there was a minimal amount of blood.     Scope was removed.        PLAN:   Repeat Botox injection in ~3 months  Ertapenem 1g IV day before and day of.    Jack Whittaker  PGY-4  [phone]      I personally performed this  procedure with assistance from resident Dr. Whittaker.  I agree with note.    Alejandro Tong MD

## 2023-09-28 NOTE — PROGRESS NOTES
PRE-OPERATIVE DIAGNOSIS:   1.  Neurogenic bladder     POST-OPERATIVE DIAGNOSIS:  1.  Neurogenic bladder     PROCEDURE:    1. Cystourethroscopy With injection of botulinum toxin A 200units in 10cc sterile saline     Surgeon: Alejandro Tong MD     OPERATIVE INDICATIONS:  Kinza Teresa is a 48 year old male with neurogenic bladder due to spinal cord injury. He does not have an augment. He performs CIC per urethra. He takes oxybutynin. UDS demonstrates persistent neurogenic DO.  He experiences recurrent UTIs. He does irrigations. He previously had an IPP which works well for him though has some glans hypermobility.  His last botox was 03/2023. He gets Ertapenem x 2 doses for this procedure - one yesterday and one today. He is not interested in bladder augmentation.     OPERATIVE DETAILS:  The patient was taken to the procedure room. He was left in his chair. Penis was prepped.      A Flexible cystoscope was placed into the bladder and a flexible needle  Was preloaded.  The bladder mucosa appeared to be normal without stones, tumors or diverticulum on 360 degree inspection. The ureteral orifices were in the normal orthotopic position bilaterally.  We mixed 2 vials of Botox for 200u total.  We performed a template injection procedure with 10 injections. All injections were placed deep to the mucosa into the detrusor muscle.  We then emptied the bladder to re-inspect our injection sites and found that there was a minimal amount of blood.     Scope was removed.        PLAN:   Repeat Botox injection in ~3 months  Ertapenem 1g IV day before and day of.    Jack Whittaker  PGY-4  [phone]      I personally performed this procedure with assistance from resident Dr. Whittaker.  I agree with note.    Alejandro Tong MD

## 2023-10-01 RX ORDER — METHYLPREDNISOLONE SODIUM SUCCINATE 125 MG/2ML
125 INJECTION, POWDER, LYOPHILIZED, FOR SOLUTION INTRAMUSCULAR; INTRAVENOUS
Status: CANCELLED
Start: 2023-10-01

## 2023-10-01 RX ORDER — ALBUTEROL SULFATE 90 UG/1
1-2 AEROSOL, METERED RESPIRATORY (INHALATION)
Status: CANCELLED
Start: 2024-01-01

## 2023-10-01 RX ORDER — METHYLPREDNISOLONE SODIUM SUCCINATE 125 MG/2ML
125 INJECTION, POWDER, LYOPHILIZED, FOR SOLUTION INTRAMUSCULAR; INTRAVENOUS
Status: CANCELLED
Start: 2024-01-01

## 2023-10-01 RX ORDER — HEPARIN SODIUM,PORCINE 10 UNIT/ML
5 VIAL (ML) INTRAVENOUS
Status: CANCELLED | OUTPATIENT
Start: 2024-01-01

## 2023-10-01 RX ORDER — EPINEPHRINE 1 MG/ML
0.3 INJECTION, SOLUTION INTRAMUSCULAR; SUBCUTANEOUS EVERY 5 MIN PRN
Status: CANCELLED | OUTPATIENT
Start: 2023-10-01

## 2023-10-01 RX ORDER — MEPERIDINE HYDROCHLORIDE 25 MG/ML
25 INJECTION INTRAMUSCULAR; INTRAVENOUS; SUBCUTANEOUS EVERY 30 MIN PRN
Status: CANCELLED | OUTPATIENT
Start: 2023-10-01

## 2023-10-01 RX ORDER — ALBUTEROL SULFATE 0.83 MG/ML
2.5 SOLUTION RESPIRATORY (INHALATION)
Status: CANCELLED | OUTPATIENT
Start: 2024-01-01

## 2023-10-01 RX ORDER — ZOLEDRONIC ACID 5 MG/100ML
5 INJECTION, SOLUTION INTRAVENOUS ONCE
Status: CANCELLED
Start: 2023-10-01

## 2023-10-01 RX ORDER — EPINEPHRINE 1 MG/ML
0.3 INJECTION, SOLUTION INTRAMUSCULAR; SUBCUTANEOUS EVERY 5 MIN PRN
Status: CANCELLED | OUTPATIENT
Start: 2024-01-01

## 2023-10-01 RX ORDER — HEPARIN SODIUM,PORCINE 10 UNIT/ML
5-20 VIAL (ML) INTRAVENOUS DAILY PRN
Status: CANCELLED | OUTPATIENT
Start: 2023-10-01

## 2023-10-01 RX ORDER — NALOXONE HYDROCHLORIDE 0.4 MG/ML
0.2 INJECTION, SOLUTION INTRAMUSCULAR; INTRAVENOUS; SUBCUTANEOUS
Status: CANCELLED | OUTPATIENT
Start: 2024-01-01

## 2023-10-01 RX ORDER — ACETAMINOPHEN 325 MG/1
650 TABLET ORAL
Status: CANCELLED | OUTPATIENT
Start: 2023-10-01

## 2023-10-01 RX ORDER — ALBUTEROL SULFATE 90 UG/1
1-2 AEROSOL, METERED RESPIRATORY (INHALATION)
Status: CANCELLED
Start: 2023-10-01

## 2023-10-01 RX ORDER — MEPERIDINE HYDROCHLORIDE 25 MG/ML
25 INJECTION INTRAMUSCULAR; INTRAVENOUS; SUBCUTANEOUS EVERY 30 MIN PRN
Status: CANCELLED | OUTPATIENT
Start: 2024-01-01

## 2023-10-01 RX ORDER — HEPARIN SODIUM (PORCINE) LOCK FLUSH IV SOLN 100 UNIT/ML 100 UNIT/ML
5 SOLUTION INTRAVENOUS
Status: CANCELLED | OUTPATIENT
Start: 2024-01-01

## 2023-10-01 RX ORDER — ALBUTEROL SULFATE 0.83 MG/ML
2.5 SOLUTION RESPIRATORY (INHALATION)
Status: CANCELLED | OUTPATIENT
Start: 2023-10-01

## 2023-10-01 RX ORDER — HEPARIN SODIUM (PORCINE) LOCK FLUSH IV SOLN 100 UNIT/ML 100 UNIT/ML
5 SOLUTION INTRAVENOUS
Status: CANCELLED | OUTPATIENT
Start: 2023-10-01

## 2023-10-01 RX ORDER — DIPHENHYDRAMINE HYDROCHLORIDE 50 MG/ML
50 INJECTION INTRAMUSCULAR; INTRAVENOUS
Status: CANCELLED
Start: 2023-10-01

## 2023-10-01 RX ORDER — DIPHENHYDRAMINE HYDROCHLORIDE 50 MG/ML
50 INJECTION INTRAMUSCULAR; INTRAVENOUS
Status: CANCELLED
Start: 2024-01-01

## 2023-10-02 ENCOUNTER — APPOINTMENT (OUTPATIENT)
Dept: LAB | Facility: CLINIC | Age: 48
End: 2023-10-02
Attending: PHYSICAL MEDICINE & REHABILITATION
Payer: COMMERCIAL

## 2023-10-02 ENCOUNTER — INFUSION THERAPY VISIT (OUTPATIENT)
Dept: INFUSION THERAPY | Facility: CLINIC | Age: 48
End: 2023-10-02
Attending: PHYSICAL MEDICINE & REHABILITATION
Payer: COMMERCIAL

## 2023-10-02 VITALS
DIASTOLIC BLOOD PRESSURE: 92 MMHG | OXYGEN SATURATION: 100 % | HEART RATE: 50 BPM | TEMPERATURE: 97.7 F | SYSTOLIC BLOOD PRESSURE: 156 MMHG | WEIGHT: 145 LBS | BODY MASS INDEX: 23.4 KG/M2 | RESPIRATION RATE: 16 BRPM

## 2023-10-02 DIAGNOSIS — M81.8 DISUSE OSTEOPOROSIS: Primary | ICD-10-CM

## 2023-10-02 LAB
ALBUMIN SERPL BCG-MCNC: 4.5 G/DL (ref 3.5–5.2)
ALP SERPL-CCNC: 84 U/L (ref 40–129)
ALT SERPL W P-5'-P-CCNC: 9 U/L (ref 0–70)
ANION GAP SERPL CALCULATED.3IONS-SCNC: 9 MMOL/L (ref 7–15)
AST SERPL W P-5'-P-CCNC: 15 U/L (ref 0–45)
BILIRUB SERPL-MCNC: 0.3 MG/DL
BUN SERPL-MCNC: 12 MG/DL (ref 6–20)
CALCIUM SERPL-MCNC: 9.5 MG/DL (ref 8.6–10)
CALCIUM SERPL-MCNC: 9.5 MG/DL (ref 8.6–10)
CHLORIDE SERPL-SCNC: 108 MMOL/L (ref 98–107)
CREAT SERPL-MCNC: 0.78 MG/DL (ref 0.67–1.17)
DEPRECATED HCO3 PLAS-SCNC: 27 MMOL/L (ref 22–29)
EGFRCR SERPLBLD CKD-EPI 2021: >90 ML/MIN/1.73M2
GLUCOSE SERPL-MCNC: 107 MG/DL (ref 70–99)
POTASSIUM SERPL-SCNC: 3.9 MMOL/L (ref 3.4–5.3)
PROT SERPL-MCNC: 7.2 G/DL (ref 6.4–8.3)
PTH-INTACT SERPL-MCNC: 47 PG/ML (ref 15–65)
SODIUM SERPL-SCNC: 144 MMOL/L (ref 135–145)
VIT D+METAB SERPL-MCNC: 33 NG/ML (ref 20–50)

## 2023-10-02 PROCEDURE — 80053 COMPREHEN METABOLIC PANEL: CPT

## 2023-10-02 PROCEDURE — 36415 COLL VENOUS BLD VENIPUNCTURE: CPT

## 2023-10-02 PROCEDURE — 83970 ASSAY OF PARATHORMONE: CPT

## 2023-10-02 PROCEDURE — 96365 THER/PROPH/DIAG IV INF INIT: CPT

## 2023-10-02 PROCEDURE — 250N000013 HC RX MED GY IP 250 OP 250 PS 637: Performed by: PHYSICAL MEDICINE & REHABILITATION

## 2023-10-02 PROCEDURE — 250N000011 HC RX IP 250 OP 636: Mod: JZ | Performed by: PHYSICAL MEDICINE & REHABILITATION

## 2023-10-02 PROCEDURE — 82306 VITAMIN D 25 HYDROXY: CPT

## 2023-10-02 RX ORDER — ACETAMINOPHEN 325 MG/1
650 TABLET ORAL
Status: DISCONTINUED | OUTPATIENT
Start: 2023-10-02 | End: 2023-10-02

## 2023-10-02 RX ORDER — ZOLEDRONIC ACID 5 MG/100ML
5 INJECTION, SOLUTION INTRAVENOUS ONCE
Status: COMPLETED | OUTPATIENT
Start: 2023-10-02 | End: 2023-10-02

## 2023-10-02 RX ADMIN — ACETAMINOPHEN 650 MG: 325 TABLET ORAL at 10:33

## 2023-10-02 RX ADMIN — ZOLEDRONIC ACID 5 MG: 0.05 INJECTION, SOLUTION INTRAVENOUS at 10:34

## 2023-10-02 ASSESSMENT — PAIN SCALES - GENERAL: PAINLEVEL: NO PAIN (0)

## 2023-10-02 NOTE — NURSING NOTE
Chief Complaint   Patient presents with    Blood Draw     Vitals, blood drawn and PIV placed placed by Vascular RN UMA Pt checked into appt.      MTATY Norman LPN

## 2023-10-02 NOTE — PROGRESS NOTES
Nursing Note  Kinza Teresa [facility] for:   Chief Complaint   Patient presents with    Blood Draw     Vitals, blood drawn and PIV placed placed by Vascular RN SHI. Pt checked into appt.     Infusion     IV Reclast       During today's [facility] appointment, orders from Dr SETH Sierra were completed.  Frequency: once    Progress note:  Patient identification verified by name and date of birth.  Assessment completed.  Vitals recorded in Doc Flowsheets.  Patient was provided with education regarding medication/procedure and possible side effects.  Patient verbalized understanding. Patient hypertensive at conclusion of appointment; asymptomatic and not atypical for patient. Instructed patient on symptoms to watch for and what to do if symptoms present. Patient verbalized understanding.      present during visit today: Not Applicable.    Treatment Conditions:   - Verified that patient is taking oral Calcium Supplement and Vitamin D per order.  - Patient monitored for 20 minutes after medication was administered (first dose).  - Creatinine clearance and Calcium met parameters in orders to be able to administer Reclast today.     Premedications: administered per order.    Drug Waste Record: No    Infusion length and rate:  infusion given over approximately  30 minutes    Labs: drawn today, prior to appointment in second floor lab.    Vascular access: peripheral IV placed today in 2nd floor lab.    Is the next appt scheduled? NA    Post Infusion Assessment:  Patient tolerated infusion without incident.  Patient observed for 20 minutes post infusion (first dose).  Blood return noted pre and post infusion.  Site patent and intact, free from redness, edema or discomfort.  No evidence of extravasations.  Access discontinued per protocol.     Discharge Plan:   Follow up plan of care with: ordering provider as scheduled.  Discharge instructions  were reviewed with patient.  Patient/representative verbalized understanding of discharge instructions and all questions answered.  Patient discharged from [facility] in stable condition.    Sj Kumar RN    Administrations This Visit       acetaminophen (TYLENOL) tablet 650 mg       Admin Date  10/02/2023 Action  $Given Dose  650 mg Route  Oral Administered By  Sj Kumar RN              zoledronic Acid (RECLAST) infusion 5 mg       Admin Date  10/02/2023 Action  $New Bag Dose  5 mg Rate  200 mL/hr Route  Intravenous Administered By  Sj Kumar RN                    /86   Pulse 61   Temp 97.7  F (36.5  C) (Oral)   Resp 16   Wt 65.8 kg (145 lb)   SpO2 99%   BMI 23.40 kg/m

## 2023-10-03 ENCOUNTER — TRANSCRIBE ORDERS (OUTPATIENT)
Dept: UROLOGY | Facility: CLINIC | Age: 48
End: 2023-10-03
Payer: COMMERCIAL

## 2023-10-03 RX ORDER — DIPHENHYDRAMINE HYDROCHLORIDE 50 MG/ML
50 INJECTION INTRAMUSCULAR; INTRAVENOUS
Status: CANCELLED
Start: 2023-12-19

## 2023-10-03 RX ORDER — EPINEPHRINE 1 MG/ML
0.3 INJECTION, SOLUTION, CONCENTRATE INTRAVENOUS EVERY 5 MIN PRN
Status: CANCELLED | OUTPATIENT
Start: 2023-12-19

## 2023-10-03 RX ORDER — METHYLPREDNISOLONE SODIUM SUCCINATE 125 MG/2ML
125 INJECTION, POWDER, LYOPHILIZED, FOR SOLUTION INTRAMUSCULAR; INTRAVENOUS
Status: CANCELLED
Start: 2023-12-19

## 2023-10-03 RX ORDER — HEPARIN SODIUM,PORCINE 10 UNIT/ML
5 VIAL (ML) INTRAVENOUS
Status: CANCELLED | OUTPATIENT
Start: 2024-01-01

## 2023-10-03 RX ORDER — HEPARIN SODIUM,PORCINE 10 UNIT/ML
5 VIAL (ML) INTRAVENOUS
Status: CANCELLED | OUTPATIENT
Start: 2023-12-19

## 2023-10-03 RX ORDER — ALBUTEROL SULFATE 90 UG/1
1-2 AEROSOL, METERED RESPIRATORY (INHALATION)
Status: CANCELLED
Start: 2023-12-19

## 2023-10-03 RX ORDER — ALBUTEROL SULFATE 0.83 MG/ML
2.5 SOLUTION RESPIRATORY (INHALATION)
Status: CANCELLED | OUTPATIENT
Start: 2023-12-19

## 2023-10-03 RX ORDER — MEPERIDINE HYDROCHLORIDE 25 MG/ML
25 INJECTION INTRAMUSCULAR; INTRAVENOUS; SUBCUTANEOUS EVERY 30 MIN PRN
Status: CANCELLED | OUTPATIENT
Start: 2024-01-01

## 2023-10-03 RX ORDER — ALBUTEROL SULFATE 90 UG/1
1-2 AEROSOL, METERED RESPIRATORY (INHALATION)
Status: CANCELLED
Start: 2024-01-01

## 2023-10-03 RX ORDER — ALBUTEROL SULFATE 0.83 MG/ML
2.5 SOLUTION RESPIRATORY (INHALATION)
Status: CANCELLED | OUTPATIENT
Start: 2024-01-01

## 2023-10-03 RX ORDER — HEPARIN SODIUM (PORCINE) LOCK FLUSH IV SOLN 100 UNIT/ML 100 UNIT/ML
5 SOLUTION INTRAVENOUS
Status: CANCELLED | OUTPATIENT
Start: 2024-01-01

## 2023-10-03 RX ORDER — NALOXONE HYDROCHLORIDE 0.4 MG/ML
0.2 INJECTION, SOLUTION INTRAMUSCULAR; INTRAVENOUS; SUBCUTANEOUS
Status: CANCELLED | OUTPATIENT
Start: 2023-12-19

## 2023-10-03 RX ORDER — HEPARIN SODIUM (PORCINE) LOCK FLUSH IV SOLN 100 UNIT/ML 100 UNIT/ML
5 SOLUTION INTRAVENOUS
Status: CANCELLED | OUTPATIENT
Start: 2023-12-19

## 2023-10-03 RX ORDER — EPINEPHRINE 1 MG/ML
0.3 INJECTION, SOLUTION, CONCENTRATE INTRAVENOUS EVERY 5 MIN PRN
Status: CANCELLED | OUTPATIENT
Start: 2024-01-01

## 2023-10-03 RX ORDER — DIPHENHYDRAMINE HYDROCHLORIDE 50 MG/ML
50 INJECTION INTRAMUSCULAR; INTRAVENOUS
Status: CANCELLED
Start: 2024-01-01

## 2023-10-03 RX ORDER — METHYLPREDNISOLONE SODIUM SUCCINATE 125 MG/2ML
125 INJECTION, POWDER, LYOPHILIZED, FOR SOLUTION INTRAMUSCULAR; INTRAVENOUS
Status: CANCELLED
Start: 2024-01-01

## 2023-10-03 RX ORDER — NALOXONE HYDROCHLORIDE 0.4 MG/ML
0.2 INJECTION, SOLUTION INTRAMUSCULAR; INTRAVENOUS; SUBCUTANEOUS
Status: CANCELLED | OUTPATIENT
Start: 2024-01-01

## 2023-10-03 RX ORDER — MEPERIDINE HYDROCHLORIDE 25 MG/ML
25 INJECTION INTRAMUSCULAR; INTRAVENOUS; SUBCUTANEOUS EVERY 30 MIN PRN
Status: CANCELLED | OUTPATIENT
Start: 2023-12-19

## 2023-10-25 NOTE — TELEPHONE ENCOUNTER
CHIEF COMPLAINT:  Office Visit and Eye Exam      HISTORY OF PRESENT ILLNESS: Patient is here for eye exam. Pt states vision is stable, no concerns today.      POH: cataracts, diabetes    FH: (-) glaucoma (-) amd    Tech notes reviewed.      ASSESSMENT/PLAN  1. Cataracts OU  - natural history of disease discussed with patient  - borderline visually significant   - monitor     2. DM  - no retinopathy  - tight BG control  - monitor    3. Dry Eyes  - natural history of disease discussed with patient  - recommend tears  - monitor    Patient's assessment and plan discussed in detail with patient.  All questions answered.    Return in about 1 year (around 10/25/2024) for complete. or sooner as needed.       Rx for Macrobid 100 mg bid x 7 days ordered per provider.    Pt called and notified of the below.    Yes macrobid 100mg BID x 1 week   All other options are IV    Routing comment

## 2023-10-25 NOTE — NURSING NOTE
Is the patient currently in the state of [place]? YES    Visit mode:VIDEO    If the visit is dropped, the patient can be reconnected by: VIDEO VISIT: Text to cell phone: [phone]    Will anyone else be joining the visit? NO      How would you like to obtain your AVS? MyChart    Are changes needed to the allergy or medication list? NO    Reason for visit: Consult         Split-Thickness Skin Graft Text: The defect edges were debeveled with a #15 scalpel blade.  Given the location of the defect, shape of the defect and the proximity to free margins a split thickness skin graft was deemed most appropriate.  Using a sterile surgical marker, the primary defect shape was transferred to the donor site. The split thickness graft was then harvested.  The skin graft was then placed in the primary defect and oriented appropriately.

## 2023-12-05 ENCOUNTER — PRE VISIT (OUTPATIENT)
Dept: UROLOGY | Facility: CLINIC | Age: 48
End: 2023-12-05
Payer: COMMERCIAL

## 2023-12-05 NOTE — TELEPHONE ENCOUNTER
Reason for visit: cystoscopy + botox  200units in 10cc- verify with EDVIN Tong      Dx/Hx/Sx: neurogenic bladder     Records/imaging/labs/orders: in epic    At Rooming: standard -possible antibiotic administration

## 2024-01-04 ENCOUNTER — PRE VISIT (OUTPATIENT)
Dept: UROLOGY | Facility: CLINIC | Age: 49
End: 2024-01-04
Payer: COMMERCIAL

## 2024-01-04 DIAGNOSIS — N31.9 NEUROGENIC BLADDER: Primary | ICD-10-CM

## 2024-01-04 NOTE — TELEPHONE ENCOUNTER
Reason for visit: cysto + botox 200u in 10cc     Dx/Hx/Sx: neurogenic bladder     Records/imaging/labs/orders: in epic    At Rooming: standard- consent

## 2024-01-21 ENCOUNTER — HEALTH MAINTENANCE LETTER (OUTPATIENT)
Age: 49
End: 2024-01-21

## 2024-01-23 ENCOUNTER — INFUSION THERAPY VISIT (OUTPATIENT)
Dept: INFUSION THERAPY | Facility: CLINIC | Age: 49
End: 2024-01-23
Attending: UROLOGY
Payer: COMMERCIAL

## 2024-01-23 VITALS
HEART RATE: 58 BPM | RESPIRATION RATE: 18 BRPM | OXYGEN SATURATION: 100 % | DIASTOLIC BLOOD PRESSURE: 84 MMHG | SYSTOLIC BLOOD PRESSURE: 132 MMHG | TEMPERATURE: 98 F

## 2024-01-23 DIAGNOSIS — N39.0 URINARY TRACT INFECTION ASSOCIATED WITH CATHETERIZATION OF URINARY TRACT, UNSPECIFIED INDWELLING URINARY CATHETER TYPE, SEQUELA: Primary | ICD-10-CM

## 2024-01-23 DIAGNOSIS — T83.511S URINARY TRACT INFECTION ASSOCIATED WITH CATHETERIZATION OF URINARY TRACT, UNSPECIFIED INDWELLING URINARY CATHETER TYPE, SEQUELA: Primary | ICD-10-CM

## 2024-01-23 DIAGNOSIS — Z29.89 NEED FOR PROPHYLAXIS AGAINST URINARY TRACT INFECTION: ICD-10-CM

## 2024-01-23 PROCEDURE — 250N000011 HC RX IP 250 OP 636: Mod: JZ | Performed by: UROLOGY

## 2024-01-23 PROCEDURE — 250N000009 HC RX 250: Performed by: UROLOGY

## 2024-01-23 PROCEDURE — 96374 THER/PROPH/DIAG INJ IV PUSH: CPT

## 2024-01-23 RX ORDER — HEPARIN SODIUM,PORCINE 10 UNIT/ML
5 VIAL (ML) INTRAVENOUS
Status: CANCELLED | OUTPATIENT
Start: 2024-04-01

## 2024-01-23 RX ORDER — MEPERIDINE HYDROCHLORIDE 25 MG/ML
25 INJECTION INTRAMUSCULAR; INTRAVENOUS; SUBCUTANEOUS EVERY 30 MIN PRN
Status: CANCELLED | OUTPATIENT
Start: 2024-04-01

## 2024-01-23 RX ORDER — ALBUTEROL SULFATE 90 UG/1
1-2 AEROSOL, METERED RESPIRATORY (INHALATION)
Status: CANCELLED
Start: 2024-04-01

## 2024-01-23 RX ORDER — METHYLPREDNISOLONE SODIUM SUCCINATE 125 MG/2ML
125 INJECTION, POWDER, LYOPHILIZED, FOR SOLUTION INTRAMUSCULAR; INTRAVENOUS
Status: CANCELLED
Start: 2024-04-01

## 2024-01-23 RX ORDER — HEPARIN SODIUM (PORCINE) LOCK FLUSH IV SOLN 100 UNIT/ML 100 UNIT/ML
5 SOLUTION INTRAVENOUS
Status: CANCELLED | OUTPATIENT
Start: 2024-04-01

## 2024-01-23 RX ORDER — ALBUTEROL SULFATE 0.83 MG/ML
2.5 SOLUTION RESPIRATORY (INHALATION)
Status: CANCELLED | OUTPATIENT
Start: 2024-04-01

## 2024-01-23 RX ORDER — DIPHENHYDRAMINE HYDROCHLORIDE 50 MG/ML
50 INJECTION INTRAMUSCULAR; INTRAVENOUS
Status: CANCELLED
Start: 2024-04-01

## 2024-01-23 RX ORDER — NALOXONE HYDROCHLORIDE 0.4 MG/ML
0.2 INJECTION, SOLUTION INTRAMUSCULAR; INTRAVENOUS; SUBCUTANEOUS
Status: CANCELLED | OUTPATIENT
Start: 2024-04-01

## 2024-01-23 RX ORDER — EPINEPHRINE 1 MG/ML
0.3 INJECTION, SOLUTION INTRAMUSCULAR; SUBCUTANEOUS EVERY 5 MIN PRN
Status: CANCELLED | OUTPATIENT
Start: 2024-04-01

## 2024-01-23 RX ADMIN — ERTAPENEM SODIUM 1 G: 1 INJECTION, POWDER, LYOPHILIZED, FOR SOLUTION INTRAMUSCULAR; INTRAVENOUS at 09:46

## 2024-01-23 NOTE — PROGRESS NOTES
"Chief Complaint   Patient presents with    Infusion     IV Invanz     Infusion Nursing Note:  Kinza Teresa presents today for IV Invanz, dose 1 of 2.  Patient seen by provider today: No   present during visit today: Not Applicable.    Note: Invanz given IVP over 5 minutes.      Intravenous Access:  Peripheral IV placed vascular access.  No labs due.     Treatment Conditions:  Not Applicable.      Post Infusion Assessment:  Patient tolerated infusion without incident.  Blood return noted pre and post infusion.  Site patent and intact, free from redness, edema or discomfort.  No evidence of extravasations.  Access discontinued per protocol.       Discharge Plan:   [facility]  Patient will return tomorrow for next appointment.   Patient discharged in stable condition accompanied by: self.  Departure Mode: Wheelchair.    Administrations This Visit       ertapenem (INVanz) 1 g in 10 mL SWFI for IVP       Admin Date  01/23/2024 Action  $Given Dose  1 g Route  Intravenous Documented By  Jose Reddy, RN                    Vital signs:  Temp: 98  F (36.7  C) Temp src: Oral BP: 132/84 Pulse: 58   Resp: 18 SpO2: 100 % O2 Device: None (Room air)        Estimated body mass index is 23.4 kg/m  as calculated from the following:    Height as of 9/27/23: 1.676 m (5' 6\").    Weight as of 10/2/23: 65.8 kg (145 lb).          "

## 2024-01-24 ENCOUNTER — INFUSION THERAPY VISIT (OUTPATIENT)
Dept: INFUSION THERAPY | Facility: CLINIC | Age: 49
End: 2024-01-24
Attending: UROLOGY
Payer: COMMERCIAL

## 2024-01-24 ENCOUNTER — OFFICE VISIT (OUTPATIENT)
Dept: UROLOGY | Facility: CLINIC | Age: 49
End: 2024-01-24
Payer: COMMERCIAL

## 2024-01-24 VITALS
OXYGEN SATURATION: 100 % | DIASTOLIC BLOOD PRESSURE: 86 MMHG | HEART RATE: 70 BPM | RESPIRATION RATE: 18 BRPM | TEMPERATURE: 98.3 F | SYSTOLIC BLOOD PRESSURE: 135 MMHG

## 2024-01-24 VITALS — DIASTOLIC BLOOD PRESSURE: 80 MMHG | SYSTOLIC BLOOD PRESSURE: 131 MMHG | HEART RATE: 85 BPM

## 2024-01-24 DIAGNOSIS — T83.511S URINARY TRACT INFECTION ASSOCIATED WITH CATHETERIZATION OF URINARY TRACT, UNSPECIFIED INDWELLING URINARY CATHETER TYPE, SEQUELA: Primary | ICD-10-CM

## 2024-01-24 DIAGNOSIS — N39.0 URINARY TRACT INFECTION ASSOCIATED WITH CATHETERIZATION OF URINARY TRACT, UNSPECIFIED INDWELLING URINARY CATHETER TYPE, SEQUELA: Primary | ICD-10-CM

## 2024-01-24 DIAGNOSIS — N31.9 NEUROGENIC BLADDER: Primary | ICD-10-CM

## 2024-01-24 DIAGNOSIS — Z29.89 NEED FOR PROPHYLAXIS AGAINST URINARY TRACT INFECTION: ICD-10-CM

## 2024-01-24 PROCEDURE — 52287 CYSTOSCOPY CHEMODENERVATION: CPT | Performed by: UROLOGY

## 2024-01-24 PROCEDURE — 250N000009 HC RX 250: Performed by: UROLOGY

## 2024-01-24 PROCEDURE — 250N000011 HC RX IP 250 OP 636: Mod: JZ | Performed by: UROLOGY

## 2024-01-24 PROCEDURE — 96374 THER/PROPH/DIAG INJ IV PUSH: CPT

## 2024-01-24 RX ORDER — EPINEPHRINE 1 MG/ML
0.3 INJECTION, SOLUTION INTRAMUSCULAR; SUBCUTANEOUS EVERY 5 MIN PRN
Status: CANCELLED | OUTPATIENT
Start: 2024-04-01

## 2024-01-24 RX ORDER — LIDOCAINE HYDROCHLORIDE 20 MG/ML
JELLY TOPICAL ONCE
Status: COMPLETED | OUTPATIENT
Start: 2024-01-24 | End: 2024-01-24

## 2024-01-24 RX ORDER — DIPHENHYDRAMINE HYDROCHLORIDE 50 MG/ML
50 INJECTION INTRAMUSCULAR; INTRAVENOUS
Status: CANCELLED
Start: 2024-04-01

## 2024-01-24 RX ORDER — HEPARIN SODIUM,PORCINE 10 UNIT/ML
5 VIAL (ML) INTRAVENOUS
Status: CANCELLED | OUTPATIENT
Start: 2024-04-01

## 2024-01-24 RX ORDER — ALBUTEROL SULFATE 90 UG/1
1-2 AEROSOL, METERED RESPIRATORY (INHALATION)
Status: CANCELLED
Start: 2024-04-01

## 2024-01-24 RX ORDER — HEPARIN SODIUM (PORCINE) LOCK FLUSH IV SOLN 100 UNIT/ML 100 UNIT/ML
5 SOLUTION INTRAVENOUS
Status: CANCELLED | OUTPATIENT
Start: 2024-04-01

## 2024-01-24 RX ORDER — NALOXONE HYDROCHLORIDE 0.4 MG/ML
0.2 INJECTION, SOLUTION INTRAMUSCULAR; INTRAVENOUS; SUBCUTANEOUS
Status: CANCELLED | OUTPATIENT
Start: 2024-04-01

## 2024-01-24 RX ORDER — METHYLPREDNISOLONE SODIUM SUCCINATE 125 MG/2ML
125 INJECTION, POWDER, LYOPHILIZED, FOR SOLUTION INTRAMUSCULAR; INTRAVENOUS
Status: CANCELLED
Start: 2024-04-01

## 2024-01-24 RX ORDER — ALBUTEROL SULFATE 0.83 MG/ML
2.5 SOLUTION RESPIRATORY (INHALATION)
Status: CANCELLED | OUTPATIENT
Start: 2024-04-01

## 2024-01-24 RX ORDER — MEPERIDINE HYDROCHLORIDE 25 MG/ML
25 INJECTION INTRAMUSCULAR; INTRAVENOUS; SUBCUTANEOUS EVERY 30 MIN PRN
Status: CANCELLED | OUTPATIENT
Start: 2024-04-01

## 2024-01-24 RX ADMIN — LIDOCAINE HYDROCHLORIDE: 20 JELLY TOPICAL at 10:52

## 2024-01-24 RX ADMIN — ERTAPENEM 1 G: 1 INJECTION, POWDER, LYOPHILIZED, FOR SOLUTION INTRAMUSCULAR; INTRAVENOUS at 09:38

## 2024-01-24 ASSESSMENT — PAIN SCALES - GENERAL: PAINLEVEL: MILD PAIN (3)

## 2024-01-24 NOTE — LETTER
1/24/2024       RE: Kinza Teresa  [address] [facility] [address]     Dear Colleague,    Thank you for referring your patient, Kinza Teresa, to the [facility]OLOGY [facility] [place] at [facility]. Please see a copy of my visit note below.    PRE-OPERATIVE DIAGNOSIS:   1.  Neurogenic bladder     POST-OPERATIVE DIAGNOSIS:  1.  Neurogenic bladder     PROCEDURE:    1. Cystourethroscopy With injection of botulinum toxin A 200units in 10cc sterile saline     Surgeon: Alejandro Tong MD     OPERATIVE INDICATIONS:  Kinza Teresa is a 48 year old male with neurogenic bladder due to spinal cord injury. He does not have an augment. He performs CIC per urethra. He takes oxybutynin. UDS demonstrates persistent neurogenic DO.  He experiences recurrent UTIs. He does irrigations. He previously had an IPP which works well for him though has some glans hypermobility.  His last botox was Sept 2023. He gets Ertapenem x 2 doses for this procedure - one yesterday and one today. He is not interested in bladder augmentation.     OPERATIVE DETAILS:  The patient was taken to the procedure room. He was left in his chair. Penis was prepped.      A Flexible cystoscope was placed into the bladder and a flexible needle  Was preloaded.  The bladder mucosa appeared to be normal without stones, tumors or diverticulum on 360 degree inspection. The ureteral orifices were in the normal orthotopic position bilaterally.  We mixed 2 vials of Botox for 200u total.  We performed a template injection procedure with 10 injections. All injections were placed deep to the mucosa into the detrusor muscle.  We then emptied the bladder to re-inspect our injection sites and found that there was a minimal amount of blood.     Scope was removed.     PLAN:   Repeat Botox injection in 3-4 months  Ertapenem 1g IV day before and day of.     Alejandro Tong MD
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
## 2024-01-24 NOTE — PATIENT INSTRUCTIONS
"AFTER YOUR CYSTOSCOPY        You have just completed a cystoscopy, or \"cysto\", which allowed your physician to learn more about your bladder (or to remove a stent placed after surgery). We suggest that you continue to avoid caffeine, fruit juice, and alcohol for the next 24 hours, however, you are encouraged to return to your normal activities.         A few things that are considered normal after your cystoscopy:     * Small amount of bleeding (or spotting) that clears within the next 24 hours     * Slight burning sensation with urination     * Sensation to of needing to avoid more frequently     * The feeling of \"air\" in your urine     * Mild discomfort that is relieved with Tylenol        Please contact our office promptly if you:     * Develop a fever above 101 degrees     * Are unable to urinate     * Develop bright red blood that does not stop     * Severe pain or swelling         Please contact our office with any concerns or questions @ [phone]   AFTER YOUR CYSTOSCOPY        You have just completed a cystoscopy, or \"cysto\", which allowed your physician to learn more about your bladder (or to remove a stent placed after surgery). We suggest that you continue to avoid caffeine, fruit juice, and alcohol for the next 24 hours, however, you are encouraged to return to your normal activities.         A few things that are considered normal after your cystoscopy:     * Small amount of bleeding (or spotting) that clears within the next 24 hours     * Slight burning sensation with urination     * Sensation to of needing to avoid more frequently     * The feeling of \"air\" in your urine     * Mild discomfort that is relieved with Tylenol        Please contact our office promptly if you:     * Develop a fever above 101 degrees     * Are unable to urinate     * Develop bright red blood that does not stop     * Severe pain or swelling         Please contact our office with any concerns or questions @[facility].  "

## 2024-01-24 NOTE — PATIENT INSTRUCTIONS
Dear Kinza Teresa    Thank you for choosing [facility] ([facility]) for your infusion.  The following information is a summary of our appointment as well as important reminders.          If you are a transplant patient and require transplant education, please click on this link: [email or web address].    We look forward in seeing you on your next appointment here at [facility] ([facility]).  Please don t hesitate to call us at [phone] to reschedule any of your appointments or to speak with one of the [facility] registered nurses.  It was a pleasure taking care of you today.    Sincerely,    [facility]  [facility]  [address] [place] [address]  [address]  Phone:  [phone]

## 2024-01-24 NOTE — PROGRESS NOTES
Infusion Nursing Note:  Kinza Teresa presents today for Invanz.    Patient seen by provider today: No   present during visit today: Not Applicable.    Note: 2/2 Invanz given Slow IV push over about 5 minutes, and flushed with prefilled Normal saline after.  Administrations This Visit       ertapenem (INVanz) 1 g in 10 mL SWFI for IVP       Admin Date  01/24/2024 Action  $Given Dose  1 g Route  Intravenous Documented By  Latisha Brown, MARLINE                     Intravenous Access:  Per[facility] by [facility].    Treatment Conditions:  None.      Post Infusion Assessment:  Patient tolerated infusion without incident.  Blood return noted pre and post infusion.  Site patent and intact, free from redness, edema or discomfort.  No evidence of extravasations.  Access discontinued per protocol.       Discharge Plan:   Discharge instructions reviewed with: Patient.  Patient and/or family verbalized understanding of discharge instructions and all questions answered.  AVS to patient via [email or web address].  Patient will return 4/30/24 for next appointment.   Patient discharged in stable condition accompanied by: self.  Departure Mode: Ambulatory.    /86 (BP Location: Left arm, Patient Position: Sitting, Cuff Size: Adult Large)   Pulse 70   Temp 98.3  F (36.8  C) (Oral)   Resp 18   SpO2 100%      Latisha Brown, MARLINE

## 2024-01-27 NOTE — PROGRESS NOTES
PRE-OPERATIVE DIAGNOSIS:   1.  Neurogenic bladder     POST-OPERATIVE DIAGNOSIS:  1.  Neurogenic bladder     PROCEDURE:    1. Cystourethroscopy With injection of botulinum toxin A 200units in 10cc sterile saline     Surgeon: Alejandro Tong MD     OPERATIVE INDICATIONS:  Kinza Teresa is a 48 year old male with neurogenic bladder due to spinal cord injury. He does not have an augment. He performs CIC per urethra. He takes oxybutynin. UDS demonstrates persistent neurogenic DO.  He experiences recurrent UTIs. He does irrigations. He previously had an IPP which works well for him though has some glans hypermobility.  His last botox was Sept 2023. He gets Ertapenem x 2 doses for this procedure - one yesterday and one today. He is not interested in bladder augmentation.     OPERATIVE DETAILS:  The patient was taken to the procedure room. He was left in his chair. Penis was prepped.      A Flexible cystoscope was placed into the bladder and a flexible needle  Was preloaded.  The bladder mucosa appeared to be normal without stones, tumors or diverticulum on 360 degree inspection. The ureteral orifices were in the normal orthotopic position bilaterally.  We mixed 2 vials of Botox for 200u total.  We performed a template injection procedure with 10 injections. All injections were placed deep to the mucosa into the detrusor muscle.  We then emptied the bladder to re-inspect our injection sites and found that there was a minimal amount of blood.     Scope was removed.     PLAN:   Repeat Botox injection in 3-4 months  Ertapenem 1g IV day before and day of.     Alejandro Tong MD

## 2024-02-12 ENCOUNTER — TELEPHONE (OUTPATIENT)
Dept: SCHEDULING | Facility: CLINIC | Age: 49
End: 2024-02-12
Payer: COMMERCIAL

## 2024-02-12 ENCOUNTER — LAB REQUISITION (OUTPATIENT)
Dept: LAB | Facility: CLINIC | Age: 49
End: 2024-02-12

## 2024-02-12 DIAGNOSIS — G89.21 CHRONIC PAIN DUE TO TRAUMA: ICD-10-CM

## 2024-02-12 DIAGNOSIS — M79.2 NEURALGIA AND NEURITIS, UNSPECIFIED: ICD-10-CM

## 2024-02-12 LAB
ALBUMIN SERPL BCG-MCNC: 4.4 G/DL (ref 3.5–5.2)
ALP SERPL-CCNC: 50 U/L (ref 40–150)
ALT SERPL W P-5'-P-CCNC: 13 U/L (ref 0–70)
ANION GAP SERPL CALCULATED.3IONS-SCNC: 10 MMOL/L (ref 7–15)
AST SERPL W P-5'-P-CCNC: 16 U/L (ref 0–45)
BILIRUB SERPL-MCNC: 0.2 MG/DL
BUN SERPL-MCNC: 10.5 MG/DL (ref 6–20)
CALCIUM SERPL-MCNC: 9.2 MG/DL (ref 8.6–10)
CHLORIDE SERPL-SCNC: 107 MMOL/L (ref 98–107)
CREAT SERPL-MCNC: 0.76 MG/DL (ref 0.67–1.17)
DEPRECATED HCO3 PLAS-SCNC: 26 MMOL/L (ref 22–29)
EGFRCR SERPLBLD CKD-EPI 2021: >90 ML/MIN/1.73M2
GLUCOSE SERPL-MCNC: 100 MG/DL (ref 70–99)
POTASSIUM SERPL-SCNC: 4.6 MMOL/L (ref 3.4–5.3)
PROT SERPL-MCNC: 6.9 G/DL (ref 6.4–8.3)
SODIUM SERPL-SCNC: 143 MMOL/L (ref 135–145)

## 2024-02-12 PROCEDURE — 82040 ASSAY OF SERUM ALBUMIN: CPT | Performed by: FAMILY MEDICINE

## 2024-02-12 NOTE — TELEPHONE ENCOUNTER
General Call    Contacts         Type Contact Phone/Fax    02/12/2024 08:31 AM CST Phone (Incoming) Kinza Teresa (Self) [phone] (H)          Reason for Call:  CPAP Supplies    What are your questions or concerns:  Pt needs CPAP supplies, was told to have dr send over the prescription so he can get more supplies. PT is using [facility] Location.     Date of last appointment with provider: 06/08/2019 [place]    Could we send this information to you in Think[employer] or would you prefer to receive a phone call?:   Patient would prefer a phone call   Okay to leave a detailed message?: Yes at Cell number on file:    Telephone Information:   Mobile [phone]

## 2024-03-07 ENCOUNTER — MYC MEDICAL ADVICE (OUTPATIENT)
Dept: PHYSICAL MEDICINE AND REHAB | Facility: CLINIC | Age: 49
End: 2024-03-07
Payer: COMMERCIAL

## 2024-03-07 NOTE — LETTER
"March 25, 2024      Kinza Teresa  [address]  [place] [address]      Dear Kinza,    This letter is notice to you that Dr Amanda Sierra of our Physical Medicine clinic is no longer available to see patients for Bone Health management.  Our Department does not have an alternate provider who can continue this care, we are advising that you find another Bone Health Specialist to continue your care.     Your consult date was:  8/2/2023     Your last visit date was: 8/2/2023     Provider who referred you to Dr. Sierra: Viki Fitzgerald DO of [facility]     Dr Fitzgerald is aware of Dr Sierra' departure, please contact the provider who referred you to our clinic or ask your Primary Care Provider (PCP) for a referral to see a new specialist who treats bone health.          Our office can provide the referral if you wish to use this option, please respond to this message.     Your new bone health provider will give all future medication, lab tests and imaging (DEXA scan) orders to continue your care.     All orders previously written by Dr Amanda Sierra have been cancelled in the interest of best care practices for your medical safety.        Your Reclast IV infusion was last done on:  10/2/2023         Continued on page two:            Your last DEXA scan (bone density measurement imaging) was done: 4/13/2023     Generally, the DEXA scan can be repeated in two years for patients receiving medications for bone health, please check with your medical insurance policy for coverage details.        Your record(s) of your visit(s) with us are available to your other providers using \"Care Everywhere\" feature in EPIC electronic medical record or you may contact  [employer] Information Department to get your records sent out.  [facility] records contacts:  Fax [phone]   Phone [phone]  [email or web address]   [email or web address] (contact by email)  Medical records " request can also be obtained through your My Chart .        We regret this inconvenience, please contact our office if you need assistance in finding a new Bone Health Specialist.            Sincerely,         Nellie Morrison RN on 3/25/2024 at 4:59 PM      A copy of this letter was sent to Dr Viki Fitzgerald at [facility]
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
## 2024-03-18 DIAGNOSIS — Z00.6 EXAMINATION OF PARTICIPANT OR CONTROL IN CLINICAL RESEARCH: Primary | ICD-10-CM

## 2024-03-19 ENCOUNTER — VIRTUAL VISIT (OUTPATIENT)
Dept: SLEEP MEDICINE | Facility: CLINIC | Age: 49
End: 2024-03-19
Payer: COMMERCIAL

## 2024-03-19 VITALS — HEIGHT: 66 IN | BODY MASS INDEX: 22.5 KG/M2 | WEIGHT: 140 LBS

## 2024-03-19 DIAGNOSIS — G47.33 OSA (OBSTRUCTIVE SLEEP APNEA): Primary | ICD-10-CM

## 2024-03-19 PROCEDURE — 99203 OFFICE O/P NEW LOW 30 MIN: CPT | Mod: 95 | Performed by: NURSE PRACTITIONER

## 2024-03-19 ASSESSMENT — SLEEP AND FATIGUE QUESTIONNAIRES
HOW LIKELY ARE YOU TO NOD OFF OR FALL ASLEEP WHEN YOU ARE A PASSENGER IN A CAR FOR AN HOUR WITHOUT A BREAK: WOULD NEVER DOZE
HOW LIKELY ARE YOU TO NOD OFF OR FALL ASLEEP WHILE SITTING AND READING: MODERATE CHANCE OF DOZING
HOW LIKELY ARE YOU TO NOD OFF OR FALL ASLEEP WHILE WATCHING TV: MODERATE CHANCE OF DOZING
HOW LIKELY ARE YOU TO NOD OFF OR FALL ASLEEP WHILE LYING DOWN TO REST IN THE AFTERNOON WHEN CIRCUMSTANCES PERMIT: WOULD NEVER DOZE
HOW LIKELY ARE YOU TO NOD OFF OR FALL ASLEEP WHILE SITTING INACTIVE IN A PUBLIC PLACE: WOULD NEVER DOZE
HOW LIKELY ARE YOU TO NOD OFF OR FALL ASLEEP WHILE SITTING AND TALKING TO SOMEONE: WOULD NEVER DOZE
HOW LIKELY ARE YOU TO NOD OFF OR FALL ASLEEP WHILE SITTING QUIETLY AFTER LUNCH WITHOUT ALCOHOL: WOULD NEVER DOZE
HOW LIKELY ARE YOU TO NOD OFF OR FALL ASLEEP IN A CAR, WHILE STOPPED FOR A FEW MINUTES IN TRAFFIC: WOULD NEVER DOZE

## 2024-03-19 ASSESSMENT — PAIN SCALES - GENERAL: PAINLEVEL: NO PAIN (0)

## 2024-03-19 NOTE — PATIENT INSTRUCTIONS
"MY INFORMATION ON SLEEP APNEA-  Kinza Teresa    DOCTOR : JOSE Aguilera CNP  SLEEP CENTER :      MY CONTACT NUMBER:   [facility]  [phone]  [facility]   [phone]  [facility]   [phone]      [facility]  [phone]  [facility]   [phone]    DME:  [facility] [place] [address], [address]  [place] [address]  Phone: [phone]    Hours:  Mon - Fri: 8:00 a.m. - 4:30 p.m.  Sat: Closed  Sun: Closed      Key Points:  1. What is Obstructive Sleep Apnea (JENNIFER)? JENNIFER is the most common type of sleep apnea. Apnea literally means, \"without breath.\" It is characterized by repetitive pauses in breathing, despite continued effort to breathe, and is usually associated with a reduction in blood oxygen saturation. Apneas can last 10 to over 60 seconds. It is caused by narrowing or collapse of the upper airway as muscles relax during sleep.   2. What are the consequences of JENNIFER? Symptoms include: daytime sleepiness- possibly increasing the risk of falling asleep while driving, unrefreshing/restless sleep, snoring, insomnia, waking frequently to urinate, waking with heartburn or reflux, reduced concentration and memory, and morning headaches. Other health consequences may include development of high blood pressure. Untreated JENNIFER also can contribute to heart disease, stroke and diabetes.   3. What are the treatment options? In most situations, sleep apnea is a lifelong disease that must be managed with daily therapy. Continuous Positive Airway (CPAP) is the most reliable treatment. A mouthguard to hold your jaw forward is usually the next most reliable option. Other options include postioning devices (to keep you off your back), nasal valves, tongue retaining device, weight loss, surgery. There is more detail about these options toward the end of this " document.  4. What are the most important things to remember about using CPAP?     WHERE CAN I FIND MORE INFORMATION?    American Academy of Sleep Medicine Patient information on sleep disorders:  http://yoursleep.aasmnet.org    CPAP -  WHY AND HOW?                 Continuous positive airway pressure, or CPAP, is the most effective treatment for obstructive sleep apnea. It works by blowing room air, through a mask, to hold your throat open. A decision to use CPAP is a major step forward in the pursuit of a healthier life. The successful use of CPAP will help you breathe easier, sleep better and live healthier. Using CPAP can be a positive experience if you keep these manning points in mind:  Commitment  CPAP is not a quick fix for your problem. It involves a long-term commitment to improve your sleep and your health.    Communication  Stay in close communication with both your sleep doctor and your CPAP supplier. Ask lots of questions and seek help when you need it.    Consistency  Use CPAP all night, every night and for every nap. You will receive the maximum health benefits from CPAP when you use it every time that you sleep. This will also make it easier for your body to adjust to the treatment.    Correction  The first machine and mask that you try may not be the best ones for you. Work with your sleep doctor and your CPAP supplier to make corrections to your equipment selection. Ask about trying a different type of machine or mask if you have ongoing problems. Make sure that your mask is a good fit and learn to use your equipment properly.    Challenge  Tell a family member or close friend to ask you each morning if you used your CPAP the previous night. Have someone to challenge you to give it your best effort.    Connection   Your adjustment to CPAP will be easier if you are able to connect with others who use the same treatment. Ask your sleep doctor if there is a support group in your area for people who have  "sleep apnea, or look for one on the Internet.  Comfort   Increase your level of comfort by using a saline spray, decongestant or heated humidifier if CPAP irritates your nose, mouth or throat. Use your unit's \"ramp\" setting to slowly get used to the air pressure level. There may be soft pads you can buy that will fit over your mask straps. Look on www.CPAP.com for accessories that can help make CPAP use more comfortable.  Cleaning   Clean your mask, tubing and headgear on a regular basis. Put this time in your schedule so that you don't forget to do it. Check and replace the filters for your CPAP unit and humidifier.    Completion   Although you are never finished with CPAP therapy, you should reward yourself by celebrating the completion of your first month of treatment. Expect this first month to be your hardest period of adjustment. It will involve some trial and error as you find the machine, mask and pressure settings that are right for you.    Continuation  After your first month of treatment, continue to make a daily commitment to use your CPAP all night, every night and for every nap.    CPAP-Tips to starting with success:  Begin using your CPAP for short periods of time during the day while you watch TV or read.    Use CPAP every night and for every nap. Using it less often reduces the health benefits and makes it harder for your body to get used to it.    Newer CPAP models are virtually silent; however, if you find the sound of your CPAP machine to be bothersome, place the unit under your bed to dampen the sound.     Make small adjustments to your mask, tubing, straps and headgear until you get the right fit. Tightening the mask may actually worsen the leak.  If it leaves significant marks on your face or irritates the bridge of your nose, it may not be the best mask for you.  Speak with the person who supplied the mask and consider trying other masks. Insurances will allow you to try different masks " during the first month of starting CPAP.  Insurance also covers a new mask, hose and filter about every 6 months.    Use a saline nasal spray to ease mild nasal congestion. Neti-Pot or saline nasal rinses may also help. Nasal gel sprays can help reduce nasal dryness.  Biotene mouthwash can be helpful to protect your teeth if you experience frequent dry mouth.  Dry mouth may be a sign of air escaping out of your mouth or out of the mask in the case of a full face mask.  Speak with your provider if you expect that is the case.     Take a nasal decongestant to relieve more severe nasal or sinus congestion.  Do not use Afrin (oxymetazoline) nasal spray more than 3 days in a row.  Speak with your sleep doctor if your nasal congestion is chronic.    Use a heated humidifier that fits your CPAP model to enhance your breathing comfort. Adjust the heat setting up if you get a dry nose or throat, down if you get condensation in the hose or mask.  Position the CPAP lower than you so that any condensation in the hose drains back into the machine rather than towards the mask.    Try a system that uses nasal pillows if traditional masks give you problems.    Clean your mask, tubing and headgear once a week. Make sure the equipment dries fully.    Regularly check and replace the filters for your CPAP unit and humidifier.    Work closely with your sleep provider and your CPAP supplier to make sure that you have the machine, mask and air pressure setting that works best for you. It is better to stop using it and call your provider to solve problems than to lay awake all night frustrated with the device.    Weight Loss:    Weight loss decreases severity of sleep apnea in most people with obesity. For those with mild obesity who have developed snoring with weight gain, even 15-30 pound weight loss can improve and occasionally eliminate sleep apnea.  Structured and life-long dietary and health habits are necessary to lose weight and  keep healthier weight levels.     Though there are significant health benefits from weight loss, long-term weight loss is very difficult to achieve- studies show success with dietary management in less than 10% of people. In addition, substantial weight loss may require years of dietary control and may be difficult if patients have severe obesity. In these cases, surgical management may be considered.    If you are interested in methods for weight loss, you should review the options discussed at the National Institutes of Health patient information sites:     http://win.niddk.nih.gov/publications/index.htm  http:/www.health.nih.gov/topic/WeightLossDieting    Bariatric programs offer counseling in all methods of weight loss:    Http:/www.[email or web address]/WeightLossSurgeryandMedicalMgmt/htm    Your BMI is Body mass index is 22.6 kg/m .    Body mass index (BMI) is one way to tell whether you are at a healthy weight, overweight, or obese. It measures your weight in relation to your height.  A BMI of 18.5 to 24.9 is in the healthy range. A person with a BMI of 25 to 29.9 is considered overweight, and someone with a BMI of 30 or greater is considered obese.  Another way to find out if you are at risk for health problems caused by overweight and obesity is to measure your waist. If you are a woman and your waist is more than 35 inches, or if you are a man and your waist is more than 40 inches, your risk of disease may be higher.  More than two-thirds of American adults are considered overweight or obese. Being overweight or obese increases the risk for further weight gain.  Excess weight may lead to heart disease and diabetes. Creating and following plans for healthy eating and physical activity may help you improve your health.    Methods for maintaining or losing weight.    Weight control is part of healthy lifestyle and includes exercise, emotional health, and healthy eating habits.  Careful eating  habits lifelong is the mainstay of weight control.  Though there are significant health benefits from weight loss, long-term weight loss with diet alone may be very difficult to achieve- studies show long-term success with dietary management in less than 10% of people. Attaining a healthy weight may be especially difficult to achieve in those with severe obesity. In some cases, medications, devices and surgical management might be considered.    What can you do?    If you are overweight or obese and are interested in methods for weight loss, you should discuss this with your provider. In addition, we recommend that you review healthy life styles and methods for weight loss available through the National Institutes of Health patient information sites:     http://win.niddk.nih.gov/publications/index.htm

## 2024-03-19 NOTE — NURSING NOTE
Is the patient currently in the state of [place]? YES    Visit mode:VIDEO    If the visit is dropped, the patient can be reconnected by: VIDEO VISIT: Text to cell phone:   Telephone Information:   Mobile [phone]       Will anyone else be joining the visit? NO  (If patient encounters technical issues they should call [phone])    How would you like to obtain your AVS? MyChart    Are changes needed to the allergy or medication list? Pt stated no med changes    Reason for visit: Consult    Loretta LEONG  Has patient had flu shot for current/most recent flu season? If so, when? No
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
## 2024-03-25 NOTE — TELEPHONE ENCOUNTER
[employer] message/ letter of 3/7/24 has not been read.    Copied letter and sent today via USPS so patient will be informed of need for a new referral.    Additional copy of letter sent to Dr Fitzgerald today    Pt will need to connect with this clinic if he prefers us to give the referral (other options are to ask his PCP or referring provider Dr Fitzgerald at [facility]).    Closing this encounter.      Nellie Morrison RN on 3/25/2024 at 4:55 PM

## 2024-03-27 ENCOUNTER — HOSPITAL ENCOUNTER (OUTPATIENT)
Dept: RESEARCH | Facility: CLINIC | Age: 49
Discharge: HOME | End: 2024-03-27
Attending: PHYSICAL MEDICINE & REHABILITATION
Payer: COMMERCIAL

## 2024-03-27 PROCEDURE — 510N000009 HC RESEARCH FACILITY, PER 15 MIN

## 2024-03-27 PROCEDURE — 510N000017 HC CRU PATIENT CARE, PER 15 MIN

## 2024-03-27 PROCEDURE — 300N000004 HC RESEARCH SPECIMEN PROCESSING, MODERATE

## 2024-03-28 NOTE — ADDENDUM NOTE
Encounter addended by: Zenaida England RN on: 3/28/2024 6:26 AM   Actions taken: Charge Capture section accepted

## 2024-04-05 ENCOUNTER — PRE VISIT (OUTPATIENT)
Dept: UROLOGY | Facility: CLINIC | Age: 49
End: 2024-04-05
Payer: COMMERCIAL

## 2024-04-05 NOTE — TELEPHONE ENCOUNTER
Reason for visit: cystoscopy + botox 200 units     Dx/Hx/Sx: neurogenic bladder     Records/imaging/labs/orders: in epic     At Rooming: verify mix with provider- consent -- ask provider if they would like to administer a medication

## 2024-04-09 ENCOUNTER — LAB REQUISITION (OUTPATIENT)
Dept: LAB | Facility: CLINIC | Age: 49
End: 2024-04-09

## 2024-04-09 DIAGNOSIS — D50.8 OTHER IRON DEFICIENCY ANEMIAS: ICD-10-CM

## 2024-04-09 DIAGNOSIS — Z01.818 ENCOUNTER FOR OTHER PREPROCEDURAL EXAMINATION: ICD-10-CM

## 2024-04-09 LAB
ANION GAP SERPL CALCULATED.3IONS-SCNC: 11 MMOL/L (ref 7–15)
BUN SERPL-MCNC: 14.9 MG/DL (ref 6–20)
CALCIUM SERPL-MCNC: 9 MG/DL (ref 8.6–10)
CHLORIDE SERPL-SCNC: 105 MMOL/L (ref 98–107)
CREAT SERPL-MCNC: 0.9 MG/DL (ref 0.67–1.17)
DEPRECATED HCO3 PLAS-SCNC: 24 MMOL/L (ref 22–29)
EGFRCR SERPLBLD CKD-EPI 2021: >90 ML/MIN/1.73M2
FOLATE SERPL-MCNC: 6.3 NG/ML (ref 4.6–34.8)
GLUCOSE SERPL-MCNC: 135 MG/DL (ref 70–99)
POTASSIUM SERPL-SCNC: 4.3 MMOL/L (ref 3.4–5.3)
SODIUM SERPL-SCNC: 140 MMOL/L (ref 135–145)
VIT B12 SERPL-MCNC: 303 PG/ML (ref 232–1245)

## 2024-04-09 PROCEDURE — 82607 VITAMIN B-12: CPT | Performed by: FAMILY MEDICINE

## 2024-04-09 PROCEDURE — 80048 BASIC METABOLIC PNL TOTAL CA: CPT | Performed by: FAMILY MEDICINE

## 2024-04-09 PROCEDURE — 82746 ASSAY OF FOLIC ACID SERUM: CPT | Performed by: FAMILY MEDICINE

## 2024-04-30 ENCOUNTER — INFUSION THERAPY VISIT (OUTPATIENT)
Dept: INFUSION THERAPY | Facility: CLINIC | Age: 49
End: 2024-04-30
Attending: UROLOGY
Payer: COMMERCIAL

## 2024-04-30 VITALS
SYSTOLIC BLOOD PRESSURE: 129 MMHG | DIASTOLIC BLOOD PRESSURE: 83 MMHG | TEMPERATURE: 98.2 F | HEART RATE: 74 BPM | RESPIRATION RATE: 14 BRPM | OXYGEN SATURATION: 100 %

## 2024-04-30 DIAGNOSIS — N39.0 URINARY TRACT INFECTION ASSOCIATED WITH CATHETERIZATION OF URINARY TRACT, UNSPECIFIED INDWELLING URINARY CATHETER TYPE, SEQUELA: Primary | ICD-10-CM

## 2024-04-30 DIAGNOSIS — Z29.89 NEED FOR PROPHYLAXIS AGAINST URINARY TRACT INFECTION: ICD-10-CM

## 2024-04-30 DIAGNOSIS — T83.511S URINARY TRACT INFECTION ASSOCIATED WITH CATHETERIZATION OF URINARY TRACT, UNSPECIFIED INDWELLING URINARY CATHETER TYPE, SEQUELA: Primary | ICD-10-CM

## 2024-04-30 PROCEDURE — 250N000011 HC RX IP 250 OP 636: Mod: JZ | Performed by: UROLOGY

## 2024-04-30 PROCEDURE — 250N000009 HC RX 250: Performed by: UROLOGY

## 2024-04-30 PROCEDURE — 96374 THER/PROPH/DIAG INJ IV PUSH: CPT

## 2024-04-30 PROCEDURE — 999N000127 HC STATISTIC PERIPHERAL IV START W US GUIDANCE

## 2024-04-30 RX ORDER — HEPARIN SODIUM (PORCINE) LOCK FLUSH IV SOLN 100 UNIT/ML 100 UNIT/ML
5 SOLUTION INTRAVENOUS
Status: CANCELLED | OUTPATIENT
Start: 2024-07-01

## 2024-04-30 RX ORDER — ALBUTEROL SULFATE 90 UG/1
1-2 AEROSOL, METERED RESPIRATORY (INHALATION)
Status: CANCELLED
Start: 2024-07-01

## 2024-04-30 RX ORDER — DIPHENHYDRAMINE HYDROCHLORIDE 50 MG/ML
50 INJECTION INTRAMUSCULAR; INTRAVENOUS
Status: CANCELLED
Start: 2024-07-01

## 2024-04-30 RX ORDER — METHYLPREDNISOLONE SODIUM SUCCINATE 125 MG/2ML
125 INJECTION, POWDER, LYOPHILIZED, FOR SOLUTION INTRAMUSCULAR; INTRAVENOUS
Status: CANCELLED
Start: 2024-07-01

## 2024-04-30 RX ORDER — EPINEPHRINE 1 MG/ML
0.3 INJECTION, SOLUTION INTRAMUSCULAR; SUBCUTANEOUS EVERY 5 MIN PRN
Status: CANCELLED | OUTPATIENT
Start: 2024-07-01

## 2024-04-30 RX ORDER — HEPARIN SODIUM,PORCINE 10 UNIT/ML
5 VIAL (ML) INTRAVENOUS
Status: CANCELLED | OUTPATIENT
Start: 2024-07-01

## 2024-04-30 RX ORDER — NALOXONE HYDROCHLORIDE 0.4 MG/ML
0.2 INJECTION, SOLUTION INTRAMUSCULAR; INTRAVENOUS; SUBCUTANEOUS
Status: CANCELLED | OUTPATIENT
Start: 2024-07-01

## 2024-04-30 RX ORDER — ALBUTEROL SULFATE 0.83 MG/ML
2.5 SOLUTION RESPIRATORY (INHALATION)
Status: CANCELLED | OUTPATIENT
Start: 2024-07-01

## 2024-04-30 RX ORDER — TRAMADOL HYDROCHLORIDE 50 MG/1
50 TABLET ORAL EVERY 6 HOURS PRN
COMMUNITY
Start: 2024-04-01

## 2024-04-30 RX ORDER — MEPERIDINE HYDROCHLORIDE 25 MG/ML
25 INJECTION INTRAMUSCULAR; INTRAVENOUS; SUBCUTANEOUS EVERY 30 MIN PRN
Status: CANCELLED | OUTPATIENT
Start: 2024-07-01

## 2024-04-30 RX ADMIN — ERTAPENEM SODIUM 1 G: 1 INJECTION, POWDER, LYOPHILIZED, FOR SOLUTION INTRAMUSCULAR; INTRAVENOUS at 10:45

## 2024-04-30 ASSESSMENT — PAIN SCALES - GENERAL: PAINLEVEL: NO PAIN (0)

## 2024-04-30 NOTE — PATIENT INSTRUCTIONS
Dear Kinza Teresa    Thank you for choosing [facility] ([facility]) for your infusion.  The following information is a summary of our appointment as well as important reminders.        We look forward in seeing you on your next appointment here at [facility] ([facility]).  Please don t hesitate to call us at [phone] to reschedule any of your appointments or to speak with one of the [facility] registered nurses.  It was a pleasure taking care of you today.    Sincerely,    [facility]  [address] [place] [address]  [id number]  Phone:  [phone]

## 2024-04-30 NOTE — TELEPHONE ENCOUNTER
[facility] Call Center    Phone Message    May a detailed message be left on voicemail: yes     Reason for Call: Other: Keshia calling to [place] Echols getting IV antibiotics 2 days prior to his Botox appointment on 11/18.  Please call her back to discuss     Action Taken: Message routed to:  [facility] ([facility]  Urology    Travel Screening: Not Applicable                                                                      
Keshia-patient's wife was notified that Skylar Yang RNCC for Dr. Alejandro Tong is coordinating the IV antibiotic appointment and she will contact her once the appointment is scheduled. Keshia agreed with the plan.      Chiqui Jeffers MA  
[facility] Call Center    Phone Message    May a detailed message be left on voicemail: yes     Reason for Call: Other: Per call from Keshia no one has reached out to her to address her question. Please reach out to Keshia at the above #.      Action Taken: Message routed to:  [facility] Urology    Travel Screening: Not Applicable                                                                      
Patient scheduled for IV antibiotic with infusion center. 11/16/20 and 11/18/20 at 11 am  Patient does not need another Covid test due to he is still within the 90 day window. Patient denies symptoms of Covid at this time     Patient's wife notified of dates     Orders are good    Skylar Yang, RN   Care Coordinator Urology    
Yes we usually do Ertapenem 1g IV infusion day before and day of Botox procedure in clinic. (long history of abx resistance)   Skylar, can we arrange?   -Jaspreet   
show

## 2024-04-30 NOTE — PROGRESS NOTES
Infusion Nursing Note:  Kinza Teresa presents today for Ertapenem.  Frequency: Patient given 2 consecutive doses prior to urology procedure with Dr. Tong every 3 months  Patient seen by provider today: No   present during visit today: Not Applicable.    Note:   Ertapenem given IVP over 5 minutes.    Intravenous Access:  [facility] by [facility]    Treatment Conditions:  Not Applicable.    Administrations This Visit       ertapenem (INVanz) 1 g in 10 mL SWFI for IVP       Admin Date  04/30/2024 Action  $Given Dose  1 g Route  Intravenous Documented By  Candace Aguilar, MARLINE                  /83 (BP Location: Right arm, Patient Position: Sitting, Cuff Size: Adult Regular)   Pulse 74   Temp 98.2  F (36.8  C) (Oral)   Resp 14   SpO2 100%     Post Infusion Assessment:  Patient tolerated infusion without incident.  Blood return noted pre and post infusion.  Site patent and intact, free from redness, edema or discomfort.  No evidence of extravasations.  Access discontinued per protocol.       Discharge Plan:   Discharge instructions reviewed with: Patient.  Patient and/or family verbalized understanding of discharge instructions and all questions answered.  AVS to patient via [employer].  Patient will return 5/1 for next appointment.   Patient discharged in stable condition accompanied by: self.  Departure Mode: Wheelchair.    Candace Aguilar RN

## 2024-05-01 ENCOUNTER — PRE VISIT (OUTPATIENT)
Dept: UROLOGY | Facility: CLINIC | Age: 49
End: 2024-05-01

## 2024-05-01 ENCOUNTER — INFUSION THERAPY VISIT (OUTPATIENT)
Dept: INFUSION THERAPY | Facility: CLINIC | Age: 49
End: 2024-05-01
Attending: UROLOGY
Payer: COMMERCIAL

## 2024-05-01 ENCOUNTER — OFFICE VISIT (OUTPATIENT)
Dept: UROLOGY | Facility: CLINIC | Age: 49
End: 2024-05-01
Payer: COMMERCIAL

## 2024-05-01 VITALS
TEMPERATURE: 98.7 F | RESPIRATION RATE: 16 BRPM | HEART RATE: 70 BPM | DIASTOLIC BLOOD PRESSURE: 86 MMHG | OXYGEN SATURATION: 100 % | SYSTOLIC BLOOD PRESSURE: 130 MMHG

## 2024-05-01 DIAGNOSIS — R32 URINARY INCONTINENCE: Primary | ICD-10-CM

## 2024-05-01 DIAGNOSIS — T83.511S URINARY TRACT INFECTION ASSOCIATED WITH CATHETERIZATION OF URINARY TRACT, UNSPECIFIED INDWELLING URINARY CATHETER TYPE, SEQUELA: Primary | ICD-10-CM

## 2024-05-01 DIAGNOSIS — Z29.89 NEED FOR PROPHYLAXIS AGAINST URINARY TRACT INFECTION: ICD-10-CM

## 2024-05-01 DIAGNOSIS — N31.9 NEUROGENIC BLADDER: Primary | ICD-10-CM

## 2024-05-01 DIAGNOSIS — N39.0 URINARY TRACT INFECTION ASSOCIATED WITH CATHETERIZATION OF URINARY TRACT, UNSPECIFIED INDWELLING URINARY CATHETER TYPE, SEQUELA: Primary | ICD-10-CM

## 2024-05-01 PROCEDURE — 52287 CYSTOSCOPY CHEMODENERVATION: CPT | Performed by: UROLOGY

## 2024-05-01 PROCEDURE — 250N000011 HC RX IP 250 OP 636: Mod: JZ | Performed by: UROLOGY

## 2024-05-01 PROCEDURE — 250N000009 HC RX 250: Performed by: UROLOGY

## 2024-05-01 PROCEDURE — 96374 THER/PROPH/DIAG INJ IV PUSH: CPT

## 2024-05-01 RX ORDER — MEPERIDINE HYDROCHLORIDE 25 MG/ML
25 INJECTION INTRAMUSCULAR; INTRAVENOUS; SUBCUTANEOUS EVERY 30 MIN PRN
Status: CANCELLED | OUTPATIENT
Start: 2024-07-01

## 2024-05-01 RX ORDER — HEPARIN SODIUM,PORCINE 10 UNIT/ML
5 VIAL (ML) INTRAVENOUS
Status: CANCELLED | OUTPATIENT
Start: 2024-07-01

## 2024-05-01 RX ORDER — HEPARIN SODIUM (PORCINE) LOCK FLUSH IV SOLN 100 UNIT/ML 100 UNIT/ML
5 SOLUTION INTRAVENOUS
Status: CANCELLED | OUTPATIENT
Start: 2024-07-01

## 2024-05-01 RX ORDER — EPINEPHRINE 1 MG/ML
0.3 INJECTION, SOLUTION INTRAMUSCULAR; SUBCUTANEOUS EVERY 5 MIN PRN
Status: CANCELLED | OUTPATIENT
Start: 2024-07-01

## 2024-05-01 RX ORDER — ALBUTEROL SULFATE 0.83 MG/ML
2.5 SOLUTION RESPIRATORY (INHALATION)
Status: CANCELLED | OUTPATIENT
Start: 2024-07-01

## 2024-05-01 RX ORDER — METHYLPREDNISOLONE SODIUM SUCCINATE 125 MG/2ML
125 INJECTION, POWDER, LYOPHILIZED, FOR SOLUTION INTRAMUSCULAR; INTRAVENOUS
Status: CANCELLED
Start: 2024-07-01

## 2024-05-01 RX ORDER — ALBUTEROL SULFATE 90 UG/1
1-2 AEROSOL, METERED RESPIRATORY (INHALATION)
Status: CANCELLED
Start: 2024-07-01

## 2024-05-01 RX ORDER — NALOXONE HYDROCHLORIDE 0.4 MG/ML
0.2 INJECTION, SOLUTION INTRAMUSCULAR; INTRAVENOUS; SUBCUTANEOUS
Status: CANCELLED | OUTPATIENT
Start: 2024-07-01

## 2024-05-01 RX ORDER — DIPHENHYDRAMINE HYDROCHLORIDE 50 MG/ML
50 INJECTION INTRAMUSCULAR; INTRAVENOUS
Status: CANCELLED
Start: 2024-07-01

## 2024-05-01 RX ADMIN — ERTAPENEM SODIUM 1 G: 1 INJECTION, POWDER, LYOPHILIZED, FOR SOLUTION INTRAMUSCULAR; INTRAVENOUS at 10:47

## 2024-05-01 ASSESSMENT — PAIN SCALES - GENERAL: PAINLEVEL: NO PAIN (0)

## 2024-05-01 NOTE — PATIENT INSTRUCTIONS
"Can you please schedule a follow up cystoscopy with botox after the September visit, in 3-4 months with Dr. Tong? The Sept. Visit should be scheduled, if not please do! Otherwise he would like to get his 2nd visit scheduled after that one.    Thank you!  Jerzy Rose    It was a pleasure meeting with you today.  Thank you for allowing me and my team the privilege of caring for you today.  YOU are the reason we are here, and I truly hope we provided you with the excellent service you deserve.  Please let us know if there is anything else we can do for you so that we can be sure you are leaving completely satisfied with your care experience.        AFTER YOUR CYSTOSCOPY        You have just completed a cystoscopy, or \"cysto\", which allowed your physician to learn more about your bladder (or to remove a stent placed after surgery). We suggest that you continue to avoid caffeine, fruit juice, and alcohol for the next 24 hours, however, you are encouraged to return to your normal activities.         A few things that are considered normal after your cystoscopy:     * Small amount of bleeding (or spotting) that clears within the next 24 hours     * Slight burning sensation with urination     * Sensation to of needing to avoid more frequently     * The feeling of \"air\" in your urine     * Mild discomfort that is relieved with Tylenol        Please contact our office promptly if you:     * Develop a fever above 101 degrees     * Are unable to urinate     * Develop bright red blood that does not stop     * Severe pain or swelling         Please contact our office with any concerns or questions @[email or web address].  "

## 2024-05-01 NOTE — LETTER
5/1/2024       RE: Kinza Teresa  [address] [address]     Dear Colleague,    Thank you for referring your patient, Kinza Teresa, to the [facility] [place] at [facility]. Please see a copy of my visit note below.    PRE-OPERATIVE DIAGNOSIS:   1.  Neurogenic bladder     POST-OPERATIVE DIAGNOSIS:  1.  Neurogenic bladder     PROCEDURE:    1. Cystourethroscopy With injection of botulinum toxin A 200units in 10cc sterile saline     Surgeon: Alejandro Tong MD     OPERATIVE INDICATIONS:  Kinza Teresa is a 49 year old male with neurogenic bladder due to spinal cord injury. He does not have an augment. He performs CIC per urethra. He takes oxybutynin. UDS demonstrates persistent neurogenic DO.  He experiences recurrent UTIs. He does irrigations. He previously had an IPP which works well for him though has some glans hypermobility.  His last botox was jan 2024. He gets Ertapenem x 2 doses for this procedure - one yesterday and one today. He is not interested in bladder augmentation.     OPERATIVE DETAILS:  The patient was taken to the procedure room. He was left in his chair. Penis was prepped.      A Flexible cystoscope was placed into the bladder and a flexible needle  Was preloaded.  The bladder mucosa appeared to be normal without stones, tumors or diverticulum on 360 degree inspection. The ureteral orifices were in the normal orthotopic position bilaterally.  We mixed 2 vials of Botox for 200u total.  We performed a template injection procedure with 10 injections. All injections were placed deep to the mucosa into the detrusor muscle.  We then emptied the bladder to re-inspect our injection sites and found that there was a minimal amount of blood.     Scope was removed.     PLAN:   Repeat Botox injection in 3-4 months  Ertapenem 1g IV day before and day of.     Alejandro Tong MD

## 2024-05-01 NOTE — TELEPHONE ENCOUNTER
Reason for visit: cystoscopy + botox 200 units     Dx/Hx/Sx: neurogenic bladder     Records/imaging/labs/orders: in epic     At Rooming: grab botox- verify mix with provider - consent

## 2024-05-01 NOTE — PROGRESS NOTES
PRE-OPERATIVE DIAGNOSIS:   1.  Neurogenic bladder     POST-OPERATIVE DIAGNOSIS:  1.  Neurogenic bladder     PROCEDURE:    1. Cystourethroscopy With injection of botulinum toxin A 200units in 10cc sterile saline     Surgeon: Alejandro Tong MD     OPERATIVE INDICATIONS:  Kinza Teresa is a 49 year old male with neurogenic bladder due to spinal cord injury. He does not have an augment. He performs CIC per urethra. He takes oxybutynin. UDS demonstrates persistent neurogenic DO.  He experiences recurrent UTIs. He does irrigations. He previously had an IPP which works well for him though has some glans hypermobility.  His last botox was jan 2024. He gets Ertapenem x 2 doses for this procedure - one yesterday and one today. He is not interested in bladder augmentation.     OPERATIVE DETAILS:  The patient was taken to the procedure room. He was left in his chair. Penis was prepped.      A Flexible cystoscope was placed into the bladder and a flexible needle  Was preloaded.  The bladder mucosa appeared to be normal without stones, tumors or diverticulum on 360 degree inspection. The ureteral orifices were in the normal orthotopic position bilaterally.  We mixed 2 vials of Botox for 200u total.  We performed a template injection procedure with 10 injections. All injections were placed deep to the mucosa into the detrusor muscle.  We then emptied the bladder to re-inspect our injection sites and found that there was a minimal amount of blood.     Scope was removed.     PLAN:   Repeat Botox injection in 3-4 months  Ertapenem 1g IV day before and day of.     Alejandro Tong MD

## 2024-05-01 NOTE — TELEPHONE ENCOUNTER
botox orders placed by michelle salazar on 5/1/24--sent sawyer morfin botox orders to sign on 5/1/24.    Michelle SALAZAR
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
## 2024-05-01 NOTE — PROGRESS NOTES
Infusion Nursing Note:  Kinza Teresa presents today for Ertapenem.    Patient seen by provider today: No   present during visit today: Not Applicable.    Note: N/A.      Intravenous Access:  Peripheral IV [facility] by [facility]    Treatment Conditions:  Not Applicable.    Post Infusion Assessment:  Patient tolerated infusion without incident.  Site patent and intact, free from redness, edema or discomfort.  Access discontinued per protocol.     Discharge Plan:   Patient and/or family verbalized understanding of discharge instructions and all questions answered.  Patient discharged in stable condition accompanied by: self.    Administrations This Visit       ertapenem (INVanz) 1 g in 10 mL SWFI for IVP       Admin Date  05/01/2024 Action  $Given Dose  1 g Route  Intravenous Documented By  Rita Vega RN              sodium chloride (PF) 0.9% PF flush 3-20 mL       Admin Date  05/01/2024 Action  $Given Dose  20 mL Route  Intracatheter Documented By  Rita Vega RN                  /86 (BP Location: Left arm, Patient Position: Sitting, Cuff Size: Adult Regular)   Pulse 70   Temp 98.7  F (37.1  C) (Oral)   Resp 16   SpO2 100%     Rita Vega RN

## 2024-05-01 NOTE — PATIENT INSTRUCTIONS
Dear Kinza Teresa    Thank you for choosing [facility] ([facility]) for your infusion.  The following information is a summary of our appointment as well as important reminders.      If you are a transplant patient and require transplant education, please click on this link: [email or web address].    We look forward in seeing you on your next appointment here at [facility] ([facility]).  Please don t hesitate to call us at [phone] to reschedule any of your appointments or to speak with one of the [facility] registered nurses.  It was a pleasure taking care of you today.    Sincerely,    [facility]  [facility]  [address] [place] [address]  [address]  Phone:  [phone]

## 2024-05-01 NOTE — NURSING NOTE
The following medication was given:     MEDICATION:  Botox  ROUTE:  Bladder wall  SITE: Bladder  DOSE: 200 units  LOT #: [id number]  : Soysuper  EXPIRATION DATE: 2026/02  NDC#: 1752-6748-07   Was there drug waste? No  Multi-dose vial: Ginny Rose  May 1, 2024

## 2024-07-10 ENCOUNTER — LAB REQUISITION (OUTPATIENT)
Dept: LAB | Facility: CLINIC | Age: 49
End: 2024-07-10

## 2024-07-10 DIAGNOSIS — D50.8 OTHER IRON DEFICIENCY ANEMIAS: ICD-10-CM

## 2024-07-10 LAB
FERRITIN SERPL-MCNC: 12 NG/ML (ref 31–409)
IRON BINDING CAPACITY (ROCHE): 391 UG/DL (ref 240–430)
IRON SATN MFR SERPL: 21 % (ref 15–46)
IRON SERPL-MCNC: 81 UG/DL (ref 61–157)

## 2024-07-10 PROCEDURE — 82728 ASSAY OF FERRITIN: CPT | Performed by: PHYSICIAN ASSISTANT

## 2024-07-10 PROCEDURE — 83550 IRON BINDING TEST: CPT | Performed by: PHYSICIAN ASSISTANT

## 2024-07-22 ENCOUNTER — HOSPITAL ENCOUNTER (EMERGENCY)
Facility: CLINIC | Age: 49
Discharge: HOME | End: 2024-07-22
Attending: STUDENT IN AN ORGANIZED HEALTH CARE EDUCATION/TRAINING PROGRAM | Admitting: STUDENT IN AN ORGANIZED HEALTH CARE EDUCATION/TRAINING PROGRAM
Payer: COMMERCIAL

## 2024-07-22 VITALS
SYSTOLIC BLOOD PRESSURE: 133 MMHG | BODY MASS INDEX: 22.6 KG/M2 | OXYGEN SATURATION: 100 % | HEART RATE: 75 BPM | RESPIRATION RATE: 18 BRPM | TEMPERATURE: 97.4 F | HEIGHT: 66 IN | DIASTOLIC BLOOD PRESSURE: 86 MMHG

## 2024-07-22 DIAGNOSIS — K62.5 RECTAL BLEEDING: ICD-10-CM

## 2024-07-22 LAB
ACANTHOCYTES BLD QL SMEAR: ABNORMAL
ALBUMIN SERPL BCG-MCNC: 4.5 G/DL (ref 3.5–5.2)
ALP SERPL-CCNC: 108 U/L (ref 40–150)
ALT SERPL W P-5'-P-CCNC: 10 U/L (ref 0–70)
ANION GAP SERPL CALCULATED.3IONS-SCNC: 12 MMOL/L (ref 7–15)
AST SERPL W P-5'-P-CCNC: 23 U/L (ref 0–45)
AUER BODIES BLD QL SMEAR: ABNORMAL
BASO STIPL BLD QL SMEAR: ABNORMAL
BASOPHILS # BLD AUTO: 0 10E3/UL (ref 0–0.2)
BASOPHILS NFR BLD AUTO: 1 %
BILIRUB SERPL-MCNC: 0.2 MG/DL
BITE CELLS BLD QL SMEAR: ABNORMAL
BLISTER CELLS BLD QL SMEAR: ABNORMAL
BUN SERPL-MCNC: 11.1 MG/DL (ref 6–20)
BURR CELLS BLD QL SMEAR: ABNORMAL
CALCIUM SERPL-MCNC: 9 MG/DL (ref 8.8–10.4)
CHLORIDE SERPL-SCNC: 105 MMOL/L (ref 98–107)
CREAT SERPL-MCNC: 0.86 MG/DL (ref 0.67–1.17)
DACRYOCYTES BLD QL SMEAR: ABNORMAL
EGFRCR SERPLBLD CKD-EPI 2021: >90 ML/MIN/1.73M2
ELLIPTOCYTES BLD QL SMEAR: ABNORMAL
EOSINOPHIL # BLD AUTO: 0.1 10E3/UL (ref 0–0.7)
EOSINOPHIL NFR BLD AUTO: 4 %
ERYTHROCYTE [DISTWIDTH] IN BLOOD BY AUTOMATED COUNT: 18.7 % (ref 10–15)
FRAGMENTS BLD QL SMEAR: ABNORMAL
GLUCOSE SERPL-MCNC: 85 MG/DL (ref 70–99)
HCO3 SERPL-SCNC: 25 MMOL/L (ref 22–29)
HCT VFR BLD AUTO: 32.8 % (ref 40–53)
HGB BLD-MCNC: 9.3 G/DL (ref 13.3–17.7)
HGB C CRYSTALS: ABNORMAL
HOLD SPECIMEN: NORMAL
HOWELL-JOLLY BOD BLD QL SMEAR: ABNORMAL
IMM GRANULOCYTES # BLD: 0 10E3/UL
IMM GRANULOCYTES NFR BLD: 0 %
INR PPP: 1.01 (ref 0.85–1.15)
LYMPHOCYTES # BLD AUTO: 2.1 10E3/UL (ref 0.8–5.3)
LYMPHOCYTES NFR BLD AUTO: 52 %
MCH RBC QN AUTO: 20.1 PG (ref 26.5–33)
MCHC RBC AUTO-ENTMCNC: 28.4 G/DL (ref 31.5–36.5)
MCV RBC AUTO: 71 FL (ref 78–100)
MONOCYTES # BLD AUTO: 0.4 10E3/UL (ref 0–1.3)
MONOCYTES NFR BLD AUTO: 9 %
NEUTROPHILS # BLD AUTO: 1.4 10E3/UL (ref 1.6–8.3)
NEUTROPHILS NFR BLD AUTO: 34 %
NEUTS HYPERSEG BLD QL SMEAR: ABNORMAL
NRBC # BLD AUTO: 0 10E3/UL
NRBC BLD AUTO-RTO: 0 /100
PLAT MORPH BLD: ABNORMAL
PLATELET # BLD AUTO: 259 10E3/UL (ref 150–450)
POLYCHROMASIA BLD QL SMEAR: ABNORMAL
POTASSIUM SERPL-SCNC: 4.1 MMOL/L (ref 3.4–5.3)
PROT SERPL-MCNC: 7.8 G/DL (ref 6.4–8.3)
RBC # BLD AUTO: 4.63 10E6/UL (ref 4.4–5.9)
RBC AGGLUT BLD QL: ABNORMAL
RBC MORPH BLD: ABNORMAL
ROULEAUX BLD QL SMEAR: ABNORMAL
SICKLE CELLS BLD QL SMEAR: ABNORMAL
SMUDGE CELLS BLD QL SMEAR: ABNORMAL
SODIUM SERPL-SCNC: 142 MMOL/L (ref 135–145)
SPHEROCYTES BLD QL SMEAR: ABNORMAL
STOMATOCYTES BLD QL SMEAR: ABNORMAL
TARGETS BLD QL SMEAR: ABNORMAL
TOXIC GRANULES BLD QL SMEAR: ABNORMAL
VARIANT LYMPHS BLD QL SMEAR: ABNORMAL
WBC # BLD AUTO: 4 10E3/UL (ref 4–11)

## 2024-07-22 PROCEDURE — 36415 COLL VENOUS BLD VENIPUNCTURE: CPT | Performed by: STUDENT IN AN ORGANIZED HEALTH CARE EDUCATION/TRAINING PROGRAM

## 2024-07-22 PROCEDURE — 85610 PROTHROMBIN TIME: CPT | Performed by: STUDENT IN AN ORGANIZED HEALTH CARE EDUCATION/TRAINING PROGRAM

## 2024-07-22 PROCEDURE — 82040 ASSAY OF SERUM ALBUMIN: CPT | Performed by: STUDENT IN AN ORGANIZED HEALTH CARE EDUCATION/TRAINING PROGRAM

## 2024-07-22 PROCEDURE — 99284 EMERGENCY DEPT VISIT MOD MDM: CPT | Mod: 25 | Performed by: STUDENT IN AN ORGANIZED HEALTH CARE EDUCATION/TRAINING PROGRAM

## 2024-07-22 PROCEDURE — 85025 COMPLETE CBC W/AUTO DIFF WBC: CPT | Performed by: STUDENT IN AN ORGANIZED HEALTH CARE EDUCATION/TRAINING PROGRAM

## 2024-07-22 PROCEDURE — 99284 EMERGENCY DEPT VISIT MOD MDM: CPT | Performed by: STUDENT IN AN ORGANIZED HEALTH CARE EDUCATION/TRAINING PROGRAM

## 2024-07-22 RX ORDER — BISACODYL 10 MG
30 SUPPOSITORY, RECTAL RECTAL ONCE
Status: DISCONTINUED | OUTPATIENT
Start: 2024-07-22 | End: 2024-07-22 | Stop reason: HOSPADM

## 2024-07-22 ASSESSMENT — COLUMBIA-SUICIDE SEVERITY RATING SCALE - C-SSRS
6. HAVE YOU EVER DONE ANYTHING, STARTED TO DO ANYTHING, OR PREPARED TO DO ANYTHING TO END YOUR LIFE?: NO
2. HAVE YOU ACTUALLY HAD ANY THOUGHTS OF KILLING YOURSELF IN THE PAST MONTH?: NO
1. IN THE PAST MONTH, HAVE YOU WISHED YOU WERE DEAD OR WISHED YOU COULD GO TO SLEEP AND NOT WAKE UP?: NO

## 2024-07-22 ASSESSMENT — ACTIVITIES OF DAILY LIVING (ADL)
ADLS_ACUITY_SCORE: 38
ADLS_ACUITY_SCORE: 36
ADLS_ACUITY_SCORE: 38

## 2024-07-22 NOTE — DISCHARGE INSTRUCTIONS
Here in the emergency room your labs were reassuring as was your physical exam and vital signs  As we discussed if you develop any new or worsening rectal bleeding, or any other new or worsening symptoms please return immediately to the emergency room  Otherwise please follow-up with your primary care doctor in the next 1 to 2 days.

## 2024-07-22 NOTE — ED TRIAGE NOTES
"Pt coming in from home with rectal bleeding X7 days. Pt states blood is \"dark red.\"      Triage Assessment (Adult)       Row Name 07/22/24 1251          Triage Assessment    Airway WDL WDL        Respiratory WDL    Respiratory WDL WDL        Cardiac WDL    Cardiac WDL WDL        Peripheral/Neurovascular WDL    Peripheral Neurovascular WDL WDL        Cognitive/Neuro/Behavioral WDL    Cognitive/Neuro/Behavioral WDL WDL                     "
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
## 2024-08-09 NOTE — ED TRIAGE NOTES
Pt c/o UTI. Recently completed 5 day course of antibiotics and feels that he is not getting better. Pt has neurogenic bladder and self caths minimum of 4x/day. Pt feels fatigued. Hx of paraplegic.    Him/He

## 2024-08-22 ENCOUNTER — PRE VISIT (OUTPATIENT)
Dept: UROLOGY | Facility: CLINIC | Age: 49
End: 2024-08-22

## 2024-08-22 DIAGNOSIS — R32 URINARY INCONTINENCE, UNSPECIFIED TYPE: Primary | ICD-10-CM

## 2024-08-22 RX ORDER — CIPROFLOXACIN 500 MG/1
500 TABLET, FILM COATED ORAL ONCE
Status: ACTIVE | OUTPATIENT
Start: 2024-09-04

## 2024-08-22 NOTE — TELEPHONE ENCOUNTER
Reason for visit: cysto + botox - 200 units --    Dx/Hx/Sx: NGB    Records/imaging/labs/orders: in epic     At Rooming: consent - ask pt if pt would like lido - verify with provider how many units in how many cc

## 2024-08-30 ENCOUNTER — TELEPHONE (OUTPATIENT)
Dept: UROLOGY | Facility: CLINIC | Age: 49
End: 2024-08-30

## 2024-08-30 NOTE — TELEPHONE ENCOUNTER
Health Call Center    Phone Message    May a detailed message be left on voicemail: yes     Reason for Call: Patient is returning clinics call, patient states insurance is covering this visit. Please call patient to discuss. Phone # [phone]     Action Taken: Message routed to:  [facility] ([facility] Urology    Travel Screening: Not Applicable     Date of Service:

## 2024-08-30 NOTE — TELEPHONE ENCOUNTER
"Left Voicemail (1st Attempt) for the patient to call back and schedule the following:    Appointment type: Cystoscopy   Provider: Dr. Tong  Return date: Cancel Sept 4th   Specialty phone number: [phone]  Additonal Notes: Per message received:  \"This patient is currently scheduled for 09/03/2024 & 09/04/224 and is self-pay.     Our attempts to connect with the patient to discuss financial options have been unsuccessful.     With this procedure being deemed non-urgent, we would like to request cancellation of care until pre-payment or insurance can be obtained. \"   "

## 2024-09-03 ENCOUNTER — INFUSION THERAPY VISIT (OUTPATIENT)
Dept: INFUSION THERAPY | Facility: CLINIC | Age: 49
End: 2024-09-03
Attending: UROLOGY
Payer: COMMERCIAL

## 2024-09-03 VITALS
RESPIRATION RATE: 16 BRPM | HEART RATE: 100 BPM | DIASTOLIC BLOOD PRESSURE: 85 MMHG | OXYGEN SATURATION: 98 % | SYSTOLIC BLOOD PRESSURE: 132 MMHG | TEMPERATURE: 98.5 F

## 2024-09-03 DIAGNOSIS — Z29.89 NEED FOR PROPHYLAXIS AGAINST URINARY TRACT INFECTION: ICD-10-CM

## 2024-09-03 DIAGNOSIS — N39.0 URINARY TRACT INFECTION ASSOCIATED WITH CATHETERIZATION OF URINARY TRACT, UNSPECIFIED INDWELLING URINARY CATHETER TYPE, SEQUELA: Primary | ICD-10-CM

## 2024-09-03 DIAGNOSIS — T83.511S URINARY TRACT INFECTION ASSOCIATED WITH CATHETERIZATION OF URINARY TRACT, UNSPECIFIED INDWELLING URINARY CATHETER TYPE, SEQUELA: Primary | ICD-10-CM

## 2024-09-03 PROCEDURE — 250N000011 HC RX IP 250 OP 636: Performed by: UROLOGY

## 2024-09-03 PROCEDURE — 250N000009 HC RX 250: Performed by: UROLOGY

## 2024-09-03 PROCEDURE — 96374 THER/PROPH/DIAG INJ IV PUSH: CPT

## 2024-09-03 RX ORDER — HEPARIN SODIUM (PORCINE) LOCK FLUSH IV SOLN 100 UNIT/ML 100 UNIT/ML
5 SOLUTION INTRAVENOUS
Status: CANCELLED | OUTPATIENT
Start: 2024-10-01

## 2024-09-03 RX ORDER — ALBUTEROL SULFATE 90 UG/1
1-2 AEROSOL, METERED RESPIRATORY (INHALATION)
Status: CANCELLED
Start: 2024-10-01

## 2024-09-03 RX ORDER — METHYLPREDNISOLONE SODIUM SUCCINATE 125 MG/2ML
125 INJECTION, POWDER, LYOPHILIZED, FOR SOLUTION INTRAMUSCULAR; INTRAVENOUS
Status: CANCELLED
Start: 2024-10-01

## 2024-09-03 RX ORDER — HEPARIN SODIUM,PORCINE 10 UNIT/ML
5 VIAL (ML) INTRAVENOUS
Status: CANCELLED | OUTPATIENT
Start: 2024-10-01

## 2024-09-03 RX ORDER — EPINEPHRINE 1 MG/ML
0.3 INJECTION, SOLUTION INTRAMUSCULAR; SUBCUTANEOUS EVERY 5 MIN PRN
Status: CANCELLED | OUTPATIENT
Start: 2024-10-01

## 2024-09-03 RX ORDER — ALBUTEROL SULFATE 0.83 MG/ML
2.5 SOLUTION RESPIRATORY (INHALATION)
Status: CANCELLED | OUTPATIENT
Start: 2024-10-01

## 2024-09-03 RX ORDER — DIPHENHYDRAMINE HYDROCHLORIDE 50 MG/ML
50 INJECTION INTRAMUSCULAR; INTRAVENOUS
Status: CANCELLED
Start: 2024-10-01

## 2024-09-03 RX ORDER — MEPERIDINE HYDROCHLORIDE 25 MG/ML
25 INJECTION INTRAMUSCULAR; INTRAVENOUS; SUBCUTANEOUS EVERY 30 MIN PRN
Status: CANCELLED | OUTPATIENT
Start: 2024-10-01

## 2024-09-03 RX ORDER — NALOXONE HYDROCHLORIDE 0.4 MG/ML
0.2 INJECTION, SOLUTION INTRAMUSCULAR; INTRAVENOUS; SUBCUTANEOUS
Status: CANCELLED | OUTPATIENT
Start: 2024-10-01

## 2024-09-03 RX ADMIN — ERTAPENEM 1 G: 1 INJECTION, POWDER, LYOPHILIZED, FOR SOLUTION INTRAMUSCULAR; INTRAVENOUS at 10:58

## 2024-09-03 ASSESSMENT — PAIN SCALES - GENERAL: PAINLEVEL: NO PAIN (0)

## 2024-09-03 NOTE — PATIENT INSTRUCTIONS
Dear Kinza Teresa    Thank you for choosing [facility] ([facility] for your infusion.  The following information is a summary of our appointment as well as important reminders.      If you have any questions on your upcoming Specialty Infusion appointments, please call scheduling at [phone].  It was a pleasure taking care of you today.    Sincerely,    [facility]  [facility]  [address] [place] [address]  [address]  Phone:  [phone]

## 2024-09-03 NOTE — PROGRESS NOTES
Infusion Nursing Note:  Kinza Teresa presents today for Invanz (dose 1 of 2).    Patient seen by provider today: No   present during visit today: Not Applicable.    Note: Invanz given IV push over 5 mins, then IV was flushed with 10ml NS.    Intravenous Access:  Peripheral IV placed by VAT.    Treatment Conditions:  Not Applicable.    /85 (BP Location: Left arm, Patient Position: Sitting, Cuff Size: Adult Regular)   Pulse 100   Temp 98.5  F (36.9  C) (Oral)   Resp 16   SpO2 98%     Administrations This Visit       ertapenem (INVanz) 1 g in 10 mL NS for IVP       Admin Date  09/03/2024 Action  $Given Dose  1 g Route  Intravenous Documented By  María Corrales RN              sodium chloride (PF) 0.9% PF flush 3-20 mL       Admin Date  09/03/2024 Action  $Given Dose  10 mL Route  Intracatheter Documented By  María Corrales RN                  Post Infusion Assessment:  Patient tolerated infusion without incident.  Site patent and intact, free from redness, edema or discomfort.  No evidence of extravasations.  Access discontinued per protocol.     Discharge Plan:   [facility]  Patient will return 9/4/24 for next appointment.   Patient discharged in stable condition accompanied by: self.  Departure Mode: Wheelchair.    María Corrales RN

## 2024-09-04 ENCOUNTER — OFFICE VISIT (OUTPATIENT)
Dept: UROLOGY | Facility: CLINIC | Age: 49
End: 2024-09-04
Payer: COMMERCIAL

## 2024-09-04 ENCOUNTER — INFUSION THERAPY VISIT (OUTPATIENT)
Dept: INFUSION THERAPY | Facility: CLINIC | Age: 49
End: 2024-09-04
Attending: UROLOGY
Payer: COMMERCIAL

## 2024-09-04 VITALS
HEART RATE: 54 BPM | SYSTOLIC BLOOD PRESSURE: 126 MMHG | HEIGHT: 66 IN | BODY MASS INDEX: 22.82 KG/M2 | WEIGHT: 142 LBS | DIASTOLIC BLOOD PRESSURE: 88 MMHG

## 2024-09-04 VITALS
DIASTOLIC BLOOD PRESSURE: 84 MMHG | TEMPERATURE: 97.9 F | SYSTOLIC BLOOD PRESSURE: 127 MMHG | RESPIRATION RATE: 16 BRPM | HEART RATE: 59 BPM | OXYGEN SATURATION: 98 %

## 2024-09-04 DIAGNOSIS — N39.0 URINARY TRACT INFECTION ASSOCIATED WITH CATHETERIZATION OF URINARY TRACT, UNSPECIFIED INDWELLING URINARY CATHETER TYPE, SEQUELA: Primary | ICD-10-CM

## 2024-09-04 DIAGNOSIS — N31.9 NEUROGENIC BLADDER: Primary | ICD-10-CM

## 2024-09-04 DIAGNOSIS — T83.511S URINARY TRACT INFECTION ASSOCIATED WITH CATHETERIZATION OF URINARY TRACT, UNSPECIFIED INDWELLING URINARY CATHETER TYPE, SEQUELA: Primary | ICD-10-CM

## 2024-09-04 DIAGNOSIS — Z29.89 NEED FOR PROPHYLAXIS AGAINST URINARY TRACT INFECTION: ICD-10-CM

## 2024-09-04 PROCEDURE — 96374 THER/PROPH/DIAG INJ IV PUSH: CPT

## 2024-09-04 PROCEDURE — 52287 CYSTOSCOPY CHEMODENERVATION: CPT | Performed by: UROLOGY

## 2024-09-04 PROCEDURE — 250N000011 HC RX IP 250 OP 636: Performed by: UROLOGY

## 2024-09-04 RX ORDER — HEPARIN SODIUM (PORCINE) LOCK FLUSH IV SOLN 100 UNIT/ML 100 UNIT/ML
5 SOLUTION INTRAVENOUS
OUTPATIENT
Start: 2024-10-01

## 2024-09-04 RX ORDER — DIPHENHYDRAMINE HYDROCHLORIDE 50 MG/ML
50 INJECTION INTRAMUSCULAR; INTRAVENOUS
Start: 2024-10-01

## 2024-09-04 RX ORDER — NALOXONE HYDROCHLORIDE 0.4 MG/ML
0.2 INJECTION, SOLUTION INTRAMUSCULAR; INTRAVENOUS; SUBCUTANEOUS
OUTPATIENT
Start: 2024-10-01

## 2024-09-04 RX ORDER — HEPARIN SODIUM,PORCINE 10 UNIT/ML
5 VIAL (ML) INTRAVENOUS
OUTPATIENT
Start: 2024-10-01

## 2024-09-04 RX ORDER — EPINEPHRINE 1 MG/ML
0.3 INJECTION, SOLUTION INTRAMUSCULAR; SUBCUTANEOUS EVERY 5 MIN PRN
OUTPATIENT
Start: 2024-10-01

## 2024-09-04 RX ORDER — MEPERIDINE HYDROCHLORIDE 25 MG/ML
25 INJECTION INTRAMUSCULAR; INTRAVENOUS; SUBCUTANEOUS EVERY 30 MIN PRN
OUTPATIENT
Start: 2024-10-01

## 2024-09-04 RX ORDER — ALBUTEROL SULFATE 0.83 MG/ML
2.5 SOLUTION RESPIRATORY (INHALATION)
OUTPATIENT
Start: 2024-10-01

## 2024-09-04 RX ORDER — METHYLPREDNISOLONE SODIUM SUCCINATE 125 MG/2ML
125 INJECTION, POWDER, LYOPHILIZED, FOR SOLUTION INTRAMUSCULAR; INTRAVENOUS
Start: 2024-10-01

## 2024-09-04 RX ORDER — ALBUTEROL SULFATE 90 UG/1
1-2 AEROSOL, METERED RESPIRATORY (INHALATION)
Start: 2024-10-01

## 2024-09-04 RX ADMIN — ERTAPENEM 1 G: 1 INJECTION, POWDER, LYOPHILIZED, FOR SOLUTION INTRAMUSCULAR; INTRAVENOUS at 10:34

## 2024-09-04 ASSESSMENT — PAIN SCALES - GENERAL: PAINLEVEL: NO PAIN (0)
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
## 2024-09-04 NOTE — PROGRESS NOTES
Infusion Nursing Note:  Kinza Teresa presents today for Invanz (dose 1 of 2).    Patient seen by provider today: No   present during visit today: Not Applicable.    Note: Invanz given IV push over 5 mins, then IV was flushed with 10ml NS.    Intravenous Access:  Peripheral IV placed by VAT.    Treatment Conditions:  Not Applicable.    /84 (BP Location: Left arm, Patient Position: Sitting, Cuff Size: Adult Regular)   Pulse 59   Temp 97.9  F (36.6  C) (Oral)   Resp 16   SpO2 98%     Administrations This Visit       ertapenem (INVanz) 1 g in 10 mL NS for IVP       Admin Date  09/04/2024 Action  $Given Dose  1 g Route  Intravenous Documented By  Isha Page RN                  Post Infusion Assessment:  Patient tolerated infusion without incident.  Site patent and intact, free from redness, edema or discomfort.  No evidence of extravasations.  Access discontinued per protocol.     Discharge Plan:   AVS to patient via MYCHART.  Patient will return 9/4/24 for next appointment.   Patient discharged in stable condition accompanied by: self.  Departure Mode: Wheelchair.    Isha Page RN on 9/4/2024 at 10:56 AM

## 2024-09-04 NOTE — LETTER
9/4/2024       RE: Kinza Teresa  [address] [facility] [address]     Dear Colleague,    Thank you for referring your patient, Kinza Teresa, to the [facility] UROLOGY [facility]IC [place] at [facility]. Please see a copy of my visit note below.    PRE-OPERATIVE DIAGNOSIS:   1.  Neurogenic bladder     POST-OPERATIVE DIAGNOSIS:  1.  Neurogenic bladder     PROCEDURE:    1. Cystourethroscopy With injection of botulinum toxin A 200units in 10cc sterile saline     Surgeon: Alejandro Tong MD     OPERATIVE INDICATIONS:  Kinza Teresa is a 49 year old male with neurogenic bladder due to spinal cord injury. He does not have an augment. He performs CIC per urethra. He takes oxybutynin. UDS demonstrates persistent neurogenic DO.  He experiences recurrent UTIs. He does irrigations. He previously had an IPP which works well for him though has some glans hypermobility.  His last botox was may  2024. He gets Ertapenem x 2 doses for this procedure - one yesterday and one today. He is not interested in bladder augmentation.     OPERATIVE DETAILS:  The patient was taken to the procedure room. He was left in his chair. Penis was prepped.      A Flexible cystoscope was placed into the bladder and a flexible needle  Was preloaded.  The bladder mucosa appeared to be normal without stones, tumors or diverticulum on 360 degree inspection. The ureteral orifices were in the normal orthotopic position bilaterally.  We mixed 2 vials of Botox for 200u total. 10cc of saline. We performed a template injection procedure with 10 injections. All injections were placed deep to the mucosa into the detrusor muscle.  We then emptied the bladder to re-inspect our injection sites and found that there was a minimal amount of blood.     Scope was removed.     PLAN:   Repeat Botox injection in 3-4 months  Ertapenem 1g IV day before and day of.    Alejandro Tong MD    The following medication was  given:     MEDICATION:  Botox 200 units   ROUTE: bladder wall  SITE: Medication was injected via bladder wall   DOSE:  200 units   LOT #: [id number]  :  allergan   EXPIRATION DATE: 2026/08  NDC#: 0513-0929-04   Was there drug waste? No    Prior to medication administration, verified patient identity using patient's name and date of birth.  Due to medication administration, patient instructed to remain in clinic for 15 minutes  afterwards, and to report any adverse reaction to me immediately.    Again, thank you for allowing me to participate in the care of your patient.      Sincerely,    Alejandro Tong MD

## 2024-09-04 NOTE — PROGRESS NOTES
The following medication was given:     MEDICATION:  Botox 200 units   ROUTE: bladder wall  SITE: Medication was injected via bladder wall   DOSE:  200 units   LOT #: [id number]  :  [employer]   EXPIRATION DATE: 2026/08  NDC#: 9303-8636-28   Was there drug waste? No    Prior to medication administration, verified patient identity using patient's name and date of birth.  Due to medication administration, patient instructed to remain in clinic for 15 minutes  afterwards, and to report any adverse reaction to me immediately.

## 2024-09-04 NOTE — PROGRESS NOTES
PRE-OPERATIVE DIAGNOSIS:   1.  Neurogenic bladder     POST-OPERATIVE DIAGNOSIS:  1.  Neurogenic bladder     PROCEDURE:    1. Cystourethroscopy With injection of botulinum toxin A 200units in 10cc sterile saline     Surgeon: Alejandro Tong MD     OPERATIVE INDICATIONS:  Kinza Teresa is a 49 year old male with neurogenic bladder due to spinal cord injury. He does not have an augment. He performs CIC per urethra. He takes oxybutynin. UDS demonstrates persistent neurogenic DO.  He experiences recurrent UTIs. He does irrigations. He previously had an IPP which works well for him though has some glans hypermobility.  His last botox was may  2024. He gets Ertapenem x 2 doses for this procedure - one yesterday and one today. He is not interested in bladder augmentation.     OPERATIVE DETAILS:  The patient was taken to the procedure room. He was left in his chair. Penis was prepped.      A Flexible cystoscope was placed into the bladder and a flexible needle  Was preloaded.  The bladder mucosa appeared to be normal without stones, tumors or diverticulum on 360 degree inspection. The ureteral orifices were in the normal orthotopic position bilaterally.  We mixed 2 vials of Botox for 200u total. 10cc of saline. We performed a template injection procedure with 10 injections. All injections were placed deep to the mucosa into the detrusor muscle.  We then emptied the bladder to re-inspect our injection sites and found that there was a minimal amount of blood.     Scope was removed.     PLAN:   Repeat Botox injection in 3-4 months  Ertapenem 1g IV day before and day of.    Alejandro Tong MD

## 2024-09-04 NOTE — PATIENT INSTRUCTIONS
Dear Kinza Teresa    Thank you for choosing [facility] for your infusion.  The following information is a summary of our appointment as well as important reminders.        If you are a transplant patient and require transplant education, please click on this link: [email or web address].    If you have any questions on your upcoming Specialty Infusion appointments, please call scheduling at [phone].  It was a pleasure taking care of you today.    Sincerely,    [facility]  [address] [place] [address]  [address]  Phone:  [phone]

## 2024-11-05 RX ORDER — HEPARIN SODIUM (PORCINE) LOCK FLUSH IV SOLN 100 UNIT/ML 100 UNIT/ML
5 SOLUTION INTRAVENOUS
OUTPATIENT
Start: 2024-11-05

## 2024-11-05 RX ORDER — NALOXONE HYDROCHLORIDE 0.4 MG/ML
0.2 INJECTION, SOLUTION INTRAMUSCULAR; INTRAVENOUS; SUBCUTANEOUS
OUTPATIENT
Start: 2024-11-05

## 2024-11-05 RX ORDER — MEPERIDINE HYDROCHLORIDE 25 MG/ML
25 INJECTION INTRAMUSCULAR; INTRAVENOUS; SUBCUTANEOUS EVERY 30 MIN PRN
OUTPATIENT
Start: 2024-11-05

## 2024-11-05 RX ORDER — HEPARIN SODIUM,PORCINE 10 UNIT/ML
5 VIAL (ML) INTRAVENOUS
OUTPATIENT
Start: 2024-11-05

## 2024-11-05 RX ORDER — ALBUTEROL SULFATE 90 UG/1
1-2 INHALANT RESPIRATORY (INHALATION)
Start: 2024-11-05

## 2024-11-05 RX ORDER — DIPHENHYDRAMINE HYDROCHLORIDE 50 MG/ML
50 INJECTION INTRAMUSCULAR; INTRAVENOUS
Start: 2024-11-05

## 2024-11-05 RX ORDER — ALBUTEROL SULFATE 0.83 MG/ML
2.5 SOLUTION RESPIRATORY (INHALATION)
OUTPATIENT
Start: 2024-11-05

## 2024-11-05 RX ORDER — EPINEPHRINE 1 MG/ML
0.3 INJECTION, SOLUTION, CONCENTRATE INTRAVENOUS EVERY 5 MIN PRN
OUTPATIENT
Start: 2024-11-05

## 2024-11-20 ENCOUNTER — PRE VISIT (OUTPATIENT)
Dept: UROLOGY | Facility: CLINIC | Age: 49
End: 2024-11-20
Payer: COMMERCIAL

## 2024-11-20 NOTE — TELEPHONE ENCOUNTER
Reason for visit: cysto + botox 200 units      Dx/Hx/Sx: NGB    Records/imaging/labs/orders: in [facility] --botox has been signed  since 9/08/24    At Rooming: consent - mix botox 200 units - verify botox amount with provider prior to drawing up med- have botox needle readily available

## 2024-12-03 ENCOUNTER — INFUSION THERAPY VISIT (OUTPATIENT)
Dept: INFUSION THERAPY | Facility: CLINIC | Age: 49
End: 2024-12-03
Attending: UROLOGY
Payer: COMMERCIAL

## 2024-12-03 VITALS
HEART RATE: 59 BPM | OXYGEN SATURATION: 99 % | RESPIRATION RATE: 16 BRPM | TEMPERATURE: 98.2 F | DIASTOLIC BLOOD PRESSURE: 80 MMHG | SYSTOLIC BLOOD PRESSURE: 127 MMHG

## 2024-12-03 DIAGNOSIS — T83.511S URINARY TRACT INFECTION ASSOCIATED WITH CATHETERIZATION OF URINARY TRACT, UNSPECIFIED INDWELLING URINARY CATHETER TYPE, SEQUELA: Primary | ICD-10-CM

## 2024-12-03 DIAGNOSIS — N39.0 URINARY TRACT INFECTION ASSOCIATED WITH CATHETERIZATION OF URINARY TRACT, UNSPECIFIED INDWELLING URINARY CATHETER TYPE, SEQUELA: Primary | ICD-10-CM

## 2024-12-03 DIAGNOSIS — Z29.89 NEED FOR PROPHYLAXIS AGAINST URINARY TRACT INFECTION: ICD-10-CM

## 2024-12-03 PROCEDURE — 250N000011 HC RX IP 250 OP 636: Performed by: UROLOGY

## 2024-12-03 PROCEDURE — 96374 THER/PROPH/DIAG INJ IV PUSH: CPT

## 2024-12-03 RX ORDER — HEPARIN SODIUM (PORCINE) LOCK FLUSH IV SOLN 100 UNIT/ML 100 UNIT/ML
5 SOLUTION INTRAVENOUS
OUTPATIENT
Start: 2025-01-01

## 2024-12-03 RX ORDER — ALBUTEROL SULFATE 90 UG/1
1-2 INHALANT RESPIRATORY (INHALATION)
Start: 2025-01-01

## 2024-12-03 RX ORDER — METHYLPREDNISOLONE SODIUM SUCCINATE 125 MG/2ML
125 INJECTION INTRAMUSCULAR; INTRAVENOUS
Start: 2025-01-01

## 2024-12-03 RX ORDER — HEPARIN SODIUM,PORCINE 10 UNIT/ML
5 VIAL (ML) INTRAVENOUS
OUTPATIENT
Start: 2025-01-01

## 2024-12-03 RX ORDER — DIPHENHYDRAMINE HYDROCHLORIDE 50 MG/ML
50 INJECTION INTRAMUSCULAR; INTRAVENOUS
Start: 2025-01-01

## 2024-12-03 RX ORDER — EPINEPHRINE 1 MG/ML
0.3 INJECTION, SOLUTION INTRAMUSCULAR; SUBCUTANEOUS EVERY 5 MIN PRN
OUTPATIENT
Start: 2025-01-01

## 2024-12-03 RX ORDER — ALBUTEROL SULFATE 0.83 MG/ML
2.5 SOLUTION RESPIRATORY (INHALATION)
OUTPATIENT
Start: 2025-01-01

## 2024-12-03 RX ORDER — MEPERIDINE HYDROCHLORIDE 25 MG/ML
25 INJECTION INTRAMUSCULAR; INTRAVENOUS; SUBCUTANEOUS EVERY 30 MIN PRN
OUTPATIENT
Start: 2025-01-01

## 2024-12-03 RX ORDER — NALOXONE HYDROCHLORIDE 0.4 MG/ML
0.2 INJECTION, SOLUTION INTRAMUSCULAR; INTRAVENOUS; SUBCUTANEOUS
OUTPATIENT
Start: 2025-01-01

## 2024-12-03 RX ADMIN — ERTAPENEM 1 G: 1 INJECTION, POWDER, LYOPHILIZED, FOR SOLUTION INTRAMUSCULAR; INTRAVENOUS at 11:00

## 2024-12-03 ASSESSMENT — PAIN SCALES - GENERAL: PAINLEVEL_OUTOF10: NO PAIN (0)

## 2024-12-03 NOTE — PROGRESS NOTES
Infusion Nursing Note:  Kinza Teresa presents today for Invanz (dose 1 of 2)  Patient seen by provider today: No   present during visit today: Not Applicable.    Note: Invanz given IV push over 5 mins.    Pt has cystoscopy tomorrow, gets two doses of prophylactic antibiotics. Dose 1 of 2 today, next dose tomorrow prior to procedure.     Intravenous Access:  Peripheral IV placed by VAT.    Treatment Conditions:  N/A    /80 (BP Location: Left arm, Patient Position: Sitting, Cuff Size: Adult Regular)   Pulse 59   Temp 98.2  F (36.8  C) (Oral)   Resp 16   SpO2 99%     Administrations This Visit       ertapenem (INVanz) 1 g in 10 mL NS for IVP       Admin Date  12/03/2024 Action  $Given Dose  1 g Route  Intravenous Documented By  María Corrales, MARLINE                  Post Infusion Assessment:  Patient tolerated infusion without incident.  Site patent and intact, free from redness, edema or discomfort.  No evidence of extravasations.  Access discontinued per protocol.     Discharge Plan:   AVS to patient via MYCHART.  Patient will return 12/4/24 for next appointment prior to Cysto.   Patient discharged in stable condition accompanied by: self.  Departure Mode: Ambulatory.    María Corrales, RN

## 2024-12-03 NOTE — PATIENT INSTRUCTIONS
Dear Kinza Teresa    Thank you for choosing [facility] ([facility] for your infusion.  The following information is a summary of our appointment as well as important reminders.      If you have any questions on your upcoming Specialty Infusion appointments, please call scheduling at [phone].  It was a pleasure taking care of you today.    Sincerely,    [facility]  [address] [place] [address]  [address]  Phone:  [phone]

## 2024-12-04 ENCOUNTER — OFFICE VISIT (OUTPATIENT)
Dept: UROLOGY | Facility: CLINIC | Age: 49
End: 2024-12-04
Payer: COMMERCIAL

## 2024-12-04 ENCOUNTER — INFUSION THERAPY VISIT (OUTPATIENT)
Dept: INFUSION THERAPY | Facility: CLINIC | Age: 49
End: 2024-12-04
Attending: UROLOGY
Payer: COMMERCIAL

## 2024-12-04 VITALS
OXYGEN SATURATION: 100 % | BODY MASS INDEX: 22.82 KG/M2 | DIASTOLIC BLOOD PRESSURE: 85 MMHG | HEART RATE: 63 BPM | WEIGHT: 142 LBS | HEIGHT: 66 IN | SYSTOLIC BLOOD PRESSURE: 136 MMHG

## 2024-12-04 VITALS
HEART RATE: 63 BPM | TEMPERATURE: 97.9 F | RESPIRATION RATE: 18 BRPM | OXYGEN SATURATION: 100 % | SYSTOLIC BLOOD PRESSURE: 136 MMHG | DIASTOLIC BLOOD PRESSURE: 85 MMHG

## 2024-12-04 DIAGNOSIS — T83.511S URINARY TRACT INFECTION ASSOCIATED WITH CATHETERIZATION OF URINARY TRACT, UNSPECIFIED INDWELLING URINARY CATHETER TYPE, SEQUELA: Primary | ICD-10-CM

## 2024-12-04 DIAGNOSIS — N39.0 URINARY TRACT INFECTION ASSOCIATED WITH CATHETERIZATION OF URINARY TRACT, UNSPECIFIED INDWELLING URINARY CATHETER TYPE, SEQUELA: Primary | ICD-10-CM

## 2024-12-04 DIAGNOSIS — N31.9 NEUROGENIC BLADDER: Primary | ICD-10-CM

## 2024-12-04 DIAGNOSIS — K59.2 NEUROGENIC BOWEL: Primary | ICD-10-CM

## 2024-12-04 DIAGNOSIS — Z29.89 NEED FOR PROPHYLAXIS AGAINST URINARY TRACT INFECTION: ICD-10-CM

## 2024-12-04 DIAGNOSIS — N31.9 NEUROGENIC BLADDER: ICD-10-CM

## 2024-12-04 PROCEDURE — 52287 CYSTOSCOPY CHEMODENERVATION: CPT | Performed by: UROLOGY

## 2024-12-04 PROCEDURE — 96374 THER/PROPH/DIAG INJ IV PUSH: CPT

## 2024-12-04 PROCEDURE — 250N000011 HC RX IP 250 OP 636: Performed by: UROLOGY

## 2024-12-04 RX ORDER — METHYLPREDNISOLONE SODIUM SUCCINATE 125 MG/2ML
125 INJECTION INTRAMUSCULAR; INTRAVENOUS
Start: 2025-01-01

## 2024-12-04 RX ORDER — DIPHENHYDRAMINE HYDROCHLORIDE 50 MG/ML
50 INJECTION INTRAMUSCULAR; INTRAVENOUS
Start: 2025-01-01

## 2024-12-04 RX ORDER — NALOXONE HYDROCHLORIDE 0.4 MG/ML
0.2 INJECTION, SOLUTION INTRAMUSCULAR; INTRAVENOUS; SUBCUTANEOUS
OUTPATIENT
Start: 2025-01-01

## 2024-12-04 RX ORDER — EPINEPHRINE 1 MG/ML
0.3 INJECTION, SOLUTION INTRAMUSCULAR; SUBCUTANEOUS EVERY 5 MIN PRN
OUTPATIENT
Start: 2025-01-01

## 2024-12-04 RX ORDER — HEPARIN SODIUM (PORCINE) LOCK FLUSH IV SOLN 100 UNIT/ML 100 UNIT/ML
5 SOLUTION INTRAVENOUS
OUTPATIENT
Start: 2025-01-01

## 2024-12-04 RX ORDER — HEPARIN SODIUM,PORCINE 10 UNIT/ML
5 VIAL (ML) INTRAVENOUS
OUTPATIENT
Start: 2025-01-01

## 2024-12-04 RX ORDER — MEPERIDINE HYDROCHLORIDE 25 MG/ML
25 INJECTION INTRAMUSCULAR; INTRAVENOUS; SUBCUTANEOUS EVERY 30 MIN PRN
OUTPATIENT
Start: 2025-01-01

## 2024-12-04 RX ORDER — ALBUTEROL SULFATE 90 UG/1
1-2 INHALANT RESPIRATORY (INHALATION)
Start: 2025-01-01

## 2024-12-04 RX ORDER — ALBUTEROL SULFATE 0.83 MG/ML
2.5 SOLUTION RESPIRATORY (INHALATION)
OUTPATIENT
Start: 2025-01-01

## 2024-12-04 RX ADMIN — ERTAPENEM 1 G: 1 INJECTION, POWDER, LYOPHILIZED, FOR SOLUTION INTRAMUSCULAR; INTRAVENOUS at 10:46

## 2024-12-04 ASSESSMENT — PAIN SCALES - GENERAL: PAINLEVEL_OUTOF10: NO PAIN (0)

## 2024-12-04 NOTE — PROGRESS NOTES
PRE-OPERATIVE DIAGNOSIS:   1.  Neurogenic bladder     POST-OPERATIVE DIAGNOSIS:  1.  Neurogenic bladder     PROCEDURE:    1. Cystourethroscopy With injection of botulinum toxin A 200units in 10cc sterile saline     Surgeon: Alejandro Tong MD     OPERATIVE INDICATIONS:  Kinza Teresa is a 49 year old male with neurogenic bladder due to spinal cord injury. He does not have an augment. He performs CIC per urethra. He takes oxybutynin. UDS demonstrates persistent neurogenic DO.  He experiences recurrent UTIs. He does irrigations. He previously had an IPP which works well for him though has some glans hypermobility.  His last botox was Sept 2024. He gets Ertapenem x 2 doses for this procedure - one yesterday and one today. He is not interested in bladder augmentation. He does well with botox with good response.     OPERATIVE DETAILS:  The patient was taken to the procedure room. He was left in his chair. Penis was prepped.      A Flexible cystoscope was placed into the bladder and a flexible needle  Was preloaded.  The bladder mucosa appeared to be normal without stones, tumors or diverticulum on 360 degree inspection. The ureteral orifices were in the normal orthotopic position bilaterally.  We mixed 2 vials of Botox for 200u total. 10cc of saline. We performed a template injection procedure with 10 injections. All injections were placed deep to the mucosa into the detrusor muscle.  We then emptied the bladder to re-inspect our injection sites and found that there was a minimal amount of blood.     Scope was removed.     PLAN:   Repeat Botox injection in 3-4 months  Ertapenem 1g IV day before and day of.    Alejandro Tong MD

## 2024-12-04 NOTE — PROGRESS NOTES
Infusion Nursing Note:  Kinza Teresa presents today for Invanz.    Patient seen by provider today: No   present during visit today: Not Applicable.    Note: Invanz administered over 5 minutes IVP.      Intravenous Access:  Peripheral IV placed by vascular access.    Treatment Conditions:  Not Applicable.      Post Infusion Assessment:  Patient tolerated infusion without incident.  Blood return noted pre and post infusion.  Site patent and intact, free from redness, edema or discomfort.  No evidence of extravasations.  Access discontinued per protocol.       Discharge Plan:   Discharge instructions reviewed with: Patient.  Patient and/or family verbalized understanding of discharge instructions and all questions answered.  AVS to patient via [employer].  Patient will return as directed by provider for next appointment.   Patient discharged in stable condition accompanied by: self.  Departure Mode: Wheelchair.    Administrations This Visit       ertapenem (INVanz) 1 g in 10 mL NS for IVP       Admin Date  12/04/2024 Action  $Given Dose  1 g Route  Intravenous Documented By  Karolina Gardner RN                    /85   Pulse 63   Temp 97.9  F (36.6  C) (Oral)   Resp 18   SpO2 100%     Karolina Gardner RN

## 2024-12-04 NOTE — PATIENT INSTRUCTIONS
Dear Kinza Teresa    Thank you for choosing [facility] ([facility] for your infusion.  The following information is a summary of our appointment as well as important reminders.      If you have any questions on your upcoming Specialty Infusion appointments, please call scheduling at [phone].  It was a pleasure taking care of you today.    Sincerely,    [facility]  [address] [place] [address]  [address]  Phone:  [phone]

## 2024-12-04 NOTE — LETTER
12/4/2024       RE: Kinza Teresa  [address] [facility] [address]     Dear Colleague,    Thank you for referring your patient, Kinza Teresa, to the [facility] UROLOGY CLINIC [place] at [facility]. Please see a copy of my visit note below.    PRE-OPERATIVE DIAGNOSIS:   1.  Neurogenic bladder     POST-OPERATIVE DIAGNOSIS:  1.  Neurogenic bladder     PROCEDURE:    1. Cystourethroscopy With injection of botulinum toxin A 200units in 10cc sterile saline     Surgeon: Alejandro Tong MD     OPERATIVE INDICATIONS:  Kinza Teresa is a 49 year old male with neurogenic bladder due to spinal cord injury. He does not have an augment. He performs CIC per urethra. He takes oxybutynin. UDS demonstrates persistent neurogenic DO.  He experiences recurrent UTIs. He does irrigations. He previously had an IPP which works well for him though has some glans hypermobility.  His last botox was Sept 2024. He gets Ertapenem x 2 doses for this procedure - one yesterday and one today. He is not interested in bladder augmentation. He does well with botox with good response.     OPERATIVE DETAILS:  The patient was taken to the procedure room. He was left in his chair. Penis was prepped.      A Flexible cystoscope was placed into the bladder and a flexible needle  Was preloaded.  The bladder mucosa appeared to be normal without stones, tumors or diverticulum on 360 degree inspection. The ureteral orifices were in the normal orthotopic position bilaterally.  We mixed 2 vials of Botox for 200u total. 10cc of saline. We performed a template injection procedure with 10 injections. All injections were placed deep to the mucosa into the detrusor muscle.  We then emptied the bladder to re-inspect our injection sites and found that there was a minimal amount of blood.     Scope was removed.     PLAN:   Repeat Botox injection in 3-4 months  Ertapenem 1g IV day before and day of.    Alejandro  MD Alycia      Again, thank you for allowing me to participate in the care of your patient.      Sincerely,    Alejandro Tong MD

## 2024-12-04 NOTE — PATIENT INSTRUCTIONS
"Please follow up for a repeat cysto + botox with Dr. Tong in 3-4 months.     It was a pleasure meeting with you today.  Thank you for allowing me and my team the privilege of caring for you today.  YOU are the reason we are here, and I truly hope we provided you with the excellent service you deserve.  Please let us know if there is anything else we can do for you so that we can be sure you are leaving completely satisfied with your care experience.        AFTER YOUR CYSTOSCOPY        You have just completed a cystoscopy, or \"cysto\", which allowed your physician to learn more about your bladder (or to remove a stent placed after surgery). We suggest that you continue to avoid caffeine, fruit juice, and alcohol for the next 24 hours, however, you are encouraged to return to your normal activities.         A few things that are considered normal after your cystoscopy:     * Small amount of bleeding (or spotting) that clears within the next 24 hours     * Slight burning sensation with urination     * Sensation to of needing to avoid more frequently     * The feeling of \"air\" in your urine     * Mild discomfort that is relieved with Tylenol        Please contact our office promptly if you:     * Develop a fever above 101 degrees     * Are unable to urinate     * Develop bright red blood that does not stop     * Severe pain or swelling         Please contact our office with any concerns or questions @[email or web address].  "
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
## 2025-02-01 ENCOUNTER — HEALTH MAINTENANCE LETTER (OUTPATIENT)
Age: 50
End: 2025-02-01

## 2025-04-15 ENCOUNTER — TELEPHONE (OUTPATIENT)
Dept: UROLOGY | Facility: CLINIC | Age: 50
End: 2025-04-15

## 2025-04-15 ENCOUNTER — INFUSION THERAPY VISIT (OUTPATIENT)
Dept: INFUSION THERAPY | Facility: CLINIC | Age: 50
End: 2025-04-15
Attending: UROLOGY
Payer: COMMERCIAL

## 2025-04-15 VITALS
SYSTOLIC BLOOD PRESSURE: 147 MMHG | HEART RATE: 60 BPM | DIASTOLIC BLOOD PRESSURE: 93 MMHG | RESPIRATION RATE: 16 BRPM | OXYGEN SATURATION: 100 %

## 2025-04-15 DIAGNOSIS — N39.0 URINARY TRACT INFECTION ASSOCIATED WITH CATHETERIZATION OF URINARY TRACT, UNSPECIFIED INDWELLING URINARY CATHETER TYPE, SEQUELA: Primary | ICD-10-CM

## 2025-04-15 DIAGNOSIS — T83.511S URINARY TRACT INFECTION ASSOCIATED WITH CATHETERIZATION OF URINARY TRACT, UNSPECIFIED INDWELLING URINARY CATHETER TYPE, SEQUELA: Primary | ICD-10-CM

## 2025-04-15 DIAGNOSIS — Z29.89 NEED FOR PROPHYLAXIS AGAINST URINARY TRACT INFECTION: ICD-10-CM

## 2025-04-15 PROCEDURE — 250N000011 HC RX IP 250 OP 636: Mod: JZ | Performed by: UROLOGY

## 2025-04-15 PROCEDURE — 96374 THER/PROPH/DIAG INJ IV PUSH: CPT

## 2025-04-15 RX ORDER — METHYLPREDNISOLONE SODIUM SUCCINATE 125 MG/2ML
125 INJECTION INTRAMUSCULAR; INTRAVENOUS
Start: 2025-07-01

## 2025-04-15 RX ORDER — DIPHENHYDRAMINE HYDROCHLORIDE 50 MG/ML
50 INJECTION, SOLUTION INTRAMUSCULAR; INTRAVENOUS
Start: 2025-07-01

## 2025-04-15 RX ORDER — ALBUTEROL SULFATE 0.83 MG/ML
2.5 SOLUTION RESPIRATORY (INHALATION)
OUTPATIENT
Start: 2025-07-01

## 2025-04-15 RX ORDER — MEPERIDINE HYDROCHLORIDE 25 MG/ML
25 INJECTION INTRAMUSCULAR; INTRAVENOUS; SUBCUTANEOUS EVERY 30 MIN PRN
OUTPATIENT
Start: 2025-07-01

## 2025-04-15 RX ORDER — EPINEPHRINE 1 MG/ML
0.3 INJECTION, SOLUTION INTRAMUSCULAR; SUBCUTANEOUS EVERY 5 MIN PRN
OUTPATIENT
Start: 2025-07-01

## 2025-04-15 RX ORDER — ALBUTEROL SULFATE 90 UG/1
1-2 INHALANT RESPIRATORY (INHALATION)
Start: 2025-07-01

## 2025-04-15 RX ORDER — HEPARIN SODIUM (PORCINE) LOCK FLUSH IV SOLN 100 UNIT/ML 100 UNIT/ML
5 SOLUTION INTRAVENOUS
OUTPATIENT
Start: 2025-07-01

## 2025-04-15 RX ORDER — HEPARIN SODIUM,PORCINE 10 UNIT/ML
5 VIAL (ML) INTRAVENOUS
OUTPATIENT
Start: 2025-07-01

## 2025-04-15 RX ORDER — NALOXONE HYDROCHLORIDE 0.4 MG/ML
0.2 INJECTION, SOLUTION INTRAMUSCULAR; INTRAVENOUS; SUBCUTANEOUS
OUTPATIENT
Start: 2025-07-01

## 2025-04-15 RX ADMIN — ERTAPENEM 1 G: 1 INJECTION, POWDER, LYOPHILIZED, FOR SOLUTION INTRAMUSCULAR; INTRAVENOUS at 12:18

## 2025-04-15 NOTE — PATIENT INSTRUCTIONS
Dear Kinza Teresa    Thank you for choosing [facility] ([facility]) for your infusion.  The following information is a summary of our appointment as well as important reminders.      We look forward in seeing you on your next appointment here at [facility] ([facility]).  Please don t hesitate to call us at [phone] to reschedule any of your appointments or to speak with one of the [facility] registered nurses.  It was a pleasure taking care of you today.    Sincerely,    [facility]  [facility]  [address] [place] [address]  [address]  Phone:  [phone]

## 2025-04-15 NOTE — TELEPHONE ENCOUNTER
Health Call Center    Phone Message    May a detailed message be left on voicemail: yes     Reason for Call: Other: Patient is calling about Cysto with Botox scheduled for 4/16/25. Patient states that this will be the first time that he has done this without starting an antibiotic prior to the appointment. Patient is wanting to confirm if that is correct. Patient is requesting a call back.     Action Taken: Message routed to:  [facility] ([facility] Urology    Travel Screening: Not Applicable

## 2025-04-15 NOTE — PROGRESS NOTES
Infusion Nursing Note:  Kinza Teresa presents today for Invanz.    Patient seen by provider today: No   present during visit today: Not Applicable.    Note:   Patient to receive every 24 hours for 2 doses prior to surgery; today is dose 1.  IVP over 5 minutes.    Intravenous Access:  [facility] by [facility].    Treatment Conditions:  Not Applicable.    Post Infusion Assessment:  Patient tolerated infusion without incident.  Blood return noted pre and post infusion.  Site patent and intact, free from redness, edema or discomfort.  No evidence of extravasations.  Access discontinued per protocol.     Discharge Plan:   Discharge instructions reviewed with: Patient.  Patient and/or family verbalized understanding of discharge instructions and all questions answered.  AVS to patient via [employer].  Patient will return tomorrow for next appointment.   Patient discharged in stable condition accompanied by: self.  Departure Mode: Ambulatory.    Administrations This Visit       ertapenem (INVanz) 1 g in 10 mL NS for IVP       Admin Date  04/15/2025 Action  $Given Dose  1 g Route  Intravenous Documented By  Shelly Narayanan RN                  BP (!) 147/93 (BP Location: Left arm)   Pulse 60   Resp 16   SpO2 100%     Shelly Narayanan RN

## 2025-04-16 ENCOUNTER — OFFICE VISIT (OUTPATIENT)
Dept: UROLOGY | Facility: CLINIC | Age: 50
End: 2025-04-16
Payer: COMMERCIAL

## 2025-04-16 ENCOUNTER — INFUSION THERAPY VISIT (OUTPATIENT)
Dept: INFUSION THERAPY | Facility: CLINIC | Age: 50
End: 2025-04-16
Attending: UROLOGY
Payer: COMMERCIAL

## 2025-04-16 VITALS — SYSTOLIC BLOOD PRESSURE: 155 MMHG | DIASTOLIC BLOOD PRESSURE: 101 MMHG | OXYGEN SATURATION: 100 % | HEART RATE: 55 BPM

## 2025-04-16 VITALS
HEART RATE: 63 BPM | WEIGHT: 141 LBS | BODY MASS INDEX: 22.66 KG/M2 | HEIGHT: 66 IN | OXYGEN SATURATION: 98 % | DIASTOLIC BLOOD PRESSURE: 94 MMHG | SYSTOLIC BLOOD PRESSURE: 151 MMHG

## 2025-04-16 DIAGNOSIS — N31.9 NEUROGENIC BLADDER: Primary | ICD-10-CM

## 2025-04-16 DIAGNOSIS — N39.0 URINARY TRACT INFECTION ASSOCIATED WITH CATHETERIZATION OF URINARY TRACT, UNSPECIFIED INDWELLING URINARY CATHETER TYPE, SEQUELA: Primary | ICD-10-CM

## 2025-04-16 DIAGNOSIS — Z29.89 NEED FOR PROPHYLAXIS AGAINST URINARY TRACT INFECTION: ICD-10-CM

## 2025-04-16 DIAGNOSIS — T83.511S URINARY TRACT INFECTION ASSOCIATED WITH CATHETERIZATION OF URINARY TRACT, UNSPECIFIED INDWELLING URINARY CATHETER TYPE, SEQUELA: Primary | ICD-10-CM

## 2025-04-16 PROCEDURE — 250N000011 HC RX IP 250 OP 636: Mod: JZ | Performed by: UROLOGY

## 2025-04-16 RX ORDER — MEPERIDINE HYDROCHLORIDE 25 MG/ML
25 INJECTION INTRAMUSCULAR; INTRAVENOUS; SUBCUTANEOUS EVERY 30 MIN PRN
OUTPATIENT
Start: 2025-07-01

## 2025-04-16 RX ORDER — HEPARIN SODIUM,PORCINE 10 UNIT/ML
5 VIAL (ML) INTRAVENOUS
OUTPATIENT
Start: 2025-07-01

## 2025-04-16 RX ORDER — NALOXONE HYDROCHLORIDE 0.4 MG/ML
0.2 INJECTION, SOLUTION INTRAMUSCULAR; INTRAVENOUS; SUBCUTANEOUS
OUTPATIENT
Start: 2025-07-01

## 2025-04-16 RX ORDER — HEPARIN SODIUM (PORCINE) LOCK FLUSH IV SOLN 100 UNIT/ML 100 UNIT/ML
5 SOLUTION INTRAVENOUS
OUTPATIENT
Start: 2025-07-01

## 2025-04-16 RX ORDER — METHYLPREDNISOLONE SODIUM SUCCINATE 125 MG/2ML
125 INJECTION INTRAMUSCULAR; INTRAVENOUS
Start: 2025-07-01

## 2025-04-16 RX ORDER — ALBUTEROL SULFATE 0.83 MG/ML
2.5 SOLUTION RESPIRATORY (INHALATION)
OUTPATIENT
Start: 2025-07-01

## 2025-04-16 RX ORDER — ALBUTEROL SULFATE 90 UG/1
1-2 INHALANT RESPIRATORY (INHALATION)
Start: 2025-07-01

## 2025-04-16 RX ORDER — DIPHENHYDRAMINE HYDROCHLORIDE 50 MG/ML
50 INJECTION, SOLUTION INTRAMUSCULAR; INTRAVENOUS
Start: 2025-07-01

## 2025-04-16 RX ORDER — EPINEPHRINE 1 MG/ML
0.3 INJECTION, SOLUTION INTRAMUSCULAR; SUBCUTANEOUS EVERY 5 MIN PRN
OUTPATIENT
Start: 2025-07-01

## 2025-04-16 RX ADMIN — ERTAPENEM 1 G: 1 INJECTION, POWDER, LYOPHILIZED, FOR SOLUTION INTRAMUSCULAR; INTRAVENOUS at 12:30

## 2025-04-16 NOTE — PROGRESS NOTES
Infusion Nursing Note:  Chelsiecrissy Teresa presents today for Invanz.    Patient seen by provider today: No   present during visit today: Not Applicable.    Note:   Patient to receive every 24 hours for 2 doses prior to surgery; today is dose 2  IVP over 5 minutes.    Intravenous Access:  [facility] IV [facility] by [facility].    Treatment Conditions:  Not Applicable.    Post Infusion Assessment:  Patient tolerated infusion without incident.  Blood return noted pre and post infusion.  Site patent and intact, free from redness, edema or discomfort.  No evidence of extravasations.  Access discontinued per protocol.     Discharge Plan:   Discharge instructions reviewed with: Patient.  Patient and/or family verbalized understanding of discharge instructions and all questions answered.  AVS to patient via [employer].  Patient will return tomorrow for next appointment.   Patient discharged in stable condition accompanied by: self.  Departure Mode: Ambulatory.        There were no vitals taken for this visit.    Isha Page RN on 4/16/2025 at 11:49 AM

## 2025-04-16 NOTE — PATIENT INSTRUCTIONS
Dear Kinza Teresa    Thank you for choosing [facility] ([facility] for your infusion.  The following information is a summary of our appointment as well as important reminders.     If you are a transplant patient and require transplant education, please click on this link: [email or web address].    If you have any questions on your upcoming Specialty Infusion appointments, please call scheduling at [phone].  It was a pleasure taking care of you today.    Sincerely,    [facility]  [facility]  [address] [place] [address]  [address]  Phone:  [phone]
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
## 2025-04-16 NOTE — LETTER
4/16/2025       RE: Kinza Teresa  [address] [facility] [address]     Dear Colleague,    Thank you for referring your patient, Kinza Teresa, to the [facility] [place] [facility]. Please see a copy of my visit note below.    PRE-OPERATIVE DIAGNOSIS:   1.  Neurogenic bladder     POST-OPERATIVE DIAGNOSIS:  1.  Neurogenic bladder     PROCEDURE:    1. Cystourethroscopy With injection of botulinum toxin A 200units in 10cc sterile saline     Surgeon: Alejandro Tong MD     OPERATIVE INDICATIONS:  Kinza Teresa is a 50 year old male with neurogenic bladder due to spinal cord injury. He does not have an augment. He performs CIC per urethra. He takes oxybutynin. UDS demonstrates persistent neurogenic DO.  He experiences recurrent UTIs. He does irrigations. He previously had an IPP which works well for him though has some glans hypermobility.  His last botox was Sept 2024. He gets Ertapenem x 2 doses for this procedure. He is not very interested in bladder augmentation.     OPERATIVE DETAILS:  The patient was taken to the procedure room. He was left in his chair. Penis was prepped.      A Flexible cystoscope was placed into the bladder and a flexible needle  Was preloaded.  The bladder mucosa appeared to be normal without stones, tumors or diverticulum on 360 degree inspection. The ureteral orifices were in the normal orthotopic position bilaterally.  We mixed 2 vials of Botox for 200u total. 10cc of saline. We performed a template injection procedure with 10 injections. All injections were placed deep to the mucosa into the detrusor muscle.  We then emptied the bladder to re-inspect our injection sites and found that there was a minimal amount of blood.     Scope was removed.     PLAN:   Repeat Botox injection in 3-4 months  Ertapenem 1g IV day before and day of.    Alejandro Tong MD    Again, thank you for allowing me to participate in the  care of your patient.      Sincerely,    Alejandro Tong MD

## 2025-04-17 NOTE — PROGRESS NOTES
PRE-OPERATIVE DIAGNOSIS:   1.  Neurogenic bladder     POST-OPERATIVE DIAGNOSIS:  1.  Neurogenic bladder     PROCEDURE:    1. Cystourethroscopy With injection of botulinum toxin A 200units in 10cc sterile saline     Surgeon: Alejandro Tong MD     OPERATIVE INDICATIONS:  Kinza Teresa is a 50 year old male with neurogenic bladder due to spinal cord injury. He does not have an augment. He performs CIC per urethra. He takes oxybutynin. UDS demonstrates persistent neurogenic DO.  He experiences recurrent UTIs. He does irrigations. He previously had an IPP which works well for him though has some glans hypermobility.  His last botox was Sept 2024. He gets Ertapenem x 2 doses for this procedure. He is not very interested in bladder augmentation.     OPERATIVE DETAILS:  The patient was taken to the procedure room. He was left in his chair. Penis was prepped.      A Flexible cystoscope was placed into the bladder and a flexible needle  Was preloaded.  The bladder mucosa appeared to be normal without stones, tumors or diverticulum on 360 degree inspection. The ureteral orifices were in the normal orthotopic position bilaterally.  We mixed 2 vials of Botox for 200u total. 10cc of saline. We performed a template injection procedure with 10 injections. All injections were placed deep to the mucosa into the detrusor muscle.  We then emptied the bladder to re-inspect our injection sites and found that there was a minimal amount of blood.     Scope was removed.     PLAN:   Repeat Botox injection in 3-4 months  Ertapenem 1g IV day before and day of.    Alejandro Tong MD

## 2025-08-11 ENCOUNTER — PRE VISIT (OUTPATIENT)
Dept: UROLOGY | Facility: CLINIC | Age: 50
End: 2025-08-11
Payer: COMMERCIAL

## 2025-08-11 DIAGNOSIS — N31.9 NEUROGENIC BLADDER: Primary | ICD-10-CM

## 2025-08-12 ENCOUNTER — INFUSION THERAPY VISIT (OUTPATIENT)
Dept: TRANSPLANT | Facility: CLINIC | Age: 50
End: 2025-08-12
Attending: UROLOGY
Payer: COMMERCIAL

## 2025-08-12 VITALS
SYSTOLIC BLOOD PRESSURE: 138 MMHG | HEART RATE: 54 BPM | RESPIRATION RATE: 16 BRPM | OXYGEN SATURATION: 98 % | DIASTOLIC BLOOD PRESSURE: 80 MMHG | TEMPERATURE: 97.9 F

## 2025-08-12 DIAGNOSIS — N39.0 URINARY TRACT INFECTION ASSOCIATED WITH CATHETERIZATION OF URINARY TRACT, UNSPECIFIED INDWELLING URINARY CATHETER TYPE, SEQUELA: Primary | ICD-10-CM

## 2025-08-12 DIAGNOSIS — Z29.89 NEED FOR PROPHYLAXIS AGAINST URINARY TRACT INFECTION: ICD-10-CM

## 2025-08-12 DIAGNOSIS — T83.511S URINARY TRACT INFECTION ASSOCIATED WITH CATHETERIZATION OF URINARY TRACT, UNSPECIFIED INDWELLING URINARY CATHETER TYPE, SEQUELA: Primary | ICD-10-CM

## 2025-08-12 PROCEDURE — 250N000011 HC RX IP 250 OP 636: Performed by: UROLOGY

## 2025-08-12 PROCEDURE — 96374 THER/PROPH/DIAG INJ IV PUSH: CPT

## 2025-08-12 RX ORDER — HEPARIN SODIUM,PORCINE 10 UNIT/ML
5 VIAL (ML) INTRAVENOUS
OUTPATIENT
Start: 2025-10-01

## 2025-08-12 RX ORDER — MEPERIDINE HYDROCHLORIDE 25 MG/ML
25 INJECTION INTRAMUSCULAR; INTRAVENOUS; SUBCUTANEOUS EVERY 30 MIN PRN
OUTPATIENT
Start: 2025-10-01

## 2025-08-12 RX ORDER — METHYLPREDNISOLONE SODIUM SUCCINATE 125 MG/2ML
125 INJECTION INTRAMUSCULAR; INTRAVENOUS
Start: 2025-10-01

## 2025-08-12 RX ORDER — ALBUTEROL SULFATE 90 UG/1
1-2 INHALANT RESPIRATORY (INHALATION)
Start: 2025-10-01

## 2025-08-12 RX ORDER — NALOXONE HYDROCHLORIDE 0.4 MG/ML
0.2 INJECTION, SOLUTION INTRAMUSCULAR; INTRAVENOUS; SUBCUTANEOUS
OUTPATIENT
Start: 2025-10-01

## 2025-08-12 RX ORDER — DIPHENHYDRAMINE HYDROCHLORIDE 50 MG/ML
50 INJECTION, SOLUTION INTRAMUSCULAR; INTRAVENOUS
Start: 2025-10-01

## 2025-08-12 RX ORDER — HEPARIN SODIUM (PORCINE) LOCK FLUSH IV SOLN 100 UNIT/ML 100 UNIT/ML
5 SOLUTION INTRAVENOUS
OUTPATIENT
Start: 2025-10-01

## 2025-08-12 RX ORDER — EPINEPHRINE 1 MG/ML
0.3 INJECTION, SOLUTION INTRAMUSCULAR; SUBCUTANEOUS EVERY 5 MIN PRN
OUTPATIENT
Start: 2025-10-01

## 2025-08-12 RX ORDER — ALBUTEROL SULFATE 0.83 MG/ML
2.5 SOLUTION RESPIRATORY (INHALATION)
OUTPATIENT
Start: 2025-10-01

## 2025-08-12 RX ADMIN — ERTAPENEM SODIUM 1 G: 1 INJECTION, POWDER, LYOPHILIZED, FOR SOLUTION INTRAMUSCULAR; INTRAVENOUS at 12:38

## 2025-08-13 ENCOUNTER — OFFICE VISIT (OUTPATIENT)
Dept: UROLOGY | Facility: CLINIC | Age: 50
End: 2025-08-13
Payer: COMMERCIAL

## 2025-08-13 ENCOUNTER — INFUSION THERAPY VISIT (OUTPATIENT)
Dept: TRANSPLANT | Facility: CLINIC | Age: 50
End: 2025-08-13
Attending: UROLOGY
Payer: COMMERCIAL

## 2025-08-13 VITALS
SYSTOLIC BLOOD PRESSURE: 145 MMHG | OXYGEN SATURATION: 97 % | RESPIRATION RATE: 16 BRPM | DIASTOLIC BLOOD PRESSURE: 98 MMHG | HEART RATE: 51 BPM | TEMPERATURE: 97.9 F

## 2025-08-13 VITALS
DIASTOLIC BLOOD PRESSURE: 101 MMHG | SYSTOLIC BLOOD PRESSURE: 165 MMHG | BODY MASS INDEX: 22.66 KG/M2 | OXYGEN SATURATION: 98 % | HEIGHT: 66 IN | WEIGHT: 141 LBS | HEART RATE: 44 BPM

## 2025-08-13 DIAGNOSIS — Z29.89 NEED FOR PROPHYLAXIS AGAINST URINARY TRACT INFECTION: ICD-10-CM

## 2025-08-13 DIAGNOSIS — N31.9 NEUROGENIC BLADDER: Primary | ICD-10-CM

## 2025-08-13 DIAGNOSIS — T83.511S URINARY TRACT INFECTION ASSOCIATED WITH CATHETERIZATION OF URINARY TRACT, UNSPECIFIED INDWELLING URINARY CATHETER TYPE, SEQUELA: Primary | ICD-10-CM

## 2025-08-13 DIAGNOSIS — N39.0 URINARY TRACT INFECTION ASSOCIATED WITH CATHETERIZATION OF URINARY TRACT, UNSPECIFIED INDWELLING URINARY CATHETER TYPE, SEQUELA: Primary | ICD-10-CM

## 2025-08-13 PROCEDURE — 250N000011 HC RX IP 250 OP 636: Performed by: UROLOGY

## 2025-08-13 RX ORDER — NALOXONE HYDROCHLORIDE 0.4 MG/ML
0.2 INJECTION, SOLUTION INTRAMUSCULAR; INTRAVENOUS; SUBCUTANEOUS
OUTPATIENT
Start: 2025-10-01

## 2025-08-13 RX ORDER — HEPARIN SODIUM,PORCINE 10 UNIT/ML
5 VIAL (ML) INTRAVENOUS
OUTPATIENT
Start: 2025-10-01

## 2025-08-13 RX ORDER — ALBUTEROL SULFATE 0.83 MG/ML
2.5 SOLUTION RESPIRATORY (INHALATION)
OUTPATIENT
Start: 2025-10-01

## 2025-08-13 RX ORDER — LIDOCAINE HYDROCHLORIDE 20 MG/ML
JELLY TOPICAL ONCE
Status: CANCELLED | OUTPATIENT
Start: 2025-08-13 | End: 2025-08-13

## 2025-08-13 RX ORDER — EPINEPHRINE 1 MG/ML
0.3 INJECTION, SOLUTION INTRAMUSCULAR; SUBCUTANEOUS EVERY 5 MIN PRN
OUTPATIENT
Start: 2025-10-01

## 2025-08-13 RX ORDER — LIDOCAINE HYDROCHLORIDE 20 MG/ML
JELLY TOPICAL ONCE
Status: COMPLETED | OUTPATIENT
Start: 2025-08-13 | End: 2025-08-13

## 2025-08-13 RX ORDER — MEPERIDINE HYDROCHLORIDE 25 MG/ML
25 INJECTION INTRAMUSCULAR; INTRAVENOUS; SUBCUTANEOUS EVERY 30 MIN PRN
OUTPATIENT
Start: 2025-10-01

## 2025-08-13 RX ORDER — METHYLPREDNISOLONE SODIUM SUCCINATE 125 MG/2ML
125 INJECTION INTRAMUSCULAR; INTRAVENOUS
Start: 2025-10-01

## 2025-08-13 RX ORDER — ALBUTEROL SULFATE 90 UG/1
1-2 INHALANT RESPIRATORY (INHALATION)
Start: 2025-10-01

## 2025-08-13 RX ORDER — HEPARIN SODIUM (PORCINE) LOCK FLUSH IV SOLN 100 UNIT/ML 100 UNIT/ML
5 SOLUTION INTRAVENOUS
OUTPATIENT
Start: 2025-10-01

## 2025-08-13 RX ORDER — DIPHENHYDRAMINE HYDROCHLORIDE 50 MG/ML
50 INJECTION, SOLUTION INTRAMUSCULAR; INTRAVENOUS
Start: 2025-10-01

## 2025-08-13 RX ADMIN — ERTAPENEM SODIUM 1 G: 1 INJECTION, POWDER, LYOPHILIZED, FOR SOLUTION INTRAMUSCULAR; INTRAVENOUS at 09:28

## 2025-08-13 RX ADMIN — LIDOCAINE HYDROCHLORIDE: 20 JELLY TOPICAL at 11:11

## (undated) DEVICE — DRAPE SHEET REV FOLD 3/4 9349

## (undated) DEVICE — SU MONOCRYL 4-0 PS-2 27" UND Y426H

## (undated) DEVICE — CAST PADDING 6IN COTTON WEBRIL STERILE 2554

## (undated) DEVICE — Device

## (undated) DEVICE — PAD CHUX UNDERPAD 23X24" 7136

## (undated) DEVICE — ESU CORD BIPOLAR GREEN 10-4000

## (undated) DEVICE — SU VICRYL 3-0 SH 27" UND J416H

## (undated) DEVICE — SUCTION MANIFOLD NEPTUNE 2 SYS 1 PORT 702-025-000

## (undated) DEVICE — DRAPE IOBAN INCISE 23X17" 6650EZ

## (undated) DEVICE — DRSG KERLIX 4 1/2"X4YDS ROLL 6715

## (undated) DEVICE — PREP POVIDONE IODINE SOLUTION 10% 4OZ

## (undated) DEVICE — PREP POVIDONE IODINE SCRUB 7.5% 4OZ APL82212

## (undated) DEVICE — DRAPE SHEET TABLE COVER KC 42301*

## (undated) DEVICE — CATH FOLYSIL 16FR 15ML AA6116

## (undated) DEVICE — ESU HOLSTER PLASTIC DISP E2400

## (undated) DEVICE — WIRE GUIDE 3.2X400MM  357.399

## (undated) DEVICE — PITCHER STERILE 1000ML  SSK9004A

## (undated) DEVICE — BLADE CLIPPER SGL USE 9680

## (undated) DEVICE — GLOVE UNDER INDICATOR PI SZ 7.0 LF 41670

## (undated) DEVICE — ESU GROUND PAD ADULT W/CORD E7507

## (undated) DEVICE — DRILL BIT QUICK COUPLING 3 FLUTE 4.2MMX145MM NDL  POINT

## (undated) DEVICE — PLATE GROUNDING ADULT W/CORD 9165L

## (undated) DEVICE — STPL SKIN 35W 6.9MM  PXW35

## (undated) DEVICE — GOWN XLG DISP 9545

## (undated) DEVICE — BLADE KNIFE SURG 15 371115

## (undated) DEVICE — ADH SKIN CLOSURE PREMIERPRO EXOFIN 1.0ML 3470

## (undated) DEVICE — DRAPE MAYO STAND 23X54 8337

## (undated) DEVICE — DRAPE C-ARMOR 5 SIDED 5523

## (undated) DEVICE — SYR 10ML LL W/O NDL 302995

## (undated) DEVICE — BNDG COBAN 2"X5YDS CO-FLEX UNSTERILE ASSRTD CLRS LF 5200CP

## (undated) DEVICE — SUTURE VICRYL+ 2-0 27IN CT-1 UND VCP259H

## (undated) DEVICE — PACK CYSTO CUSTOM ASC

## (undated) DEVICE — PREP SKIN SCRUB TRAY 4461A

## (undated) DEVICE — DRSG GAUZE 4X4" TRAY 6939

## (undated) DEVICE — GLOVE BIOGEL PI INDICATOR 8.0 LF 41680

## (undated) DEVICE — DRAPE SPLIT SHEET 77X108 REINFORCED 29436

## (undated) DEVICE — SPECIMEN CONTAINER W/10% BUFFERED FORMALIN 120ML 591201

## (undated) DEVICE — DRAPE LAP W/ARMBOARD 29410

## (undated) DEVICE — GLOVE PROTEXIS W/NEU-THERA 7.5  2D73TE75

## (undated) DEVICE — DRESSING XEROFORM PETROLATUM 5X9 33605

## (undated) DEVICE — SU PDS II 2-0 SH 27" Z317H

## (undated) DEVICE — LINEN TOWEL PACK X6 WHITE 5487

## (undated) DEVICE — SYR 50ML LL W/O NDL 309653

## (undated) DEVICE — SOL WATER IRRIG 1000ML BOTTLE 2F7114

## (undated) DEVICE — RETR ELASTIC STAYS LONE STAR SHARP 5MM 8/PACK 3311-8G

## (undated) DEVICE — DRAPE SLEEVE 599

## (undated) DEVICE — TUBING SUCTION 10'X3/16" N510

## (undated) DEVICE — SUTURE BOOTS 051003PBX

## (undated) DEVICE — LINEN TOWEL PACK X5 5464

## (undated) DEVICE — ROD RM 950MM 3MM 3.8MM BALL TIP STRL

## (undated) DEVICE — DRSG TELFA 3X8" 1238

## (undated) DEVICE — DRILL BIT QUICK COUPLING 3 FLUTE 4.2MMX330/100MM CALIBRATE

## (undated) DEVICE — GLOVE BIOGEL PI ULTRATOUCH G SZ 7.0 42170

## (undated) DEVICE — STPL SKIN 35W ROTATING HEAD PRW35

## (undated) DEVICE — GOWN IMPERVIOUS BREATHABLE SMART LG 89015

## (undated) DEVICE — CATH PLUG W/CAP 000076

## (undated) DEVICE — SUTURE VICRYL+ 1 27IN CT-1 UND VCP261H

## (undated) DEVICE — PREP CHLORAPREP 26ML TINTED HI-LITE ORANGE 930815

## (undated) DEVICE — DRSG COTTON ROLL DEROYAL STERILE 9866-01

## (undated) DEVICE — GLOVE BIOGEL PI ORTHOPRO SZ 7.5 47675

## (undated) DEVICE — SU MONOCRYL 3-0 SH 27" UND Y416H

## (undated) DEVICE — ESU ELEC NDL 1" E1552

## (undated) DEVICE — SU MONOCRYL 3-0 PS-2 18" UND MCP497G

## (undated) DEVICE — CUSTOM PACK TOTAL KNEE SOP5BTKHEC

## (undated) DEVICE — GOWN IMPERVIOUS BREATHABLE SMART XLG 89045

## (undated) DEVICE — DRAPE C-ARM 60X42" 1013

## (undated) DEVICE — SOL NACL 0.9% IRRIG 1000ML BOTTLE 2F7124

## (undated) DEVICE — SYR BULB IRRIG 50ML LATEX FREE 0035280

## (undated) RX ORDER — DEXAMETHASONE SODIUM PHOSPHATE 10 MG/ML
INJECTION, SOLUTION INTRAMUSCULAR; INTRAVENOUS
Status: DISPENSED
Start: 2023-05-29

## (undated) RX ORDER — ONDANSETRON 2 MG/ML
INJECTION INTRAMUSCULAR; INTRAVENOUS
Status: DISPENSED
Start: 2019-11-14

## (undated) RX ORDER — GLYCOPYRROLATE 0.2 MG/ML
INJECTION, SOLUTION INTRAMUSCULAR; INTRAVENOUS
Status: DISPENSED
Start: 2019-11-14

## (undated) RX ORDER — OXYCODONE HYDROCHLORIDE 5 MG/1
TABLET ORAL
Status: DISPENSED
Start: 2019-11-14

## (undated) RX ORDER — HYDROMORPHONE HYDROCHLORIDE 1 MG/ML
INJECTION, SOLUTION INTRAMUSCULAR; INTRAVENOUS; SUBCUTANEOUS
Status: DISPENSED
Start: 2019-11-14

## (undated) RX ORDER — CIPROFLOXACIN 500 MG/1
TABLET, FILM COATED ORAL
Status: DISPENSED
Start: 2019-01-09

## (undated) RX ORDER — LIDOCAINE HYDROCHLORIDE 20 MG/ML
INJECTION, SOLUTION EPIDURAL; INFILTRATION; INTRACAUDAL; PERINEURAL
Status: DISPENSED
Start: 2019-11-14

## (undated) RX ORDER — VANCOMYCIN HYDROCHLORIDE 1 G/20ML
INJECTION, POWDER, LYOPHILIZED, FOR SOLUTION INTRAVENOUS
Status: DISPENSED
Start: 2023-05-29

## (undated) RX ORDER — ACETAMINOPHEN 325 MG/1
TABLET ORAL
Status: DISPENSED
Start: 2021-03-30

## (undated) RX ORDER — LIDOCAINE HYDROCHLORIDE 20 MG/ML
JELLY TOPICAL
Status: DISPENSED
Start: 2025-08-13

## (undated) RX ORDER — PHENYLEPHRINE HCL IN 0.9% NACL 1 MG/10 ML
SYRINGE (ML) INTRAVENOUS
Status: DISPENSED
Start: 2019-11-14

## (undated) RX ORDER — EPHEDRINE SULFATE 50 MG/ML
INJECTION, SOLUTION INTRAMUSCULAR; INTRAVENOUS; SUBCUTANEOUS
Status: DISPENSED
Start: 2019-11-14

## (undated) RX ORDER — CIPROFLOXACIN 500 MG/1
TABLET, FILM COATED ORAL
Status: DISPENSED
Start: 2017-09-27

## (undated) RX ORDER — FENTANYL CITRATE 50 UG/ML
INJECTION, SOLUTION INTRAMUSCULAR; INTRAVENOUS
Status: DISPENSED
Start: 2019-11-14

## (undated) RX ORDER — FENTANYL CITRATE-0.9 % NACL/PF 10 MCG/ML
PLASTIC BAG, INJECTION (ML) INTRAVENOUS
Status: DISPENSED
Start: 2023-05-29

## (undated) RX ORDER — LIDOCAINE HYDROCHLORIDE 20 MG/ML
JELLY TOPICAL
Status: DISPENSED
Start: 2024-01-24

## (undated) RX ORDER — PROPOFOL 10 MG/ML
INJECTION, EMULSION INTRAVENOUS
Status: DISPENSED
Start: 2023-05-29

## (undated) RX ORDER — PROPOFOL 10 MG/ML
INJECTION, EMULSION INTRAVENOUS
Status: DISPENSED
Start: 2019-11-14

## (undated) RX ORDER — FENTANYL CITRATE 50 UG/ML
INJECTION, SOLUTION INTRAMUSCULAR; INTRAVENOUS
Status: DISPENSED
Start: 2021-03-30

## (undated) RX ORDER — GABAPENTIN 300 MG/1
CAPSULE ORAL
Status: DISPENSED
Start: 2021-03-30

## (undated) RX ORDER — DEXAMETHASONE SODIUM PHOSPHATE 4 MG/ML
INJECTION, SOLUTION INTRA-ARTICULAR; INTRALESIONAL; INTRAMUSCULAR; INTRAVENOUS; SOFT TISSUE
Status: DISPENSED
Start: 2019-11-14

## (undated) RX ORDER — LIDOCAINE HYDROCHLORIDE 20 MG/ML
JELLY TOPICAL
Status: DISPENSED
Start: 2023-09-27

## (undated) RX ORDER — ACETAMINOPHEN 325 MG/1
TABLET ORAL
Status: DISPENSED
Start: 2019-11-14

## (undated) RX ORDER — ONDANSETRON 2 MG/ML
INJECTION INTRAMUSCULAR; INTRAVENOUS
Status: DISPENSED
Start: 2023-05-29

## (undated) RX ORDER — ERTAPENEM 1 G/1
INJECTION, POWDER, LYOPHILIZED, FOR SOLUTION INTRAMUSCULAR; INTRAVENOUS
Status: DISPENSED
Start: 2021-03-30